# Patient Record
Sex: MALE | Race: ASIAN | Employment: UNEMPLOYED | URBAN - METROPOLITAN AREA
[De-identification: names, ages, dates, MRNs, and addresses within clinical notes are randomized per-mention and may not be internally consistent; named-entity substitution may affect disease eponyms.]

---

## 2023-07-03 ENCOUNTER — APPOINTMENT (OUTPATIENT)
Dept: GENERAL RADIOLOGY | Facility: CLINIC | Age: 38
End: 2023-07-03
Attending: STUDENT IN AN ORGANIZED HEALTH CARE EDUCATION/TRAINING PROGRAM
Payer: COMMERCIAL

## 2023-07-03 ENCOUNTER — HOSPITAL ENCOUNTER (INPATIENT)
Facility: CLINIC | Age: 38
LOS: 17 days | Discharge: HOME | End: 2023-07-20
Attending: STUDENT IN AN ORGANIZED HEALTH CARE EDUCATION/TRAINING PROGRAM | Admitting: STUDENT IN AN ORGANIZED HEALTH CARE EDUCATION/TRAINING PROGRAM
Payer: COMMERCIAL

## 2023-07-03 DIAGNOSIS — Z79.899 RECEIVING INOTROPIC MEDICATION: ICD-10-CM

## 2023-07-03 DIAGNOSIS — R07.82 INTERCOSTAL PAIN: ICD-10-CM

## 2023-07-03 DIAGNOSIS — F41.9 ANXIETY: ICD-10-CM

## 2023-07-03 DIAGNOSIS — E61.1 IRON DEFICIENCY: ICD-10-CM

## 2023-07-03 DIAGNOSIS — I42.7: ICD-10-CM

## 2023-07-03 DIAGNOSIS — R57.0 CARDIOGENIC SHOCK (H): Primary | ICD-10-CM

## 2023-07-03 LAB
ABO/RH(D): NORMAL
ALBUMIN SERPL BCG-MCNC: 3.6 G/DL (ref 3.5–5.2)
ALP SERPL-CCNC: 141 U/L (ref 40–129)
ALT SERPL W P-5'-P-CCNC: 554 U/L (ref 0–70)
ANION GAP SERPL CALCULATED.3IONS-SCNC: 11 MMOL/L (ref 7–15)
ANTIBODY SCREEN: NEGATIVE
AST SERPL W P-5'-P-CCNC: 128 U/L (ref 0–45)
BASE EXCESS BLDV CALC-SCNC: 3.2 MMOL/L (ref -7.7–1.9)
BASE EXCESS BLDV CALC-SCNC: 3.6 MMOL/L (ref -7.7–1.9)
BASOPHILS # BLD AUTO: 0.1 10E3/UL (ref 0–0.2)
BASOPHILS NFR BLD AUTO: 1 %
BILIRUB SERPL-MCNC: 0.9 MG/DL
BUN SERPL-MCNC: 19 MG/DL (ref 6–20)
CALCIUM SERPL-MCNC: 8.9 MG/DL (ref 8.6–10)
CHLORIDE SERPL-SCNC: 99 MMOL/L (ref 98–107)
CREAT SERPL-MCNC: 1.01 MG/DL (ref 0.67–1.17)
DEPRECATED HCO3 PLAS-SCNC: 24 MMOL/L (ref 22–29)
EOSINOPHIL # BLD AUTO: 0.2 10E3/UL (ref 0–0.7)
EOSINOPHIL NFR BLD AUTO: 3 %
ERYTHROCYTE [DISTWIDTH] IN BLOOD BY AUTOMATED COUNT: 22.2 % (ref 10–15)
GFR SERPL CREATININE-BSD FRML MDRD: >90 ML/MIN/1.73M2
GLUCOSE BLDC GLUCOMTR-MCNC: 100 MG/DL (ref 70–99)
GLUCOSE SERPL-MCNC: 98 MG/DL (ref 70–99)
HCO3 BLDV-SCNC: 27 MMOL/L (ref 21–28)
HCO3 BLDV-SCNC: 27 MMOL/L (ref 21–28)
HCT VFR BLD AUTO: 35.3 % (ref 40–53)
HGB BLD-MCNC: 10.3 G/DL (ref 13.3–17.7)
IMM GRANULOCYTES # BLD: 0 10E3/UL
IMM GRANULOCYTES NFR BLD: 0 %
INR PPP: 1.3 (ref 0.85–1.15)
LYMPHOCYTES # BLD AUTO: 2.2 10E3/UL (ref 0.8–5.3)
LYMPHOCYTES NFR BLD AUTO: 33 %
MAGNESIUM SERPL-MCNC: 1.9 MG/DL (ref 1.7–2.3)
MCH RBC QN AUTO: 20.6 PG (ref 26.5–33)
MCHC RBC AUTO-ENTMCNC: 29.2 G/DL (ref 31.5–36.5)
MCV RBC AUTO: 71 FL (ref 78–100)
MONOCYTES # BLD AUTO: 0.9 10E3/UL (ref 0–1.3)
MONOCYTES NFR BLD AUTO: 13 %
NEUTROPHILS # BLD AUTO: 3.3 10E3/UL (ref 1.6–8.3)
NEUTROPHILS NFR BLD AUTO: 50 %
NRBC # BLD AUTO: 0 10E3/UL
NRBC BLD AUTO-RTO: 0 /100
NT-PROBNP SERPL-MCNC: 1773 PG/ML (ref 0–450)
O2/TOTAL GAS SETTING VFR VENT: 21 %
O2/TOTAL GAS SETTING VFR VENT: 21 %
OXYHGB MFR BLDV: 43 % (ref 70–75)
OXYHGB MFR BLDV: 63 % (ref 70–75)
PCO2 BLDV: 37 MM HG (ref 40–50)
PCO2 BLDV: 38 MM HG (ref 40–50)
PH BLDV: 7.47 [PH] (ref 7.32–7.43)
PH BLDV: 7.48 [PH] (ref 7.32–7.43)
PLATELET # BLD AUTO: 379 10E3/UL (ref 150–450)
PO2 BLDV: 27 MM HG (ref 25–47)
PO2 BLDV: 36 MM HG (ref 25–47)
POTASSIUM SERPL-SCNC: 3.8 MMOL/L (ref 3.4–5.3)
PROT SERPL-MCNC: 7.6 G/DL (ref 6.4–8.3)
RBC # BLD AUTO: 5.01 10E6/UL (ref 4.4–5.9)
SODIUM SERPL-SCNC: 134 MMOL/L (ref 136–145)
SPECIMEN EXPIRATION DATE: NORMAL
TROPONIN T SERPL HS-MCNC: 63 NG/L
WBC # BLD AUTO: 6.7 10E3/UL (ref 4–11)

## 2023-07-03 PROCEDURE — 84484 ASSAY OF TROPONIN QUANT: CPT | Performed by: STUDENT IN AN ORGANIZED HEALTH CARE EDUCATION/TRAINING PROGRAM

## 2023-07-03 PROCEDURE — 83735 ASSAY OF MAGNESIUM: CPT | Performed by: STUDENT IN AN ORGANIZED HEALTH CARE EDUCATION/TRAINING PROGRAM

## 2023-07-03 PROCEDURE — 250N000009 HC RX 250: Performed by: STUDENT IN AN ORGANIZED HEALTH CARE EDUCATION/TRAINING PROGRAM

## 2023-07-03 PROCEDURE — 83880 ASSAY OF NATRIURETIC PEPTIDE: CPT | Performed by: STUDENT IN AN ORGANIZED HEALTH CARE EDUCATION/TRAINING PROGRAM

## 2023-07-03 PROCEDURE — 82728 ASSAY OF FERRITIN: CPT | Performed by: STUDENT IN AN ORGANIZED HEALTH CARE EDUCATION/TRAINING PROGRAM

## 2023-07-03 PROCEDURE — 85610 PROTHROMBIN TIME: CPT | Performed by: STUDENT IN AN ORGANIZED HEALTH CARE EDUCATION/TRAINING PROGRAM

## 2023-07-03 PROCEDURE — 250N000011 HC RX IP 250 OP 636: Performed by: STUDENT IN AN ORGANIZED HEALTH CARE EDUCATION/TRAINING PROGRAM

## 2023-07-03 PROCEDURE — 250N000011 HC RX IP 250 OP 636: Mod: JZ | Performed by: STUDENT IN AN ORGANIZED HEALTH CARE EDUCATION/TRAINING PROGRAM

## 2023-07-03 PROCEDURE — 999N000065 XR CHEST PORT 1 VIEW

## 2023-07-03 PROCEDURE — 250N000013 HC RX MED GY IP 250 OP 250 PS 637: Performed by: STUDENT IN AN ORGANIZED HEALTH CARE EDUCATION/TRAINING PROGRAM

## 2023-07-03 PROCEDURE — 86850 RBC ANTIBODY SCREEN: CPT | Performed by: STUDENT IN AN ORGANIZED HEALTH CARE EDUCATION/TRAINING PROGRAM

## 2023-07-03 PROCEDURE — 85025 COMPLETE CBC W/AUTO DIFF WBC: CPT | Performed by: STUDENT IN AN ORGANIZED HEALTH CARE EDUCATION/TRAINING PROGRAM

## 2023-07-03 PROCEDURE — 200N000002 HC R&B ICU UMMC

## 2023-07-03 PROCEDURE — 80053 COMPREHEN METABOLIC PANEL: CPT | Performed by: STUDENT IN AN ORGANIZED HEALTH CARE EDUCATION/TRAINING PROGRAM

## 2023-07-03 PROCEDURE — 83550 IRON BINDING TEST: CPT | Performed by: STUDENT IN AN ORGANIZED HEALTH CARE EDUCATION/TRAINING PROGRAM

## 2023-07-03 PROCEDURE — 71045 X-RAY EXAM CHEST 1 VIEW: CPT | Mod: 26 | Performed by: RADIOLOGY

## 2023-07-03 PROCEDURE — 82805 BLOOD GASES W/O2 SATURATION: CPT | Performed by: STUDENT IN AN ORGANIZED HEALTH CARE EDUCATION/TRAINING PROGRAM

## 2023-07-03 PROCEDURE — 99207 PR SERVICE NOT STAFFED W/SUPERV PROV: CPT | Performed by: STUDENT IN AN ORGANIZED HEALTH CARE EDUCATION/TRAINING PROGRAM

## 2023-07-03 PROCEDURE — 93005 ELECTROCARDIOGRAM TRACING: CPT

## 2023-07-03 RX ORDER — POTASSIUM CHLORIDE 750 MG/1
20 TABLET, EXTENDED RELEASE ORAL ONCE
Status: COMPLETED | OUTPATIENT
Start: 2023-07-04 | End: 2023-07-03

## 2023-07-03 RX ORDER — BUMETANIDE 0.25 MG/ML
0.5 INJECTION INTRAMUSCULAR; INTRAVENOUS ONCE
Status: DISCONTINUED | OUTPATIENT
Start: 2023-07-03 | End: 2023-07-04

## 2023-07-03 RX ORDER — MAGNESIUM SULFATE HEPTAHYDRATE 40 MG/ML
2 INJECTION, SOLUTION INTRAVENOUS ONCE
Status: COMPLETED | OUTPATIENT
Start: 2023-07-04 | End: 2023-07-04

## 2023-07-03 RX ORDER — DOBUTAMINE HYDROCHLORIDE 200 MG/100ML
7.5 INJECTION INTRAVENOUS CONTINUOUS
Status: DISPENSED | OUTPATIENT
Start: 2023-07-03 | End: 2023-07-04

## 2023-07-03 RX ADMIN — DOBUTAMINE HYDROCHLORIDE 7.5 MCG/KG/MIN: 200 INJECTION INTRAVENOUS at 23:32

## 2023-07-03 RX ADMIN — Medication 0.5 MG/HR: at 23:46

## 2023-07-03 RX ADMIN — POTASSIUM CHLORIDE 20 MEQ: 750 TABLET, EXTENDED RELEASE ORAL at 23:58

## 2023-07-03 RX ADMIN — MAGNESIUM SULFATE IN WATER 2 G: 40 INJECTION, SOLUTION INTRAVENOUS at 23:51

## 2023-07-03 ASSESSMENT — ACTIVITIES OF DAILY LIVING (ADL): ADLS_ACUITY_SCORE: 35

## 2023-07-03 NOTE — LETTER
Transition Communication Hand-off for Care Transitions to Next Level of Care Provider    Name: Jose Enrique Engel  : 1985  MRN #: [id number]  Primary Care Provider: KUSH NO     Primary Clinic: [address] [facility] [address]     Reason for Hospitalization:  Cardiogenic shock (H) [R57.0]  Admit Date/Time: 7/3/2023  9:30 PM  Discharge Date:23  Payor Source: Payor: [employer] / Plan: [employer] PMAP / Product Type: HMO /     Reason for Communication Hand-off Referral: Fragility  Multiple providers/specialties    Discharge Plan: Home with Dobutamine infusion, labs and line care in clinic and outpatient cardiac rehab.        Concern for non-adherence with plan of care:   No  Discharge Needs Assessment:  Needs      Flowsheet Row Most Recent Value   Anticipated Changes Related to Illness none   Equipment Currently Used at Home none   # of Referrals Placed by CM External Care Coordination, Home Infusion            Already enrolled in Tele-monitoring program and name of program:    Follow-up specialty is recommended: Yes    Follow-up plan:    Future Appointments   Date Time Provider Department Center   2023  9:30 AM  CV DEVICE 1 UCCVCV [facility]   2023 12:45 PM UC LAB UCLABR [facility]   2023  1:15 PM Heather Dove MD [facility]       Any outstanding tests or procedures:        Referrals       Future Labs/Procedures    Home Infusion Referral     Comments:    [facility]  Phone # [phone]  Fax # [phone]              Goyal Recommendations:      Ghada Treadwell RN    AVS/Discharge Summary is the source of truth; this is a helpful guide for improved communication of patient story

## 2023-07-04 ENCOUNTER — APPOINTMENT (OUTPATIENT)
Dept: CARDIOLOGY | Facility: CLINIC | Age: 38
End: 2023-07-04
Attending: STUDENT IN AN ORGANIZED HEALTH CARE EDUCATION/TRAINING PROGRAM
Payer: COMMERCIAL

## 2023-07-04 LAB
ALBUMIN SERPL BCG-MCNC: 3.5 G/DL (ref 3.5–5.2)
ALBUMIN SERPL BCG-MCNC: 3.7 G/DL (ref 3.5–5.2)
ALP SERPL-CCNC: 139 U/L (ref 40–129)
ALP SERPL-CCNC: 147 U/L (ref 40–129)
ALT SERPL W P-5'-P-CCNC: 452 U/L (ref 0–70)
ALT SERPL W P-5'-P-CCNC: 540 U/L (ref 0–70)
ANION GAP SERPL CALCULATED.3IONS-SCNC: 11 MMOL/L (ref 7–15)
ANION GAP SERPL CALCULATED.3IONS-SCNC: 13 MMOL/L (ref 7–15)
APTT PPP: 44 SECONDS (ref 22–38)
APTT PPP: 92 SECONDS (ref 22–38)
AST SERPL W P-5'-P-CCNC: 122 U/L (ref 0–45)
AST SERPL W P-5'-P-CCNC: 97 U/L (ref 0–45)
BASE EXCESS BLDV CALC-SCNC: 6.8 MMOL/L (ref -7.7–1.9)
BASE EXCESS BLDV CALC-SCNC: 7 MMOL/L (ref -7.7–1.9)
BASE EXCESS BLDV CALC-SCNC: 7.2 MMOL/L (ref -7.7–1.9)
BASE EXCESS BLDV CALC-SCNC: 7.2 MMOL/L (ref -7.7–1.9)
BASE EXCESS BLDV CALC-SCNC: 8 MMOL/L (ref -7.7–1.9)
BI-PLANE LVEF ECHO: NORMAL
BILIRUB SERPL-MCNC: 1 MG/DL
BILIRUB SERPL-MCNC: 1.1 MG/DL
BUN SERPL-MCNC: 18 MG/DL (ref 6–20)
BUN SERPL-MCNC: 20.9 MG/DL (ref 6–20)
CALCIUM SERPL-MCNC: 8.7 MG/DL (ref 8.6–10)
CALCIUM SERPL-MCNC: 9 MG/DL (ref 8.6–10)
CHLORIDE SERPL-SCNC: 95 MMOL/L (ref 98–107)
CHLORIDE SERPL-SCNC: 96 MMOL/L (ref 98–107)
CHOLEST SERPL-MCNC: 117 MG/DL
CREAT SERPL-MCNC: 1 MG/DL (ref 0.67–1.17)
CREAT SERPL-MCNC: 1.06 MG/DL (ref 0.67–1.17)
DEPRECATED HCO3 PLAS-SCNC: 26 MMOL/L (ref 22–29)
DEPRECATED HCO3 PLAS-SCNC: 27 MMOL/L (ref 22–29)
ERYTHROCYTE [DISTWIDTH] IN BLOOD BY AUTOMATED COUNT: 22.2 % (ref 10–15)
FERRITIN SERPL-MCNC: 49 NG/ML (ref 31–409)
GFR SERPL CREATININE-BSD FRML MDRD: >90 ML/MIN/1.73M2
GFR SERPL CREATININE-BSD FRML MDRD: >90 ML/MIN/1.73M2
GLUCOSE BLDC GLUCOMTR-MCNC: 108 MG/DL (ref 70–99)
GLUCOSE BLDC GLUCOMTR-MCNC: 96 MG/DL (ref 70–99)
GLUCOSE SERPL-MCNC: 101 MG/DL (ref 70–99)
GLUCOSE SERPL-MCNC: 93 MG/DL (ref 70–99)
HCO3 BLDV-SCNC: 31 MMOL/L (ref 21–28)
HCO3 BLDV-SCNC: 32 MMOL/L (ref 21–28)
HCO3 BLDV-SCNC: 33 MMOL/L (ref 21–28)
HCT VFR BLD AUTO: 36.3 % (ref 40–53)
HDLC SERPL-MCNC: 43 MG/DL
HGB BLD-MCNC: 10.5 G/DL (ref 13.3–17.7)
IRON BINDING CAPACITY (ROCHE): 449 UG/DL (ref 240–430)
IRON SATN MFR SERPL: 3 % (ref 15–46)
IRON SERPL-MCNC: 15 UG/DL (ref 61–157)
LDLC SERPL CALC-MCNC: 65 MG/DL
LVEF ECHO: NORMAL
MAGNESIUM SERPL-MCNC: 2.1 MG/DL (ref 1.7–2.3)
MAGNESIUM SERPL-MCNC: 2.4 MG/DL (ref 1.7–2.3)
MCH RBC QN AUTO: 20.3 PG (ref 26.5–33)
MCHC RBC AUTO-ENTMCNC: 28.9 G/DL (ref 31.5–36.5)
MCV RBC AUTO: 70 FL (ref 78–100)
NONHDLC SERPL-MCNC: 74 MG/DL
O2/TOTAL GAS SETTING VFR VENT: 21 %
OXYHGB MFR BLDV: 45 % (ref 70–75)
OXYHGB MFR BLDV: 53 % (ref 70–75)
OXYHGB MFR BLDV: 58 % (ref 70–75)
OXYHGB MFR BLDV: 58 % (ref 70–75)
OXYHGB MFR BLDV: 63 % (ref 70–75)
PCO2 BLDV: 42 MM HG (ref 40–50)
PCO2 BLDV: 43 MM HG (ref 40–50)
PCO2 BLDV: 44 MM HG (ref 40–50)
PH BLDV: 7.47 [PH] (ref 7.32–7.43)
PH BLDV: 7.47 [PH] (ref 7.32–7.43)
PH BLDV: 7.48 [PH] (ref 7.32–7.43)
PH BLDV: 7.48 [PH] (ref 7.32–7.43)
PH BLDV: 7.49 [PH] (ref 7.32–7.43)
PHOSPHATE SERPL-MCNC: 4.5 MG/DL (ref 2.5–4.5)
PLATELET # BLD AUTO: 397 10E3/UL (ref 150–450)
PO2 BLDV: 28 MM HG (ref 25–47)
PO2 BLDV: 31 MM HG (ref 25–47)
PO2 BLDV: 34 MM HG (ref 25–47)
PO2 BLDV: 34 MM HG (ref 25–47)
PO2 BLDV: 37 MM HG (ref 25–47)
POTASSIUM SERPL-SCNC: 3.3 MMOL/L (ref 3.4–5.3)
POTASSIUM SERPL-SCNC: 3.5 MMOL/L (ref 3.4–5.3)
POTASSIUM SERPL-SCNC: 3.8 MMOL/L (ref 3.4–5.3)
POTASSIUM SERPL-SCNC: 3.8 MMOL/L (ref 3.4–5.3)
POTASSIUM SERPL-SCNC: 4 MMOL/L (ref 3.4–5.3)
PROT SERPL-MCNC: 7.5 G/DL (ref 6.4–8.3)
PROT SERPL-MCNC: 7.9 G/DL (ref 6.4–8.3)
RBC # BLD AUTO: 5.17 10E6/UL (ref 4.4–5.9)
SODIUM SERPL-SCNC: 132 MMOL/L (ref 136–145)
SODIUM SERPL-SCNC: 136 MMOL/L (ref 136–145)
TRIGL SERPL-MCNC: 47 MG/DL
UFH PPP CHRO-ACNC: 0.89 IU/ML
WBC # BLD AUTO: 8.2 10E3/UL (ref 4–11)

## 2023-07-04 PROCEDURE — 999N000208 ECHOCARDIOGRAM COMPLETE

## 2023-07-04 PROCEDURE — 250N000011 HC RX IP 250 OP 636: Mod: JZ | Performed by: STUDENT IN AN ORGANIZED HEALTH CARE EDUCATION/TRAINING PROGRAM

## 2023-07-04 PROCEDURE — 85027 COMPLETE CBC AUTOMATED: CPT | Performed by: STUDENT IN AN ORGANIZED HEALTH CARE EDUCATION/TRAINING PROGRAM

## 2023-07-04 PROCEDURE — 85520 HEPARIN ASSAY: CPT | Performed by: STUDENT IN AN ORGANIZED HEALTH CARE EDUCATION/TRAINING PROGRAM

## 2023-07-04 PROCEDURE — 85730 THROMBOPLASTIN TIME PARTIAL: CPT | Performed by: STUDENT IN AN ORGANIZED HEALTH CARE EDUCATION/TRAINING PROGRAM

## 2023-07-04 PROCEDURE — 250N000009 HC RX 250: Performed by: STUDENT IN AN ORGANIZED HEALTH CARE EDUCATION/TRAINING PROGRAM

## 2023-07-04 PROCEDURE — 258N000003 HC RX IP 258 OP 636: Performed by: STUDENT IN AN ORGANIZED HEALTH CARE EDUCATION/TRAINING PROGRAM

## 2023-07-04 PROCEDURE — 84100 ASSAY OF PHOSPHORUS: CPT | Performed by: STUDENT IN AN ORGANIZED HEALTH CARE EDUCATION/TRAINING PROGRAM

## 2023-07-04 PROCEDURE — 80061 LIPID PANEL: CPT | Performed by: STUDENT IN AN ORGANIZED HEALTH CARE EDUCATION/TRAINING PROGRAM

## 2023-07-04 PROCEDURE — 84450 TRANSFERASE (AST) (SGOT): CPT | Performed by: INTERNAL MEDICINE

## 2023-07-04 PROCEDURE — 84132 ASSAY OF SERUM POTASSIUM: CPT | Performed by: STUDENT IN AN ORGANIZED HEALTH CARE EDUCATION/TRAINING PROGRAM

## 2023-07-04 PROCEDURE — 99291 CRITICAL CARE FIRST HOUR: CPT | Mod: GC | Performed by: STUDENT IN AN ORGANIZED HEALTH CARE EDUCATION/TRAINING PROGRAM

## 2023-07-04 PROCEDURE — 255N000002 HC RX 255 OP 636: Performed by: INTERNAL MEDICINE

## 2023-07-04 PROCEDURE — 200N000002 HC R&B ICU UMMC

## 2023-07-04 PROCEDURE — 250N000013 HC RX MED GY IP 250 OP 250 PS 637

## 2023-07-04 PROCEDURE — 93306 TTE W/DOPPLER COMPLETE: CPT | Mod: 26 | Performed by: INTERNAL MEDICINE

## 2023-07-04 PROCEDURE — 80053 COMPREHEN METABOLIC PANEL: CPT | Performed by: STUDENT IN AN ORGANIZED HEALTH CARE EDUCATION/TRAINING PROGRAM

## 2023-07-04 PROCEDURE — 250N000013 HC RX MED GY IP 250 OP 250 PS 637: Performed by: STUDENT IN AN ORGANIZED HEALTH CARE EDUCATION/TRAINING PROGRAM

## 2023-07-04 PROCEDURE — 258N000003 HC RX IP 258 OP 636

## 2023-07-04 PROCEDURE — 250N000011 HC RX IP 250 OP 636: Mod: JZ

## 2023-07-04 PROCEDURE — 83735 ASSAY OF MAGNESIUM: CPT | Performed by: STUDENT IN AN ORGANIZED HEALTH CARE EDUCATION/TRAINING PROGRAM

## 2023-07-04 PROCEDURE — 999N000045 HC STATISTIC DAILY SWAN MONITORING

## 2023-07-04 PROCEDURE — 82805 BLOOD GASES W/O2 SATURATION: CPT | Performed by: STUDENT IN AN ORGANIZED HEALTH CARE EDUCATION/TRAINING PROGRAM

## 2023-07-04 RX ORDER — ISOSORBIDE DINITRATE 10 MG/1
20 TABLET ORAL
Status: DISCONTINUED | OUTPATIENT
Start: 2023-07-04 | End: 2023-07-06

## 2023-07-04 RX ORDER — GABAPENTIN 300 MG/1
600 CAPSULE ORAL EVERY 8 HOURS PRN
Status: DISCONTINUED | OUTPATIENT
Start: 2023-07-04 | End: 2023-07-04

## 2023-07-04 RX ORDER — GABAPENTIN 300 MG/1
900 CAPSULE ORAL EVERY 8 HOURS
Status: DISCONTINUED | OUTPATIENT
Start: 2023-07-04 | End: 2023-07-04

## 2023-07-04 RX ORDER — ONDANSETRON 4 MG/1
4 TABLET, ORALLY DISINTEGRATING ORAL EVERY 4 HOURS PRN
Status: ON HOLD | COMMUNITY
End: 2023-07-17

## 2023-07-04 RX ORDER — GABAPENTIN 600 MG/1
1200 TABLET ORAL ONCE
Status: DISCONTINUED | OUTPATIENT
Start: 2023-07-04 | End: 2023-07-04

## 2023-07-04 RX ORDER — GABAPENTIN 300 MG/1
900 CAPSULE ORAL EVERY 8 HOURS PRN
Status: DISCONTINUED | OUTPATIENT
Start: 2023-07-04 | End: 2023-07-04

## 2023-07-04 RX ORDER — ACETAMINOPHEN 325 MG/1
325 TABLET ORAL EVERY 4 HOURS PRN
Status: DISCONTINUED | OUTPATIENT
Start: 2023-07-04 | End: 2023-07-13

## 2023-07-04 RX ORDER — GABAPENTIN 300 MG/1
300 CAPSULE ORAL EVERY 8 HOURS PRN
Status: DISCONTINUED | OUTPATIENT
Start: 2023-07-04 | End: 2023-07-04

## 2023-07-04 RX ORDER — GABAPENTIN 300 MG/1
300 CAPSULE ORAL EVERY 8 HOURS
Status: DISCONTINUED | OUTPATIENT
Start: 2023-07-09 | End: 2023-07-04

## 2023-07-04 RX ORDER — SPIRONOLACTONE 25 MG/1
12.5 TABLET ORAL DAILY
Status: ON HOLD | COMMUNITY
End: 2023-07-19

## 2023-07-04 RX ORDER — GABAPENTIN 100 MG/1
100 CAPSULE ORAL EVERY 8 HOURS
Status: DISCONTINUED | OUTPATIENT
Start: 2023-07-11 | End: 2023-07-04

## 2023-07-04 RX ORDER — SILDENAFIL 100 MG/1
100 TABLET, FILM COATED ORAL DAILY PRN
Status: ON HOLD | COMMUNITY
End: 2023-07-19

## 2023-07-04 RX ORDER — HYDRALAZINE HYDROCHLORIDE 25 MG/1
25 TABLET, FILM COATED ORAL 4 TIMES DAILY
Status: DISCONTINUED | OUTPATIENT
Start: 2023-07-04 | End: 2023-07-06

## 2023-07-04 RX ORDER — GABAPENTIN 600 MG/1
1200 TABLET ORAL
Status: DISCONTINUED | OUTPATIENT
Start: 2023-07-04 | End: 2023-07-04

## 2023-07-04 RX ORDER — LISINOPRIL 2.5 MG/1
2.5 TABLET ORAL DAILY
Status: ON HOLD | COMMUNITY
End: 2023-07-19

## 2023-07-04 RX ORDER — RAMELTEON 8 MG/1
8 TABLET ORAL AT BEDTIME
Status: DISCONTINUED | OUTPATIENT
Start: 2023-07-04 | End: 2023-07-20 | Stop reason: HOSPADM

## 2023-07-04 RX ORDER — HEPARIN SODIUM 10000 [USP'U]/100ML
0-5000 INJECTION, SOLUTION INTRAVENOUS CONTINUOUS
Status: DISCONTINUED | OUTPATIENT
Start: 2023-07-04 | End: 2023-07-04

## 2023-07-04 RX ORDER — GABAPENTIN 300 MG/1
600 CAPSULE ORAL EVERY 8 HOURS
Status: DISCONTINUED | OUTPATIENT
Start: 2023-07-07 | End: 2023-07-04

## 2023-07-04 RX ORDER — GABAPENTIN 100 MG/1
100 CAPSULE ORAL EVERY 8 HOURS PRN
Status: DISCONTINUED | OUTPATIENT
Start: 2023-07-04 | End: 2023-07-04

## 2023-07-04 RX ORDER — MULTIPLE VITAMINS W/ MINERALS TAB 9MG-400MCG
1 TAB ORAL DAILY
Status: DISCONTINUED | OUTPATIENT
Start: 2023-07-04 | End: 2023-07-20 | Stop reason: HOSPADM

## 2023-07-04 RX ORDER — POTASSIUM CHLORIDE 29.8 MG/ML
20 INJECTION INTRAVENOUS
Status: COMPLETED | OUTPATIENT
Start: 2023-07-04 | End: 2023-07-04

## 2023-07-04 RX ORDER — FUROSEMIDE 20 MG
20 TABLET ORAL DAILY PRN
Status: ON HOLD | COMMUNITY
End: 2023-07-19

## 2023-07-04 RX ORDER — POLYETHYLENE GLYCOL 3350 17 G/17G
17 POWDER, FOR SOLUTION ORAL DAILY
Status: DISCONTINUED | OUTPATIENT
Start: 2023-07-04 | End: 2023-07-20 | Stop reason: HOSPADM

## 2023-07-04 RX ORDER — HEPARIN SODIUM 10000 [USP'U]/100ML
0-5000 INJECTION, SOLUTION INTRAVENOUS CONTINUOUS
Status: DISCONTINUED | OUTPATIENT
Start: 2023-07-04 | End: 2023-07-07

## 2023-07-04 RX ORDER — METHYLPREDNISOLONE SODIUM SUCCINATE 125 MG/2ML
125 INJECTION, POWDER, LYOPHILIZED, FOR SOLUTION INTRAMUSCULAR; INTRAVENOUS
Status: DISCONTINUED | OUTPATIENT
Start: 2023-07-04 | End: 2023-07-09

## 2023-07-04 RX ORDER — DOBUTAMINE HCL IN DEXTROSE 5 % 500MG/250
5 INTRAVENOUS SOLUTION INTRAVENOUS CONTINUOUS
Status: DISCONTINUED | OUTPATIENT
Start: 2023-07-04 | End: 2023-07-20

## 2023-07-04 RX ORDER — POTASSIUM CHLORIDE 29.8 MG/ML
20 INJECTION INTRAVENOUS ONCE
Status: COMPLETED | OUTPATIENT
Start: 2023-07-04 | End: 2023-07-04

## 2023-07-04 RX ORDER — DIPHENHYDRAMINE HYDROCHLORIDE 50 MG/ML
50 INJECTION INTRAMUSCULAR; INTRAVENOUS
Status: DISCONTINUED | OUTPATIENT
Start: 2023-07-04 | End: 2023-07-09

## 2023-07-04 RX ORDER — FOLIC ACID 1 MG/1
1 TABLET ORAL DAILY
Status: DISCONTINUED | OUTPATIENT
Start: 2023-07-04 | End: 2023-07-20 | Stop reason: HOSPADM

## 2023-07-04 RX ORDER — METOPROLOL SUCCINATE 25 MG/1
12.5 TABLET, EXTENDED RELEASE ORAL DAILY
Status: ON HOLD | COMMUNITY
End: 2023-07-19

## 2023-07-04 RX ORDER — FERROUS SULFATE 325(65) MG
325 TABLET, DELAYED RELEASE (ENTERIC COATED) ORAL 2 TIMES DAILY
Status: ON HOLD | COMMUNITY
End: 2023-07-19

## 2023-07-04 RX ORDER — POTASSIUM CHLORIDE 750 MG/1
20 TABLET, EXTENDED RELEASE ORAL ONCE
Status: COMPLETED | OUTPATIENT
Start: 2023-07-04 | End: 2023-07-04

## 2023-07-04 RX ADMIN — POTASSIUM CHLORIDE 20 MEQ: 750 TABLET, EXTENDED RELEASE ORAL at 05:45

## 2023-07-04 RX ADMIN — DOBUTAMINE 7.5 MCG/KG/MIN: 12.5 INJECTION, SOLUTION, CONCENTRATE INTRAVENOUS at 17:29

## 2023-07-04 RX ADMIN — ISOSORBIDE DINITRATE 20 MG: 10 TABLET ORAL at 17:28

## 2023-07-04 RX ADMIN — POTASSIUM CHLORIDE 20 MEQ: 29.8 INJECTION, SOLUTION INTRAVENOUS at 09:45

## 2023-07-04 RX ADMIN — HEPARIN SODIUM 1600 UNITS/HR: 10000 INJECTION, SOLUTION INTRAVENOUS at 01:23

## 2023-07-04 RX ADMIN — HYDRALAZINE HYDROCHLORIDE 25 MG: 25 TABLET, FILM COATED ORAL at 17:28

## 2023-07-04 RX ADMIN — ISOSORBIDE DINITRATE 20 MG: 10 TABLET ORAL at 09:47

## 2023-07-04 RX ADMIN — IRON SUCROSE 200 MG: 20 INJECTION, SOLUTION INTRAVENOUS at 11:10

## 2023-07-04 RX ADMIN — SODIUM NITROPRUSSIDE 0.25 MCG/KG/MIN: 25 INJECTION, SOLUTION, CONCENTRATE INTRAVENOUS at 00:45

## 2023-07-04 RX ADMIN — HYDRALAZINE HYDROCHLORIDE 25 MG: 25 TABLET, FILM COATED ORAL at 13:30

## 2023-07-04 RX ADMIN — POTASSIUM CHLORIDE 20 MEQ: 29.8 INJECTION, SOLUTION INTRAVENOUS at 10:48

## 2023-07-04 RX ADMIN — DOBUTAMINE HYDROCHLORIDE 7.5 MCG/KG/MIN: 200 INJECTION INTRAVENOUS at 11:09

## 2023-07-04 RX ADMIN — HYDRALAZINE HYDROCHLORIDE 25 MG: 25 TABLET, FILM COATED ORAL at 09:46

## 2023-07-04 RX ADMIN — THIAMINE HCL TAB 100 MG 100 MG: 100 TAB at 09:46

## 2023-07-04 RX ADMIN — ISOSORBIDE DINITRATE 20 MG: 10 TABLET ORAL at 13:29

## 2023-07-04 RX ADMIN — HUMAN ALBUMIN MICROSPHERES AND PERFLUTREN 5 ML: 10; .22 INJECTION, SOLUTION INTRAVENOUS at 08:19

## 2023-07-04 RX ADMIN — Medication 1 TABLET: at 09:47

## 2023-07-04 RX ADMIN — POTASSIUM CHLORIDE 20 MEQ: 29.8 INJECTION, SOLUTION INTRAVENOUS at 18:51

## 2023-07-04 RX ADMIN — FOLIC ACID 1 MG: 1 TABLET ORAL at 09:46

## 2023-07-04 RX ADMIN — RAMELTEON 8 MG: 8 TABLET, FILM COATED ORAL at 00:52

## 2023-07-04 RX ADMIN — HEPARIN SODIUM 1900 UNITS/HR: 10000 INJECTION, SOLUTION INTRAVENOUS at 16:21

## 2023-07-04 RX ADMIN — HYDRALAZINE HYDROCHLORIDE 25 MG: 25 TABLET, FILM COATED ORAL at 20:02

## 2023-07-04 ASSESSMENT — ACTIVITIES OF DAILY LIVING (ADL)
DRESSING/BATHING_DIFFICULTY: NO
ADLS_ACUITY_SCORE: 25
ADLS_ACUITY_SCORE: 25
CONCENTRATING,_REMEMBERING_OR_MAKING_DECISIONS_DIFFICULTY: YES
ADLS_ACUITY_SCORE: 35
ADLS_ACUITY_SCORE: 25
WEAR_GLASSES_OR_BLIND: NO
ADLS_ACUITY_SCORE: 25
WALKING_OR_CLIMBING_STAIRS_DIFFICULTY: NO
ADLS_ACUITY_SCORE: 25
CHANGE_IN_FUNCTIONAL_STATUS_SINCE_ONSET_OF_CURRENT_ILLNESS/INJURY: NO
ADLS_ACUITY_SCORE: 25
TOILETING_ISSUES: NO
ADLS_ACUITY_SCORE: 25
ADLS_ACUITY_SCORE: 25
FALL_HISTORY_WITHIN_LAST_SIX_MONTHS: NO
DOING_ERRANDS_INDEPENDENTLY_DIFFICULTY: NO
DIFFICULTY_EATING/SWALLOWING: NO
ADLS_ACUITY_SCORE: 25
ADLS_ACUITY_SCORE: 35
ADLS_ACUITY_SCORE: 25

## 2023-07-04 NOTE — PROGRESS NOTES
Cardiology ICU Progress Note    Brief HPI:  Jose Enrique is a 38-year-old gentleman with past medical history of ADHD who was transferred from [facility] on 7/3/23 due to cardiogenic shock.      Subjective and Interval:  -admitted last night, switched NTG to nipride  -diuresing well   -discussed substance use at length this am    Assessment and plan by system:  Today's changes:  -add hydralazine 25 mg QID   -IV iron  -continue bumex 0.5 mg/hr   -continue dobutamine 7.5 mcg/kg/min  -continue heparin for LV thrombus   -wean nipride after starting hydralazine   -q6h hemodynamics        Neurology  #ADHD  Monitor for now    #Alcohol use disorder  #Methamphetamine use  -addiction med consult once he is more stable and getting close to leaving ICU   -CIWA, thiamine, folate, MVI, no benzos for now since his last drink he says was ~1 month ago      Cardiovascular  #Cardiogenic Shock  #HFrEF (EF 10%) secondary to NICM possibly from drug use/alcohol use disorders   #LV mural thrombus  Pt with recently diagnosed heart failure presenting with cardiogenic shock (CO/CI 3.0/1.43 on 7/3) to [facility], transferred to [facility] for further care on 7/3. Coronary angiogram on 6/14 showed no obstructive CAD. Hemodynamics have improved while on Dobutamine, NTG and bumex drips. 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 Luckily minimal end organ damage with only shock liver on presentation which is improving.    -Hemodynamics q6h   -Continue Dobutamine at 7.5 mcg/kg/min for now   -start hydralazine 25 mg QID   -Continue bumex drip at 0.5mg/hour. Having good response  -Heparin drip for LV thrombus, can transition to DOAC when appropriate  -Hold off on GDMT given cardiogenic shock picture, pharmacy liaison consult for ARNI and SGLT2i coverage   -Strict I/O  -Daily standing weight  -Low sodium diet and 2L daily fluid  "restriction     Pulmonary  No acute issues     Gastrointestinal, Nutrition  #Shock Liver  Improving, continue to monitor     Renal, Electrolytes  #Hypervolemic Hyponatremia  Diuresis as above     Infectious Disease  No acute issues     Hematology  #Iron deficiency anemia  Monitor Hb  Transfuse for Hb <7  Iron panel  7/3 with evidence of iron deficiency  -venofer 200 mg IV x5 doses      Endocrinology  Started on synthroid at OSH due to TSH of 9. Technically should treat subclinical hypothyroidism if TSH >10. Unsure if heart failure plays a role in this decision-making. Although subclinical hypothyroidism is associated with cardiovascular disease, this is not the cause of his heart failure.  -Can restart synthroid 25mcg daily in AM       ICU Checklist:  #Feeding: low na   #Analgesia: tylenol   #Sedation: n/a  #Thromboembolism ppx: heparin   #HOB: 30 degrees   #Ulcer ppx: not indicated   #Glucose: acceptable   #SBT/SAT: n/a  #Bowel reg: miralax   #Lines/tubes/drains:   R internal jugular swan   PIV x3  #Code status: full   #Family: father Neal is primary contact   #Dispo: ICU     Patient seen and discussed with staff physician.    León Beaulieu MD  Cardiology Fellow  Pager: [phone]    Objective:  Most recent vital signs:  BP 99/52   Pulse 113   Temp 98.3  F (36.8  C) (Oral)   Resp 18   Ht 1.803 m (5' 11\")   Wt 89.2 kg (196 lb 10.4 oz)   SpO2 93%   BMI 27.43 kg/m    Temp:  [98.3  F (36.8  C)-98.4  F (36.9  C)] 98.3  F (36.8  C)  Pulse:  [100-117] 113  Resp:  [13-25] 18  BP: ()/(52-94) 99/52  SpO2:  [90 %-100 %] 93 %  Wt Readings from Last 2 Encounters:   07/03/23 89.2 kg (196 lb 10.4 oz)       Intake/Output Summary (Last 24 hours) at 7/4/2023 1224  Last data filed at 7/4/2023 1100  Gross per 24 hour   Intake 1601.55 ml   Output 52866 ml   Net -8993.45 ml     Physical exam:  GEN: pleasant, no acute distress  HEENT: No discharge  EYES: no icterus  CV: RRR, normal s1/s2, no murmurs/rubs/s3/s4, no heave. " Unable to assess JVP due to presence of [place].  CHEST: clear to ausculation bilaterally, no rales or wheezing  ABD: soft, non-tender, normal active bowel sounds  : no flank/suprapubic tenderness  EXTR: No clubbing, cyanosis or edema.   NEURO: alert oriented, speech fluent/appropriate, motor grossly nonfocal  PSYCH: cooperative, affect appropriate, pleasant    Labs (Past three days):  CBC  Recent Labs   Lab 23  0157 23   WBC 8.2 6.7   RBC 5.17 5.01   HGB 10.5* 10.3*   HCT 36.3* 35.3*   MCV 70* 71*   MCH 20.3* 20.6*   MCHC 28.9* 29.2*   RDW 22.2* 22.2*    379     BMP  Recent Labs   Lab 23  0846 23  0400 23  0359 23   NA  --  136  --   --  134*   POTASSIUM 3.3* 3.5  --   --  3.8   CHLORIDE  --  96*  --   --  99   CO2  --  27  --   --  24   ANIONGAP  --  13  --   --  11   GLC  --  93 96 100* 98   BUN  --  18.0  --   --  19.0   CR  --  1.06  --   --  1.01   GFRESTIMATED  --  >90  --   --  >90   LENARD  --  9.0  --   --  8.9   MAG  --  2.4*  --   --  1.9   PHOS  --  4.5  --   --   --      Troponins:     INR  Recent Labs   Lab 23   INR 1.30*     Liver panel  Recent Labs   Lab 23  04023   PROTTOTAL 7.9 7.6   ALBUMIN 3.7 3.6   BILITOTAL 1.1 0.9   ALKPHOS 147* 141*   * 128*   * 554*       Imaging/procedure results:  Echocardiogram Complete  [id number]  [id number]  [id number]  [id number]^ALDEN^NIELS     [facility],[place]  Echocardiography Laboratory  [address] [place] [id number]     Name: WILLIAM JIMÉNEZ  MRN: [id number]  : 1985  Study Date: 2023 08:08 AM  Age: 38 yrs  Gender: Male  Patient Location: [facility]  Reason For Study: CHF  Ordering Physician: NIELS RUANO  Referring Physician: SAMIR TRACY  Performed By: Dolly Manriquez RDCS     BSA: 2.1 m2  Height: 71 in  Weight: 196 lb  BP: 108/76  mmHg  ______________________________________________________________________________  Procedure  Echocardiogram with two-dimensional, color and spectral Doppler performed.  Contrast Optison. Optison (NDC #2953-8575-89) given intravenously. Patient was  given 5 ml mixture of 3 ml Optison and 6 ml saline. 4 ml wasted. The final  echo results were communicated to Dr. León Beaulieu. The final echo results were  communicated to the ordering physician by phone .  ______________________________________________________________________________  Interpretation Summary  Severely dilated (LVIDd 7.2 cm) LV with severely reduced LV function,  LVEF=14%.  Mildly dilated RV with moderately to severely reduced RV function, RVFAC 19%.  LV apical thrombus, large (~1.8 cm x 1.8 cm x 1.1 cm), protruding, and  predominantly sessile. Some views are suspicious for additional more laminary  mural thrombi. If clinically relevant, overall burden of thrombus could be  better determined by cardiac MRI.  Small circumferential pericardial effusion is present without any hemodynamic  significance.  Previous study not available for comparison.  ______________________________________________________________________________  Left Ventricle  Left ventricular size is normal. Biplane LVEF is 14%. Severe left ventricular  dilation is present. LVIDd 7.2 cm. Left ventricular function is decreased. The  ejection fraction is 10-15% (severely reduced). Grade III or advanced  diastolic dysfunction. Diastolic Doppler findings (E/E' ratio and/or other  parameters) suggest left ventricular filling pressures are indeterminate. LV  apical thrombus, large (~1.8 cm x 1.8 cm x 1.1 cm) , protruding, and  predominantly sessile. Some views are suspicious for additional more laminary  mural thrombi. If clinically relevant, overall burden of thrombus could be  better determined by cardiac MRI.     Right Ventricle  Mild right ventricular dilation is present. Global right  ventricular function  is moderately to severely reduced. RVFAC 19%.     Atria  The right atria appears normal. Moderate left atrial enlargement is present.     Mitral Valve  Mild mitral insufficiency is present. The cause of the mitral insufficiency  appears to be altered left ventricular size and geometry.     Aortic Valve  The aortic valve is tricuspid. Trace aortic insufficiency is present.     Tricuspid Valve  The tricuspid valve is normal. Mild tricuspid insufficiency is present. The  right ventricular systolic pressure is approximated at 22.1 mmHg plus the  right atrial pressure.     Pulmonic Valve  The pulmonic valve is normal. Trace pulmonic insufficiency is present.     Vessels  The aorta root is normal. The thoracic aorta is normal. Dilation of the  inferior vena cava is present with abnormal respiratory variation in diameter.  IVC diameter >2.1 cm collapsing <50% with sniff suggests a high RA pressure  estimated at 15 mmHg or greater.     Pericardium  Small circumferential pericardial effusion is present without any hemodynamic  significance.     Compared to Previous Study  Previous study not available for comparison.  ______________________________________________________________________________  MMode/2D Measurements & Calculations     IVSd: 0.89 cm  LVIDd: 7.2 cm  LVIDs: 6.8 cm  LVPWd: 0.99 cm  FS: 5.4 %  LV mass(C)d: 314.2 grams  LV mass(C)dI: 150.3 grams/m2  Ao root diam: 3.5 cm  asc Aorta Diam: 3.2 cm  LVOT diam: 2.3 cm  LVOT area: 4.2 cm2     EF(MOD-bp): 13.6 %  LA Volume (BP): 100.0 ml  LA Volume Index (BP): 47.8 ml/m2     RV Base: 4.2 cm  RWT: 0.27  TAPSE: 1.5 cm     Time Measurements  Aortic HR: 100.2 BPM     Doppler Measurements & Calculations  MV E max zayda: 85.9 cm/sec  MV A max zayda: 26.2 cm/sec  MV E/A: 3.3  MV dec slope: 529.0 cm/sec2  MV dec time: 0.16 sec  LV V1 max PG: 3.1 mmHg  LV V1 max: 87.8 cm/sec  LV V1 VTI: 11.1 cm  CO(LVOT): 4.6 l/min  CI(LVOT): 2.2 l/min/m2  SV(LVOT): 46.0  ml  SI(LVOT): 22.0 ml/m2  TR max christoph: 235.0 cm/sec  TR max P.1 mmHg  E/E' av.8  Lateral E/e': 10.4  Medial E/e': 15.2     RV S Christoph: 9.1 cm/sec     ______________________________________________________________________________  Report approved by: Seth Redd 2023 10:11 AM        XR Chest Port 1 View  Narrative: EXAM: XR CHEST PORT 1 VIEW 7/3/2023 11:28 PM      HISTORY: swan reposition.    COMPARISON: Same-day chest radiograph at 11:15 PM.     TECHNIQUE: Frontal view of the chest.    FINDINGS:   Interval retraction of right internal jugular [place]-Sam catheter with  tip now projecting over the right pulmonary artery.  Trachea is midline. Cardiac silhouette is stably enlarged. Pulmonary  vasculature is mildly indistinct with mild bilateral interstitial  prominence. No focal airspace opacity. Costophrenic angles are  collimated out of the field-of-view. No appreciable pneumothorax.    No acute osseous abnormality. Visualized soft tissues and upper  abdomen are unremarkable.  Impression: IMPRESSION: Interval retraction of the [place]-Sam catheter with tip now  projecting over the right pulmonary artery.    I have personally reviewed the examination and initial interpretation  and I agree with the findings.    REILLY FRANKEL DO         SYSTEM ID:  [id number]  XR Chest Port 1 View  Narrative: EXAM: XR CHEST PORT 1 VIEW 7/3/2023 11:26 PM      HISTORY: [place] reposition.    COMPARISON: Chest radiograph same day at 10:07 PM.     TECHNIQUE: Frontal view of the chest.    FINDINGS:   Right internal jugular [place]-Sam catheter with tip in unchanged  position projecting over the right lower lobar pulmonary artery.    Trachea is midline. Cardiac silhouette is stably enlarged. Pulmonary  vasculature is distinct. No focal airspace opacity. Costophrenic  angles are collimated out of the field-of-view. No appreciable  pneumothorax.    No acute osseous abnormality. Visualized soft tissues and upper  abdomen are  unremarkable.  Impression: IMPRESSION: Unchanged position of [place]-Sam catheter with tip  projecting over the right lower lobar pulmonary artery.     I have personally reviewed the examination and initial interpretation  and I agree with the findings.    REILLY FRANKEL DO         SYSTEM ID:  [id number]  XR Chest Port 1 View  Narrative: Exam: XR CHEST PORT 1 VIEW, 7/3/2023 10:10 PM    Indication: [place] placement    Comparison: None    Findings:   Semiupright AP chest. [place]-Sam catheter tip projects over the right  lower lobar pulmonary artery. Cardiomegaly. Central streaky opacities.  No pneumothorax or pleural effusion.  Impression: Impression: [place]-Sam catheter tip projects over the right lower lobar  pulmonary artery. Recommend slight retraction.    I have personally reviewed the examination and initial interpretation  and I agree with the findings.    REILLY FRANKEL DO         SYSTEM ID:  [id number]

## 2023-07-04 NOTE — PLAN OF CARE
Major Shift Events:   Neuro: A/Ox4, MORGAN's. PERRLA  Cardiac: Nipride and dobutamine to keep MAP 65-85, [place] in place, CARA #'s every 4hrs (CVP 11, CI 2, CO  4.3, PAP 50/37, SVR 1229, SV 14. Cards 2 fellow notified of Cara #'s, no changes at this time. MAPS and SBP remained stable . Potasium replaced, repeat in PM per cards 2 fellow and at 0400 per protocol  Pulmonary: LS clear, room air. Strong non-productive cough  GI: Good appetite, ate two lunch boxes in the night. 2 gm Na diet with 2L fluid restriction   : Adequate UOP in urinal ( >500cc/hr)  Skin: Intact     Plan: Cara #'s every 4 hrs, trending labs, accurate I&O, educate patient     For vital signs and complete assessments, please see documentation flowsheets.        Goal Outcome Evaluation:      Plan of Care Reviewed With: patient    Overall Patient Progress: no changeOverall Patient Progress: no change    Outcome Evaluation: On pressors for BP/MAP management

## 2023-07-04 NOTE — PLAN OF CARE
ICU End of Shift Summary. See flowsheets for vital signs and detailed assessment.    Changes this shift: A&O x4. Intermittently anxious and restless. CIWA protocl done, no benzos administered. Having muscle cramping, decreased since bumex gtt stopped, CVP:6-9, PA:40s/20s, SVO2: 48-58%, -1500. Up in room x1, SBA. K replaced through the day. Nipride, Heparin  and dobutamine gtt. Voiding adequately via urinal.     Plan: Will continue with POC and update team with changes.

## 2023-07-04 NOTE — PROGRESS NOTES
Admitted/transferred from: [facility]  Reason for admission/transfer: Heart failure management  Patient status upon admission/transfer: stable on dobutamine, nitroglycerin, and bumex  Interventions: hemodynamics obtained. Oriented to unit.  Plan: optimize heart function with drips and diuresis.   2 RN skin assessment: completed by Bakari Wilhelm   Result of skin assessment and interventions/actions: WDL  Height, weight, drug calc weight: done  Patient belongings: all belongings in room with patient. Including cellphone, tablet computer and two chargers. Various clothing items. Declined sending anything to security for safe keeping. Patient label placed on bag with belongings
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
## 2023-07-04 NOTE — PHARMACY-ADMISSION MEDICATION HISTORY
Pharmacist Admission Medication History    Admission medication history is complete. The information provided in this note is only as accurate as the sources available at the time of the update.    Medication reconciliation/reorder completed by provider prior to medication history? No    Information Source(s): Clinic records, Hospital records and med rec completed by PharmD @ OSH on 6/27/2023 (pharmacist spoke in person with patient) via chart review    Pertinent Information: levothyroxine 25 mcg po daily was started at the OSH                                      apixaban 5 mg po bid was started @ OSH- last dose was on 7/3/2023 @ 1920                              Per OSH med rec: patient reports that he gets nauseous and vomits when ever he takes his medications and has to use Zofran multiple times a day.     Changes made to PTA medication list:    Added: all listed below    Deleted: None    Changed: None      Allergies reviewed with patient and updates made in EHR: yes via chart review    Medications have been reviewed by me and are current to the best of my knowledge and ability.    Medication History Completed By: Danya Kemp PharmD 7/4/2023 1:57 PM    Prior to Admission medications    Medication Sig Last Dose Taking? Auth Provider Long Term End Date   ferrous sulfate (FE TABS) 325 (65 Fe) MG EC tablet Take 325 mg by mouth 2 times daily 7/3/2023 Yes Unknown, Entered By History     furosemide (LASIX) 20 MG tablet Take 20 mg by mouth daily as needed (for weight gain of 3 lbs in 1 day OR 5 lbs in 1 week) Unknown Yes Unknown, Entered By History Yes    lisinopril (ZESTRIL) 2.5 MG tablet Take 2.5 mg by mouth daily 7/3/2023 Yes Unknown, Entered By History     metoprolol succinate ER (TOPROL XL) 25 MG 24 hr tablet Take 12.5 mg by mouth daily Unknown Yes Unknown, Entered By History Yes    ondansetron (ZOFRAN ODT) 4 MG ODT tab Take 4 mg by mouth every 4 hours as needed for nausea or vomiting Unknown Yes  Unknown, Entered By History     sildenafil (VIAGRA) 100 MG tablet Take 100 mg by mouth daily as needed (one hour prior to anticipated sexual activity) Unknown Yes Unknown, Entered By History Yes    spironolactone (ALDACTONE) 25 MG tablet Take 12.5 mg by mouth daily 6/27/2023 Yes Unknown, Entered By History Yes

## 2023-07-05 ENCOUNTER — APPOINTMENT (OUTPATIENT)
Dept: GENERAL RADIOLOGY | Facility: CLINIC | Age: 38
End: 2023-07-05
Attending: STUDENT IN AN ORGANIZED HEALTH CARE EDUCATION/TRAINING PROGRAM
Payer: COMMERCIAL

## 2023-07-05 ENCOUNTER — APPOINTMENT (OUTPATIENT)
Dept: OCCUPATIONAL THERAPY | Facility: CLINIC | Age: 38
End: 2023-07-05
Attending: STUDENT IN AN ORGANIZED HEALTH CARE EDUCATION/TRAINING PROGRAM
Payer: COMMERCIAL

## 2023-07-05 LAB
ANION GAP SERPL CALCULATED.3IONS-SCNC: 10 MMOL/L (ref 7–15)
APTT PPP: 84 SECONDS (ref 22–38)
BASE EXCESS BLDV CALC-SCNC: 3.9 MMOL/L (ref -7.7–1.9)
BASE EXCESS BLDV CALC-SCNC: 4.1 MMOL/L (ref -7.7–1.9)
BASE EXCESS BLDV CALC-SCNC: 5 MMOL/L (ref -7.7–1.9)
BASE EXCESS BLDV CALC-SCNC: 5.8 MMOL/L (ref -7.7–1.9)
BASE EXCESS BLDV CALC-SCNC: 6.8 MMOL/L (ref -7.7–1.9)
BUN SERPL-MCNC: 21.4 MG/DL (ref 6–20)
CALCIUM SERPL-MCNC: 9.2 MG/DL (ref 8.6–10)
CHLORIDE SERPL-SCNC: 97 MMOL/L (ref 98–107)
CREAT SERPL-MCNC: 0.99 MG/DL (ref 0.67–1.17)
DEPRECATED HCO3 PLAS-SCNC: 27 MMOL/L (ref 22–29)
ERYTHROCYTE [DISTWIDTH] IN BLOOD BY AUTOMATED COUNT: 22 % (ref 10–15)
GFR SERPL CREATININE-BSD FRML MDRD: >90 ML/MIN/1.73M2
GLUCOSE BLDC GLUCOMTR-MCNC: 124 MG/DL (ref 70–99)
GLUCOSE BLDC GLUCOMTR-MCNC: 130 MG/DL (ref 70–99)
GLUCOSE BLDC GLUCOMTR-MCNC: 93 MG/DL (ref 70–99)
GLUCOSE SERPL-MCNC: 93 MG/DL (ref 70–99)
HCO3 BLDV-SCNC: 28 MMOL/L (ref 21–28)
HCO3 BLDV-SCNC: 29 MMOL/L (ref 21–28)
HCO3 BLDV-SCNC: 30 MMOL/L (ref 21–28)
HCO3 BLDV-SCNC: 30 MMOL/L (ref 21–28)
HCO3 BLDV-SCNC: 32 MMOL/L (ref 21–28)
HCT VFR BLD AUTO: 35.8 % (ref 40–53)
HGB BLD-MCNC: 10.7 G/DL (ref 13.3–17.7)
MAGNESIUM SERPL-MCNC: 2.1 MG/DL (ref 1.7–2.3)
MCH RBC QN AUTO: 20.7 PG (ref 26.5–33)
MCHC RBC AUTO-ENTMCNC: 29.9 G/DL (ref 31.5–36.5)
MCV RBC AUTO: 69 FL (ref 78–100)
O2/TOTAL GAS SETTING VFR VENT: 21 %
OXYHGB MFR BLDV: 50 % (ref 70–75)
OXYHGB MFR BLDV: 51 % (ref 70–75)
OXYHGB MFR BLDV: 56 % (ref 70–75)
OXYHGB MFR BLDV: 58 % (ref 70–75)
OXYHGB MFR BLDV: 60 % (ref 70–75)
PCO2 BLDV: 40 MM HG (ref 40–50)
PCO2 BLDV: 42 MM HG (ref 40–50)
PCO2 BLDV: 44 MM HG (ref 40–50)
PCO2 BLDV: 45 MM HG (ref 40–50)
PCO2 BLDV: 45 MM HG (ref 40–50)
PH BLDV: 7.42 [PH] (ref 7.32–7.43)
PH BLDV: 7.45 [PH] (ref 7.32–7.43)
PH BLDV: 7.46 [PH] (ref 7.32–7.43)
PHOSPHATE SERPL-MCNC: 4.7 MG/DL (ref 2.5–4.5)
PLATELET # BLD AUTO: 356 10E3/UL (ref 150–450)
PO2 BLDV: 32 MM HG (ref 25–47)
PO2 BLDV: 32 MM HG (ref 25–47)
PO2 BLDV: 34 MM HG (ref 25–47)
PO2 BLDV: 35 MM HG (ref 25–47)
PO2 BLDV: 36 MM HG (ref 25–47)
POTASSIUM SERPL-SCNC: 3.9 MMOL/L (ref 3.4–5.3)
POTASSIUM SERPL-SCNC: 4 MMOL/L (ref 3.4–5.3)
RBC # BLD AUTO: 5.17 10E6/UL (ref 4.4–5.9)
SODIUM SERPL-SCNC: 134 MMOL/L (ref 136–145)
WBC # BLD AUTO: 8 10E3/UL (ref 4–11)

## 2023-07-05 PROCEDURE — 84100 ASSAY OF PHOSPHORUS: CPT | Performed by: STUDENT IN AN ORGANIZED HEALTH CARE EDUCATION/TRAINING PROGRAM

## 2023-07-05 PROCEDURE — 200N000002 HC R&B ICU UMMC

## 2023-07-05 PROCEDURE — 97165 OT EVAL LOW COMPLEX 30 MIN: CPT | Mod: GO

## 2023-07-05 PROCEDURE — 250N000013 HC RX MED GY IP 250 OP 250 PS 637: Performed by: STUDENT IN AN ORGANIZED HEALTH CARE EDUCATION/TRAINING PROGRAM

## 2023-07-05 PROCEDURE — 71045 X-RAY EXAM CHEST 1 VIEW: CPT

## 2023-07-05 PROCEDURE — 82805 BLOOD GASES W/O2 SATURATION: CPT | Performed by: STUDENT IN AN ORGANIZED HEALTH CARE EDUCATION/TRAINING PROGRAM

## 2023-07-05 PROCEDURE — 250N000011 HC RX IP 250 OP 636: Mod: JZ | Performed by: INTERNAL MEDICINE

## 2023-07-05 PROCEDURE — 85730 THROMBOPLASTIN TIME PARTIAL: CPT | Performed by: STUDENT IN AN ORGANIZED HEALTH CARE EDUCATION/TRAINING PROGRAM

## 2023-07-05 PROCEDURE — 250N000011 HC RX IP 250 OP 636: Mod: JZ | Performed by: STUDENT IN AN ORGANIZED HEALTH CARE EDUCATION/TRAINING PROGRAM

## 2023-07-05 PROCEDURE — 250N000009 HC RX 250: Performed by: STUDENT IN AN ORGANIZED HEALTH CARE EDUCATION/TRAINING PROGRAM

## 2023-07-05 PROCEDURE — 250N000011 HC RX IP 250 OP 636: Mod: JZ

## 2023-07-05 PROCEDURE — 258N000003 HC RX IP 258 OP 636: Performed by: STUDENT IN AN ORGANIZED HEALTH CARE EDUCATION/TRAINING PROGRAM

## 2023-07-05 PROCEDURE — 71045 X-RAY EXAM CHEST 1 VIEW: CPT | Mod: 26 | Performed by: RADIOLOGY

## 2023-07-05 PROCEDURE — 258N000003 HC RX IP 258 OP 636

## 2023-07-05 PROCEDURE — 99291 CRITICAL CARE FIRST HOUR: CPT | Mod: GC | Performed by: STUDENT IN AN ORGANIZED HEALTH CARE EDUCATION/TRAINING PROGRAM

## 2023-07-05 PROCEDURE — 84132 ASSAY OF SERUM POTASSIUM: CPT | Performed by: STUDENT IN AN ORGANIZED HEALTH CARE EDUCATION/TRAINING PROGRAM

## 2023-07-05 PROCEDURE — 83735 ASSAY OF MAGNESIUM: CPT | Performed by: STUDENT IN AN ORGANIZED HEALTH CARE EDUCATION/TRAINING PROGRAM

## 2023-07-05 PROCEDURE — 250N000013 HC RX MED GY IP 250 OP 250 PS 637

## 2023-07-05 PROCEDURE — 97535 SELF CARE MNGMENT TRAINING: CPT | Mod: GO

## 2023-07-05 PROCEDURE — 250N000013 HC RX MED GY IP 250 OP 250 PS 637: Performed by: INTERNAL MEDICINE

## 2023-07-05 PROCEDURE — 85027 COMPLETE CBC AUTOMATED: CPT | Performed by: INTERNAL MEDICINE

## 2023-07-05 RX ORDER — SENNOSIDES 8.6 MG
8.6 TABLET ORAL 2 TIMES DAILY PRN
Status: DISCONTINUED | OUTPATIENT
Start: 2023-07-05 | End: 2023-07-20 | Stop reason: HOSPADM

## 2023-07-05 RX ORDER — FUROSEMIDE 10 MG/ML
60 INJECTION INTRAMUSCULAR; INTRAVENOUS ONCE
Status: COMPLETED | OUTPATIENT
Start: 2023-07-05 | End: 2023-07-05

## 2023-07-05 RX ADMIN — DOBUTAMINE 7.5 MCG/KG/MIN: 12.5 INJECTION, SOLUTION, CONCENTRATE INTRAVENOUS at 17:52

## 2023-07-05 RX ADMIN — Medication 1 HALF-TAB: at 19:45

## 2023-07-05 RX ADMIN — IRON SUCROSE 200 MG: 20 INJECTION, SOLUTION INTRAVENOUS at 11:09

## 2023-07-05 RX ADMIN — ISOSORBIDE DINITRATE 20 MG: 10 TABLET ORAL at 07:42

## 2023-07-05 RX ADMIN — Medication 1 HALF-TAB: at 11:09

## 2023-07-05 RX ADMIN — RAMELTEON 8 MG: 8 TABLET, FILM COATED ORAL at 00:10

## 2023-07-05 RX ADMIN — RAMELTEON 8 MG: 8 TABLET, FILM COATED ORAL at 22:05

## 2023-07-05 RX ADMIN — FUROSEMIDE 60 MG: 10 INJECTION, SOLUTION INTRAMUSCULAR; INTRAVENOUS at 09:28

## 2023-07-05 RX ADMIN — FOLIC ACID 1 MG: 1 TABLET ORAL at 07:42

## 2023-07-05 RX ADMIN — Medication 1 TABLET: at 07:42

## 2023-07-05 RX ADMIN — THIAMINE HCL TAB 100 MG 100 MG: 100 TAB at 07:42

## 2023-07-05 RX ADMIN — SODIUM NITROPRUSSIDE 0.25 MCG/KG/MIN: 25 INJECTION, SOLUTION, CONCENTRATE INTRAVENOUS at 01:08

## 2023-07-05 RX ADMIN — HEPARIN SODIUM 1900 UNITS/HR: 10000 INJECTION, SOLUTION INTRAVENOUS at 17:52

## 2023-07-05 RX ADMIN — HYDRALAZINE HYDROCHLORIDE 25 MG: 25 TABLET, FILM COATED ORAL at 07:42

## 2023-07-05 RX ADMIN — HEPARIN SODIUM 1900 UNITS/HR: 10000 INJECTION, SOLUTION INTRAVENOUS at 06:22

## 2023-07-05 ASSESSMENT — ACTIVITIES OF DAILY LIVING (ADL)
ADLS_ACUITY_SCORE: 25

## 2023-07-05 NOTE — PROGRESS NOTES
Cardiology ICU Progress Note    Brief HPI:  Jose Enrique is a 38-year-old gentleman with past medical history of ADHD who was transferred from [facility] on 7/3/23 due to cardiogenic shock.      Subjective and Interval:  -continued low CO/CI on dobutamine 7.5   -had cramps with bumex infusion yesterday, much better this am after stopping bumex     Assessment and plan by system:  Today's changes:  -start entresto 12/13 mg BID, hold hydralazine/isordil, wean nipride   -continue dobutamine 7.5 mcg/kg/min  -spot dose lasix today based on CVP, goal 8-10   -continue heparin for LV thrombus      Neurology  #ADHD  Monitor for now    #Alcohol use disorder  #Methamphetamine use  -addiction med consult once he is more stable and getting close to leaving ICU   -CIWA, thiamine, folate, MVI, no benzos for now since his last drink he says was ~1 month ago      Cardiovascular  #Cardiogenic Shock  #HFrEF (EF 10%) secondary to NICM possibly from drug use/alcohol use disorders   #LV mural thrombus  Pt with recently diagnosed heart failure presenting with cardiogenic shock (CO/CI 3.0/1.43 on 7/3) to [facility], transferred to [facility] for further care on 7/3. Coronary angiogram on 6/14 showed no obstructive CAD. Hemodynamics have improved while on Dobutamine, NTG and bumex drips. 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 Luckily minimal end organ damage with only shock liver on presentation which is improving.    -Hemodynamics q6h   -Continue Dobutamine at 7.5 mcg/kg/min for now   -start entresto 12/13 mg BID today 7/5   -hold hydralazine/isordil today since starting entresto   -lasix 60 mg IV x1 this am   -Heparin drip for LV thrombus, can transition to DOAC when appropriate  -Strict I/O  -Daily standing weight  -Low sodium diet and 2L daily fluid restriction     Pulmonary  No acute issues     Gastrointestinal, Nutrition  #Shock  "Liver  Improving, continue to monitor     Renal, Electrolytes  #Hypervolemic Hyponatremia  Diuresis as above     Infectious Disease  No acute issues     Hematology  #Iron deficiency anemia  Monitor Hb  Transfuse for Hb <7  Iron panel  7/3 with evidence of iron deficiency  -venofer 200 mg IV x5 doses      Endocrinology  Started on synthroid at OSH due to TSH of 9. Technically should treat subclinical hypothyroidism if TSH >10. Unsure if heart failure plays a role in this decision-making. Although subclinical hypothyroidism is associated with cardiovascular disease, this is not the cause of his heart failure.  -Can restart synthroid 25mcg daily in AM       ICU Checklist:  #Feeding: low na   #Analgesia: tylenol   #Sedation: n/a  #Thromboembolism ppx: heparin   #HOB: 30 degrees   #Ulcer ppx: not indicated   #Glucose: acceptable   #SBT/SAT: n/a  #Bowel reg: miralax   #Lines/tubes/drains:   R internal jugular swan   PIV x3  #Code status: full   #Family: father Neal is primary contact, will update today   #Dispo: ICU     Patient seen and discussed with staff physician.    León Beaulieu MD  Cardiology Fellow  Pager: [phone]    Objective:  Most recent vital signs:  /76   Pulse 119   Temp 97.8  F (36.6  C) (Axillary)   Resp 20   Ht 1.803 m (5' 11\")   Wt 89.2 kg (196 lb 10.4 oz)   SpO2 97%   BMI 27.43 kg/m    Temp:  [97.8  F (36.6  C)-98.3  F (36.8  C)] 97.8  F (36.6  C)  Pulse:  [101-119] 119  Resp:  [16-24] 20  BP: ()/(51-79) 106/76  SpO2:  [89 %-99 %] 97 %  Wt Readings from Last 2 Encounters:   07/03/23 89.2 kg (196 lb 10.4 oz)       Intake/Output Summary (Last 24 hours) at 7/4/2023 1224  Last data filed at 7/4/2023 1100  Gross per 24 hour   Intake 1601.55 ml   Output 23886 ml   Net -8993.45 ml     Physical exam:  GEN: pleasant, no acute distress  HEENT: No discharge  EYES: no icterus  CV: RRR, normal s1/s2, no murmurs/rubs/s3/s4, no heave. Unable to assess JVP due to presence of [place].  CHEST: clear " to ausculation bilaterally, no rales or wheezing  ABD: soft, non-tender, normal active bowel sounds  : no flank/suprapubic tenderness  EXTR: No clubbing, cyanosis or edema.   NEURO: alert oriented, speech fluent/appropriate, motor grossly nonfocal  PSYCH: cooperative, affect appropriate, pleasant    Labs (Past three days):  CBC  Recent Labs   Lab 07/05/23  0410 07/04/23  0157 07/03/23  2204   WBC 8.0 8.2 6.7   RBC 5.17 5.17 5.01   HGB 10.7* 10.5* 10.3*   HCT 35.8* 36.3* 35.3*   MCV 69* 70* 71*   MCH 20.7* 20.3* 20.6*   MCHC 29.9* 28.9* 29.2*   RDW 22.0* 22.2* 22.2*    397 379     BMP  Recent Labs   Lab 07/05/23  0411 07/05/23  0410 07/05/23  0007 07/05/23  0006 07/04/23 2017 07/04/23  1606 07/04/23  1219 07/04/23  0846 07/04/23  0400 07/03/23  2209 07/03/23  2204   NA  --  134*  --   --   --  132*  --   --  136  --  134*   POTASSIUM  --  4.0 3.9  --   --  3.8  3.8 4.0   < > 3.5  --  3.8   CHLORIDE  --  97*  --   --   --  95*  --   --  96*  --  99   CO2  --  27  --   --   --  26  --   --  27  --  24   ANIONGAP  --  10  --   --   --  11  --   --  13  --  11   GLC 93 93  --  124* 108* 101*  --   --  93   < > 98   BUN  --  21.4*  --   --   --  20.9*  --   --  18.0  --  19.0   CR  --  0.99  --   --   --  1.00  --   --  1.06  --  1.01   GFRESTIMATED  --  >90  --   --   --  >90  --   --  >90  --  >90   LENARD  --  9.2  --   --   --  8.7  --   --  9.0  --  8.9   MAG  --  2.1  --   --   --  2.1  --   --  2.4*  --  1.9   PHOS  --  4.7*  --   --   --   --   --   --  4.5  --   --     < > = values in this interval not displayed.     Troponins:     INR  Recent Labs   Lab 07/03/23  2204   INR 1.30*     Liver panel  Recent Labs   Lab 07/04/23  1606 07/04/23  0400 07/03/23  2204   PROTTOTAL 7.5 7.9 7.6   ALBUMIN 3.5 3.7 3.6   BILITOTAL 1.0 1.1 0.9   ALKPHOS 139* 147* 141*   AST 97* 122* 128*   * 540* 554*       Imaging/procedure results:  XR Chest Port 1 View  Narrative: EXAM: XR CHEST PORT 1 VIEW 7/5/2023 12:44 AM       HISTORY: daily swan check.    COMPARISON: Chest radiograph 7/3/2023.     TECHNIQUE: Frontal view of the chest.    FINDINGS: Right internal jugular [place]-Sam catheter with tip  projecting over the main pulmonary artery, retracted compared to prior  exam. Stable cardiomegaly. Trachea is midline. No definite pleural  effusion. No appreciable pneumothorax. No focal consolidative opacity.  No acute osseous abnormality. Upper abdomen is unremarkable.  Impression: IMPRESSION: Interval retraction of Columbia-Sam catheter with tip now  projecting over the main pulmonary artery.    I have personally reviewed the examination and initial interpretation  and I agree with the findings.    EBONIE MULLIGAN MD         SYSTEM ID:  [id number]

## 2023-07-05 NOTE — PROGRESS NOTES
CLINICAL NUTRITION SERVICES    Reason for Assessment:  MD consult for low-sodium (2 g/day) nutrition education    Interventions:  -Attempted to discuss nutrition education with pt, however he asked RD to come back at another time  -Left pt with the following handouts: Low Sodium Nutrition Therapy    Follow-up:   RD will provide pt with low sodium education prior to discharge     Tamika Romero MS, RD, LD  4E (CVICU) RD pager: [phone]  Ascom: [phone]  Weekend/Holiday RD pager: [phone]

## 2023-07-05 NOTE — PLAN OF CARE
Major Shift Events:   Neuro: A/Ox4, MORGAN's. PERRLA  Cardiac: Nipride and dobutamine to keep MAP 65-85, [place] in place, trending CARA #'s (CVP 8-11 , CI 2 , CO 4 , PAP 42/22 , SVR 1331 , SV 36. MAPS and SBP remain stable  Pulmonary: LS clear, room air. Strong non-productive cough  GI: Good appetite. 2 gm Na diet with 2L fluid restriction   : Adequate UOP in urinal   Skin: Intact      Plan: Cara #'s every 6 hrs, trending labs, accurate I&O, educate patient      For vital signs and complete assessments, please see documentation flowsheets.          Goal Outcome Evaluation:       Plan of Care Reviewed With: patient     Overall Patient Progress: no changeOverall Patient Progress: no change     Outcome Evaluation: On pressors for BP/MAP management    Goal Outcome Evaluation:      Plan of Care Reviewed With: patient    Overall Patient Progress: no changeOverall Patient Progress: no change    Outcome Evaluation: Pressors still needed.

## 2023-07-05 NOTE — CONSULTS
Discharge Pharmacy Test Claim    Entresto, farxiga, and jardiance are covered with copays of $3 per month through patient's [employer] prepaid medical assistance plan.    Test Claim Copay   entresto 3.00   farxiga 3.00   jardiance 3.00     Thais Parson  [facility] Liaison  Ph: [phone] Pager: [phone]   Securely message with the Vocera Web Console (learn more here)

## 2023-07-05 NOTE — PLAN OF CARE
Major Shift Events: Hemodynamic numbers improving. Nipride shut off. Entresto started. Up to the chair x1 - moving well. Good appetite.     Plan: Continue to monitor.     For vital signs and complete assessments, please see documentation flowsheets.

## 2023-07-05 NOTE — PROGRESS NOTES
07/05/23 1600   Appointment Info   Signing Clinician's Name / Credentials (OT) Shalini Keith OTRL   Living Environment   People in Home child(rian), dependent   Current Living Arrangements house   Transportation Anticipated family or friend will provide   Living Environment Comments Pt lives in a house with his 6 y.o. son. Pt reports his SO can A at discharge. 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   Self-Care   Usual Activity Tolerance good   Current Activity Tolerance fair   Regular Exercise No   Equipment Currently Used at Home none   Fall history within last six months no   Activity/Exercise/Self-Care Comment Pt reports IND at baseline with ADLs/IADLs. Pt is primary caregiver for 6 y.o. son.   General Information   Onset of Illness/Injury or Date of Surgery 07/03/23   Referring Physician Pawel Stevens MD   Patient/Family Therapy Goal Statement (OT) To go home   Additional Occupational Profile Info/Pertinent History of Current Problem Jose Enrique is a 38-year-old gentleman with past medical history of ADHD who was transferred from [facility] on 7/3/23 due to cardiogenic shock.   Existing Precautions/Restrictions cardiac   Cognitive Status Examination   Orientation Status orientation to person, place and time   Affect/Mental Status (Cognitive) WFL   Follows Commands WFL   Safety Deficit minimal deficit;safety precautions awareness;awareness of need for assistance   Attention Deficit moderate deficit;distractible in noisy environment;distractible in quiet environment   Cognitive Status Comments Pt pleasant, following commands appropriately. Distractible throughout session, though able to be redirected   Posture   Posture not impaired   Range of Motion Comprehensive   General Range of Motion no range of motion deficits identified   Strength Comprehensive (MMT)   General Manual Muscle Testing (MMT) Assessment no strength deficits identified   Coordination   Upper Extremity Coordination No deficits were identified   Bed  Mobility   Bed Mobility supine-sit   Supine-Sit [place] (Bed Mobility) supervision;set up   Comment (Bed Mobility) SBA for line management   Transfers   Transfers sit-stand transfer;toilet transfer;shower transfer   Sit-Stand Transfer   Sit-Stand [place] (Transfers) supervision;set up   Shower Transfer   Type (Shower Transfer) lateral   [place] Level (Shower Transfer) independent   Shower Transfer Comments per clinical judgement   Toilet Transfer   Type (Toilet Transfer) sit-stand   [place] Level (Toilet Transfer) independent   Toilet Transfer Comments per clinical judgement   Balance   Balance Comments No balance deficits noted   Clinical Impression   Criteria for Skilled Therapeutic Interventions Met (OT) Yes, treatment indicated   OT Diagnosis Pt is below baseline for ADLs/IADLs, at increased risk for deconditioning due to hospital stay   OT Problem List-Impairments impacting ADL problems related to;activity tolerance impaired   Assessment of Occupational Performance 1-3 Performance Deficits   Identified Performance Deficits home management, work, childcare   Planned Therapy Interventions (OT) IADL retraining;home program guidelines;progressive activity/exercise   Clinical Decision Making Complexity (OT) low complexity   Risk & Benefits of therapy have been explained evaluation/treatment results reviewed;care plan/treatment goals reviewed;risks/benefits reviewed;current/potential barriers reviewed;participants voiced agreement with care plan;participants included;patient   Clinical Impression Comments Pt presents at increased risk of deconditioning due to hospital stay, pt will benefit from skilled OT to progress toward PLOF   OT Total Evaluation Time   OT Eval, Low Complexity Minutes (66227) 7   OT Goals   Therapy Frequency (OT) 3 times/wk   OT Predicted Duration/Target Date for Goal Attainment 07/19/23   OT Goals Cardiac Phase 1   OT: Understanding of cardiac education to maximize quality of  life, condition management, and health outcomes Patient;Verbalize;Demonstrate   OT: Perform aerobic activity with stable cardiovascular response intermittent;15 minutes;ambulation;NuStep   OT: Functional/aerobic ambulation tolerance with stable cardiovascular response in order to return to home and community environment Independent;Greater than 300 feet   OT: Navigation of stairs simulating home set up with stable cardiovascular response in order to return to home and community environment Modified independent;5 stairs   OT Discharge Planning   OT Plan update stair goal as needed, progress ambulation   OT Discharge Recommendation (DC Rec) home with assist;home with outpatient cardiac rehab   OT Rationale for DC Rec Anticipate pt will be safe to d/c home with PRN assist from SO/family. Pending progress and eligibility for OP CR pt may also benefit from CR referral to progress activity tolerance   OT Brief overview of current status SBA for lines   Total Session Time   Total Session Time (sum of timed and untimed services) 7

## 2023-07-06 ENCOUNTER — APPOINTMENT (OUTPATIENT)
Dept: GENERAL RADIOLOGY | Facility: CLINIC | Age: 38
End: 2023-07-06
Attending: STUDENT IN AN ORGANIZED HEALTH CARE EDUCATION/TRAINING PROGRAM
Payer: COMMERCIAL

## 2023-07-06 LAB
ANION GAP SERPL CALCULATED.3IONS-SCNC: 10 MMOL/L (ref 7–15)
APTT PPP: 103 SECONDS (ref 22–38)
APTT PPP: 143 SECONDS (ref 22–38)
APTT PPP: 91 SECONDS (ref 22–38)
APTT PPP: 99 SECONDS (ref 22–38)
BASE EXCESS BLDV CALC-SCNC: 1.4 MMOL/L (ref -7.7–1.9)
BASE EXCESS BLDV CALC-SCNC: 2.3 MMOL/L (ref -7.7–1.9)
BASE EXCESS BLDV CALC-SCNC: 4.4 MMOL/L (ref -7.7–1.9)
BASE EXCESS BLDV CALC-SCNC: 5.7 MMOL/L (ref -7.7–1.9)
BUN SERPL-MCNC: 31.6 MG/DL (ref 6–20)
CALCIUM SERPL-MCNC: 9.1 MG/DL (ref 8.6–10)
CHLORIDE SERPL-SCNC: 98 MMOL/L (ref 98–107)
CREAT SERPL-MCNC: 0.96 MG/DL (ref 0.67–1.17)
DEPRECATED HCO3 PLAS-SCNC: 25 MMOL/L (ref 22–29)
ERYTHROCYTE [DISTWIDTH] IN BLOOD BY AUTOMATED COUNT: 22.7 % (ref 10–15)
GFR SERPL CREATININE-BSD FRML MDRD: >90 ML/MIN/1.73M2
GLUCOSE BLDC GLUCOMTR-MCNC: 145 MG/DL (ref 70–99)
GLUCOSE BLDC GLUCOMTR-MCNC: 89 MG/DL (ref 70–99)
GLUCOSE SERPL-MCNC: 97 MG/DL (ref 70–99)
HCO3 BLDV-SCNC: 27 MMOL/L (ref 21–28)
HCO3 BLDV-SCNC: 28 MMOL/L (ref 21–28)
HCO3 BLDV-SCNC: 29 MMOL/L (ref 21–28)
HCO3 BLDV-SCNC: 31 MMOL/L (ref 21–28)
HCT VFR BLD AUTO: 41.2 % (ref 40–53)
HGB BLD-MCNC: 12.2 G/DL (ref 13.3–17.7)
MAGNESIUM SERPL-MCNC: 2.1 MG/DL (ref 1.7–2.3)
MCH RBC QN AUTO: 20.9 PG (ref 26.5–33)
MCHC RBC AUTO-ENTMCNC: 29.6 G/DL (ref 31.5–36.5)
MCV RBC AUTO: 71 FL (ref 78–100)
O2/TOTAL GAS SETTING VFR VENT: 21 %
OXYHGB MFR BLDV: 41 % (ref 70–75)
OXYHGB MFR BLDV: 51 % (ref 70–75)
OXYHGB MFR BLDV: 58 % (ref 70–75)
OXYHGB MFR BLDV: 59 % (ref 70–75)
PCO2 BLDV: 44 MM HG (ref 40–50)
PCO2 BLDV: 45 MM HG (ref 40–50)
PCO2 BLDV: 45 MM HG (ref 40–50)
PCO2 BLDV: 46 MM HG (ref 40–50)
PH BLDV: 7.38 [PH] (ref 7.32–7.43)
PH BLDV: 7.4 [PH] (ref 7.32–7.43)
PH BLDV: 7.43 [PH] (ref 7.32–7.43)
PH BLDV: 7.44 [PH] (ref 7.32–7.43)
PHOSPHATE SERPL-MCNC: 4.4 MG/DL (ref 2.5–4.5)
PLATELET # BLD AUTO: 394 10E3/UL (ref 150–450)
PO2 BLDV: 27 MM HG (ref 25–47)
PO2 BLDV: 32 MM HG (ref 25–47)
PO2 BLDV: 35 MM HG (ref 25–47)
PO2 BLDV: 37 MM HG (ref 25–47)
POTASSIUM SERPL-SCNC: 3.8 MMOL/L (ref 3.4–5.3)
RBC # BLD AUTO: 5.84 10E6/UL (ref 4.4–5.9)
SODIUM SERPL-SCNC: 133 MMOL/L (ref 136–145)
WBC # BLD AUTO: 8.9 10E3/UL (ref 4–11)

## 2023-07-06 PROCEDURE — 250N000011 HC RX IP 250 OP 636: Mod: JZ | Performed by: STUDENT IN AN ORGANIZED HEALTH CARE EDUCATION/TRAINING PROGRAM

## 2023-07-06 PROCEDURE — 258N000003 HC RX IP 258 OP 636

## 2023-07-06 PROCEDURE — 85730 THROMBOPLASTIN TIME PARTIAL: CPT | Performed by: STUDENT IN AN ORGANIZED HEALTH CARE EDUCATION/TRAINING PROGRAM

## 2023-07-06 PROCEDURE — 83735 ASSAY OF MAGNESIUM: CPT | Performed by: STUDENT IN AN ORGANIZED HEALTH CARE EDUCATION/TRAINING PROGRAM

## 2023-07-06 PROCEDURE — 250N000013 HC RX MED GY IP 250 OP 250 PS 637

## 2023-07-06 PROCEDURE — 250N000013 HC RX MED GY IP 250 OP 250 PS 637: Performed by: INTERNAL MEDICINE

## 2023-07-06 PROCEDURE — 80048 BASIC METABOLIC PNL TOTAL CA: CPT | Performed by: STUDENT IN AN ORGANIZED HEALTH CARE EDUCATION/TRAINING PROGRAM

## 2023-07-06 PROCEDURE — 250N000011 HC RX IP 250 OP 636: Mod: JZ

## 2023-07-06 PROCEDURE — 82805 BLOOD GASES W/O2 SATURATION: CPT | Performed by: STUDENT IN AN ORGANIZED HEALTH CARE EDUCATION/TRAINING PROGRAM

## 2023-07-06 PROCEDURE — 71045 X-RAY EXAM CHEST 1 VIEW: CPT | Mod: 26 | Performed by: RADIOLOGY

## 2023-07-06 PROCEDURE — 85027 COMPLETE CBC AUTOMATED: CPT | Performed by: INTERNAL MEDICINE

## 2023-07-06 PROCEDURE — 200N000002 HC R&B ICU UMMC

## 2023-07-06 PROCEDURE — 250N000013 HC RX MED GY IP 250 OP 250 PS 637: Performed by: STUDENT IN AN ORGANIZED HEALTH CARE EDUCATION/TRAINING PROGRAM

## 2023-07-06 PROCEDURE — 250N000011 HC RX IP 250 OP 636: Mod: JZ | Performed by: INTERNAL MEDICINE

## 2023-07-06 PROCEDURE — 99291 CRITICAL CARE FIRST HOUR: CPT | Mod: GC | Performed by: STUDENT IN AN ORGANIZED HEALTH CARE EDUCATION/TRAINING PROGRAM

## 2023-07-06 PROCEDURE — 71045 X-RAY EXAM CHEST 1 VIEW: CPT

## 2023-07-06 PROCEDURE — 84100 ASSAY OF PHOSPHORUS: CPT | Performed by: STUDENT IN AN ORGANIZED HEALTH CARE EDUCATION/TRAINING PROGRAM

## 2023-07-06 PROCEDURE — 258N000003 HC RX IP 258 OP 636: Performed by: INTERNAL MEDICINE

## 2023-07-06 RX ORDER — POTASSIUM CHLORIDE 750 MG/1
20 TABLET, EXTENDED RELEASE ORAL ONCE
Status: COMPLETED | OUTPATIENT
Start: 2023-07-06 | End: 2023-07-06

## 2023-07-06 RX ADMIN — HEPARIN SODIUM 1600 UNITS/HR: 10000 INJECTION, SOLUTION INTRAVENOUS at 21:18

## 2023-07-06 RX ADMIN — POLYETHYLENE GLYCOL 3350 17 G: 17 POWDER, FOR SOLUTION ORAL at 08:28

## 2023-07-06 RX ADMIN — Medication 1 TABLET: at 08:29

## 2023-07-06 RX ADMIN — Medication 1 HALF-TAB: at 12:32

## 2023-07-06 RX ADMIN — FOLIC ACID 1 MG: 1 TABLET ORAL at 08:29

## 2023-07-06 RX ADMIN — HEPARIN SODIUM 1600 UNITS/HR: 10000 INJECTION, SOLUTION INTRAVENOUS at 06:48

## 2023-07-06 RX ADMIN — DOBUTAMINE 5 MCG/KG/MIN: 12.5 INJECTION, SOLUTION, CONCENTRATE INTRAVENOUS at 21:13

## 2023-07-06 RX ADMIN — RAMELTEON 8 MG: 8 TABLET, FILM COATED ORAL at 22:06

## 2023-07-06 RX ADMIN — Medication 1 HALF-TAB: at 08:29

## 2023-07-06 RX ADMIN — POTASSIUM CHLORIDE 20 MEQ: 750 TABLET, EXTENDED RELEASE ORAL at 05:04

## 2023-07-06 RX ADMIN — IRON SUCROSE 200 MG: 20 INJECTION, SOLUTION INTRAVENOUS at 12:38

## 2023-07-06 RX ADMIN — THIAMINE HCL TAB 100 MG 100 MG: 100 TAB at 08:29

## 2023-07-06 RX ADMIN — SACUBITRIL AND VALSARTAN 1 TABLET: 24; 26 TABLET, FILM COATED ORAL at 20:00

## 2023-07-06 ASSESSMENT — ACTIVITIES OF DAILY LIVING (ADL)
ADLS_ACUITY_SCORE: 25
ADLS_ACUITY_SCORE: 21
ADLS_ACUITY_SCORE: 25
ADLS_ACUITY_SCORE: 21

## 2023-07-06 NOTE — PLAN OF CARE
ICU End of Shift Summary. See flowsheets for vital signs and detailed assessment.    Major Changes: 2000 CARA calculations 1.9 CI 1500 SVR SvO2 50%, team notified, no interventions prescribed, reassess at 0200, pt order chicken devin-hayes online, team notified because of salt restriction, pt refused education on sodium restriction and ate food anyways, team told RN to let day team know, team alerted of pt's 0200 numbers, no intervnetion prescribed, will reassess at 0800    Neuro: WDL, denies pain, distractable  Cardiac: -110, BP WDL, afebrile  CARA:   2000: CI 1.9, Svo2: 50% SVR: 1500 Pa: 42/22 CVP:6   0200: CI: 2.1 SvO2: 58% SVR: 1147 Pa: 34/15 CVP: 4  Respiratory: RA, no SOB reported, lungs clear  GI/: voiding spontaneously without difficulty, large, formed BM, fluid restriction followed  Skin: WDL, no changes  Drips: dobut 7.5 heparin 1600  Labs: aptt 143, heparin paused and decreased by 300, redraw to verify results, K 3.8 20 meq replaced    Plan: continue monitoring CARA, encourage compliance of Na restriction, and incorporate discharge teaching of lifestyle management    EFREN Lockwood  July 5, 2023        Goal Outcome Evaluation:      Plan of Care Reviewed With: patient    Overall Patient Progress: no changeOverall Patient Progress: no change    Outcome Evaluation: On dobut and heprin

## 2023-07-06 NOTE — PLAN OF CARE
Major Shift Events:  Aox4, neuros intact. CIWAs are 0. Afebrile. Sinus tach 100-110s. MAP 60s-70s. Dobutamine lowered to 5. See flowsheets for katina numbers. RA, , lungs clear. LBM: 7-4-23.     Plan: Continues to monitor. Notify MD with concerns.    For vital signs and complete assessments, please see documentation flowsheets.

## 2023-07-06 NOTE — PROGRESS NOTES
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     Subjective and Interval:  -ordered chicken devin hayes last night  -started entresto 12/13 yesterday, tolerated well  -continues on dobutamine 7.5 mcg/kg/min this am  -father coming today to discuss his care    Assessment and plan by system:  Today's changes:  -hold diuresis given single digit CVP today   -decrease dobutamine from 7.5 to 5 mcg/kg/min  -spot dose lasix today based on CVP, goal 8-10   -continue heparin for LV thrombus      Neurology  #ADHD  Monitor for now    #Alcohol use disorder  #Methamphetamine use  -addiction med consult once he is more stable and getting close to leaving ICU   -CIWA, thiamine, folate, MVI, no benzos for now since his last drink he says was ~1 month ago      Cardiovascular  #Cardiogenic Shock  #HFrEF (EF 10%) secondary to NICM possibly from drug use/alcohol use disorders   #LV mural thrombus  Pt with recently diagnosed heart failure presenting with cardiogenic shock (CO/CI 3.0/1.43 on 7/3) to [facility], transferred to [facility] for further care on 7/3. Coronary angiogram on 6/14 showed no obstructive CAD. Hemodynamics have improved while on Dobutamine, NTG and bumex drips. 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 Luckily minimal end organ damage with only shock liver on presentation which is improving.    -Hemodynamics q6h   -decrease Dobutamine to 5 mcg/kg/min   -continue entresto 12/13 mg BID   -Heparin drip for LV thrombus, can transition to DOAC when appropriate  -Strict I/O  -Daily standing weight  -Low sodium diet and 2L daily fluid restriction     Pulmonary  No acute issues     Gastrointestinal, Nutrition  #Shock  "Liver  Improving, continue to monitor     Renal, Electrolytes  #Hypervolemic Hyponatremia  Diuresis as above     Infectious Disease  No acute issues     Hematology  #Iron deficiency anemia  Monitor Hb  Transfuse for Hb <7  Iron panel  7/3 with evidence of iron deficiency  -venofer 200 mg IV x5 doses      Endocrinology  Started on synthroid at OSH due to TSH of 9. Technically should treat subclinical hypothyroidism if TSH >10. Unsure if heart failure plays a role in this decision-making. Although subclinical hypothyroidism is associated with cardiovascular disease, this is not the cause of his heart failure.  -will hold synthroid and recheck once more stable out of ICU, may be sick euthyroid syndrome        ICU Checklist:  #Feeding: low na   #Analgesia: tylenol   #Sedation: n/a  #Thromboembolism ppx: heparin   #HOB: 30 degrees   #Ulcer ppx: not indicated   #Glucose: acceptable   #SBT/SAT: n/a  #Bowel reg: miralax   #Lines/tubes/drains:   R internal jugular swan   PIV x3  #Code status: full   #Family: father Neal is primary contact, will update today   #Dispo: ICU     Patient seen and discussed with staff physician.    León Beaulieu MD  Cardiology Fellow  Pager: [phone]    Objective:  Most recent vital signs:  BP 98/66   Pulse 110   Temp 97.6  F (36.4  C) (Axillary)   Resp 23   Ht 1.803 m (5' 11\")   Wt 74.8 kg (164 lb 14.5 oz)   SpO2 98%   BMI 23.00 kg/m    Temp:  [96.9  F (36.1  C)-97.9  F (36.6  C)] 97.6  F (36.4  C)  Pulse:  [] 110  Resp:  [11-29] 23  BP: ()/(51-80) 98/66  SpO2:  [92 %-100 %] 98 %  Wt Readings from Last 2 Encounters:   07/06/23 74.8 kg (164 lb 14.5 oz)       Intake/Output Summary (Last 24 hours) at 7/4/2023 1224  Last data filed at 7/4/2023 1100  Gross per 24 hour   Intake 1601.55 ml   Output 26696 ml   Net -8993.45 ml     Physical exam:  GEN: pleasant, no acute distress  HEENT: No discharge  EYES: no icterus  CV: RRR, normal s1/s2, no murmurs/rubs/s3/s4, no heave. Unable to " assess JVP due to presence of [place].  CHEST: clear to ausculation bilaterally, no rales or wheezing  ABD: soft, non-tender, normal active bowel sounds  : no flank/suprapubic tenderness  EXTR: No clubbing, cyanosis or edema.   NEURO: alert oriented, speech fluent/appropriate, motor grossly nonfocal  PSYCH: cooperative, affect appropriate, pleasant    Labs (Past three days):  CBC  Recent Labs   Lab 07/06/23  0344 07/05/23  0410 07/04/23  0157 07/03/23  2204   WBC 8.9 8.0 8.2 6.7   RBC 5.84 5.17 5.17 5.01   HGB 12.2* 10.7* 10.5* 10.3*   HCT 41.2 35.8* 36.3* 35.3*   MCV 71* 69* 70* 71*   MCH 20.9* 20.7* 20.3* 20.6*   MCHC 29.6* 29.9* 28.9* 29.2*   RDW 22.7* 22.0* 22.2* 22.2*    356 397 379     BMP  Recent Labs   Lab 07/06/23  0344 07/05/23  2347 07/05/23  0411 07/05/23  0410 07/05/23  0007 07/04/23  2017 07/04/23  1606 07/04/23  0846 07/04/23  0400   *  --   --  134*  --   --  132*  --  136   POTASSIUM 3.8  --   --  4.0 3.9  --  3.8  3.8   < > 3.5   CHLORIDE 98  --   --  97*  --   --  95*  --  96*   CO2 25  --   --  27  --   --  26  --  27   ANIONGAP 10  --   --  10  --   --  11  --  13   GLC 97 130* 93 93  --    < > 101*  --  93   BUN 31.6*  --   --  21.4*  --   --  20.9*  --  18.0   CR 0.96  --   --  0.99  --   --  1.00  --  1.06   GFRESTIMATED >90  --   --  >90  --   --  >90  --  >90   LENARD 9.1  --   --  9.2  --   --  8.7  --  9.0   MAG 2.1  --   --  2.1  --   --  2.1  --  2.4*   PHOS 4.4  --   --  4.7*  --   --   --   --  4.5    < > = values in this interval not displayed.     Troponins:     INR  Recent Labs   Lab 07/03/23  2204   INR 1.30*     Liver panel  Recent Labs   Lab 07/04/23  1606 07/04/23  0400 07/03/23  2204   PROTTOTAL 7.5 7.9 7.6   ALBUMIN 3.5 3.7 3.6   BILITOTAL 1.0 1.1 0.9   ALKPHOS 139* 147* 141*   AST 97* 122* 128*   * 540* 554*       Imaging/procedure results:  XR Chest Port 1 View  Narrative: EXAM: XR CHEST PORT 1 VIEW 7/5/2023 12:44 AM      HISTORY: daily swan  check.    COMPARISON: Chest radiograph 7/3/2023.     TECHNIQUE: Frontal view of the chest.    FINDINGS: Right internal jugular [place]-Sam catheter with tip  projecting over the main pulmonary artery, retracted compared to prior  exam. Stable cardiomegaly. Trachea is midline. No definite pleural  effusion. No appreciable pneumothorax. No focal consolidative opacity.  No acute osseous abnormality. Upper abdomen is unremarkable.  Impression: IMPRESSION: Interval retraction of [place]-Sam catheter with tip now  projecting over the main pulmonary artery.    I have personally reviewed the examination and initial interpretation  and I agree with the findings.    EBONIE MULLIGAN MD         SYSTEM ID:  [id number]

## 2023-07-07 ENCOUNTER — APPOINTMENT (OUTPATIENT)
Dept: GENERAL RADIOLOGY | Facility: CLINIC | Age: 38
End: 2023-07-07
Attending: STUDENT IN AN ORGANIZED HEALTH CARE EDUCATION/TRAINING PROGRAM
Payer: COMMERCIAL

## 2023-07-07 LAB
ALBUMIN SERPL BCG-MCNC: 3.3 G/DL (ref 3.5–5.2)
ALP SERPL-CCNC: 119 U/L (ref 40–129)
ALT SERPL W P-5'-P-CCNC: 238 U/L (ref 0–70)
APTT PPP: 84 SECONDS (ref 22–38)
AST SERPL W P-5'-P-CCNC: 45 U/L (ref 0–45)
BASE EXCESS BLDV CALC-SCNC: -0.2 MMOL/L (ref -7.7–1.9)
BASE EXCESS BLDV CALC-SCNC: 0 MMOL/L (ref -7.7–1.9)
BASE EXCESS BLDV CALC-SCNC: 1.3 MMOL/L (ref -7.7–1.9)
BASE EXCESS BLDV CALC-SCNC: 1.4 MMOL/L (ref -7.7–1.9)
BASE EXCESS BLDV CALC-SCNC: 1.6 MMOL/L (ref -7.7–1.9)
BILIRUB DIRECT SERPL-MCNC: 0.24 MG/DL (ref 0–0.3)
BILIRUB SERPL-MCNC: 0.5 MG/DL
ERYTHROCYTE [DISTWIDTH] IN BLOOD BY AUTOMATED COUNT: 23 % (ref 10–15)
HCO3 BLDV-SCNC: 25 MMOL/L (ref 21–28)
HCO3 BLDV-SCNC: 26 MMOL/L (ref 21–28)
HCT VFR BLD AUTO: 40.5 % (ref 40–53)
HGB BLD-MCNC: 12 G/DL (ref 13.3–17.7)
LACTATE SERPL-SCNC: 1.4 MMOL/L (ref 0.7–2)
MAGNESIUM SERPL-MCNC: 2.4 MG/DL (ref 1.7–2.3)
MCH RBC QN AUTO: 21.2 PG (ref 26.5–33)
MCHC RBC AUTO-ENTMCNC: 29.6 G/DL (ref 31.5–36.5)
MCV RBC AUTO: 71 FL (ref 78–100)
O2/TOTAL GAS SETTING VFR VENT: 21 %
OXYHGB MFR BLDV: 41 % (ref 70–75)
OXYHGB MFR BLDV: 44 % (ref 70–75)
OXYHGB MFR BLDV: 47 % (ref 70–75)
OXYHGB MFR BLDV: 49 % (ref 70–75)
OXYHGB MFR BLDV: 52 % (ref 70–75)
PCO2 BLDV: 41 MM HG (ref 40–50)
PCO2 BLDV: 44 MM HG (ref 40–50)
PCO2 BLDV: 45 MM HG (ref 40–50)
PH BLDV: 7.36 [PH] (ref 7.32–7.43)
PH BLDV: 7.37 [PH] (ref 7.32–7.43)
PH BLDV: 7.41 [PH] (ref 7.32–7.43)
PH BLDV: 7.41 [PH] (ref 7.32–7.43)
PH BLDV: 7.42 [PH] (ref 7.32–7.43)
PHOSPHATE SERPL-MCNC: 3.4 MG/DL (ref 2.5–4.5)
PLATELET # BLD AUTO: 379 10E3/UL (ref 150–450)
PO2 BLDV: 26 MM HG (ref 25–47)
PO2 BLDV: 29 MM HG (ref 25–47)
PO2 BLDV: 30 MM HG (ref 25–47)
PO2 BLDV: 30 MM HG (ref 25–47)
PO2 BLDV: 31 MM HG (ref 25–47)
POTASSIUM SERPL-SCNC: 4.4 MMOL/L (ref 3.4–5.3)
PROT SERPL-MCNC: 7.1 G/DL (ref 6.4–8.3)
RBC # BLD AUTO: 5.67 10E6/UL (ref 4.4–5.9)
UFH PPP CHRO-ACNC: 0.38 IU/ML
UFH PPP CHRO-ACNC: 0.38 IU/ML
WBC # BLD AUTO: 8 10E3/UL (ref 4–11)

## 2023-07-07 PROCEDURE — 85520 HEPARIN ASSAY: CPT | Performed by: INTERNAL MEDICINE

## 2023-07-07 PROCEDURE — 99204 OFFICE O/P NEW MOD 45 MIN: CPT | Performed by: PSYCHIATRY & NEUROLOGY

## 2023-07-07 PROCEDURE — 250N000011 HC RX IP 250 OP 636: Mod: JZ | Performed by: STUDENT IN AN ORGANIZED HEALTH CARE EDUCATION/TRAINING PROGRAM

## 2023-07-07 PROCEDURE — 999N000155 HC STATISTIC RAPCV CVP MONITORING

## 2023-07-07 PROCEDURE — 250N000013 HC RX MED GY IP 250 OP 250 PS 637: Performed by: STUDENT IN AN ORGANIZED HEALTH CARE EDUCATION/TRAINING PROGRAM

## 2023-07-07 PROCEDURE — 250N000013 HC RX MED GY IP 250 OP 250 PS 637: Performed by: INTERNAL MEDICINE

## 2023-07-07 PROCEDURE — 36569 INSJ PICC 5 YR+ W/O IMAGING: CPT

## 2023-07-07 PROCEDURE — 99291 CRITICAL CARE FIRST HOUR: CPT | Mod: GC | Performed by: INTERNAL MEDICINE

## 2023-07-07 PROCEDURE — 71045 X-RAY EXAM CHEST 1 VIEW: CPT

## 2023-07-07 PROCEDURE — 258N000003 HC RX IP 258 OP 636

## 2023-07-07 PROCEDURE — 84132 ASSAY OF SERUM POTASSIUM: CPT | Performed by: STUDENT IN AN ORGANIZED HEALTH CARE EDUCATION/TRAINING PROGRAM

## 2023-07-07 PROCEDURE — 250N000013 HC RX MED GY IP 250 OP 250 PS 637

## 2023-07-07 PROCEDURE — 200N000002 HC R&B ICU UMMC

## 2023-07-07 PROCEDURE — 82805 BLOOD GASES W/O2 SATURATION: CPT | Performed by: STUDENT IN AN ORGANIZED HEALTH CARE EDUCATION/TRAINING PROGRAM

## 2023-07-07 PROCEDURE — 85520 HEPARIN ASSAY: CPT | Performed by: STUDENT IN AN ORGANIZED HEALTH CARE EDUCATION/TRAINING PROGRAM

## 2023-07-07 PROCEDURE — 80076 HEPATIC FUNCTION PANEL: CPT

## 2023-07-07 PROCEDURE — 83735 ASSAY OF MAGNESIUM: CPT | Performed by: STUDENT IN AN ORGANIZED HEALTH CARE EDUCATION/TRAINING PROGRAM

## 2023-07-07 PROCEDURE — 85730 THROMBOPLASTIN TIME PARTIAL: CPT | Performed by: STUDENT IN AN ORGANIZED HEALTH CARE EDUCATION/TRAINING PROGRAM

## 2023-07-07 PROCEDURE — 84100 ASSAY OF PHOSPHORUS: CPT | Performed by: STUDENT IN AN ORGANIZED HEALTH CARE EDUCATION/TRAINING PROGRAM

## 2023-07-07 PROCEDURE — 71045 X-RAY EXAM CHEST 1 VIEW: CPT | Mod: 26 | Performed by: RADIOLOGY

## 2023-07-07 PROCEDURE — 272N000473 HC KIT, VPS RHYTHM STYLET

## 2023-07-07 PROCEDURE — 85014 HEMATOCRIT: CPT | Performed by: INTERNAL MEDICINE

## 2023-07-07 PROCEDURE — 250N000011 HC RX IP 250 OP 636: Mod: JZ

## 2023-07-07 PROCEDURE — 272N000451 HC KIT SHRLOCK 5FR POWER PICC DOUBLE LUMEN

## 2023-07-07 PROCEDURE — 83605 ASSAY OF LACTIC ACID: CPT | Performed by: STUDENT IN AN ORGANIZED HEALTH CARE EDUCATION/TRAINING PROGRAM

## 2023-07-07 RX ORDER — LIDOCAINE 40 MG/G
CREAM TOPICAL
Status: ACTIVE | OUTPATIENT
Start: 2023-07-07 | End: 2023-07-10

## 2023-07-07 RX ORDER — DAPAGLIFLOZIN 5 MG/1
5 TABLET, FILM COATED ORAL DAILY
Status: DISCONTINUED | OUTPATIENT
Start: 2023-07-07 | End: 2023-07-07

## 2023-07-07 RX ORDER — HEPARIN SODIUM,PORCINE 10 UNIT/ML
5-20 VIAL (ML) INTRAVENOUS EVERY 24 HOURS
Status: DISCONTINUED | OUTPATIENT
Start: 2023-07-07 | End: 2023-07-20 | Stop reason: HOSPADM

## 2023-07-07 RX ORDER — HEPARIN SODIUM 10000 [USP'U]/100ML
0-5000 INJECTION, SOLUTION INTRAVENOUS CONTINUOUS
Status: DISCONTINUED | OUTPATIENT
Start: 2023-07-07 | End: 2023-07-17

## 2023-07-07 RX ORDER — HEPARIN SODIUM,PORCINE 10 UNIT/ML
5-20 VIAL (ML) INTRAVENOUS
Status: DISCONTINUED | OUTPATIENT
Start: 2023-07-07 | End: 2023-07-20 | Stop reason: HOSPADM

## 2023-07-07 RX ORDER — DAPAGLIFLOZIN 10 MG/1
10 TABLET, FILM COATED ORAL DAILY
Status: DISCONTINUED | OUTPATIENT
Start: 2023-07-07 | End: 2023-07-07

## 2023-07-07 RX ADMIN — IRON SUCROSE 200 MG: 20 INJECTION, SOLUTION INTRAVENOUS at 11:19

## 2023-07-07 RX ADMIN — FOLIC ACID 1 MG: 1 TABLET ORAL at 08:58

## 2023-07-07 RX ADMIN — EMPAGLIFLOZIN 10 MG: 10 TABLET, FILM COATED ORAL at 11:12

## 2023-07-07 RX ADMIN — RAMELTEON 8 MG: 8 TABLET, FILM COATED ORAL at 22:48

## 2023-07-07 RX ADMIN — Medication 1 TABLET: at 08:58

## 2023-07-07 RX ADMIN — HEPARIN SODIUM 1600 UNITS/HR: 10000 INJECTION, SOLUTION INTRAVENOUS at 11:14

## 2023-07-07 RX ADMIN — THIAMINE HCL TAB 100 MG 100 MG: 100 TAB at 08:58

## 2023-07-07 RX ADMIN — SACUBITRIL AND VALSARTAN 1 TABLET: 24; 26 TABLET, FILM COATED ORAL at 08:59

## 2023-07-07 RX ADMIN — SACUBITRIL AND VALSARTAN 1 TABLET: 24; 26 TABLET, FILM COATED ORAL at 20:31

## 2023-07-07 ASSESSMENT — ACTIVITIES OF DAILY LIVING (ADL)
ADLS_ACUITY_SCORE: 21
ADLS_ACUITY_SCORE: 25
ADLS_ACUITY_SCORE: 21
ADLS_ACUITY_SCORE: 25
ADLS_ACUITY_SCORE: 21

## 2023-07-07 NOTE — PLAN OF CARE
ICU End of Shift Summary. See flowsheets for vital signs and detailed assessment.    Major Changes: 2230 CARA calculation completed while pt was anxious, pt asked to try and relax but pt felt he was unable to relax. SvO2 was 41. Charge consulted, redrawn at 0000. SvO2 was 52. Ci-1.6, team notified, lactate sent. Lactate normal. CARA redone at 0530 when pt was asleep, CI-1.6, SvO2 49, Svr 1500, team notified, recheck in 4 hrs    Neuro: Anxious, on phone with significant other and getting upset, hard for pt to relax and sleep, AOx4, moves all extremities, PERRLA, denies pain  Cardiac: -120, BP WDL, afebrile  Respiratory: RA, clear lungs  GI/: voids spontaneously, no BM, eating snacks throughout night, Na restriction education re-enforced,   Skin: WDL    Drips: dobut 5, heparin 1600  Labs: aptt WDL on 2nd check, recheck at 4am, electrolytes WDL    Plan: Continue to monitor       EFREN Lockwood  July 7, 2023        Goal Outcome Evaluation:      Plan of Care Reviewed With: patient    Overall Patient Progress: no changeOverall Patient Progress: no change    Outcome Evaluation: 5 dobut, CARA numbers trending

## 2023-07-07 NOTE — PROCEDURES
[facility]    Double Lumen PICC Placement    Date/Time: 7/7/2023 2:46 PM    Performed by: Mark Khan RN  Authorized by: Aileen Dimas MD  Indications: vascular access      UNIVERSAL PROTOCOL   Site Marked: Yes  Prior Images Obtained and Reviewed:  Yes  Required items: Required blood products, implants, devices and special equipment available    Patient identity confirmed:  Verbally with patient, arm band, provided demographic data, hospital-assigned identification number and anonymous protocol, patient vented/unresponsive  NA - No sedation, light sedation, or local anesthesia  Confirmation Checklist:  Patient's identity using two indicators, relevant allergies, procedure was appropriate and matched the consent or emergent situation and correct equipment/implants were available  Time out: Immediately prior to the procedure a time out was called (Mark)    Universal Protocol: the Joint Commission Universal Protocol was followed    Preparation: Patient was prepped and draped in usual sterile fashion       ANESTHESIA    Anesthesia: Local infiltration  Local Anesthetic:  Lidocaine 1% without epinephrine  Anesthetic Total (mL):  5      SEDATION    Patient Sedated: No        Preparation: skin prepped with ChloraPrep  Skin prep agent: skin prep agent completely dried prior to procedure  Sterile barriers: maximum sterile barriers were used: cap, mask, sterile gown, sterile gloves, and large sterile sheet  Hand hygiene: hand hygiene performed prior to central venous catheter insertion  Type of line used: PICC  Catheter type: double lumen  Lumen type: non-valved and power PICC  Lumen Identification: Purple and Red  Catheter size: 5 Fr  Brand: Bard  Lot number: [id number]  Placement method: venipuncture, MST, ultrasound and tip navigation system  Number of attempts: 1  Difficulty threading catheter: no  Successful placement: yes  Orientation: left  Catheter to Vein (%):  24  Location: basilic vein (0.74cm)  Tip Location: SVC  Arm circumference: adults 10 cm  Extremity circumference: 28  Visible catheter length: 2  Total catheter length: 48  Dressing and securement: adhesive securement device, alcohol impregnated caps, blood cleaned with CHG, blood removed, fixation device, chlorhexidine patch applied, securement device, site cleansed, statlock and transparent securement dressing  Post procedure assessment: blood return through all ports, free fluid flow and placement verified by 3CG technology  PROCEDURE Describe Procedure: Left basilic vein 0.74cm dm.2cm external.Placement verified by Richar 3CG.PICC okay to use.  Disposal: sharps and needle count correct at the end of procedure, needles and guidewire disposed in sharps container

## 2023-07-07 NOTE — PLAN OF CARE
Goal Outcome Evaluation:      Plan of Care Reviewed With: patient    Overall Patient Progress: no changeOverall Patient Progress: no change    Outcome Evaluation: CI 1.5 on 5 of dobutamine.    Major Shift Events:  Alert and oriented, sleeping most of shift. -120s. Dobutamine infusing at 5. Cardiac index 1.3-1.5, team aware, Jardiance started. PICC placed. Sating >90% on RA. 2G Na 2L fluid restriction diet. Voiding well. Did not get out of bed today. Left double lumen PICC placed.    Plan: Remove internal jugular CVC and swan. Transfer to 6th floor pending bed availability.    For vital signs and complete assessments, please see documentation flowsheets.

## 2023-07-07 NOTE — PROGRESS NOTES
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     Subjective and Interval:  -met with family yesterday  -index is still poor on 5 of dobutamine   -increased entresto yesterday    Assessment and plan by system:  Today's changes:  -start dapagliflozin  -continue entresto 24/26  -continue dobutamine at 5 mcg/kg/min for now  -remove swan, place PICC, transfer to floor afterwards   -psych consult for ADHD - trying to avoid stimulant medications with severe cardiomyopathy     Neurology  #ADHD  Monitor for now    #Alcohol use disorder  #Methamphetamine use  -addiction med consult once he is more stable and getting close to leaving ICU   -CIWA, thiamine, folate, MVI, no benzos for now since his last drink he says was ~1 month ago      Cardiovascular  #Cardiogenic Shock  #HFrEF (EF 10%) secondary to NICM possibly from drug use/alcohol use disorders   #LV mural thrombus  Pt with recently diagnosed heart failure presenting with cardiogenic shock (CO/CI 3.0/1.43 on 7/3) to [facility], transferred to [facility] for further care on 7/3. Coronary angiogram on 6/14 showed no obstructive CAD. Hemodynamics have improved while on Dobutamine, NTG and bumex drips. 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 Luckily minimal end organ damage with only shock liver on presentation which is improving.    -continue Dobutamine at 5 mcg/kg/min   -continue entresto 24/26 mg BID   -start farxiga, likely add spironolactone tomorrow   -Heparin drip for LV thrombus, can transition to DOAC when appropriate in next couple of days  -remove swan  -place PICC line   -Strict I/O  -Daily standing weight  -Low sodium diet and 2L daily fluid  "restriction     Pulmonary  No acute issues     Gastrointestinal, Nutrition  #Shock Liver  Improving, continue to monitor     Renal, Electrolytes  #Hypervolemic Hyponatremia  Diuresis as above     Infectious Disease  No acute issues     Hematology  #Iron deficiency anemia  Monitor Hb  Transfuse for Hb <7  Iron panel  7/3 with evidence of iron deficiency  -venofer 200 mg IV x5 doses      Endocrinology  Started on synthroid at OSH due to TSH of 9. Technically should treat subclinical hypothyroidism if TSH >10. Unsure if heart failure plays a role in this decision-making. Although subclinical hypothyroidism is associated with cardiovascular disease, this is not the cause of his heart failure.  -will hold synthroid and recheck once more stable out of ICU, may be sick euthyroid syndrome        ICU Checklist:  #Feeding: low na   #Analgesia: tylenol   #Sedation: n/a  #Thromboembolism ppx: heparin   #HOB: 30 degrees   #Ulcer ppx: not indicated   #Glucose: acceptable   #SBT/SAT: n/a  #Bowel reg: miralax   #Lines/tubes/drains:   R internal jugular swan   PIV x3  #Code status: full   #Family: father Neal is primary contact, will update today   #Dispo: ICU     Patient seen and discussed with staff physician.    León Beaulieu MD  Cardiology Fellow  Pager: [phone]    Objective:  Most recent vital signs:  BP 99/65   Pulse 104   Temp 98.2  F (36.8  C) (Oral)   Resp 13   Ht 1.803 m (5' 11\")   Wt 77.7 kg (171 lb 4.8 oz)   SpO2 96%   BMI 23.89 kg/m    Temp:  [97.6  F (36.4  C)-98.6  F (37  C)] 98.2  F (36.8  C)  Pulse:  [] 104  Resp:  [] 13  BP: ()/(53-87) 99/65  SpO2:  [91 %-100 %] 96 %  Wt Readings from Last 2 Encounters:   07/07/23 77.7 kg (171 lb 4.8 oz)       Intake/Output Summary (Last 24 hours) at 7/4/2023 1224  Last data filed at 7/4/2023 1100  Gross per 24 hour   Intake 1601.55 ml   Output 97073 ml   Net -8993.45 ml     Physical exam:  GEN: pleasant, no acute distress  HEENT: No discharge  EYES: " no icterus  CV: tachycardic, normal s1/s2, no murmurs/rubs/s3/s4, no heave, JVD ~8 cm H2O  CHEST: clear to ausculation bilaterally, no rales or wheezing  ABD: soft, non-tender, normal active bowel sounds  : no flank/suprapubic tenderness  EXTR: No clubbing, cyanosis or edema.   NEURO: alert oriented, speech fluent/appropriate, motor grossly nonfocal  PSYCH: cooperative, affect appropriate, pleasant    Labs (Past three days):  CBC  Recent Labs   Lab 07/07/23  0409 07/06/23  0344 07/05/23  0410 07/04/23  0157   WBC 8.0 8.9 8.0 8.2   RBC 5.67 5.84 5.17 5.17   HGB 12.0* 12.2* 10.7* 10.5*   HCT 40.5 41.2 35.8* 36.3*   MCV 71* 71* 69* 70*   MCH 21.2* 20.9* 20.7* 20.3*   MCHC 29.6* 29.6* 29.9* 28.9*   RDW 23.0* 22.7* 22.0* 22.2*    394 356 397     BMP  Recent Labs   Lab 07/07/23  0409 07/06/23  1623 07/06/23  0929 07/06/23  0344 07/05/23  2347 07/05/23  0411 07/05/23  0410 07/05/23  0007 07/04/23 2017 07/04/23  1606 07/04/23  0846 07/04/23  0400   NA  --   --   --  133*  --   --  134*  --   --  132*  --  136   POTASSIUM 4.4  --   --  3.8  --   --  4.0 3.9  --  3.8  3.8   < > 3.5   CHLORIDE  --   --   --  98  --   --  97*  --   --  95*  --  96*   CO2  --   --   --  25  --   --  27  --   --  26  --  27   ANIONGAP  --   --   --  10  --   --  10  --   --  11  --  13   GLC  --  145* 89 97 130*   < > 93  --    < > 101*  --  93   BUN  --   --   --  31.6*  --   --  21.4*  --   --  20.9*  --  18.0   CR  --   --   --  0.96  --   --  0.99  --   --  1.00  --  1.06   GFRESTIMATED  --   --   --  >90  --   --  >90  --   --  >90  --  >90   LENARD  --   --   --  9.1  --   --  9.2  --   --  8.7  --  9.0   MAG 2.4*  --   --  2.1  --   --  2.1  --   --  2.1  --  2.4*   PHOS 3.4  --   --  4.4  --   --  4.7*  --   --   --   --  4.5    < > = values in this interval not displayed.     Troponins:     INR  Recent Labs   Lab 07/03/23 2204   INR 1.30*     Liver panel  Recent Labs   Lab 07/04/23  1606 07/04/23  0400 07/03/23  2204   PROTTOTAL  7.5 7.9 7.6   ALBUMIN 3.5 3.7 3.6   BILITOTAL 1.0 1.1 0.9   ALKPHOS 139* 147* 141*   AST 97* 122* 128*   * 540* 554*       Imaging/procedure results:  XR Chest Port 1 View  Narrative: EXAM: XR CHEST PORT 1 VIEW 7/6/2023 12:39 AM      HISTORY: daily swan check.    COMPARISON: Previous day.     TECHNIQUE: Frontal view of the chest.    FINDINGS: Right internal jugular [place]-Sam catheter tip projects over  the main pulmonary artery. Stable enlarged cardiac silhouette. Trachea  is midline. No significant pleural effusion. Lung apices are  collimated out of the field-of-view. No visualized pneumothorax. No  focal consolidative opacity. No acute osseous abnormality. Upper  abdomen is unremarkable.  Impression: IMPRESSION: [place] catheter tip projects over the main pulmonary  artery.    I have personally reviewed the examination and initial interpretation  and I agree with the findings.    REILLY FRANKEL DO         SYSTEM ID:  [id number]
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
## 2023-07-08 ENCOUNTER — APPOINTMENT (OUTPATIENT)
Dept: GENERAL RADIOLOGY | Facility: CLINIC | Age: 38
End: 2023-07-08
Attending: STUDENT IN AN ORGANIZED HEALTH CARE EDUCATION/TRAINING PROGRAM
Payer: COMMERCIAL

## 2023-07-08 LAB
ALBUMIN SERPL BCG-MCNC: 3.5 G/DL (ref 3.5–5.2)
ALP SERPL-CCNC: 119 U/L (ref 40–129)
ALT SERPL W P-5'-P-CCNC: 226 U/L (ref 0–70)
ANION GAP SERPL CALCULATED.3IONS-SCNC: 9 MMOL/L (ref 7–15)
APTT PPP: 85 SECONDS (ref 22–38)
AST SERPL W P-5'-P-CCNC: 66 U/L (ref 0–45)
ATRIAL RATE - MUSE: 108 BPM
BASE EXCESS BLDV CALC-SCNC: -1 MMOL/L (ref -7.7–1.9)
BILIRUB SERPL-MCNC: 0.5 MG/DL
BUN SERPL-MCNC: 29.2 MG/DL (ref 6–20)
CALCIUM SERPL-MCNC: 8.9 MG/DL (ref 8.6–10)
CHLORIDE SERPL-SCNC: 105 MMOL/L (ref 98–107)
CREAT SERPL-MCNC: 0.98 MG/DL (ref 0.67–1.17)
DEPRECATED HCO3 PLAS-SCNC: 20 MMOL/L (ref 22–29)
DIASTOLIC BLOOD PRESSURE - MUSE: NORMAL MMHG
ERYTHROCYTE [DISTWIDTH] IN BLOOD BY AUTOMATED COUNT: 23.5 % (ref 10–15)
GFR SERPL CREATININE-BSD FRML MDRD: >90 ML/MIN/1.73M2
GLUCOSE SERPL-MCNC: 97 MG/DL (ref 70–99)
HCO3 BLDV-SCNC: 24 MMOL/L (ref 21–28)
HCT VFR BLD AUTO: 41.6 % (ref 40–53)
HGB BLD-MCNC: 11.8 G/DL (ref 13.3–17.7)
INTERPRETATION ECG - MUSE: NORMAL
LACTATE SERPL-SCNC: 1 MMOL/L (ref 0.7–2)
MAGNESIUM SERPL-MCNC: 2.1 MG/DL (ref 1.7–2.3)
MCH RBC QN AUTO: 20.6 PG (ref 26.5–33)
MCHC RBC AUTO-ENTMCNC: 28.4 G/DL (ref 31.5–36.5)
MCV RBC AUTO: 73 FL (ref 78–100)
O2/TOTAL GAS SETTING VFR VENT: 21 %
OXYHGB MFR BLDV: 54 % (ref 70–75)
P AXIS - MUSE: 47 DEGREES
PCO2 BLDV: 40 MM HG (ref 40–50)
PH BLDV: 7.38 [PH] (ref 7.32–7.43)
PHOSPHATE SERPL-MCNC: 3.6 MG/DL (ref 2.5–4.5)
PLATELET # BLD AUTO: 364 10E3/UL (ref 150–450)
PO2 BLDV: 33 MM HG (ref 25–47)
POTASSIUM SERPL-SCNC: 4.7 MMOL/L (ref 3.4–5.3)
PR INTERVAL - MUSE: 174 MS
PROT SERPL-MCNC: 7.4 G/DL (ref 6.4–8.3)
QRS DURATION - MUSE: 90 MS
QT - MUSE: 362 MS
QTC - MUSE: 485 MS
R AXIS - MUSE: 70 DEGREES
RBC # BLD AUTO: 5.72 10E6/UL (ref 4.4–5.9)
SODIUM SERPL-SCNC: 134 MMOL/L (ref 136–145)
SYSTOLIC BLOOD PRESSURE - MUSE: NORMAL MMHG
T AXIS - MUSE: 17 DEGREES
UFH PPP CHRO-ACNC: 0.36 IU/ML
VENTRICULAR RATE- MUSE: 108 BPM
WBC # BLD AUTO: 7.8 10E3/UL (ref 4–11)

## 2023-07-08 PROCEDURE — 99233 SBSQ HOSP IP/OBS HIGH 50: CPT | Mod: GC | Performed by: INTERNAL MEDICINE

## 2023-07-08 PROCEDURE — 73610 X-RAY EXAM OF ANKLE: CPT | Mod: RT

## 2023-07-08 PROCEDURE — 999N000045 HC STATISTIC DAILY SWAN MONITORING

## 2023-07-08 PROCEDURE — 80053 COMPREHEN METABOLIC PANEL: CPT | Performed by: INTERNAL MEDICINE

## 2023-07-08 PROCEDURE — 85730 THROMBOPLASTIN TIME PARTIAL: CPT | Performed by: STUDENT IN AN ORGANIZED HEALTH CARE EDUCATION/TRAINING PROGRAM

## 2023-07-08 PROCEDURE — 84100 ASSAY OF PHOSPHORUS: CPT | Performed by: STUDENT IN AN ORGANIZED HEALTH CARE EDUCATION/TRAINING PROGRAM

## 2023-07-08 PROCEDURE — 85520 HEPARIN ASSAY: CPT | Performed by: INTERNAL MEDICINE

## 2023-07-08 PROCEDURE — 250N000011 HC RX IP 250 OP 636: Mod: JZ | Performed by: INTERNAL MEDICINE

## 2023-07-08 PROCEDURE — 250N000013 HC RX MED GY IP 250 OP 250 PS 637

## 2023-07-08 PROCEDURE — 250N000013 HC RX MED GY IP 250 OP 250 PS 637: Performed by: INTERNAL MEDICINE

## 2023-07-08 PROCEDURE — 999N000155 HC STATISTIC RAPCV CVP MONITORING

## 2023-07-08 PROCEDURE — 85027 COMPLETE CBC AUTOMATED: CPT | Performed by: INTERNAL MEDICINE

## 2023-07-08 PROCEDURE — 214N000001 HC R&B CCU UMMC

## 2023-07-08 PROCEDURE — 83735 ASSAY OF MAGNESIUM: CPT | Performed by: STUDENT IN AN ORGANIZED HEALTH CARE EDUCATION/TRAINING PROGRAM

## 2023-07-08 PROCEDURE — 258N000003 HC RX IP 258 OP 636

## 2023-07-08 PROCEDURE — 82805 BLOOD GASES W/O2 SATURATION: CPT | Performed by: STUDENT IN AN ORGANIZED HEALTH CARE EDUCATION/TRAINING PROGRAM

## 2023-07-08 PROCEDURE — 258N000003 HC RX IP 258 OP 636: Performed by: INTERNAL MEDICINE

## 2023-07-08 PROCEDURE — 83605 ASSAY OF LACTIC ACID: CPT | Performed by: INTERNAL MEDICINE

## 2023-07-08 PROCEDURE — 73610 X-RAY EXAM OF ANKLE: CPT | Mod: 26 | Performed by: RADIOLOGY

## 2023-07-08 PROCEDURE — 250N000013 HC RX MED GY IP 250 OP 250 PS 637: Performed by: STUDENT IN AN ORGANIZED HEALTH CARE EDUCATION/TRAINING PROGRAM

## 2023-07-08 PROCEDURE — 250N000011 HC RX IP 250 OP 636: Mod: JZ

## 2023-07-08 RX ORDER — SPIRONOLACTONE 25 MG/1
25 TABLET ORAL DAILY
Status: DISCONTINUED | OUTPATIENT
Start: 2023-07-08 | End: 2023-07-20 | Stop reason: HOSPADM

## 2023-07-08 RX ADMIN — THIAMINE HCL TAB 100 MG 100 MG: 100 TAB at 07:55

## 2023-07-08 RX ADMIN — DOBUTAMINE 5 MCG/KG/MIN: 12.5 INJECTION, SOLUTION, CONCENTRATE INTRAVENOUS at 07:46

## 2023-07-08 RX ADMIN — FOLIC ACID 1 MG: 1 TABLET ORAL at 07:55

## 2023-07-08 RX ADMIN — SPIRONOLACTONE 25 MG: 25 TABLET, FILM COATED ORAL at 07:55

## 2023-07-08 RX ADMIN — HEPARIN SODIUM 1600 UNITS/HR: 10000 INJECTION, SOLUTION INTRAVENOUS at 01:52

## 2023-07-08 RX ADMIN — EMPAGLIFLOZIN 10 MG: 10 TABLET, FILM COATED ORAL at 07:55

## 2023-07-08 RX ADMIN — SACUBITRIL AND VALSARTAN 1 TABLET: 24; 26 TABLET, FILM COATED ORAL at 21:50

## 2023-07-08 RX ADMIN — Medication 1 TABLET: at 07:58

## 2023-07-08 RX ADMIN — IRON SUCROSE 200 MG: 20 INJECTION, SOLUTION INTRAVENOUS at 15:20

## 2023-07-08 RX ADMIN — HEPARIN SODIUM 1600 UNITS/HR: 10000 INJECTION, SOLUTION INTRAVENOUS at 18:13

## 2023-07-08 RX ADMIN — SACUBITRIL AND VALSARTAN 1 TABLET: 24; 26 TABLET, FILM COATED ORAL at 07:55

## 2023-07-08 ASSESSMENT — ACTIVITIES OF DAILY LIVING (ADL)
ADLS_ACUITY_SCORE: 21

## 2023-07-08 NOTE — PROGRESS NOTES
Transferred to: Unit 6C at 1240, report called to Rubia PIPER RN  Belongings: With Pt including phone, ipad, , clothing, money, and wallet  Valle removed? Yes  Central line removed? Yes, PICC remains  Chart and medications sent with patient Yes  Family notified: No: Pt will inform

## 2023-07-08 NOTE — PROGRESS NOTES
Shift Summary    CV: ST rates from 109-125.  ScvO2 was up to 54 from 47 yesterday.  8 beats of symptomatic (palpitations) VTach - team aware.      Pulmonary: RA.    Resp: 17    GI/: Voiding via urinal.    Intake/Output Summary (Last 24 hours) at 7/8/2023 0401  Last data filed at 7/8/2023 0400  Gross per 24 hour   Intake 2691.8 ml   Output 2100 ml   Net 591.8 ml     Neuro: WDL.    Skin: WDL.    Major Shift Events:  SWAN removed at 2145, no complications.    Plan: Continue to optimize PO and IV medication therapy.    Documenting RN assumed care of this patient from 7/7/23  [id number].  For vital signs, drip titrations, and complete assessments, please see documentation flowsheets; assessments charted by exception.  All ICU standards of care were followed.    Haroldo Whitt, RN, BSN, CCRN on 7/8/2023 at 4:01 AM

## 2023-07-08 NOTE — PROGRESS NOTES
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     Subjective and Interval:  -removed swan yesterday, placed PICC, floor orders  -started dapagliflozin     Assessment and plan by system:  Today's changes:  -start spironolactone 25 mg daily   -continue dobutamine 5 mcg/kg/min   -ambulate  -floor orders     Neurology  #ADHD  -psych consulted 7/7, managing without medications     #Alcohol use disorder  #Methamphetamine use  -addiction med consult once he is more stable and getting close to leaving ICU   -CIWA, thiamine, folate, MVI, no benzos for now since his last drink he says was ~1 month ago      Cardiovascular  #Cardiogenic Shock  #HFrEF (EF 10%) secondary to NICM possibly from drug use/alcohol use disorders   #LV mural thrombus  Pt with recently diagnosed heart failure presenting with cardiogenic shock (CO/CI 3.0/1.43 on 7/3) to [facility], transferred to [facility] for further care on 7/3. Coronary angiogram on 6/14 showed no obstructive CAD. Hemodynamics have improved while on Dobutamine, NTG and bumex drips. 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 Luckily minimal end organ damage with only shock liver on presentation which is improving.    -continue Dobutamine at 5 mcg/kg/min   -continue entresto 24/26 mg BID   -continue farxiga   -start spironolactone 25 mg daily   -Heparin drip for LV thrombus, can transition to DOAC when appropriate in next couple of days - pharmacy liason consult for coverage   -Strict I/O  -Daily standing weight  -Low sodium diet and 2L daily fluid restriction     Pulmonary  No acute issues     Gastrointestinal, Nutrition  #Shock Liver  Improving, continue to monitor     Renal,  "Electrolytes  #Hypervolemic Hyponatremia  Diuresis as above     Infectious Disease  No acute issues     Hematology  #Iron deficiency anemia  Monitor Hb  Transfuse for Hb <7  Iron panel  7/3 with evidence of iron deficiency  -venofer 200 mg IV x5 doses      Endocrinology  Started on synthroid at OSH due to TSH of 9. Technically should treat subclinical hypothyroidism if TSH >10. Unsure if heart failure plays a role in this decision-making. Although subclinical hypothyroidism is associated with cardiovascular disease, this is not the cause of his heart failure.  -will hold synthroid and recheck once more stable out of ICU, may be sick euthyroid syndrome        ICU Checklist:  #Feeding: low na   #Analgesia: tylenol   #Sedation: n/a  #Thromboembolism ppx: heparin   #HOB: n/a   #Ulcer ppx: not indicated   #Glucose: acceptable   #SBT/SAT: n/a  #Bowel reg: miralax   #Lines/tubes/drains:   R internal jugular swan   PIV x3  #Code status: full   #Family: father Neal is primary contact   #Dispo: ICU     Patient seen and discussed with staff physician.    León Beaulieu MD  Cardiology Fellow  Pager: [phone]    Objective:  Most recent vital signs:  /62   Pulse 112   Temp 98.6  F (37  C) (Oral)   Resp 25   Ht 1.803 m (5' 11\")   Wt 79 kg (174 lb 2.6 oz)   SpO2 96%   BMI 24.29 kg/m    Temp:  [98  F (36.7  C)-98.8  F (37.1  C)] 98.6  F (37  C)  Pulse:  [105-126] 112  Resp:  [14-28] 25  BP: ()/() 104/62  SpO2:  [94 %-100 %] 96 %  Wt Readings from Last 2 Encounters:   07/08/23 79 kg (174 lb 2.6 oz)       Intake/Output Summary (Last 24 hours) at 7/4/2023 1224  Last data filed at 7/4/2023 1100  Gross per 24 hour   Intake 1601.55 ml   Output 63879 ml   Net -8993.45 ml     Physical exam:  GEN: pleasant, no acute distress  HEENT: No discharge  EYES: no icterus  CV: tachycardic, normal s1/s2, no murmurs/rubs/s3/s4, no heave, JVD ~8 cm H2O  CHEST: clear to ausculation bilaterally, no rales or wheezing  ABD: soft, " non-tender, normal active bowel sounds  : no flank/suprapubic tenderness  EXTR: No clubbing, cyanosis or edema.   NEURO: alert oriented, speech fluent/appropriate, motor grossly nonfocal  PSYCH: cooperative, affect appropriate, pleasant    Labs (Past three days):  CBC  Recent Labs   Lab 07/08/23  0331 07/07/23  0409 07/06/23 0344 07/05/23  0410   WBC 7.8 8.0 8.9 8.0   RBC 5.72 5.67 5.84 5.17   HGB 11.8* 12.0* 12.2* 10.7*   HCT 41.6 40.5 41.2 35.8*   MCV 73* 71* 71* 69*   MCH 20.6* 21.2* 20.9* 20.7*   MCHC 28.4* 29.6* 29.6* 29.9*   RDW 23.5* 23.0* 22.7* 22.0*    379 394 356     BMP  Recent Labs   Lab 07/08/23  0331 07/07/23 0409 07/06/23  1623 07/06/23  0929 07/06/23 0344 07/05/23 0411 07/05/23  0410 07/04/23 2017 07/04/23  1606   *  --   --   --  133*  --  134*  --  132*   POTASSIUM 4.7 4.4  --   --  3.8  --  4.0   < > 3.8  3.8   CHLORIDE 105  --   --   --  98  --  97*  --  95*   CO2 20*  --   --   --  25  --  27  --  26   ANIONGAP 9  --   --   --  10  --  10  --  11   GLC 97  --  145* 89 97   < > 93   < > 101*   BUN 29.2*  --   --   --  31.6*  --  21.4*  --  20.9*   CR 0.98  --   --   --  0.96  --  0.99  --  1.00   GFRESTIMATED >90  --   --   --  >90  --  >90  --  >90   LENARD 8.9  --   --   --  9.1  --  9.2  --  8.7   MAG 2.1 2.4*  --   --  2.1  --  2.1  --  2.1   PHOS 3.6 3.4  --   --  4.4  --  4.7*  --   --     < > = values in this interval not displayed.     Troponins:     INR  Recent Labs   Lab 07/03/23  2204   INR 1.30*     Liver panel  Recent Labs   Lab 07/08/23  0331 07/07/23  0409 07/04/23  1606 07/04/23  0400   PROTTOTAL 7.4 7.1 7.5 7.9   ALBUMIN 3.5 3.3* 3.5 3.7   BILITOTAL 0.5 0.5 1.0 1.1   ALKPHOS 119 119 139* 147*   AST 66* 45 97* 122*   * 238* 452* 540*       Imaging/procedure results:  XR Chest Port 1 View  Narrative: EXAM: XR CHEST PORT 1 VIEW 7/7/2023 12:44 AM      HISTORY: daily swan check.    COMPARISON: Previous day.     TECHNIQUE: Frontal view of the  chest.    FINDINGS: Right internal jugular Hartwick-Sam catheter with tip  projecting over the main pulmonary artery. Stable cardiomegaly.  Trachea is midline. No significant pleural effusion. Lung apices are  collimated off the field of view, there is no visualized pneumothorax.  No focal consolidative opacity. Upper abdomen is unremarkable.  Impression: IMPRESSION: Hartwick-Sam catheter tip projects over the main pulmonary  artery.    I have personally reviewed the examination and initial interpretation  and I agree with the findings.    EBONIE MULLIGAN MD         SYSTEM ID:  [id number]

## 2023-07-09 LAB
ALBUMIN SERPL BCG-MCNC: 3.7 G/DL (ref 3.5–5.2)
ALP SERPL-CCNC: 116 U/L (ref 40–129)
ALT SERPL W P-5'-P-CCNC: 218 U/L (ref 0–70)
ANION GAP SERPL CALCULATED.3IONS-SCNC: 11 MMOL/L (ref 7–15)
AST SERPL W P-5'-P-CCNC: 68 U/L (ref 0–45)
BASE EXCESS BLDV CALC-SCNC: 0.1 MMOL/L (ref -7.7–1.9)
BASE EXCESS BLDV CALC-SCNC: 0.7 MMOL/L (ref -7.7–1.9)
BILIRUB SERPL-MCNC: 0.6 MG/DL
BUN SERPL-MCNC: 20 MG/DL (ref 6–20)
CALCIUM SERPL-MCNC: 9.4 MG/DL (ref 8.6–10)
CHLORIDE SERPL-SCNC: 103 MMOL/L (ref 98–107)
CREAT SERPL-MCNC: 1.04 MG/DL (ref 0.67–1.17)
DEPRECATED HCO3 PLAS-SCNC: 22 MMOL/L (ref 22–29)
ERYTHROCYTE [DISTWIDTH] IN BLOOD BY AUTOMATED COUNT: 24.1 % (ref 10–15)
GFR SERPL CREATININE-BSD FRML MDRD: >90 ML/MIN/1.73M2
GLUCOSE SERPL-MCNC: 88 MG/DL (ref 70–99)
HCO3 BLDV-SCNC: 25 MMOL/L (ref 21–28)
HCO3 BLDV-SCNC: 26 MMOL/L (ref 21–28)
HCT VFR BLD AUTO: 41.4 % (ref 40–53)
HGB BLD-MCNC: 12 G/DL (ref 13.3–17.7)
LACTATE SERPL-SCNC: 1 MMOL/L (ref 0.7–2)
MAGNESIUM SERPL-MCNC: 2 MG/DL (ref 1.7–2.3)
MCH RBC QN AUTO: 21.1 PG (ref 26.5–33)
MCHC RBC AUTO-ENTMCNC: 29 G/DL (ref 31.5–36.5)
MCV RBC AUTO: 73 FL (ref 78–100)
O2/TOTAL GAS SETTING VFR VENT: 21 %
O2/TOTAL GAS SETTING VFR VENT: 21 %
OXYHGB MFR BLDV: 36 % (ref 70–75)
OXYHGB MFR BLDV: 81 % (ref 70–75)
PCO2 BLDV: 39 MM HG (ref 40–50)
PCO2 BLDV: 43 MM HG (ref 40–50)
PH BLDV: 7.39 [PH] (ref 7.32–7.43)
PH BLDV: 7.41 [PH] (ref 7.32–7.43)
PHOSPHATE SERPL-MCNC: 4.3 MG/DL (ref 2.5–4.5)
PLATELET # BLD AUTO: 339 10E3/UL (ref 150–450)
PO2 BLDV: 26 MM HG (ref 25–47)
PO2 BLDV: 49 MM HG (ref 25–47)
POTASSIUM SERPL-SCNC: 4.4 MMOL/L (ref 3.4–5.3)
PROT SERPL-MCNC: 7.5 G/DL (ref 6.4–8.3)
RBC # BLD AUTO: 5.69 10E6/UL (ref 4.4–5.9)
SODIUM SERPL-SCNC: 136 MMOL/L (ref 136–145)
UFH PPP CHRO-ACNC: 0.45 IU/ML
WBC # BLD AUTO: 8.2 10E3/UL (ref 4–11)

## 2023-07-09 PROCEDURE — 999N000007 HC SITE CHECK

## 2023-07-09 PROCEDURE — 250N000013 HC RX MED GY IP 250 OP 250 PS 637

## 2023-07-09 PROCEDURE — 258N000003 HC RX IP 258 OP 636: Performed by: INTERNAL MEDICINE

## 2023-07-09 PROCEDURE — 85027 COMPLETE CBC AUTOMATED: CPT | Performed by: INTERNAL MEDICINE

## 2023-07-09 PROCEDURE — 250N000013 HC RX MED GY IP 250 OP 250 PS 637: Performed by: INTERNAL MEDICINE

## 2023-07-09 PROCEDURE — 250N000011 HC RX IP 250 OP 636: Mod: JZ | Performed by: INTERNAL MEDICINE

## 2023-07-09 PROCEDURE — 99233 SBSQ HOSP IP/OBS HIGH 50: CPT | Performed by: PHYSICIAN ASSISTANT

## 2023-07-09 PROCEDURE — 83605 ASSAY OF LACTIC ACID: CPT | Performed by: INTERNAL MEDICINE

## 2023-07-09 PROCEDURE — 84100 ASSAY OF PHOSPHORUS: CPT | Performed by: INTERNAL MEDICINE

## 2023-07-09 PROCEDURE — 85520 HEPARIN ASSAY: CPT | Performed by: INTERNAL MEDICINE

## 2023-07-09 PROCEDURE — 83735 ASSAY OF MAGNESIUM: CPT | Performed by: STUDENT IN AN ORGANIZED HEALTH CARE EDUCATION/TRAINING PROGRAM

## 2023-07-09 PROCEDURE — 82805 BLOOD GASES W/O2 SATURATION: CPT | Performed by: INTERNAL MEDICINE

## 2023-07-09 PROCEDURE — 214N000001 HC R&B CCU UMMC

## 2023-07-09 PROCEDURE — 82805 BLOOD GASES W/O2 SATURATION: CPT | Performed by: PHYSICIAN ASSISTANT

## 2023-07-09 PROCEDURE — 250N000013 HC RX MED GY IP 250 OP 250 PS 637: Performed by: STUDENT IN AN ORGANIZED HEALTH CARE EDUCATION/TRAINING PROGRAM

## 2023-07-09 PROCEDURE — 80053 COMPREHEN METABOLIC PANEL: CPT | Performed by: INTERNAL MEDICINE

## 2023-07-09 RX ORDER — MAGNESIUM OXIDE 400 MG/1
400 TABLET ORAL EVERY 4 HOURS
Status: COMPLETED | OUTPATIENT
Start: 2023-07-09 | End: 2023-07-09

## 2023-07-09 RX ADMIN — DOBUTAMINE 5 MCG/KG/MIN: 12.5 INJECTION, SOLUTION, CONCENTRATE INTRAVENOUS at 19:26

## 2023-07-09 RX ADMIN — RAMELTEON 8 MG: 8 TABLET, FILM COATED ORAL at 22:48

## 2023-07-09 RX ADMIN — SPIRONOLACTONE 25 MG: 25 TABLET, FILM COATED ORAL at 09:33

## 2023-07-09 RX ADMIN — SACUBITRIL AND VALSARTAN 1 TABLET: 24; 26 TABLET, FILM COATED ORAL at 09:38

## 2023-07-09 RX ADMIN — MAGNESIUM OXIDE TAB 400 MG (241.3 MG ELEMENTAL MG) 400 MG: 400 (241.3 MG) TAB at 13:42

## 2023-07-09 RX ADMIN — HEPARIN SODIUM 1600 UNITS/HR: 10000 INJECTION, SOLUTION INTRAVENOUS at 09:29

## 2023-07-09 RX ADMIN — MAGNESIUM OXIDE TAB 400 MG (241.3 MG ELEMENTAL MG) 400 MG: 400 (241.3 MG) TAB at 06:26

## 2023-07-09 RX ADMIN — FOLIC ACID 1 MG: 1 TABLET ORAL at 09:33

## 2023-07-09 RX ADMIN — SACUBITRIL AND VALSARTAN 1 TABLET: 24; 26 TABLET, FILM COATED ORAL at 20:08

## 2023-07-09 RX ADMIN — EMPAGLIFLOZIN 10 MG: 10 TABLET, FILM COATED ORAL at 09:33

## 2023-07-09 RX ADMIN — Medication 1 TABLET: at 09:33

## 2023-07-09 ASSESSMENT — ACTIVITIES OF DAILY LIVING (ADL)
ADLS_ACUITY_SCORE: 23
ADLS_ACUITY_SCORE: 21
DEPENDENT_IADLS:: INDEPENDENT
ADLS_ACUITY_SCORE: 21
ADLS_ACUITY_SCORE: 23
ADLS_ACUITY_SCORE: 21

## 2023-07-09 NOTE — PLAN OF CARE
[id number]    Neuro: A&Ox4. Intermittently forgetful and hyperverbal. Poor sleep throughout shift. Utilizes call light appropriately and is able to make needs known.   Cardiac:  Sinus tach w HR ~110s. Continues on hep gtt for LV thrombus. Denies CP or SOB.   Respiratory: RR even & nonlabored. Sats >90 on RA. Denies CP or SOB.  GI/: Voiding, adequate urine output. LBM 7/8. Bowel sounds active throughout all quadrants. Endorses presence of flatus.   Diet/appetite: Good appetite. Continues on 2g sodium restriction and 2 L FR.  Activity: Independent & ambulatory  Pain: Denied pain throughout shift  Skin:  No skin impairments or integrity concerns noted this shift.   LDA's: R double lumen PICC infusing dobutamine @ 5 mcg/kg/min. R midline infusing heparin @ 1400 unit(s)/hr.     Plan: Plan to discharge on dobutamine gtt. Patient learning center appt possibly Monday. No changes to current plan of care. Monitor for changes or complications.        Temp: 98.6  F (37  C) Temp src: Oral BP: 114/79 Pulse: 115   Resp: 18 SpO2: 97 % O2 Device: None (Room air)  Weight: 76.4 kg (168 lb 8 oz)

## 2023-07-09 NOTE — PROGRESS NOTES
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     Today's Plan:  - Recheck Venous blood gas off PICC  - Addiction medicine consult  - Pharmacy consult for DOAC coverage    # Acute on chronic systolic heart failure/HFrEF secondary to NICM 2/2 methamphetamines and etoh     Stage D. NYHA Class IIIB.   TTE 7/3/23: LVIDd 7.2 cm, EF 14%, RV mildly dilated with moderate to severe RV dysfunction, large apical LV thrombus. Last Hemodynamics 7/7/23: CVP 9 PA 44/25, no PCW, Olinda CI 1.5 PVR    Fluid status: euvolemic off loop diuretics  Inotrop: Dobutamine at 5 mcg/kg/min   ACEi/ARB/ARNI/afterload reduction: Entresto 24-26 mg BID  BB: contraindicated due to low cardiac output  Aldosterone antagonist: aldactone 25 mg daily  SGLT2i: jardiance 10 mg daily  SCD prophylaxis: N/A, need to consider lifevest vs ICD prior to discharge  - 2g sodium diet  - 2L fluid restriction    # LV mural thrombus  - Continue heparin gtt  - Planning to transition to DOAC in coming days, pharmacy consult placed today     #Alcohol use disorder  #Methamphetamine use  -addiction med consult now that he is out of the ICU  -CIWA, thiamine, folate, MVI, no benzos for now since his last drink he says was ~1 month ago     # Shock Liver  Improving, continue to monitor with daily LFTs    # Hypervolemic Hyponatremia, resolved  - Volume management as above    #Iron deficiency anemia Iron panel  7/3 with evidence of iron deficiency  -venofer 200 mg IV x5 doses     #ADHD  -psych consulted 7/7, managing without medications     # Subclinical Hypothyroidism  Started on synthroid at OSH due to TSH  "of 9. Technically should treat subclinical hypothyroidism if TSH >10. Unsure if heart failure plays a role in this decision-making. Although subclinical hypothyroidism is associated with cardiovascular disease, this is not the cause of his heart failure.  -will hold synthroid and recheck once more stable out of ICU, may be sick euthyroid syndrome      FEN: 2g Na, 2L Fluid restriction  PROPHY:  Heparin gtt  LINES:  PICC  DISPO:  Pending clinical course  CODE STATUS:  FULL CODE    Rosy Mcdermott PA-C  Advanced Heart Failure/Cardiology II Service  Pager [phone]      Patient discussed with Dr. Johnson.        65 minutes spent on the date of the encounter doing chart review, history and exam, documentation and further activities per the note    ================================================================    Subjective/24-Hr Events:   Last 24 hr care team notes reviewed. Feeling okay toda. Maybe a bit of lightheadedness with going to the bathroom, but otherwise has been okay. Breathing is feeling better, but still not baseline. His right ankle still hurts. No chest pain. No nausea. Appetite is good. He is watching his fluids and salt intake. No chest pain. No other concerns.    ROS:  4 point ROS including respiratory, CV, GI and  (other than that noted in the HPI) is negative.     Medications: Reviewed in EPIC.     Physical Exam:   /72 (BP Location: Left arm)   Pulse 110   Temp 97.8  F (36.6  C) (Oral)   Resp 18   Ht 1.803 m (5' 11\")   Wt 76.4 kg (168 lb 8 oz)   SpO2 98%   BMI 23.50 kg/m      GENERAL: Appears comfortable, in no distress.  HEENT: Eye symmetrical, no discharge or icterus bilaterally. Mucous membranes moist and without lesions.  NECK: Supple, JVD ~8.   CV: Tachycardic, but regular, +S1S2, no murmur, rub, or gallop.   RESPIRATORY: Respirations regular, even, and unlabored. Lungs CTA throughout.    GI: Soft and non distended with normoactive bowel sounds present in all " quadrants. No tenderness, rebound, guarding.   EXTREMITIES: No peripheral edema. All extremities are warm and well perfused  NEUROLOGIC: Alert and interacting appropriatly. No focal deficits.   MUSCULOSKELETAL: No joint swelling or tenderness.   SKIN: No jaundice. No rashes or lesions.     Labs:  [facility]  Recent Labs   Lab 07/09/23  0414 07/08/23  0331 07/07/23  0409 07/06/23  1623 07/06/23  0929 07/06/23  0344 07/05/23  0411 07/05/23  0410 07/04/23 2017 07/04/23  1606    134*  --   --   --  133*  --  134*  --  132*   POTASSIUM 4.4 4.7 4.4  --   --  3.8  --  4.0   < > 3.8  3.8   CHLORIDE 103 105  --   --   --  98  --  97*  --  95*   CO2 22 20*  --   --   --  25  --  27  --  26   ANIONGAP 11 9  --   --   --  10  --  10  --  11   GLC 88 97  --  145* 89 97   < > 93   < > 101*   BUN 20.0 29.2*  --   --   --  31.6*  --  21.4*  --  20.9*   CR 1.04 0.98  --   --   --  0.96  --  0.99  --  1.00   GFRESTIMATED >90 >90  --   --   --  >90  --  >90  --  >90   LENARD 9.4 8.9  --   --   --  9.1  --  9.2  --  8.7   MAG 2.0 2.1 2.4*  --   --  2.1  --  2.1  --  2.1   PHOS 4.3 3.6 3.4  --   --  4.4  --  4.7*  --   --    PROTTOTAL 7.5 7.4 7.1  --   --   --   --   --   --  7.5   ALBUMIN 3.7 3.5 3.3*  --   --   --   --   --   --  3.5   BILITOTAL 0.6 0.5 0.5  --   --   --   --   --   --  1.0   ALKPHOS 116 119 119  --   --   --   --   --   --  139*   AST 68* 66* 45  --   --   --   --   --   --  97*   * 226* 238*  --   --   --   --   --   --  452*    < > = values in this interval not displayed.       CBC  Recent Labs   Lab 07/09/23  0414 07/08/23  0331 07/07/23  0409 07/06/23  0344   WBC 8.2 7.8 8.0 8.9   RBC 5.69 5.72 5.67 5.84   HGB 12.0* 11.8* 12.0* 12.2*   HCT 41.4 41.6 40.5 41.2   MCV 73* 73* 71* 71*   MCH 21.1* 20.6* 21.2* 20.9*   MCHC 29.0* 28.4* 29.6* 29.6*   RDW 24.1* 23.5* 23.0* 22.7*    [phone]       INR  Recent Labs   Lab 07/03/23  2204   INR 1.30*

## 2023-07-09 NOTE — PLAN OF CARE
Shift Events: Pt arrived on 6C, Room 16-02. Received report from Kaity ELLINGTON RN. Pt stable on arrival. Vital signs stable.   Pt belongings: Guitar, backpack, small cosmetic bag and sunglasses.   Gtt: Dobutamine & Heparin.  Plan: Pt learning center coming on Monday for PICC line and Dobutamine teaching.  For vital signs and complete assessments, please see documentation flowsheets.     Goal Outcome Evaluation:  Plan of Care Reviewed With: patient  Overall Patient Progress: improvingOverall Patient Progress: improving

## 2023-07-09 NOTE — CONSULTS
Care Management Initial Consult    General Information  Assessment completed with: Patient,    Type of CM/SW Visit: Initial Assessment    Primary Care Provider verified and updated as needed: Yes   Readmission within the last 30 days: previous discharge plan unsuccessful      Reason for Consult: other (see comments) (home infusion)  Advance Care Planning: Advance Care Planning Reviewed: no concerns identified          Communication Assessment  Patient's communication style: spoken language (English or Bilingual)    Hearing Difficulty or Deaf: no   Wear Glasses or Blind: no    Cognitive  Cognitive/Neuro/Behavioral: WDL                      Living Environment:   People in home: significant other, child(rian), dependent  Bela (fiance)  Current living Arrangements: house      Able to return to prior arrangements: yes       Family/Social Support:  Care provided by: self, spouse/significant other  Provides care for: child(rian)  Marital Status: Lives with Significant Other             Description of Support System:           Current Resources:   Patient receiving home care services: No     Community Resources: None  Equipment currently used at home: none  Supplies currently used at home: None    Employment/Financial:  Employment Status: unemployed        Financial Concerns:             Does the patient's insurance plan have a 3 day qualifying hospital stay waiver?  No    Lifestyle & Psychosocial Needs:  Social Determinants of Health     Tobacco Use: Not on file   Alcohol Use: Not on file   Financial Resource Strain: Not on file   Food Insecurity: Not on file   Transportation Needs: Not on file   Physical Activity: Not on file   Stress: Not on file   Social Connections: Not on file   Intimate Partner Violence: Not on file   Depression: Not on file   Housing Stability: Not on file       Functional Status:  Prior to admission patient needed assistance:   Dependent ADLs:: Independent  Dependent IADLs:: Independent       Mental  Health Status:  Mental Health Status: No Current Concerns       Chemical Dependency Status:  Chemical Dependency Status: Current Concern (addictions med cons for EtOH, meth)             Values/Beliefs:  Spiritual, Cultural Beliefs, Cheondoism Practices, Values that affect care: no               Additional Information:  RNCC attempted bedside case management assessment however patient not in room though patient then answered their listed primary phone number so RNCC completed assessment with patient. Patient comes from home, a house in [place], [place] with his fiance Bela, his 6 y.o. son and 15 y.o. son. Patient is not employed. Patient lives across the street from his parents and grand parents. Patient is independent at baseline. RNCC aware, spoke to CARDS 2 team, re: need for home IV dobutamine. Patient is aware of this need, open to [facility], [place], [facility], others. RNCC placed initial referral/benefits check to [facility], awaits follow up. PLC ordered. Patient has ride home from parents at discharge in next day(s). RNCC available as needed.      [facility]  Phone # [phone]  Fax # [phone]     Patient Learning Center  Ph: [phone]    Kojo Romero BSN, BA, RN, CMSRN, RNCC  [facility]  Covering Units 6D/OBS  Pager: [phone]  Phone: [phone]    6D  Ph: [phone]  6D  pager: [phone]    6C/D Weekend/holiday pager: [phone]    6A/B Weekend/holiday pager: [phone]    7A/7B/7C/7D/5C Weekend/holiday pager: [phone]    4A/4C/4E/5A/5B Weekend/holiday pager: [phone]    [facility] ED/5 Ortho/5 Med/Surg 6 Med/Surg 8A 10 ICU and [facility] Weekend/holiday pager: [phone]    Observation weekend/after hours phone: [phone]      ]

## 2023-07-09 NOTE — PLAN OF CARE
"Time of care 1900 - 2330    Neuro: AOx4  Cardiac: Sinus tach. Taking blood pressures on left lower leg.  Respiratory: Breathing comfortably on room air.  GI/: 2g Na diet. 2L fluid restriction. Good appetite.  Activity: SBA.  Pain: Denies this shift.  Skin: No new deficits noted this shift.   Lines: Left double lumen PICC. Right double lumen midline.   Drips: Dobutamine at 5 mcg/kg/min (6.7 mL/hr). Heparin at 1600 units/hr.    Refused bedtime ramelteon dose.    Goal Outcome Evaluation: progressing    /86 (BP Location: Left leg, Patient Position: Supine, Cuff Size: Adult Regular)   Pulse 114   Temp 98.2  F (36.8  C) (Oral)   Resp 18   Ht 1.803 m (5' 11\")   Wt 79 kg (174 lb 2.6 oz)   SpO2 97%   BMI 24.29 kg/m      "

## 2023-07-10 ENCOUNTER — HOME INFUSION (PRE-WILLOW HOME INFUSION) (OUTPATIENT)
Dept: PHARMACY | Facility: CLINIC | Age: 38
End: 2023-07-10
Payer: COMMERCIAL

## 2023-07-10 LAB
ALBUMIN SERPL BCG-MCNC: 3.7 G/DL (ref 3.5–5.2)
ALP SERPL-CCNC: 108 U/L (ref 40–129)
ALT SERPL W P-5'-P-CCNC: 189 U/L (ref 0–70)
ANION GAP SERPL CALCULATED.3IONS-SCNC: 11 MMOL/L (ref 7–15)
AST SERPL W P-5'-P-CCNC: 60 U/L (ref 0–45)
BASE EXCESS BLDV CALC-SCNC: -0.1 MMOL/L (ref -7.7–1.9)
BASOPHILS # BLD AUTO: 0.1 10E3/UL (ref 0–0.2)
BASOPHILS NFR BLD AUTO: 1 %
BILIRUB SERPL-MCNC: 0.6 MG/DL
BUN SERPL-MCNC: 23 MG/DL (ref 6–20)
CALCIUM SERPL-MCNC: 8.9 MG/DL (ref 8.6–10)
CHLORIDE SERPL-SCNC: 106 MMOL/L (ref 98–107)
CREAT SERPL-MCNC: 0.98 MG/DL (ref 0.67–1.17)
DEPRECATED HCO3 PLAS-SCNC: 21 MMOL/L (ref 22–29)
EOSINOPHIL # BLD AUTO: 0.4 10E3/UL (ref 0–0.7)
EOSINOPHIL NFR BLD AUTO: 6 %
ERYTHROCYTE [DISTWIDTH] IN BLOOD BY AUTOMATED COUNT: 24.6 % (ref 10–15)
GFR SERPL CREATININE-BSD FRML MDRD: >90 ML/MIN/1.73M2
GLUCOSE SERPL-MCNC: 93 MG/DL (ref 70–99)
HCO3 BLDV-SCNC: 25 MMOL/L (ref 21–28)
HCT VFR BLD AUTO: 39.5 % (ref 40–53)
HGB BLD-MCNC: 11.5 G/DL (ref 13.3–17.7)
IMM GRANULOCYTES # BLD: 0 10E3/UL
IMM GRANULOCYTES NFR BLD: 0 %
LACTATE SERPL-SCNC: 1 MMOL/L (ref 0.7–2)
LYMPHOCYTES # BLD AUTO: 3 10E3/UL (ref 0.8–5.3)
LYMPHOCYTES NFR BLD AUTO: 41 %
MAGNESIUM SERPL-MCNC: 2.1 MG/DL (ref 1.7–2.3)
MCH RBC QN AUTO: 21.5 PG (ref 26.5–33)
MCHC RBC AUTO-ENTMCNC: 29.1 G/DL (ref 31.5–36.5)
MCV RBC AUTO: 74 FL (ref 78–100)
MONOCYTES # BLD AUTO: 0.8 10E3/UL (ref 0–1.3)
MONOCYTES NFR BLD AUTO: 12 %
NEUTROPHILS # BLD AUTO: 2.9 10E3/UL (ref 1.6–8.3)
NEUTROPHILS NFR BLD AUTO: 40 %
NRBC # BLD AUTO: 0 10E3/UL
NRBC BLD AUTO-RTO: 0 /100
O2/TOTAL GAS SETTING VFR VENT: 21 %
OXYHGB MFR BLDV: 41 % (ref 70–75)
PCO2 BLDV: 44 MM HG (ref 40–50)
PH BLDV: 7.37 [PH] (ref 7.32–7.43)
PHOSPHATE SERPL-MCNC: 3.8 MG/DL (ref 2.5–4.5)
PLATELET # BLD AUTO: 307 10E3/UL (ref 150–450)
PO2 BLDV: 28 MM HG (ref 25–47)
POTASSIUM SERPL-SCNC: 4.3 MMOL/L (ref 3.4–5.3)
PROT SERPL-MCNC: 7.3 G/DL (ref 6.4–8.3)
RBC # BLD AUTO: 5.35 10E6/UL (ref 4.4–5.9)
SODIUM SERPL-SCNC: 138 MMOL/L (ref 136–145)
UFH PPP CHRO-ACNC: 0.3 IU/ML
WBC # BLD AUTO: 7.3 10E3/UL (ref 4–11)

## 2023-07-10 PROCEDURE — 99223 1ST HOSP IP/OBS HIGH 75: CPT | Performed by: INTERNAL MEDICINE

## 2023-07-10 PROCEDURE — 250N000013 HC RX MED GY IP 250 OP 250 PS 637: Performed by: PHYSICIAN ASSISTANT

## 2023-07-10 PROCEDURE — 999N000007 HC SITE CHECK

## 2023-07-10 PROCEDURE — 250N000013 HC RX MED GY IP 250 OP 250 PS 637: Performed by: STUDENT IN AN ORGANIZED HEALTH CARE EDUCATION/TRAINING PROGRAM

## 2023-07-10 PROCEDURE — 99418 PROLNG IP/OBS E/M EA 15 MIN: CPT | Performed by: INTERNAL MEDICINE

## 2023-07-10 PROCEDURE — 250N000013 HC RX MED GY IP 250 OP 250 PS 637

## 2023-07-10 PROCEDURE — 82805 BLOOD GASES W/O2 SATURATION: CPT | Performed by: INTERNAL MEDICINE

## 2023-07-10 PROCEDURE — 85025 COMPLETE CBC W/AUTO DIFF WBC: CPT

## 2023-07-10 PROCEDURE — 84100 ASSAY OF PHOSPHORUS: CPT | Performed by: INTERNAL MEDICINE

## 2023-07-10 PROCEDURE — 250N000011 HC RX IP 250 OP 636: Mod: JZ | Performed by: INTERNAL MEDICINE

## 2023-07-10 PROCEDURE — 80053 COMPREHEN METABOLIC PANEL: CPT | Performed by: INTERNAL MEDICINE

## 2023-07-10 PROCEDURE — 83605 ASSAY OF LACTIC ACID: CPT | Performed by: INTERNAL MEDICINE

## 2023-07-10 PROCEDURE — 83735 ASSAY OF MAGNESIUM: CPT | Performed by: STUDENT IN AN ORGANIZED HEALTH CARE EDUCATION/TRAINING PROGRAM

## 2023-07-10 PROCEDURE — 250N000013 HC RX MED GY IP 250 OP 250 PS 637: Performed by: INTERNAL MEDICINE

## 2023-07-10 PROCEDURE — 85520 HEPARIN ASSAY: CPT | Performed by: INTERNAL MEDICINE

## 2023-07-10 PROCEDURE — 214N000001 HC R&B CCU UMMC

## 2023-07-10 PROCEDURE — 99232 SBSQ HOSP IP/OBS MODERATE 35: CPT | Performed by: PHYSICIAN ASSISTANT

## 2023-07-10 RX ORDER — CEFAZOLIN SODIUM 2 G/100ML
2 INJECTION, SOLUTION INTRAVENOUS
Status: CANCELLED | OUTPATIENT
Start: 2023-07-10

## 2023-07-10 RX ORDER — SODIUM CHLORIDE 9 MG/ML
INJECTION, SOLUTION INTRAVENOUS CONTINUOUS
Status: CANCELLED | OUTPATIENT
Start: 2023-07-10

## 2023-07-10 RX ORDER — FUROSEMIDE 20 MG
20 TABLET ORAL
Status: DISCONTINUED | OUTPATIENT
Start: 2023-07-10 | End: 2023-07-19

## 2023-07-10 RX ORDER — LIDOCAINE 40 MG/G
CREAM TOPICAL
Status: CANCELLED | OUTPATIENT
Start: 2023-07-10

## 2023-07-10 RX ORDER — POTASSIUM CHLORIDE 750 MG/1
20 TABLET, EXTENDED RELEASE ORAL DAILY
Status: DISCONTINUED | OUTPATIENT
Start: 2023-07-10 | End: 2023-07-10

## 2023-07-10 RX ADMIN — Medication 1 TABLET: at 09:05

## 2023-07-10 RX ADMIN — ACETAMINOPHEN 325 MG: 325 TABLET, FILM COATED ORAL at 17:04

## 2023-07-10 RX ADMIN — HEPARIN SODIUM 1600 UNITS/HR: 10000 INJECTION, SOLUTION INTRAVENOUS at 03:01

## 2023-07-10 RX ADMIN — EMPAGLIFLOZIN 10 MG: 10 TABLET, FILM COATED ORAL at 09:05

## 2023-07-10 RX ADMIN — FUROSEMIDE 20 MG: 20 TABLET ORAL at 17:04

## 2023-07-10 RX ADMIN — FOLIC ACID 1 MG: 1 TABLET ORAL at 09:05

## 2023-07-10 RX ADMIN — RAMELTEON 8 MG: 8 TABLET, FILM COATED ORAL at 22:03

## 2023-07-10 RX ADMIN — SACUBITRIL AND VALSARTAN 1 TABLET: 24; 26 TABLET, FILM COATED ORAL at 09:05

## 2023-07-10 RX ADMIN — SACUBITRIL AND VALSARTAN 1 TABLET: 24; 26 TABLET, FILM COATED ORAL at 19:40

## 2023-07-10 RX ADMIN — HEPARIN SODIUM 1600 UNITS/HR: 10000 INJECTION, SOLUTION INTRAVENOUS at 17:05

## 2023-07-10 RX ADMIN — SPIRONOLACTONE 25 MG: 25 TABLET, FILM COATED ORAL at 09:05

## 2023-07-10 ASSESSMENT — ACTIVITIES OF DAILY LIVING (ADL)
ADLS_ACUITY_SCORE: 23
ADLS_ACUITY_SCORE: 23
ADLS_ACUITY_SCORE: 19
ADLS_ACUITY_SCORE: 23
ADLS_ACUITY_SCORE: 19
ADLS_ACUITY_SCORE: 23
ADLS_ACUITY_SCORE: 19
ADLS_ACUITY_SCORE: 23
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
## 2023-07-10 NOTE — PROGRESS NOTES
Care Management Follow Up    Length of Stay (days): 7    Expected Discharge Date: 07/12/2023     Concerns to be Addressed: discharge planning     Patient plan of care discussed at interdisciplinary rounds: Yes    Anticipated Discharge Disposition: Home     Anticipated Discharge Services: Home Infusion  Anticipated Discharge DME: None    Patient/family educated on Medicare website which has current facility and service quality ratings: no  Education Provided on the Discharge Plan: yes  Patient/Family in Agreement with the Plan: yes    Referrals Placed by CM/SW: External Care Coordination, Home Infusion    Additional Information:  This writer met with pt and SO, Bela to discuss discharge planning. This writer called and spoke with PLC, scheduled pt for tomorrow 7/11 at noon. Bela confirmed she can attend.    Bela is currently staying at a local hotel. She came down to [place] in a medi-van with another family member who is here for cardiology workup this week. She stated pt could stay with her at the hotel if needed since pt's dad said he his unable to transport pt home until Friday.     Awaiting benefit check for home IV dobutamine. Per NP, still deciding on ICD vs lifevest.     CC will continue to monitor patient's medical condition and progress towards discharge.  Krystal Bonds RN BSN  6C Unit Care Coordinator  Phone number: [phone]  Pager: [phone]

## 2023-07-10 NOTE — PROGRESS NOTES
Therapy: IV Dobutamine   Insurance: [employer]       100% coverage    In reference to admission on 7/3/23 to check IV Dobutamine coverage      Please contact Intake with any questions, [phone] or In Basket pool, [facility] ([phone]).

## 2023-07-10 NOTE — PLAN OF CARE
[id number]     Neuro: A&Ox4. Utilizes call light appropriately and is able to make needs known. Rested well between cares- sleep improved from last night.   Cardiac:  Sinus tach w HR ~110s. Continues on hep gtt for LV thrombus. Denies CP or SOB.   Respiratory: RR even & nonlabored. Sats >90 on RA. Denies CP or SOB.  GI/: Voiding, adequate urine output. LBM 7/8. Bowel sounds active throughout all quadrants.  Diet/appetite: Good appetite. Continues on 2g sodium restriction and 2 L FR.  Activity: Independent & ambulatory  Pain: Denied pain throughout shift  Skin:  No skin impairments or integrity concerns noted this shift.   LDA's: R double lumen PICC infusing dobutamine @ 5 mcg/kg/min. R midline infusing heparin @ 1600 unit(s)/hr.  Plan: Plan to discharge on dobutamine gtt. Pt partner coming in AM and hoping to receive teaching r/t PICC line & dobutamine at home infusion. No changes to current plan of care. Monitor for changes or complications.       Temp: 97.8  F (36.6  C) Temp src: Oral BP: 129/81 Pulse: 108   Resp: 18 SpO2: 97 % O2 Device: None (Room air)       Intake/Output Summary (Last 24 hours) at 7/10/2023 0600  Last data filed at 7/10/2023 0529  Gross per 24 hour   Intake 2365.6 ml   Output 725 ml   Net 1640.6 ml

## 2023-07-10 NOTE — CONSULTS
[facility]   Consult Note - Addiction Service     Date of Admission:  7/3/2023    Consult Requested by: Rosy Mcdermott PA-C  Reason for Consult: Alcohol and Methamphetamine abuse , with end-stage HF    Assessment & Plan   Jose Enrique is a 38-year-old gentleman with past medical history of ADHD who was transferred from [facility] on 7/3/23 due to cardiogenic shock from non-ischemic cardiomyopathy complicated and possibly secondary to methamphetamine and alcohol use.     # Alcohol Use Disorder  #Acute on chronic systolic heart failure/HFrEF with EF = 14% 2ndary due to alcohol and amphetamine use  Denies cravings, but does report often drinking while playing the guitar.  Goal is abstinence from alcohol, and reports meeting this goal prior to hospitalization.  We discussed different options to maintain goal of sobriety, including medications to manage cravings if they develop in the future, including acamprosate and naltrexone.   At this time, he thinks he can meet his goal without meds, but is interested in linkage to addiction medicine.   - will discuss local connection ([facility] in [address] [address]   Phone: [phone]. Fax: [phone]), or virtual, through [place], tomorrow.    # ADHD  # Methamphetamine Use Disorder  Amanda has ADHD. Has been on ritalin and aderral in the past for it.   He mentioned that he has no cravings for meth, and goal is to avoid illicit stimulants.  He does worry about ability to focus without ADHD management, and thinks having options would be helpful; he is aware that stimulant based medications like adderal are not possible due to interactions with Dobutamine, plus the risk of stressing his heart.  Of note, he recently established care with Agatha Romero, Psych [facility] at [facility], and would be interested in following with her.      # Peer Support:   -Our peer  will meet the patient if  agreeable and still hospitalized on Thursday, to provide additional outpatient resources  -To contact Tiara, Peer  from [facility]): call or text: [phone]    # Addiction Social Worker:   Our addiction social worker Zenon Romero can be contacted if needed, on her pager [phone] or texted/called at [phone]  --Patient is interested in ONLY outpatient addiction treatment after discharge.  Our addiction social worker will give the patient appropriate resources for outpatient evaluation.    The patient's care was discussed with the Attending Physician, Dr. Waterman.    I spent 120 minutes on the unit/floor managing the care of Jose Enrique Engel. Over 50% of my time was spent on the following:   Significant education and counseling spent on: how substance use disorders and dependence occur, and how it can become a chronic relapsing and remitting medical condition.  In addition, the pharmacology of medical treatments including Methamphetamine, aderral, alcohol, the importance of follow up, and Harm Reduction advice on how to use substances in a less harmful way why trying to cut down were discussed today.      Patti Ellington MD  [facility]   Contact information available via [facility] Paging/Directory  Please see sign in/sign out for up to date coverage information    ChAT team (Addiction Consult Team): Coverage Monday-Friday 8-4pm     ______________________________________________________________________    Chief Complaint   Alcohol and Methamphetamine abuse , with end-stage HF      History is obtained from the patient    History of Present Illness   Jose Enrique is a 38-year-old gentleman with past medical history of ADHD who was transferred from [facility] on 7/3/23 due to cardiogenic shock from non-ischemic cardiomyopathy due to methamphetamine and alcohol use.     Drug/Substance of Choice: methamphetamine and alcohol    Opioid use history:  none    Alcohol use history: mentioned that he started drinking late. aobut 2 years ago. 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 He used to drink half a pint every day during weekdays and lighter during weekends. He would be hungover but was able to carry out all the work. No severe withdrawal or halllucinations or seizures. Last drink was may 19    Withdrawal history: no severe withdrawal history    Methamphetamine use :  He mentioned that he has ADHD and was on ritalin since 6 years upto school. It helped him focus and helped with school work. After finishing school, he felt that he needed to be focus so stopped his medicaitons. He said that around 31 years of age, he wanted to be more focus with work and started aderral. It was prescribed by his PCP, then his PCP retired and the new PCP did not recommend him Aderrall, so he started researching other ways to get it. He found that methamphetamine and aderral was similar so he started taking it from the street, which helped him keep focus. He states that he needed higher dosage than normal to function as he was ritalin since young. He used it via smoking. He used to take a puff and it would be enough for the day , but developed tolerence and needed to puff 2-3 times during the day. Did not inject. He said that his surrounding at work enabled his behaviour. Work mate all used meth and alcohol.     Prior MAT history:  none    Other substances used: marijuana: doesnot like it cause it slows his brain.     Employed: unemplouyed on disability  Housing status/insecurity: good/ safe  Where patient lives/[facility] town:[facility] [place] [facility]  Transportation status/insecurity: none  Telephone status/insecurity: none  Additional family member or friend who can support health goals: has supportive fiance, family members who lives near him    Review of Systems     No past medical history on file.    Past Surgical History:    Procedure Laterality Date     PICC DOUBLE LUMEN PLACEMENT Left 07/07/2023    Left basilic vein 48cm total 2cm external.       Social History   Social History     Socioeconomic History     Marital status: Single     Spouse name: Not on file     Number of children: Not on file     Years of education: Not on file     Highest education level: Not on file   Occupational History     Not on file   Tobacco Use     Smoking status: Not on file     Smokeless tobacco: Not on file   Substance and Sexual Activity     Alcohol use: Not on file     Drug use: Not on file     Sexual activity: Not on file   Other Topics Concern     Not on file   Social History Narrative     Not on file     Social Determinants of Health     Financial Resource Strain: Not on file   Food Insecurity: Not on file   Transportation Needs: Not on file   Physical Activity: Not on file   Stress: Not on file   Social Connections: Not on file   Intimate Partner Violence: Not on file   Housing Stability: Not on file       Medications   I have reviewed this patient's current medications    Allergies   No Known Allergies    Physical Exam   Temp: 98.1  F (36.7  C) Temp src: Oral BP: 117/49 Pulse: 112   Resp: 18 SpO2: 99 % O2 Device: None (Room air)      GENERAL: Appears comfortable, in no distress.  HEENT: Eye symmetrical, no discharge or icterus bilaterally. Mucous membranes moist and without lesions.  NECK: Supple, JVD ~8.   CV: Tachycardic, but regular, +S1S2, no murmur, rub, or gallop.   RESPIRATORY: Respirations regular, even, and unlabored. Lungs CTA throughout.    GI: Soft and non distended with normoactive bowel sounds present in all quadrants. No tenderness, rebound, guarding.   EXTREMITIES: No peripheral edema. All extremities are warm and well perfused  NEUROLOGIC: Alert and interacting appropriatly. No focal deficits.   MUSCULOSKELETAL: No joint swelling or tenderness.   SKIN: No jaundice. No rashes or lesions.     Due to regulation of Title 42 of the  Code of Federal Regulations (CFR) Part 2: Confidentiality laws apply to this note and the information wherein.  Thus, this note cannot be copy and pasted into any other health care staff's note nor can it be included in general medical records sent to ANY outside agency without the patient's written consent.    Addiction Medicine Staff Addendum  Date of Service: 7/10/2023  I have seen and examined Jose Enrique Engel with the resident team, reviewed the data and discussed the plan of care with the patient and the care team on P&FC Rounds.  I agree with the above documentation     I discussed pt's care with bedside RN, case management/social work today.  I personally reviewed labs, medications and past 24 hr notes.  Assessment/Plan/Diagnoses: plan/dx as above, which contains my edits and reflects our joint medical decision-making.     Geo Waterman MD  Addiction Medicine   of Internal Medicine and Pediatrics  [facility]  Pager: [phone]

## 2023-07-10 NOTE — PROGRESS NOTES
Care Management Follow Up    Length of Stay (days): 7    Expected Discharge Date: 07/12/2023     Concerns to be Addressed:  PLC communication   Patient plan of care discussed at interdisciplinary rounds: NA    Anticipated Discharge Disposition:  Home, Home Infusion     Anticipated Discharge Services:    Anticipated Discharge DME:      Patient/family educated on Medicare website which has current facility and service quality ratings: no  Education Provided on the Discharge Plan:    Patient/Family in Agreement with the Plan: yes    Referrals Placed by CM/SW: External Care Coordination, Home Infusion  Private pay costs discussed: Not applicable    Additional Information:  9:28am:  Received voice message from Ella in [facility] [employer] [facility]. She had called pt's Dad to set up teaching appt. Pt's dad inquired about the discharge date. He is not available to take pt home until Friday, 7-14.  Updated Nanda Bonds, RNCC, Unit 6C.         Adeline Camp, RN Care Coordinator  Float RNCC  [facility]  On 7- RNCC coverage for Unit 6D & Observation. Call [phone].

## 2023-07-10 NOTE — PLAN OF CARE
"Time of care 1900 - 2330     Neuro: AOx4  Cardiac: Sinus tach. Taking blood pressures on left lower leg.  Respiratory: Breathing comfortably on room air.  GI/: 2g Na diet. 2L fluid restriction. Good appetite.  Activity: SBA.  Pain: Denies this shift.  Skin: No new deficits noted this shift.   Lines: Left double lumen PICC. Right double lumen midline.   Drips: Dobutamine at 5 mcg/kg/min (6.7 mL/hr). Heparin at 1600 units/hr.     Provided education regarding heart failure, low sodium diet and fluid restriction. .     Goal Outcome Evaluation: progressing     /65   Pulse 116   Temp 98.1  F (36.7  C) (Oral)   Resp 20   Ht 1.803 m (5' 11\")   Wt 76.4 kg (168 lb 8 oz)   SpO2 99%   BMI 23.50 kg/m       "

## 2023-07-10 NOTE — CONSULTS
Discharge Pharmacy Test Claim    Eliquis and xarelto are covered with copays of $3 per month through patient's [employer] prepaid medical assistance plan.    Test Claim Copay   eliquis 3.00   xarelto 3.00     Thais Parson  [facility] Pharmacy Liaison  Ph: [phone] Pager: [phone]   Securely message with the Vocera Web Console (learn more here)

## 2023-07-10 NOTE — PLAN OF CARE
"Care from: 0700 - 1530    Neuro: A&Ox4. Q4 CIWA  Cardiac: ST. VSS.   Respiratory: Sating 99% on RA.  GI/: Adequate urine output. LBM 7/8  Diet/appetite: Tolerating 2g Na diet with 2L FR - does not adhere to diet/fluid restriction . Eating well.  Activity:  Independent  Pain: At acceptable level on current regimen. Denies pain.  Skin: No new deficits noted.  LDA's: R DL midline - red infusing heparin @1600U/hr, purple SL; L DL PICC - purple infusing Dobutamine 5 mcg/kg/min, red - SL    /82 (BP Location: Left arm, Cuff Size: Adult Regular)   Pulse 115   Temp 97.8  F (36.6  C) (Oral)   Resp 18   Ht 1.803 m (5' 11\")   Wt 77.3 kg (170 lb 8 oz)   SpO2 99%   BMI 23.78 kg/m      Plan: Continue to monitor Pt status and report changes to Cards 2 treatment team. Family at bedside. At-home dobutamine education for discharge in the near future.    Problem: Risk for Delirium  Goal: Improved Behavioral Control  Outcome: Progressing  Intervention: Minimize Safety Risk  Recent Flowsheet Documentation  Taken 7/10/2023 0900 by Ann Marie Borges RN  Trust Relationship/Rapport:    care explained    questions answered    questions encouraged    thoughts/feelings acknowledged    reassurance provided     Problem: Plan of Care - These are the overarching goals to be used throughout the patient stay.    Goal: Optimal Comfort and Wellbeing  Intervention: Provide Person-Centered Care  Recent Flowsheet Documentation  Taken 7/10/2023 0900 by Ann Marie Borges, RN  Trust Relationship/Rapport:    care explained    questions answered    questions encouraged    thoughts/feelings acknowledged    reassurance provided     Problem: Plan of Care - These are the overarching goals to be used throughout the patient stay.    Goal: Optimal Comfort and Wellbeing  Intervention: Provide Person-Centered Care  Recent Flowsheet Documentation  Taken 7/10/2023 0900 by Ann Marie Borges, RN  Trust Relationship/Rapport:    care explained    questions answered    " questions encouraged    thoughts/feelings acknowledged    reassurance provided   Goal Outcome Evaluation:      Plan of Care Reviewed With: patient    Overall Patient Progress: improvingOverall Patient Progress: improving
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
## 2023-07-10 NOTE — PLAN OF CARE
Shift Events:   Pt vital signs stable. Continues to be restless throughout the day. Addressed questions and concerns regarding discharge home. Educational material reviewed on Dobutamine, central line and heart failure. Pt seemed interested and interactive during teaching.   Gtt: Dobutamine & Heparin.  Plan: Pt learning center tomorrow. Pt wants girl friend to be present during teaching. Pt will be living with girl friend. Pt's father is a nurse and lives down the street from them per pt report.   For vital signs and complete assessments, please see documentation flowsheets.     Goal Outcome Evaluation:  Plan of Care Reviewed With: patient  Overall Patient Progress: improvingOverall Patient Progress: improving

## 2023-07-11 ENCOUNTER — APPOINTMENT (OUTPATIENT)
Dept: EDUCATION SERVICES | Facility: CLINIC | Age: 38
End: 2023-07-11
Attending: INTERNAL MEDICINE
Payer: COMMERCIAL

## 2023-07-11 LAB
ALBUMIN SERPL BCG-MCNC: 3.8 G/DL (ref 3.5–5.2)
ALP SERPL-CCNC: 104 U/L (ref 40–129)
ALT SERPL W P-5'-P-CCNC: 175 U/L (ref 0–70)
ANION GAP SERPL CALCULATED.3IONS-SCNC: 11 MMOL/L (ref 7–15)
AST SERPL W P-5'-P-CCNC: 53 U/L (ref 0–45)
BASE EXCESS BLDV CALC-SCNC: 0.6 MMOL/L (ref -7.7–1.9)
BASOPHILS # BLD AUTO: 0.1 10E3/UL (ref 0–0.2)
BASOPHILS NFR BLD AUTO: 1 %
BILIRUB SERPL-MCNC: 0.6 MG/DL
BUN SERPL-MCNC: 20.2 MG/DL (ref 6–20)
CALCIUM SERPL-MCNC: 9.4 MG/DL (ref 8.6–10)
CHLORIDE SERPL-SCNC: 102 MMOL/L (ref 98–107)
CREAT SERPL-MCNC: 1.09 MG/DL (ref 0.67–1.17)
DEPRECATED HCO3 PLAS-SCNC: 22 MMOL/L (ref 22–29)
EOSINOPHIL # BLD AUTO: 0.4 10E3/UL (ref 0–0.7)
EOSINOPHIL NFR BLD AUTO: 7 %
ERYTHROCYTE [DISTWIDTH] IN BLOOD BY AUTOMATED COUNT: 24.9 % (ref 10–15)
GFR SERPL CREATININE-BSD FRML MDRD: 89 ML/MIN/1.73M2
GLUCOSE SERPL-MCNC: 88 MG/DL (ref 70–99)
HCO3 BLDV-SCNC: 26 MMOL/L (ref 21–28)
HCT VFR BLD AUTO: 38.8 % (ref 40–53)
HGB BLD-MCNC: 11.4 G/DL (ref 13.3–17.7)
IMM GRANULOCYTES # BLD: 0 10E3/UL
IMM GRANULOCYTES NFR BLD: 0 %
LACTATE SERPL-SCNC: 0.8 MMOL/L (ref 0.7–2)
LYMPHOCYTES # BLD AUTO: 2.8 10E3/UL (ref 0.8–5.3)
LYMPHOCYTES NFR BLD AUTO: 42 %
MAGNESIUM SERPL-MCNC: 2 MG/DL (ref 1.7–2.3)
MCH RBC QN AUTO: 21.5 PG (ref 26.5–33)
MCHC RBC AUTO-ENTMCNC: 29.4 G/DL (ref 31.5–36.5)
MCV RBC AUTO: 73 FL (ref 78–100)
MONOCYTES # BLD AUTO: 0.8 10E3/UL (ref 0–1.3)
MONOCYTES NFR BLD AUTO: 11 %
NEUTROPHILS # BLD AUTO: 2.7 10E3/UL (ref 1.6–8.3)
NEUTROPHILS NFR BLD AUTO: 39 %
NRBC # BLD AUTO: 0 10E3/UL
NRBC BLD AUTO-RTO: 0 /100
O2/TOTAL GAS SETTING VFR VENT: 21 %
OXYHGB MFR BLDV: 62 % (ref 70–75)
PCO2 BLDV: 44 MM HG (ref 40–50)
PH BLDV: 7.38 [PH] (ref 7.32–7.43)
PHOSPHATE SERPL-MCNC: 4.2 MG/DL (ref 2.5–4.5)
PLATELET # BLD AUTO: 299 10E3/UL (ref 150–450)
PO2 BLDV: 37 MM HG (ref 25–47)
POTASSIUM SERPL-SCNC: 4.3 MMOL/L (ref 3.4–5.3)
PROT SERPL-MCNC: 7.5 G/DL (ref 6.4–8.3)
RBC # BLD AUTO: 5.3 10E6/UL (ref 4.4–5.9)
SODIUM SERPL-SCNC: 135 MMOL/L (ref 136–145)
TSH SERPL DL<=0.005 MIU/L-ACNC: 4.01 UIU/ML (ref 0.3–4.2)
UFH PPP CHRO-ACNC: 0.44 IU/ML
WBC # BLD AUTO: 6.8 10E3/UL (ref 4–11)

## 2023-07-11 PROCEDURE — 85025 COMPLETE CBC W/AUTO DIFF WBC: CPT

## 2023-07-11 PROCEDURE — 99232 SBSQ HOSP IP/OBS MODERATE 35: CPT | Performed by: NURSE PRACTITIONER

## 2023-07-11 PROCEDURE — 80053 COMPREHEN METABOLIC PANEL: CPT | Performed by: INTERNAL MEDICINE

## 2023-07-11 PROCEDURE — 99232 SBSQ HOSP IP/OBS MODERATE 35: CPT | Performed by: INTERNAL MEDICINE

## 2023-07-11 PROCEDURE — 84443 ASSAY THYROID STIM HORMONE: CPT | Performed by: PHYSICIAN ASSISTANT

## 2023-07-11 PROCEDURE — 250N000013 HC RX MED GY IP 250 OP 250 PS 637: Performed by: PHYSICIAN ASSISTANT

## 2023-07-11 PROCEDURE — 250N000013 HC RX MED GY IP 250 OP 250 PS 637

## 2023-07-11 PROCEDURE — 83735 ASSAY OF MAGNESIUM: CPT | Performed by: STUDENT IN AN ORGANIZED HEALTH CARE EDUCATION/TRAINING PROGRAM

## 2023-07-11 PROCEDURE — 250N000011 HC RX IP 250 OP 636: Mod: JZ | Performed by: INTERNAL MEDICINE

## 2023-07-11 PROCEDURE — 258N000003 HC RX IP 258 OP 636: Performed by: INTERNAL MEDICINE

## 2023-07-11 PROCEDURE — 83605 ASSAY OF LACTIC ACID: CPT | Performed by: INTERNAL MEDICINE

## 2023-07-11 PROCEDURE — 85520 HEPARIN ASSAY: CPT | Performed by: INTERNAL MEDICINE

## 2023-07-11 PROCEDURE — 84100 ASSAY OF PHOSPHORUS: CPT | Performed by: INTERNAL MEDICINE

## 2023-07-11 PROCEDURE — 999N000007 HC SITE CHECK

## 2023-07-11 PROCEDURE — 250N000013 HC RX MED GY IP 250 OP 250 PS 637: Performed by: INTERNAL MEDICINE

## 2023-07-11 PROCEDURE — 82805 BLOOD GASES W/O2 SATURATION: CPT | Performed by: INTERNAL MEDICINE

## 2023-07-11 PROCEDURE — 250N000013 HC RX MED GY IP 250 OP 250 PS 637: Performed by: NURSE PRACTITIONER

## 2023-07-11 PROCEDURE — 250N000013 HC RX MED GY IP 250 OP 250 PS 637: Performed by: STUDENT IN AN ORGANIZED HEALTH CARE EDUCATION/TRAINING PROGRAM

## 2023-07-11 PROCEDURE — 214N000001 HC R&B CCU UMMC

## 2023-07-11 RX ORDER — MAGNESIUM OXIDE 400 MG/1
400 TABLET ORAL EVERY 4 HOURS
Status: COMPLETED | OUTPATIENT
Start: 2023-07-11 | End: 2023-07-11

## 2023-07-11 RX ORDER — MAGNESIUM OXIDE 400 MG/1
400 TABLET ORAL EVERY 4 HOURS
Status: DISCONTINUED | OUTPATIENT
Start: 2023-07-11 | End: 2023-07-11

## 2023-07-11 RX ADMIN — EMPAGLIFLOZIN 10 MG: 10 TABLET, FILM COATED ORAL at 08:09

## 2023-07-11 RX ADMIN — FOLIC ACID 1 MG: 1 TABLET ORAL at 08:09

## 2023-07-11 RX ADMIN — MAGNESIUM OXIDE TAB 400 MG (241.3 MG ELEMENTAL MG) 400 MG: 400 (241.3 MG) TAB at 08:09

## 2023-07-11 RX ADMIN — DOBUTAMINE 5 MCG/KG/MIN: 12.5 INJECTION, SOLUTION, CONCENTRATE INTRAVENOUS at 07:48

## 2023-07-11 RX ADMIN — ACETAMINOPHEN 325 MG: 325 TABLET, FILM COATED ORAL at 09:48

## 2023-07-11 RX ADMIN — SACUBITRIL AND VALSARTAN 1 TABLET: 24; 26 TABLET, FILM COATED ORAL at 08:09

## 2023-07-11 RX ADMIN — HEPARIN SODIUM 1600 UNITS/HR: 10000 INJECTION, SOLUTION INTRAVENOUS at 22:51

## 2023-07-11 RX ADMIN — MAGNESIUM OXIDE TAB 400 MG (241.3 MG ELEMENTAL MG) 400 MG: 400 (241.3 MG) TAB at 11:18

## 2023-07-11 RX ADMIN — SACUBITRIL AND VALSARTAN 1 TABLET: 49; 51 TABLET, FILM COATED ORAL at 20:04

## 2023-07-11 RX ADMIN — HEPARIN SODIUM 1600 UNITS/HR: 10000 INJECTION, SOLUTION INTRAVENOUS at 07:59

## 2023-07-11 RX ADMIN — FUROSEMIDE 20 MG: 20 TABLET ORAL at 08:09

## 2023-07-11 RX ADMIN — RAMELTEON 8 MG: 8 TABLET, FILM COATED ORAL at 21:55

## 2023-07-11 RX ADMIN — Medication 1 TABLET: at 08:09

## 2023-07-11 RX ADMIN — FUROSEMIDE 20 MG: 20 TABLET ORAL at 16:11

## 2023-07-11 RX ADMIN — SPIRONOLACTONE 25 MG: 25 TABLET, FILM COATED ORAL at 08:09

## 2023-07-11 ASSESSMENT — ACTIVITIES OF DAILY LIVING (ADL)
ADLS_ACUITY_SCORE: 19

## 2023-07-11 NOTE — PROGRESS NOTES
Addiction Team Social Work Note    Date of  Intervention: 07/11/23  Collaborated with:  Dr. Waterman, Addiction Attending; patient; pt's SO Bela    Patient information: Addiction Medicine team following for support, resources and linkage to care.      Intervention:  Chart reviewed.  Discussed pt's care and plan in Team rounds.    Writer met with pt with Dr. Waterman for an initial introduction for writer, and to discuss plan for support and resources moving forward to help meet pt's goal of complete avoidance.    Pt openly shared his substance use, potential hardships and areas of support.  He plans on following up with Psychiatry who he had previously seen recently.  Community Health Worker assisting with making this follow up appointment.  Discussed OP virtual Addiction Medicine clinic appointments which could be with [facility] here in [place].  He states he prefers in-person and there is [facility] in [place].  This would include care for his ADHD.    He shared his support system that includes his SO, Bela, and his father. Bela joined today's visit.     He states it is important to him to get things done around the home and be productive.  He hopes to continue to be able to do these things despite having heart failure.      Assessment:  Supportive visit focused on establishing connections for both immediate support but also to support him meeting his long-term goal of avoidance.    Plan:    Anticipated Disposition:  Home.    Follow Up:  Writer will continue to follow.    Due to regulation of Title 42 of the Code of Federal Regulations (CFR) Part 2: Confidentiality laws apply to this note and the information wherein.  Thus, this note cannot be copy and pasted into any other health care staff's note nor can it be included in general medical records sent to ANY outside agency without the patient's written consent.    Zenon Romero,  DULCE  She/her/hers  Social Work Services  Inpatient Addiction Medicine Team  [phone] work cell phone  [phone] pager  8:00am-4:30pm M/T/Th/F; Off Wednesday's

## 2023-07-11 NOTE — PLAN OF CARE
Goal Outcome Evaluation:    Major Shift Events:  Denies pain. VSS, RA, tachycardic. Continues on heparin and dobutamine drips. Slept fair overnight. NPO after midnight.     Plan: ICD vs Life Vest today.     For vital signs and complete assessments, please see documentation flowsheets.

## 2023-07-11 NOTE — CONSULTS
Jose Enrique and Bela were seen bedside for PICC and IV dobutamine infusion. There was some tension between them so class was delayed. They were taught heparin flush, use of the CADD villalobos pump for continuous infusion and safety and troubleshooting.. Bela did practice a heparin flush on model but needed reminders during flush to follow directions. They watched a setup and takedown of the cCADD pump but neither of them practiced. They will have  A second class when Jose Enrique's dad comes on Friday.    Literature given: Handwashing and Skin Care, Getting Ready for Your PICC, Caring for Your PICC at Home, Changing the End Cap, Flushing the Line with Heparin, Saline or Citrate,   Literature given: How to Change Your Medicine Bag, Using the CADD Villalobos Pump for a Single Dose of Medicine.

## 2023-07-11 NOTE — PROGRESS NOTES
Care Management Follow Up    Length of Stay (days): 8    Expected Discharge Date: 7/14/2023      Concerns to be Addressed: discharge planning     Patient plan of care discussed at interdisciplinary rounds: Yes    Anticipated Discharge Disposition: Home     Anticipated Discharge Services: Home infusion  Anticipated Discharge DME: None    Patient/family educated on Medicare website which has current facility and service quality ratings: no  Education Provided on the Discharge Plan:    Patient/Family in Agreement with the Plan: yes    Referrals Placed by CM/SW: External Care Coordination, Home Infusion (See 7/10 RNCC note)    Additional Information:    1. CHW was tasked by 6C RNCC to schedule a follow-up appointment with pt's previous psychiatry provider Agatha LYNNE at [facility] a few weeks after hospitalization discharge. CHW met w/pt at bedside to inquire about preferred appointment dates and times. Pt informed CHW that afternoons were better and Wednesdays or Thursdays. CHW thanked pt left the room.     Appointment    [facility] [address], [place] [address]  Provider: Agatha Romero  Date: 7/20/23   Time: 1:30 pm  Phone: [phone]    2. CHW was tasked by 6C RNCC to schedule weekly OP PICC line dressing changes at [facility] in [place] starting 7/20 or 7/21 as there are no home care options available to pt in home area. CHW spoke with representative at [facility] and they said to fax orders to [phone] to schedule the appointment to start weekly PICC dressing changes.       Lamar Coyne

## 2023-07-11 NOTE — PROGRESS NOTES
Care Management Follow Up    Length of Stay (days): 8    Expected Discharge Date: 07/12/2023     Concerns to be Addressed: discharge planning     Patient plan of care discussed at interdisciplinary rounds: Yes    Anticipated Discharge Disposition: Home     Anticipated Discharge Services: Home Infusion, outpatient PICC line cares, outpatient psych follow up  Anticipated Discharge DME: None anticipated    Patient/family educated on Medicare website which has current facility and service quality ratings: no  Education Provided on the Discharge Plan: yes  Patient/Family in Agreement with the Plan: yes    Referrals Placed by CM/SW: Home Infusion    Additional Information:  Received update from [facility] that pt has 100% coverage for home IV dobutamine. Per [facility] gray, unable to find home care agency in pt's area, he will need outpatient line cares.     Per PLC RN, pt and SO will need additional education. Since pt will not have home care will need additional teaching prior to discharge. Additional PLC scheduled for Friday 7/14 at 1pm as pt's father likely coming that day to transport him home. Called pt's father, Neal who confirmed he can attend PLC Friday. Neal is an ER RN a their local hospital and feels comfortable learning and assisting pt with his home dobutamine infusion.     CHW calling to schedule outpatient pysch follow up. CHW also calling to assist in arranging outpatient PICC line dressing changes.     [facility]   Phone: [phone]    CC will continue to monitor patient's medical condition and progress towards discharge.  Krystal Bonds RN BSN  6C Unit Care Coordinator  Phone number: [phone]  Pager: [phone]

## 2023-07-11 NOTE — PROGRESS NOTES
Home Infusion    Received referral from Krystal Bonds RNCC for IV inotropes.  Benefits verified.  Patient has [facility] PMAP plan and is covered 100%.  Met with patient at bedside to review home infusion services, review benefits and offer choice of providers.  Patient would like to use hospitals for home infusion.  Confirmed discharge address, phone, and emergency contact information.  Jose Enrique is expected to dc soon and will be going home on IV Dobutamine.  Patient has not done home IV therapy before however he and his s.o. Bela met with the [facility] for initial teaching today.  Provided patient with information about hospitals services.  Explained about administration method, the pump, medication bag and keri pack used.   I informed patient that I will assist with hook up of his home medication here at the hospital on day of discharge.  Unfortunately there are no home care agencies with availability to provide home RN visits for patient in his area.  Patient will need to be independent with dobutamine bag changes prior to discharge and he will need to go to a local clinic for weekly PICC line dressing changes. RNCC aware.  Informed patient about supplies and delivery of supplies, storage of medication, continuous infusion requirements, backup pump and medication bag, and 24/7 availability of hospitals staff while on IV therapy.   Patient verbalized understanding of all information given.   He is willing to learn and manage home IV therapy and states his father is an RN and will be able to help.  Questions answered.  Plan for patient and father to have [facility] training Friday.  Pending final discharge orders and IV training, hospitals will plan to deliver medication and supplies to hospital on day of discharge.  Will continue to follow until discharge and update pt once final orders are determined.    Thank you for the referral      EFREN Muniz  hospitals Nurse Liaison   [email or web address]  Cell: [phone]  M-F  [facility] Main: [phone]

## 2023-07-11 NOTE — PROGRESS NOTES
[facility]     Addiction Progress Note - Addiction Service        Date of Admission:  7/3/2023  Assessment & Plan       Jose Enrique is a 38-year-old gentleman with past medical history of ADHD who was transferred from [facility] on 7/3/23 due to cardiogenic shock from non-ischemic cardiomyopathy complicated and possibly secondary to methamphetamine and alcohol use.      # Alcohol Use Disorder  #Acute on chronic systolic heart failure/HFrEF with EF = 14% 2ndary due to alcohol and amphetamine use  Denies cravings, but does report often drinking while playing the guitar.  Goal is abstinence from alcohol, and reports meeting this goal prior to hospitalization.  We discussed different options to maintain goal of sobriety, including medications to manage cravings if they develop in the future, including acamprosate and naltrexone.   At this time, he thinks he can meet his goal without meds, but is interested in linkage to addiction medicine.   - will discuss local connection ([facility] in [place], [address] [place] [address]   Phone: [phone]. Fax: [phone]), or virtual, through [place], tomorrow.    - Will provide contact info to community health care worker to link to follow up. ([address] [place] [address], [phone])  - will reach out and discuss guanfacine or other medications that could potentially be started prior to discharge.      # ADHD  # Methamphetamine Use Disorder  Amanda has ADHD. Has been on ritalin and aderral in the past for it.   He mentioned that he has no cravings for meth, and goal is to avoid illicit stimulants.  He does worry about ability to focus without ADHD management, and thinks having options would be helpful; he is aware that stimulant based medications like adderal are not possible due to interactions with Dobutamine, plus the risk of stressing his heart.  Of note, he recently established care with Agatha  "Nick, Psych FERNANDO at [place], and would be interested in following with her.       # Peer Support:   -Our peer  will meet the patient if agreeable and still hospitalized on Thursday, to provide additional outpatient resources  -To contact Cielo Menjivar  from [facility] ([facility]): call or text: [phone]     # Addiction Social Worker:   Our addiction social worker Zenon Nick can be contacted if needed, on her pager [phone] or texted/called at [phone]  --Patient is interested in ONLY outpatient addiction treatment after discharge.  Our addiction social worker will give the patient appropriate resources for outpatient evaluation.       Linkage to care:  As above, prior to discharge, will link to addiction medicine as well as mental health for ADHD management      The patient's care was discussed with the Bedside Nurse, Care Coordinator/, Patient and Primary team.    Time Spent on this Encounter   I spent 40 minutes on the unit/floor managing the care of Jose Enrique Engel. Over 50% of my time was spent on the following:   - Counseling the patient and/or family regarding: risks and benefits of treatment options, recommended follow-up and medical compliance  - Coordination of care with the: care coordinator/, as well as with outpatient psychiatry         Geo Waterman MD on 7/11/2023 at 1:52 PM   Addiction Service   [facility]     Contact information available via [facility] Paging/Directory under \"addiction medicine\"        ______________________________________________________________________    Interval History   No acute events.  Planning for ICD prior to discharge    ROS:  CV, Pulm, GI and  assessed. Pertinent positives as above, otherwise negative.     Data reviewed today: I reviewed all medications, new labs and imaging results over the last 24 hours.     Physical Exam   Vital Signs: Temp: 99.5  F (37.5  C) " Temp src: Oral BP: 94/63 Pulse: 108   Resp: 16 SpO2: 96 % O2 Device: None (Room air)    Weight: 185 lbs 6.51 oz  Gen: NAD  HEENT: EOMI, PERRL, MMM  CV: extremities warm and well perfused  Resp: breathing comfortably on RA  : deferred  Msk: no LE edema  Skin: no rashes  Neuro: nonfocal exam        Due to regulation of Title 42 of the Code of Federal Regulations (CFR) Part 2: Confidentiality laws apply to this note and the information wherein.  Thus, this note cannot be copy and pasted into any other health care staff's note nor can it be included in general medical records sent to ANY outside agency without the patient's written consent.

## 2023-07-11 NOTE — PLAN OF CARE
"D: 38-year-old gentleman with past medical history of ADHD who was transferred from [facility] on 7/3/23 due to cardiogenic shock from non-ischemic cardiomyopathy due to methamphetamine and alcohol use.     I: Monitored and assessed patient status; nursing cares provided.    A:   Today's Highlights/Changes:    Pt states okay for visitor Bela black to visit.     New bag heparin and dobutamine this am.     Walked hallways in am, went downstairs for donut in cafe with NST.     PICC line care education in am,     /82 (BP Location: Right leg)   Pulse 98   Temp 98.4  F (36.9  C) (Oral)   Resp 18   Ht 1.803 m (5' 11\")   Wt 75.1 kg (165 lb 9.1 oz)   SpO2 98%   BMI 23.09 kg/m      Neuro: A&Ox4. Calm, cooperative, and pleasant.   Cardiac: SR with HR 's. Afebrile and VSS.   Respiratory: Sating 98 on RA.  GI/: Adequate urine output and no BM during shift.   Diet/appetite: Tolerating 2 g NA diet/2 L restriction. Good appetite and eating well.  Activity:  Up ad aron, independent, walked hallways in am.  Pain: Denies, pt does have scheduled tylenol given x 1.    Labs/Lytes: Magnesium 2.0, replaced.   Skin: No new deficits noted.  LDA's: L double lumen PICC, both infusing.     Plan: Possible ICD placement tomorrow. Continue with POC. Notify primary team with changes.    Dianna Stovall RN    Cardiology     "
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
## 2023-07-11 NOTE — PLAN OF CARE
I/A: A/Ox4. VSS on RA. ST 100s-110s. PRN tylenol given before midline removal. Good appetite. 2L FR. Adequate UOP. LBM 7/8. Up ad aron.     Drips: Dobutamine gtt at 5 mcg/kg/min via L DL PICC  Heparin gtt at 1600 units/hr (PCU collect)    P: NPO for possible ICD placement tomorrow. PLC IV med/ PICC line teaching at noon w/ SO Priyank

## 2023-07-11 NOTE — PROGRESS NOTES
SPIRITUAL HEALTH SERVICES  SPIRITUAL ASSESSMENT Progress Note  [facility] 6C     REFERRAL SOURCE: LOS    Brief visit with pt Jose Enrique Engel.  Jose Enrique said that he was doing fine and did not have any needs at this time but is aware of how to reach out to [facility] should he want another visit.     PLAN: [facility] will remain available     Comfort Mckeon  Pager: [phone]

## 2023-07-12 ENCOUNTER — APPOINTMENT (OUTPATIENT)
Dept: OCCUPATIONAL THERAPY | Facility: CLINIC | Age: 38
End: 2023-07-12
Attending: STUDENT IN AN ORGANIZED HEALTH CARE EDUCATION/TRAINING PROGRAM
Payer: COMMERCIAL

## 2023-07-12 ENCOUNTER — ANESTHESIA EVENT (OUTPATIENT)
Dept: CARDIOLOGY | Facility: CLINIC | Age: 38
End: 2023-07-12
Payer: COMMERCIAL

## 2023-07-12 LAB
ALBUMIN SERPL BCG-MCNC: 3.9 G/DL (ref 3.5–5.2)
ALP SERPL-CCNC: 104 U/L (ref 40–129)
ALT SERPL W P-5'-P-CCNC: 156 U/L (ref 0–70)
ANION GAP SERPL CALCULATED.3IONS-SCNC: 16 MMOL/L (ref 7–15)
AST SERPL W P-5'-P-CCNC: 44 U/L (ref 0–45)
BASE EXCESS BLDV CALC-SCNC: -0.3 MMOL/L (ref -7.7–1.9)
BASOPHILS # BLD AUTO: 0.1 10E3/UL (ref 0–0.2)
BASOPHILS NFR BLD AUTO: 1 %
BILIRUB SERPL-MCNC: 0.5 MG/DL
BUN SERPL-MCNC: 26 MG/DL (ref 6–20)
CALCIUM SERPL-MCNC: 9.3 MG/DL (ref 8.6–10)
CHLORIDE SERPL-SCNC: 101 MMOL/L (ref 98–107)
CREAT SERPL-MCNC: 1.14 MG/DL (ref 0.67–1.17)
DEPRECATED HCO3 PLAS-SCNC: 22 MMOL/L (ref 22–29)
EOSINOPHIL # BLD AUTO: 0.5 10E3/UL (ref 0–0.7)
EOSINOPHIL NFR BLD AUTO: 6 %
ERYTHROCYTE [DISTWIDTH] IN BLOOD BY AUTOMATED COUNT: 25.5 % (ref 10–15)
GFR SERPL CREATININE-BSD FRML MDRD: 84 ML/MIN/1.73M2
GLUCOSE SERPL-MCNC: 113 MG/DL (ref 70–99)
HCO3 BLDV-SCNC: 26 MMOL/L (ref 21–28)
HCT VFR BLD AUTO: 41.1 % (ref 40–53)
HGB BLD-MCNC: 12.2 G/DL (ref 13.3–17.7)
IMM GRANULOCYTES # BLD: 0 10E3/UL
IMM GRANULOCYTES NFR BLD: 0 %
LACTATE SERPL-SCNC: 1.3 MMOL/L (ref 0.7–2)
LYMPHOCYTES # BLD AUTO: 2.8 10E3/UL (ref 0.8–5.3)
LYMPHOCYTES NFR BLD AUTO: 34 %
MAGNESIUM SERPL-MCNC: 2.1 MG/DL (ref 1.7–2.3)
MCH RBC QN AUTO: 21.9 PG (ref 26.5–33)
MCHC RBC AUTO-ENTMCNC: 29.7 G/DL (ref 31.5–36.5)
MCV RBC AUTO: 74 FL (ref 78–100)
MONOCYTES # BLD AUTO: 0.9 10E3/UL (ref 0–1.3)
MONOCYTES NFR BLD AUTO: 11 %
NEUTROPHILS # BLD AUTO: 4 10E3/UL (ref 1.6–8.3)
NEUTROPHILS NFR BLD AUTO: 48 %
NRBC # BLD AUTO: 0 10E3/UL
NRBC BLD AUTO-RTO: 0 /100
O2/TOTAL GAS SETTING VFR VENT: 21 %
OXYHGB MFR BLDV: 46 % (ref 70–75)
PCO2 BLDV: 45 MM HG (ref 40–50)
PH BLDV: 7.36 [PH] (ref 7.32–7.43)
PHOSPHATE SERPL-MCNC: 4.5 MG/DL (ref 2.5–4.5)
PLATELET # BLD AUTO: 307 10E3/UL (ref 150–450)
PO2 BLDV: 30 MM HG (ref 25–47)
POTASSIUM SERPL-SCNC: 4.4 MMOL/L (ref 3.4–5.3)
PROT SERPL-MCNC: 7.8 G/DL (ref 6.4–8.3)
RBC # BLD AUTO: 5.58 10E6/UL (ref 4.4–5.9)
SODIUM SERPL-SCNC: 139 MMOL/L (ref 136–145)
UFH PPP CHRO-ACNC: 0.68 IU/ML
WBC # BLD AUTO: 8.3 10E3/UL (ref 4–11)

## 2023-07-12 PROCEDURE — 84100 ASSAY OF PHOSPHORUS: CPT | Performed by: INTERNAL MEDICINE

## 2023-07-12 PROCEDURE — 99232 SBSQ HOSP IP/OBS MODERATE 35: CPT | Performed by: INTERNAL MEDICINE

## 2023-07-12 PROCEDURE — 250N000013 HC RX MED GY IP 250 OP 250 PS 637: Performed by: INTERNAL MEDICINE

## 2023-07-12 PROCEDURE — 82805 BLOOD GASES W/O2 SATURATION: CPT | Performed by: INTERNAL MEDICINE

## 2023-07-12 PROCEDURE — 99231 SBSQ HOSP IP/OBS SF/LOW 25: CPT | Performed by: NURSE PRACTITIONER

## 2023-07-12 PROCEDURE — 250N000013 HC RX MED GY IP 250 OP 250 PS 637: Performed by: NURSE PRACTITIONER

## 2023-07-12 PROCEDURE — 85520 HEPARIN ASSAY: CPT | Performed by: INTERNAL MEDICINE

## 2023-07-12 PROCEDURE — 258N000003 HC RX IP 258 OP 636: Performed by: INTERNAL MEDICINE

## 2023-07-12 PROCEDURE — 80053 COMPREHEN METABOLIC PANEL: CPT | Performed by: INTERNAL MEDICINE

## 2023-07-12 PROCEDURE — 250N000013 HC RX MED GY IP 250 OP 250 PS 637: Performed by: PHYSICIAN ASSISTANT

## 2023-07-12 PROCEDURE — 85025 COMPLETE CBC W/AUTO DIFF WBC: CPT

## 2023-07-12 PROCEDURE — 250N000013 HC RX MED GY IP 250 OP 250 PS 637: Performed by: STUDENT IN AN ORGANIZED HEALTH CARE EDUCATION/TRAINING PROGRAM

## 2023-07-12 PROCEDURE — 97530 THERAPEUTIC ACTIVITIES: CPT | Mod: GO | Performed by: OCCUPATIONAL THERAPIST

## 2023-07-12 PROCEDURE — 250N000013 HC RX MED GY IP 250 OP 250 PS 637

## 2023-07-12 PROCEDURE — 83735 ASSAY OF MAGNESIUM: CPT | Performed by: STUDENT IN AN ORGANIZED HEALTH CARE EDUCATION/TRAINING PROGRAM

## 2023-07-12 PROCEDURE — 214N000001 HC R&B CCU UMMC

## 2023-07-12 PROCEDURE — 83605 ASSAY OF LACTIC ACID: CPT | Performed by: INTERNAL MEDICINE

## 2023-07-12 PROCEDURE — 97535 SELF CARE MNGMENT TRAINING: CPT | Mod: GO | Performed by: OCCUPATIONAL THERAPIST

## 2023-07-12 PROCEDURE — 250N000011 HC RX IP 250 OP 636: Mod: JZ | Performed by: INTERNAL MEDICINE

## 2023-07-12 RX ADMIN — SACUBITRIL AND VALSARTAN 1 TABLET: 49; 51 TABLET, FILM COATED ORAL at 08:51

## 2023-07-12 RX ADMIN — FUROSEMIDE 20 MG: 20 TABLET ORAL at 08:51

## 2023-07-12 RX ADMIN — DOBUTAMINE 5 MCG/KG/MIN: 12.5 INJECTION, SOLUTION, CONCENTRATE INTRAVENOUS at 17:55

## 2023-07-12 RX ADMIN — FOLIC ACID 1 MG: 1 TABLET ORAL at 08:51

## 2023-07-12 RX ADMIN — FUROSEMIDE 20 MG: 20 TABLET ORAL at 16:19

## 2023-07-12 RX ADMIN — SACUBITRIL AND VALSARTAN 1 TABLET: 49; 51 TABLET, FILM COATED ORAL at 19:47

## 2023-07-12 RX ADMIN — SPIRONOLACTONE 25 MG: 25 TABLET, FILM COATED ORAL at 08:51

## 2023-07-12 RX ADMIN — Medication 1 TABLET: at 08:51

## 2023-07-12 RX ADMIN — EMPAGLIFLOZIN 10 MG: 10 TABLET, FILM COATED ORAL at 08:51

## 2023-07-12 RX ADMIN — HEPARIN SODIUM 1600 UNITS/HR: 10000 INJECTION, SOLUTION INTRAVENOUS at 15:08

## 2023-07-12 RX ADMIN — RAMELTEON 8 MG: 8 TABLET, FILM COATED ORAL at 21:42

## 2023-07-12 ASSESSMENT — ACTIVITIES OF DAILY LIVING (ADL)
ADLS_ACUITY_SCORE: 19

## 2023-07-12 NOTE — PLAN OF CARE
I/A: A/Ox4. VSS on RA. ST 100s-110s. Denies pain. Good appetite. 2L FR. Voiding, needs reminders to save UOP. LBM 7/11. Up ad aron. Pre-procedure shower completed with CHG soap.      Drips: Dobutamine gtt at 5 mcg/kg/min via L DL PICC  Heparin gtt at 1600 units/hr (PCU collect)    P: NPO at [place] for possible S-ICD placement tomorrow.

## 2023-07-12 NOTE — PROGRESS NOTES
CLINICAL NUTRITION SERVICES    Reviewed nutrition risk factors due to LOS. Pt is tolerating diet, eating mostly 100% of adequate sized meals TID per nursing documentation and [facility] records. Attempted to discuss low sodium diet education with patient, however pt declined education at this time. Has handouts in room. May also follow up with an outpatient dietitian in the future as needed for nutrition education. RD will follow via rounds at this time, unless consulted.    CARLITO Mendoza, RD  6C/5A(beds 5201 - 0915) RD pgr: [phone]  Weekend/Holiday RD pager: [phone]    I have read and agree with the above nutrition note.   Sofya Clement, MS, RD, LD, CNSC

## 2023-07-12 NOTE — PLAN OF CARE
D: Admitted on 7/3/23 due to cardiogenic shock from non-ischemic cardiomyopathy due to methamphetamine and alcohol use.      I: Monitored vitals and assessed patient status.   Changed: pt supposed to be NPO since 0000 for possible procedure today, however patient declining to have procedure today due to lack of rest and not adhering to NPO orders, team updated on ~0230, NPO order dced this AM  Running:  dobutamine at 5 mcg/kg/min  heparin at 1600 units/hr, next 10a due tomorrow     A: A&Ox4. On room air. ST, intermittently refusing tele, VSS, afebrile. Urinating adequately. LBM 7/11. Up ad aron.     P: Continue to monitor. ICD placement TBD.

## 2023-07-12 NOTE — PROGRESS NOTES
CROSS COVER TEAM    Paged by RN at 5:45am    Patient ate around ~2am and he is refusing to have ICD placement today 7/12 due to no getting adequate rest. It seems that NPO [place] orders were not released until 6:15am.     Lacie Garrett  Internal Medicine PGY-3  [facility]

## 2023-07-12 NOTE — DISCHARGE INSTRUCTIONS
[facility] Behavioral Health Care Coordinator   Phone: [phone]  Address: [address] [place] [address]  *If you need help with arranging appointments with psychiatry or any of their other chemical health or mental health programs.    Peer  from [facility] ([facility]):   Phone (call or txt): Tiara [phone]    Addiction Medicine Clinic  [facility]   Phone: [phone]. Fax: [phone]  Address: [address] [place] [address] [place] [address]  *No appointment made at [facility]; this info is for your reference if you want an appointment with a specialized substance use doctor.    You have a follow-up appointment scheduled at:  [facility]  [address] [place] [address]., [address], [place] [address]  Provider: Kandice Miranda Nurse Practitioner  Date: August 3rd  Time: 4 pm  Phone: [phone]    You have a follow-up appointment scheduled at:  [facility]   [address] [place] [address]  Provider: Nasra Adams NP  Date: 7/26/23  Time : 2:45 pm, please come 30 minute early for labs  Phone: [phone]    You have an appointment for talk therapy at:  [facility]  Tuesday, August 1st at 12:30pm  With Angie Lee  [address] [place] [id number]  [phone]  *New Intake paperwork sent to your email ([email or web address]) to complete on your own; OR you can come to the appointment 30 minutes earlier to do the Intake paperwork in the clinic.          Home Care after an ICD implant    Wound care:  Keep your incision (surgery wound) dry for 3 days.  After 3 days, you may remove the outer bandage.  Keep the strips of tape on.  They will be removed at your clinic visit.  Check for signs of infection each day.  These include increased redness, swelling, drainage or a fever over 101 F (38.3 C).  Call us immediately if you see any of these signs.  If there are no signs of infection, you may shower in 3  days.  Do not submerge the incision (in a bath tub, hot tub, or swimming pool) until fully healed.  Pain:   You may have mild to moderate pain for 3 to 5 days.  Take acetaminophen (Tylenol) or ibuprofen (Advil) for the pain.  Call us if the pain is severe or lasts more than 5 days.    Activity:  You should slowly go back to your normal activities after 24 hours.  Healing will take 4 to 6 weeks.  No driving for 3 days  Avoid climbing a ladder alone.  It is best to stay within 4 feet of the ground.  Avoid anything that may cause rough contact or a hard hit to your chest.  This includes football, hockey, and other contact sports.  Do not go swimming or boating alone.    For at least 2 weeks:  Do not raise your affected arm above your shoulder.  Do not use your affected arm to push, pull, or lift anything over 10 pounds.  For the next 6 weeks:  Avoid repetitive upper body activities (ie: golf, swimming, and weight lifting)    Home monitor:     Upon arriving home, plug your [employer] Home Monitor into the wall.   Your weekly home monitor check will happen every Tuesday. The heart button on the monitor will flash indicating your check is due. Please press the button when flashing.    Follow Up Visits:  Return to the clinic in 7 to 10 days to have your device and wound checked.    Telling others about your device:  Before you have any medical tests or treatments, tell the doctors, dentists, and other care providers about your device.  There are a few tests and treatments that may interfere with your device.  (These include MRI, radiation therapy, electrocautery, and others.)  Your care team may need to take special steps to keep you safe.  Before you leave the hospital, you will receive a temporary ID card.  A permanent card will be mailed to you about 6 to 8 weeks later.  Always carry the ID card with you.  It has important details about your device.  You should also get a MedicAlert ID.  Please ask us  for a MedicAlert brochure, or go to [email or web address].    Safety near electrical equipment:  All of these are safe to use when in good repair:  Microwaves  Radios  Cordless phone  Remote controls  Small electrical tools    Security haines: It is okay to walk through security haines at the airports and department stores.  Tell airport security that you have a defibrillator.  They should keep the screening wand at least 6 inches from your device.  Full-body scanners are safe.    Avoid the following:   MRI tests in the hospital unless you have a MRI safe defibrillator.   Arc welding, chain saws and high-powered industrial or commercial tools.   Power lines, power plants and large power generators.   Electric body fat scales.   Magnetic mattress pads or pillow.    What to do after a shock from your ICD:  Stop what you re doing and rest.  If you feel fine before and after the shock, call the device clinic.  If you feel unwell or receive more than one shock, call 911 or go to the emergency room.  A shock could mean that your condition has changed and you may need to see a doctor.    Questions?  Please call [facility]   Device Nurse:          Business Hours:  [phone]    After Hours:  [phone]   Choose option 4, then ask for the on-call device nurse at job code [id number].    Your next device clinic appointment is scheduled on:     ___________________________ at _____________.                                                 [facility] Heart Care  [facility] - Clinic [address] [phone]

## 2023-07-12 NOTE — PROGRESS NOTES
[facility]     Addiction Progress Note - Addiction Service        Date of Admission:  7/3/2023  Assessment & Plan       Jose Enrique is a 38-year-old gentleman with past medical history of ADHD who was transferred from [facility] on 7/3/23 due to cardiogenic shock from non-ischemic cardiomyopathy complicated and possibly secondary to methamphetamine and alcohol use.      # Alcohol Use Disorder  #Acute on chronic systolic heart failure/HFrEF with EF = 14% 2ndary due to alcohol and amphetamine use  Denies cravings.  Goal is abstinence from alcohol, and reports meeting this goal prior to hospitalization.  We discussed different options to maintain goal of sobriety, including medications to manage cravings if they develop in the future, including acamprosate and naltrexone.   At this time, he thinks he can meet his goal without meds, but is interested in linkage to addiction medicine.   - discussed local connection ([facility] in [place], [address] [facility] [place] [address] [place] [address]   Phone: [phone]. Fax: [phone]), or virtual, through [facility].  He prefers to wait and see how things are going, so will provide resources in discharge AVS    - Will provide contact info to community health care worker to link to follow up. ([address] [place] [address], [phone])       # ADHD  # Methamphetamine Use Disorder  # anxiety  Amanda has ADHD. Has been on ritalin and aderral in the past for it.   He mentioned that he has no cravings for meth, and goal is to avoid illicit stimulants.  He does worry about ability to focus without ADHD management, and thinks having options would be helpful; he is aware that stimulant based medications like adderal are not possible due to interactions with Dobutamine, plus the risk of stressing his heart.  Of note, he recently established care with Agatha Romero, Psych FERNANDO at [place], and would be interested in following with her.     "Was able to discuss options with her, and she would recommend initiation of wellbutrin, 150 mg, titrating up to 300 mg.  He is agreeable  - wellbutrin, 150 mg XR, Q am     # Peer Support:   -Our peer  will meet the patient if agreeable and still hospitalized on Thursday, to provide additional outpatient resources  -To contact Tiara Peer  from [facility] ([facility]): call or text: [phone]     # Addiction Social Worker:   Our addiction social worker Zenon Nick can be contacted if needed, on her pager [phone] or texted/called at [phone]  --Patient is interested in ONLY outpatient addiction treatment after discharge.  Our addiction social worker will give the patient appropriate resources for outpatient evaluation.       Linkage to care:  As above, prior to discharge, will link to addiction medicine as well as mental health for ADHD management      The patient's care was discussed with the Bedside Nurse, Care Coordinator/, Patient and Primary team.    Time Spent on this Encounter   I spent 40 minutes on the unit/floor managing the care of Jose Enrique BENITO Engel. Over 50% of my time was spent on the following:   - Counseling the patient and/or family regarding: risks and benefits of treatment options, recommended follow-up and medical compliance  - Coordination of care with the: care coordinator/, as well as with outpatient psychiatry         Geo Waterman MD on 7/12/2023 at 1:52 PM   Addiction Service   [facility]     Contact information available via [facility] Paging/Directory under \"addiction medicine\"        ______________________________________________________________________    Interval History   No acute events.      ROS:  CV, Pulm, GI and  assessed. Pertinent positives as above, otherwise negative.     Data reviewed today: I reviewed all medications, new labs and imaging results over the last 24 hours. "     Physical Exam   Vital Signs: Temp: 99.5  F (37.5  C) Temp src: Oral BP: 94/63 Pulse: 108   Resp: 16 SpO2: 96 % O2 Device: None (Room air)    Weight: 185 lbs 6.51 oz  Gen: NAD  HEENT: EOMI, PERRL, MMM  CV: extremities warm and well perfused  Resp: breathing comfortably on RA  : deferred  Msk: no LE edema  Skin: no rashes  Neuro: nonfocal exam        Due to regulation of Title 42 of the Code of Federal Regulations (CFR) Part 2: Confidentiality laws apply to this note and the information wherein.  Thus, this note cannot be copy and pasted into any other health care staff's note nor can it be included in general medical records sent to ANY outside agency without the patient's written consent.

## 2023-07-12 NOTE — PLAN OF CARE
"D: 38-year-old gentleman with past medical history of ADHD who was transferred from [facility] on 7/3/23 due to cardiogenic shock from non-ischemic cardiomyopathy due to methamphetamine and alcohol use.      I: Monitored and assessed patient status; nursing cares provided.     A:   Today's Highlights/Changes:    Pt requesting new room or new roommate, informed charge nurse, waiting for a room to open on unit.     Possible ICD placement tomorrow 7/13, NPO at midnight.     Pt refused monitor on overnight and this am, provider spoke to pt to reinforce importance of cardiac monitor staying on, monitor back on currently for cardiac monitoring.      PICC/dobutamine teaching with father Friday 7/14.      BP 98/78 (BP Location: Right arm)   Pulse 94   Temp 97.9  F (36.6  C) (Oral)   Resp 16   Ht 1.803 m (5' 11\")   Wt 75.1 kg (165 lb 9.1 oz)   SpO2 95%   BMI 23.09 kg/m      Neuro: A&Ox4. Calm, cooperative, and pleasant. Pt states did not get much sleep last night from roommates alarms going off, irritable and refusing monitor on. Trying to sleep during the day.   Cardiac: SR with HR 's. Afebrile and VSS.       Respiratory: Sating 95 on RA.  GI/: Adequate urine output and no BM during shift.   Diet/appetite: Tolerating regular diet, good appetite and eating well.  Activity:  Up ad aron, independent.   Pain: Denies  Labs/Lytes: No replacements needed.    Skin: No new deficits noted.  LDA's: L double lumen PICC, both infusing.      Plan: Continue with POC. Notify primary team with changes.     Dianna Stovall, RN     Cardiology     "

## 2023-07-12 NOTE — PLAN OF CARE
Occupational Therapy Discharge Summary    Reason for therapy discharge:    No further expectations of functional progress.    Progress towards therapy goal(s). See goals on Care Plan in [facility] electronic health record for goal details.  Goals met    Therapy recommendation(s):    No further therapy is recommended.    Pt ambulating in hallway I with no difficulty.

## 2023-07-13 ENCOUNTER — APPOINTMENT (OUTPATIENT)
Dept: GENERAL RADIOLOGY | Facility: CLINIC | Age: 38
End: 2023-07-13
Attending: NURSE PRACTITIONER
Payer: COMMERCIAL

## 2023-07-13 ENCOUNTER — ANESTHESIA (OUTPATIENT)
Dept: CARDIOLOGY | Facility: CLINIC | Age: 38
End: 2023-07-13
Payer: COMMERCIAL

## 2023-07-13 LAB
ALBUMIN SERPL BCG-MCNC: 3.9 G/DL (ref 3.5–5.2)
ALP SERPL-CCNC: 102 U/L (ref 40–129)
ALT SERPL W P-5'-P-CCNC: 119 U/L (ref 0–70)
ANION GAP SERPL CALCULATED.3IONS-SCNC: 11 MMOL/L (ref 7–15)
AST SERPL W P-5'-P-CCNC: 35 U/L (ref 0–45)
BASE EXCESS BLDV CALC-SCNC: 0.2 MMOL/L (ref -7.7–1.9)
BASOPHILS # BLD AUTO: 0 10E3/UL (ref 0–0.2)
BASOPHILS NFR BLD AUTO: 1 %
BILIRUB SERPL-MCNC: 0.5 MG/DL
BUN SERPL-MCNC: 18.8 MG/DL (ref 6–20)
CALCIUM SERPL-MCNC: 9 MG/DL (ref 8.6–10)
CHLORIDE SERPL-SCNC: 103 MMOL/L (ref 98–107)
CREAT SERPL-MCNC: 0.96 MG/DL (ref 0.67–1.17)
DEPRECATED HCO3 PLAS-SCNC: 22 MMOL/L (ref 22–29)
EOSINOPHIL # BLD AUTO: 0.4 10E3/UL (ref 0–0.7)
EOSINOPHIL NFR BLD AUTO: 5 %
ERYTHROCYTE [DISTWIDTH] IN BLOOD BY AUTOMATED COUNT: 26 % (ref 10–15)
GFR SERPL CREATININE-BSD FRML MDRD: >90 ML/MIN/1.73M2
GLUCOSE SERPL-MCNC: 94 MG/DL (ref 70–99)
HCO3 BLDV-SCNC: 26 MMOL/L (ref 21–28)
HCT VFR BLD AUTO: 40.9 % (ref 40–53)
HGB BLD-MCNC: 12.3 G/DL (ref 13.3–17.7)
IMM GRANULOCYTES # BLD: 0 10E3/UL
IMM GRANULOCYTES NFR BLD: 0 %
LACTATE SERPL-SCNC: 1.3 MMOL/L (ref 0.7–2)
LYMPHOCYTES # BLD AUTO: 2.3 10E3/UL (ref 0.8–5.3)
LYMPHOCYTES NFR BLD AUTO: 32 %
MAGNESIUM SERPL-MCNC: 2 MG/DL (ref 1.7–2.3)
MCH RBC QN AUTO: 22.4 PG (ref 26.5–33)
MCHC RBC AUTO-ENTMCNC: 30.1 G/DL (ref 31.5–36.5)
MCV RBC AUTO: 74 FL (ref 78–100)
MONOCYTES # BLD AUTO: 0.9 10E3/UL (ref 0–1.3)
MONOCYTES NFR BLD AUTO: 12 %
NEUTROPHILS # BLD AUTO: 3.7 10E3/UL (ref 1.6–8.3)
NEUTROPHILS NFR BLD AUTO: 50 %
NRBC # BLD AUTO: 0 10E3/UL
NRBC BLD AUTO-RTO: 0 /100
O2/TOTAL GAS SETTING VFR VENT: 21 %
OXYHGB MFR BLDV: 52 % (ref 70–75)
PCO2 BLDV: 43 MM HG (ref 40–50)
PH BLDV: 7.38 [PH] (ref 7.32–7.43)
PHOSPHATE SERPL-MCNC: 4.2 MG/DL (ref 2.5–4.5)
PLATELET # BLD AUTO: 302 10E3/UL (ref 150–450)
PO2 BLDV: 33 MM HG (ref 25–47)
POTASSIUM SERPL-SCNC: 4.3 MMOL/L (ref 3.4–5.3)
PROT SERPL-MCNC: 7.6 G/DL (ref 6.4–8.3)
RBC # BLD AUTO: 5.5 10E6/UL (ref 4.4–5.9)
SODIUM SERPL-SCNC: 136 MMOL/L (ref 136–145)
UFH PPP CHRO-ACNC: 0.15 IU/ML
UFH PPP CHRO-ACNC: 0.43 IU/ML
UFH PPP CHRO-ACNC: 0.61 IU/ML
WBC # BLD AUTO: 7.4 10E3/UL (ref 4–11)

## 2023-07-13 PROCEDURE — 250N000013 HC RX MED GY IP 250 OP 250 PS 637: Performed by: PHYSICIAN ASSISTANT

## 2023-07-13 PROCEDURE — 99231 SBSQ HOSP IP/OBS SF/LOW 25: CPT | Performed by: NURSE PRACTITIONER

## 2023-07-13 PROCEDURE — 83605 ASSAY OF LACTIC ACID: CPT | Performed by: INTERNAL MEDICINE

## 2023-07-13 PROCEDURE — 258N000003 HC RX IP 258 OP 636: Performed by: INTERNAL MEDICINE

## 2023-07-13 PROCEDURE — 250N000013 HC RX MED GY IP 250 OP 250 PS 637: Performed by: NURSE PRACTITIONER

## 2023-07-13 PROCEDURE — 83735 ASSAY OF MAGNESIUM: CPT | Performed by: STUDENT IN AN ORGANIZED HEALTH CARE EDUCATION/TRAINING PROGRAM

## 2023-07-13 PROCEDURE — 250N000011 HC RX IP 250 OP 636: Mod: JZ

## 2023-07-13 PROCEDURE — 85520 HEPARIN ASSAY: CPT | Performed by: INTERNAL MEDICINE

## 2023-07-13 PROCEDURE — 71046 X-RAY EXAM CHEST 2 VIEWS: CPT | Mod: 26 | Performed by: RADIOLOGY

## 2023-07-13 PROCEDURE — 84100 ASSAY OF PHOSPHORUS: CPT | Performed by: INTERNAL MEDICINE

## 2023-07-13 PROCEDURE — 71046 X-RAY EXAM CHEST 2 VIEWS: CPT

## 2023-07-13 PROCEDURE — 250N000013 HC RX MED GY IP 250 OP 250 PS 637: Performed by: INTERNAL MEDICINE

## 2023-07-13 PROCEDURE — 250N000011 HC RX IP 250 OP 636: Mod: JZ | Performed by: INTERNAL MEDICINE

## 2023-07-13 PROCEDURE — 250N000013 HC RX MED GY IP 250 OP 250 PS 637

## 2023-07-13 PROCEDURE — 99232 SBSQ HOSP IP/OBS MODERATE 35: CPT | Performed by: INTERNAL MEDICINE

## 2023-07-13 PROCEDURE — 82805 BLOOD GASES W/O2 SATURATION: CPT | Performed by: INTERNAL MEDICINE

## 2023-07-13 PROCEDURE — 85025 COMPLETE CBC W/AUTO DIFF WBC: CPT

## 2023-07-13 PROCEDURE — 80053 COMPREHEN METABOLIC PANEL: CPT | Performed by: INTERNAL MEDICINE

## 2023-07-13 PROCEDURE — 250N000013 HC RX MED GY IP 250 OP 250 PS 637: Performed by: STUDENT IN AN ORGANIZED HEALTH CARE EDUCATION/TRAINING PROGRAM

## 2023-07-13 PROCEDURE — 214N000001 HC R&B CCU UMMC

## 2023-07-13 RX ORDER — HYDROXYZINE HYDROCHLORIDE 25 MG/1
50 TABLET, FILM COATED ORAL EVERY 6 HOURS PRN
Status: DISCONTINUED | OUTPATIENT
Start: 2023-07-13 | End: 2023-07-20 | Stop reason: HOSPADM

## 2023-07-13 RX ORDER — BUPROPION HYDROCHLORIDE 150 MG/1
150 TABLET ORAL DAILY
Status: DISCONTINUED | OUTPATIENT
Start: 2023-07-13 | End: 2023-07-15

## 2023-07-13 RX ORDER — MAGNESIUM OXIDE 400 MG/1
400 TABLET ORAL EVERY 4 HOURS
Status: COMPLETED | OUTPATIENT
Start: 2023-07-13 | End: 2023-07-13

## 2023-07-13 RX ORDER — HYDROXYZINE HYDROCHLORIDE 25 MG/1
25 TABLET, FILM COATED ORAL EVERY 6 HOURS PRN
Status: DISCONTINUED | OUTPATIENT
Start: 2023-07-13 | End: 2023-07-20 | Stop reason: HOSPADM

## 2023-07-13 RX ORDER — ACETAMINOPHEN 325 MG/1
650 TABLET ORAL EVERY 6 HOURS PRN
Status: DISCONTINUED | OUTPATIENT
Start: 2023-07-13 | End: 2023-07-18

## 2023-07-13 RX ADMIN — Medication 1 TABLET: at 09:21

## 2023-07-13 RX ADMIN — MAGNESIUM OXIDE TAB 400 MG (241.3 MG ELEMENTAL MG) 400 MG: 400 (241.3 MG) TAB at 13:08

## 2023-07-13 RX ADMIN — ACETAMINOPHEN 325 MG: 325 TABLET, FILM COATED ORAL at 09:21

## 2023-07-13 RX ADMIN — RAMELTEON 8 MG: 8 TABLET, FILM COATED ORAL at 21:00

## 2023-07-13 RX ADMIN — DOBUTAMINE 5 MCG/KG/MIN: 12.5 INJECTION, SOLUTION, CONCENTRATE INTRAVENOUS at 23:29

## 2023-07-13 RX ADMIN — EMPAGLIFLOZIN 10 MG: 10 TABLET, FILM COATED ORAL at 09:21

## 2023-07-13 RX ADMIN — FUROSEMIDE 20 MG: 20 TABLET ORAL at 09:21

## 2023-07-13 RX ADMIN — HYDROXYZINE HYDROCHLORIDE 25 MG: 25 TABLET, FILM COATED ORAL at 09:53

## 2023-07-13 RX ADMIN — HEPARIN SODIUM 1600 UNITS/HR: 10000 INJECTION, SOLUTION INTRAVENOUS at 11:05

## 2023-07-13 RX ADMIN — SPIRONOLACTONE 25 MG: 25 TABLET, FILM COATED ORAL at 09:21

## 2023-07-13 RX ADMIN — SACUBITRIL AND VALSARTAN 1 TABLET: 49; 51 TABLET, FILM COATED ORAL at 20:59

## 2023-07-13 RX ADMIN — FUROSEMIDE 20 MG: 20 TABLET ORAL at 16:18

## 2023-07-13 RX ADMIN — MAGNESIUM OXIDE TAB 400 MG (241.3 MG ELEMENTAL MG) 400 MG: 400 (241.3 MG) TAB at 09:39

## 2023-07-13 RX ADMIN — Medication 10 ML: at 16:17

## 2023-07-13 RX ADMIN — HYDROXYZINE HYDROCHLORIDE 50 MG: 25 TABLET, FILM COATED ORAL at 23:21

## 2023-07-13 RX ADMIN — BUPROPION HYDROCHLORIDE 150 MG: 150 TABLET, FILM COATED, EXTENDED RELEASE ORAL at 09:39

## 2023-07-13 RX ADMIN — FOLIC ACID 1 MG: 1 TABLET ORAL at 09:22

## 2023-07-13 RX ADMIN — HYDROXYZINE HYDROCHLORIDE 50 MG: 25 TABLET, FILM COATED ORAL at 17:58

## 2023-07-13 RX ADMIN — HEPARIN SODIUM 1600 UNITS/HR: 10000 INJECTION, SOLUTION INTRAVENOUS at 23:26

## 2023-07-13 RX ADMIN — SACUBITRIL AND VALSARTAN 1 TABLET: 49; 51 TABLET, FILM COATED ORAL at 09:39

## 2023-07-13 ASSESSMENT — ACTIVITIES OF DAILY LIVING (ADL)
ADLS_ACUITY_SCORE: 19

## 2023-07-13 NOTE — PLAN OF CARE
"D: 38-year-old gentleman with past medical history of ADHD who was transferred from [facility] on 7/3/23 due to cardiogenic shock from non-ischemic cardiomyopathy due to methamphetamine and alcohol use.      I: Monitored and assessed patient status; nursing cares provided.     A:   Today's Highlights/Changes:    ICD placement cancel today, pt back on regular diet. Heparin gtt restarted, recheck Rosario six hrs after restart scheduled for 1300.     New meds: Atarax and Wellbutrin, both given x 1 during shift.     Xray ordered, pt refused/he's tired and wants time to sleep, will try again at dinner time.     BP (!) 82/56 (BP Location: Right arm, Cuff Size: Adult Regular)   Pulse 99   Temp 97.8  F (36.6  C) (Oral)   Resp 20   Ht 1.803 m (5' 11\")   Wt 75.1 kg (165 lb 9.1 oz)   SpO2 97%   BMI 23.09 kg/m      Neuro: A&Ox4. Cooperative, and pleasant.   Cardiac: SR with HR 's. Afebrile and VSS.       Respiratory: Sating 95% on RA.  GI/: Adequate urine output and no BM during shift.   Diet/appetite: Tolerating regular diet, good appetite and eating well.  Activity:  Up ad aron, independent.   Pain: Rib/chest pain during inspiration, PRN tylenol given x 1.   Labs/Lytes: Mg 2.0, replaced.     Skin: No new deficits noted.  LDA's: L double lumen PICC, both infusing.      Plan: Possible ICD placement Monday 7/17. Continue with POC. Notify primary team with changes.     Dianna Stovall, RN     Cardiology        "

## 2023-07-13 NOTE — PROGRESS NOTES
[facility]     Addiction Progress Note - Addiction Service        Date of Admission:  7/3/2023  Assessment & Plan       Jose Enrique is a 38-year-old gentleman with past medical history of ADHD who was transferred from [facility] on 7/3/23 due to cardiogenic shock from non-ischemic cardiomyopathy complicated and possibly secondary to methamphetamine and alcohol use.      Changes today:  No new changes, visited for social support.  Received first dose of wellbutrin this am    # Alcohol Use Disorder  #Acute on chronic systolic heart failure/HFrEF with EF = 14% 2ndary due to alcohol and amphetamine use  Denies cravings.  Goal is abstinence from alcohol, and reports meeting this goal prior to hospitalization.  We discussed different options to maintain goal of sobriety, including medications to manage cravings if they develop in the future, including acamprosate and naltrexone.   At this time, he thinks he can meet his goal without meds, but is will consider linkage to addiction medicine.   - discussed local connection ([facility] in [place], [address] [facility] [place] [address]   Phone: [phone]. Fax: [phone]), or virtual, through [facility].  He prefers to wait and see how things are going, so will provide resources in discharge AVS    - Will provide contact info to community health care worker to link to follow up. ([address] [place] [address], [phone])       # ADHD  # Methamphetamine Use Disorder  # anxiety  F/u with Agatha Romero, Psych FERNANDO at [place]     - wellbutrin, 150 mg XR, Q am, will icnresae to 300 mg prior to discharge     # Peer Support:   -Our peer  will meet the patient if agreeable and still hospitalized on Thursday, to provide additional outpatient resources  -To contact Tiara Peer  from [facility] ([facility]): call or text: [phone]     # Addiction Social Worker:   Our addiction social worker Zenon  "Nick can be contacted if needed, on her pager [phone] or texted/called at [phone]  --Patient is interested in ONLY outpatient addiction treatment after discharge.  Our addiction social worker will give the patient appropriate resources for outpatient evaluation.       Linkage to care:  As above, prior to discharge, will link to addiction medicine as well as mental health for ADHD management      The patient's care was discussed with the Bedside Nurse, Care Coordinator/, Patient and Primary team.          Geo Waterman MD on 7/13/2023 at 1:20 PM   Addiction Service   [facility]     Contact information available via [facility] Paging/Directory under \"addiction medicine\"        ______________________________________________________________________    Interval History   No acute events.      ROS:  CV, Pulm, GI and  assessed. Pertinent positives as above, otherwise negative.     Data reviewed today: I reviewed all medications, new labs and imaging results over the last 24 hours.     Physical Exam   Vital Signs: Temp: 99.5  F (37.5  C) Temp src: Oral BP: 94/63 Pulse: 108   Resp: 16 SpO2: 96 % O2 Device: None (Room air)    Weight: 185 lbs 6.51 oz  Gen: NAD  HEENT: EOMI, PERRL, MMM  CV: extremities warm and well perfused  Resp: breathing comfortably on RA  : deferred  Msk: no LE edema  Skin: no rashes  Neuro: nonfocal exam        Due to regulation of Title 42 of the Code of Federal Regulations (CFR) Part 2: Confidentiality laws apply to this note and the information wherein.  Thus, this note cannot be copy and pasted into any other health care staff's note nor can it be included in general medical records sent to ANY outside agency without the patient's written consent.    "

## 2023-07-13 NOTE — PROGRESS NOTES
"/74 (BP Location: Left leg)   Pulse 97   Temp 98.2  F (36.8  C) (Oral)   Resp 16   Ht 1.803 m (5' 11\")   Wt 75.1 kg (165 lb 9.1 oz)   SpO2 98%   BMI 23.09 kg/m      Shift: 0850-8744  Reason for Admission: transferred from [facility] on 7/3/23 due to cardiogenic shock from non-ischemic cardiomyopathy complicated and possibly secondary to methamphetamine and alcohol use.   VS/Tele: VSS on RA. SR  Neuros: A/Ox4, able to make needs known. Restless.  Respiratory: Sats >95% on RA. No c/o of SOB  GI/: No BMs this shift. Voiding in urinal  Nutrition: Tolerating regular diet. 2L FR  Drains/Lines: L. PICC 2L- infusing heparin gtt @ 1600 units/hr, dobutamine @ 5mL/hr   Activity: Pt up ad aron  Pain/Nausea: C/o of slight pain in chest. Denies nausea  Skin: Intact  Labs: Hep10 recheck due at 2030.   New this shift: Gave 50 mg Atarax for anxiety. Pt states he just wants to fall alseep.   Plan of care: Plan for possible ICD placement on Monday. Will continue to monitor and follow POC.     "
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
## 2023-07-13 NOTE — PLAN OF CARE
Pt transferred from [place] on 7/3 due to cardiogenic shock from non-ischemic cardiomyopathy due to methamphetamine and alcohol use. PMHx of ADHD      Code status: Full Code   Team: Cards 2     Neuro: AOx4, calm and cooperative, no nuero deficits  Cardiac/Tele: SR/ST, HR 90's-100's, no chest pain or S/S of distress. Other VSS   Respiratory: Room air, no SOB at rest. O2 sats > 92%  GI/: Voids spontaneously. BS audible and normoactive. BM regular.   Diet/Appetite: NPO from midnight for procedure  Skin: No overt deficits  Endocrine/Electrolytes: No BG checks. No replacements done this shift  LDAs: Double lumen PICC. Infusing dobutamine at 5 mcg/kg/min  Activity: Up ad aron  Pain: Denies pain     Plan: ICD placement today. Pt to discharge with IV dobutamine. Continue to monitor and notify team of concerns

## 2023-07-14 ENCOUNTER — APPOINTMENT (OUTPATIENT)
Dept: EDUCATION SERVICES | Facility: CLINIC | Age: 38
End: 2023-07-14
Attending: STUDENT IN AN ORGANIZED HEALTH CARE EDUCATION/TRAINING PROGRAM
Payer: COMMERCIAL

## 2023-07-14 LAB
ALBUMIN SERPL BCG-MCNC: 3.9 G/DL (ref 3.5–5.2)
ALP SERPL-CCNC: 97 U/L (ref 40–129)
ALT SERPL W P-5'-P-CCNC: 103 U/L (ref 0–70)
ANION GAP SERPL CALCULATED.3IONS-SCNC: 12 MMOL/L (ref 7–15)
AST SERPL W P-5'-P-CCNC: 31 U/L (ref 0–45)
BASE EXCESS BLDV CALC-SCNC: 0.3 MMOL/L (ref -7.7–1.9)
BASOPHILS # BLD AUTO: 0.1 10E3/UL (ref 0–0.2)
BASOPHILS NFR BLD AUTO: 1 %
BILIRUB SERPL-MCNC: 0.4 MG/DL
BUN SERPL-MCNC: 23 MG/DL (ref 6–20)
CALCIUM SERPL-MCNC: 9.5 MG/DL (ref 8.6–10)
CHLORIDE SERPL-SCNC: 101 MMOL/L (ref 98–107)
CREAT SERPL-MCNC: 1.04 MG/DL (ref 0.67–1.17)
DEPRECATED HCO3 PLAS-SCNC: 22 MMOL/L (ref 22–29)
EOSINOPHIL # BLD AUTO: 0.4 10E3/UL (ref 0–0.7)
EOSINOPHIL NFR BLD AUTO: 5 %
ERYTHROCYTE [DISTWIDTH] IN BLOOD BY AUTOMATED COUNT: 26.8 % (ref 10–15)
GFR SERPL CREATININE-BSD FRML MDRD: >90 ML/MIN/1.73M2
GLUCOSE SERPL-MCNC: 93 MG/DL (ref 70–99)
HCO3 BLDV-SCNC: 26 MMOL/L (ref 21–28)
HCT VFR BLD AUTO: 41.7 % (ref 40–53)
HGB BLD-MCNC: 12.3 G/DL (ref 13.3–17.7)
IMM GRANULOCYTES # BLD: 0 10E3/UL
IMM GRANULOCYTES NFR BLD: 0 %
LACTATE SERPL-SCNC: 1 MMOL/L (ref 0.7–2)
LYMPHOCYTES # BLD AUTO: 3 10E3/UL (ref 0.8–5.3)
LYMPHOCYTES NFR BLD AUTO: 40 %
MAGNESIUM SERPL-MCNC: 2.2 MG/DL (ref 1.7–2.3)
MCH RBC QN AUTO: 22 PG (ref 26.5–33)
MCHC RBC AUTO-ENTMCNC: 29.5 G/DL (ref 31.5–36.5)
MCV RBC AUTO: 75 FL (ref 78–100)
MONOCYTES # BLD AUTO: 0.9 10E3/UL (ref 0–1.3)
MONOCYTES NFR BLD AUTO: 12 %
NEUTROPHILS # BLD AUTO: 3.2 10E3/UL (ref 1.6–8.3)
NEUTROPHILS NFR BLD AUTO: 42 %
NRBC # BLD AUTO: 0 10E3/UL
NRBC BLD AUTO-RTO: 0 /100
O2/TOTAL GAS SETTING VFR VENT: 21 %
OXYHGB MFR BLDV: 49 % (ref 70–75)
PCO2 BLDV: 47 MM HG (ref 40–50)
PH BLDV: 7.36 [PH] (ref 7.32–7.43)
PHOSPHATE SERPL-MCNC: 4.8 MG/DL (ref 2.5–4.5)
PLATELET # BLD AUTO: 362 10E3/UL (ref 150–450)
PO2 BLDV: 32 MM HG (ref 25–47)
POTASSIUM SERPL-SCNC: 4.2 MMOL/L (ref 3.4–5.3)
PROT SERPL-MCNC: 7.7 G/DL (ref 6.4–8.3)
RBC # BLD AUTO: 5.59 10E6/UL (ref 4.4–5.9)
SODIUM SERPL-SCNC: 135 MMOL/L (ref 136–145)
UFH PPP CHRO-ACNC: 0.56 IU/ML
WBC # BLD AUTO: 7.6 10E3/UL (ref 4–11)

## 2023-07-14 PROCEDURE — 250N000013 HC RX MED GY IP 250 OP 250 PS 637: Performed by: NURSE PRACTITIONER

## 2023-07-14 PROCEDURE — 85025 COMPLETE CBC W/AUTO DIFF WBC: CPT

## 2023-07-14 PROCEDURE — 214N000001 HC R&B CCU UMMC

## 2023-07-14 PROCEDURE — 83735 ASSAY OF MAGNESIUM: CPT | Performed by: STUDENT IN AN ORGANIZED HEALTH CARE EDUCATION/TRAINING PROGRAM

## 2023-07-14 PROCEDURE — 250N000013 HC RX MED GY IP 250 OP 250 PS 637: Performed by: INTERNAL MEDICINE

## 2023-07-14 PROCEDURE — 85520 HEPARIN ASSAY: CPT | Performed by: INTERNAL MEDICINE

## 2023-07-14 PROCEDURE — 80053 COMPREHEN METABOLIC PANEL: CPT | Performed by: INTERNAL MEDICINE

## 2023-07-14 PROCEDURE — 82805 BLOOD GASES W/O2 SATURATION: CPT | Performed by: INTERNAL MEDICINE

## 2023-07-14 PROCEDURE — 83605 ASSAY OF LACTIC ACID: CPT | Performed by: INTERNAL MEDICINE

## 2023-07-14 PROCEDURE — 250N000013 HC RX MED GY IP 250 OP 250 PS 637: Performed by: STUDENT IN AN ORGANIZED HEALTH CARE EDUCATION/TRAINING PROGRAM

## 2023-07-14 PROCEDURE — 250N000013 HC RX MED GY IP 250 OP 250 PS 637: Performed by: PHYSICIAN ASSISTANT

## 2023-07-14 PROCEDURE — 99232 SBSQ HOSP IP/OBS MODERATE 35: CPT | Performed by: INTERNAL MEDICINE

## 2023-07-14 PROCEDURE — 99231 SBSQ HOSP IP/OBS SF/LOW 25: CPT | Mod: FS | Performed by: INTERNAL MEDICINE

## 2023-07-14 PROCEDURE — 250N000013 HC RX MED GY IP 250 OP 250 PS 637

## 2023-07-14 PROCEDURE — 84100 ASSAY OF PHOSPHORUS: CPT | Performed by: INTERNAL MEDICINE

## 2023-07-14 RX ADMIN — HYDROXYZINE HYDROCHLORIDE 50 MG: 25 TABLET, FILM COATED ORAL at 15:11

## 2023-07-14 RX ADMIN — HYDROXYZINE HYDROCHLORIDE 50 MG: 25 TABLET, FILM COATED ORAL at 23:32

## 2023-07-14 RX ADMIN — FUROSEMIDE 20 MG: 20 TABLET ORAL at 08:00

## 2023-07-14 RX ADMIN — ACETAMINOPHEN 650 MG: 325 TABLET, FILM COATED ORAL at 08:00

## 2023-07-14 RX ADMIN — BUPROPION HYDROCHLORIDE 150 MG: 150 TABLET, FILM COATED, EXTENDED RELEASE ORAL at 08:00

## 2023-07-14 RX ADMIN — SACUBITRIL AND VALSARTAN 1 TABLET: 49; 51 TABLET, FILM COATED ORAL at 09:52

## 2023-07-14 RX ADMIN — SPIRONOLACTONE 25 MG: 25 TABLET, FILM COATED ORAL at 08:00

## 2023-07-14 RX ADMIN — HYDROXYZINE HYDROCHLORIDE 50 MG: 25 TABLET, FILM COATED ORAL at 08:00

## 2023-07-14 RX ADMIN — EMPAGLIFLOZIN 10 MG: 10 TABLET, FILM COATED ORAL at 08:00

## 2023-07-14 RX ADMIN — Medication 1 TABLET: at 08:00

## 2023-07-14 RX ADMIN — FUROSEMIDE 20 MG: 20 TABLET ORAL at 15:07

## 2023-07-14 RX ADMIN — RAMELTEON 8 MG: 8 TABLET, FILM COATED ORAL at 21:16

## 2023-07-14 RX ADMIN — Medication 10 MG: at 02:25

## 2023-07-14 RX ADMIN — FOLIC ACID 1 MG: 1 TABLET ORAL at 08:00

## 2023-07-14 RX ADMIN — SACUBITRIL AND VALSARTAN 1 TABLET: 49; 51 TABLET, FILM COATED ORAL at 21:22

## 2023-07-14 ASSESSMENT — ACTIVITIES OF DAILY LIVING (ADL)
ADLS_ACUITY_SCORE: 19

## 2023-07-14 NOTE — PROGRESS NOTES
[facility]     Addiction Progress Note - Addiction Service        Date of Admission:  7/3/2023  Assessment & Plan       Jose Enrique is a 38-year-old gentleman with past medical history of ADHD who was transferred from [facility] on 7/3/23 due to cardiogenic shock from non-ischemic cardiomyopathy complicated and possibly secondary to methamphetamine and alcohol use.      Changes today:  No new changes, visited for social support.  Will discuss increasing wellbutrin tomorrow    # Alcohol Use Disorder  #Acute on chronic systolic heart failure/HFrEF with EF = 14% 2ndary due to alcohol and amphetamine use  Denies cravings.  Goal is abstinence from alcohol, and reports meeting this goal prior to hospitalization.  We discussed different options to maintain goal of sobriety, including medications to manage cravings if they develop in the future, including acamprosate and naltrexone.   At this time, he thinks he can meet his goal without meds, but is will consider linkage to addiction medicine.   - discussed local connection ([facility] in [place], [address] [place] [address] [place] [address]   Phone: [phone]. Fax: [phone]), or virtual, through [place].  He prefers to wait and see how things are going, so will provide resources in discharge AVS    - Will provide contact info to community health care worker to link to follow up. ([address] [place] [address], [phone])       # ADHD  # Methamphetamine Use Disorder  # anxiety  F/u with Agatha Romero, Psych FERNAND[facility] at [facility]     - wellbutrin, 150 mg XR, Q am, will increase to 300 mg prior to discharge     # Peer Support:   -Our peer  will meet the patient if agreeable and still hospitalized on Thursday, to provide additional outpatient resources  -To contact Tiara Peer  from [facility] ([facility]): call or text: [phone]     # Addiction Social Worker:   Our addiction social worker  "Zenon Romero can be contacted if needed, on her pager [phone] or texted/called at [phone]  --Patient is interested in ONLY outpatient addiction treatment after discharge.  Our addiction social worker will give the patient appropriate resources for outpatient evaluation.       Linkage to care:  As above, prior to discharge, will link to addiction medicine as well as mental health for ADHD management      The patient's care was discussed with the Bedside Nurse, Care Coordinator/, Patient and Primary team.          Geo Waterman MD on 7/14/2023 at 1:20 PM   Addiction Service   [facility]     Contact information available via [facility] Paging/Directory under \"addiction medicine\"        ______________________________________________________________________    Interval History   No acute events.      ROS:  CV, Pulm, GI and  assessed. Pertinent positives as above, otherwise negative.     Data reviewed today: I reviewed all medications, new labs and imaging results over the last 24 hours.     Physical Exam   Vital Signs: Temp: 99.5  F (37.5  C) Temp src: Oral BP: 94/63 Pulse: 108   Resp: 16 SpO2: 96 % O2 Device: None (Room air)    Weight: 185 lbs 6.51 oz  Gen: NAD  HEENT: EOMI, PERRL, MMM  CV: extremities warm and well perfused  Resp: breathing comfortably on RA  : deferred  Msk: no LE edema  Skin: no rashes  Neuro: nonfocal exam        Due to regulation of Title 42 of the Code of Federal Regulations (CFR) Part 2: Confidentiality laws apply to this note and the information wherein.  Thus, this note cannot be copy and pasted into any other health care staff's note nor can it be included in general medical records sent to ANY outside agency without the patient's written consent.    "

## 2023-07-14 NOTE — CONSULTS
Met with patient's father Neal at the bedside for PICC and IV dobutamine education. Neal reports that he is an ER nurse at the [employer] near home and is familiar with line cares. Demonstrated on a model changing the medicine bag on the CADD pump, cap change, and use of the emergency clamp. We discussed line safety, importance of not stopping or flushing the medication line and only heparin flushing the dormant lumen, and when to seek medical care. Patient asked several times if he could stop his medication for short periods of time and this RN reiterated the importance of keeping the medication continuous at all times. Neal was able to return demonstrate changing the medicine bag on the model without difficulty. He verbalized understanding of the information given.     Literature given: Handwashing and Skin Care, Caring for Your PICC at Home, Changing the End Cap, Flushing the Line with Heparin, Saline or Citrate, How to Change Your Medicine Bag.

## 2023-07-14 NOTE — PLAN OF CARE
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      I: Monitored vitals and assessed pt status.   Changed: Pt frustrated with prior roommate and couldn't sleep so was going to sleep on couch in hallway but 6C had another bed open in another room so Pt able to switch rooms and was much happier with that and then able to sleep   LDA: L DL PICC  Running:   -Dobutamine gtt @ 5 mcg/kg/min  -Heparin gtt @ 1600 units/hr   PRN:   -prn atarax x1  -Melatonin x1 (now scheduled)  Tele: SR/ST (90's-100's)  -PAC's  O2: RA  Mobility: Ind     A: A0x4. VSS. BP's soft. Afebrile. Denied pain, OWEN/SOB, CP, dizziness or lightheadedness, or N/T. Regular diet, 2L FR. No nausea. Urinating adequately via urinal. No BM reported. No new skin deficits noted. Ambulating independently/up ad aron. Appeared to sleep well once in new room.      P: Continue to monitor Pt status and report changes to Cards 2. Plan for ICD placement on Monday 7/17 1PM. Will discharge with IV dobutamine - PICC/dobutamine teaching with father today 7/14.      Shift of care [id number]
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
## 2023-07-14 NOTE — PROGRESS NOTES
CHW spoke with pt at bedside regarding the below appointment to ask if pt wanted the CHW to reschedule the appointment to a later date. The pt was in agreement.     Appointment  [facility]  [address], [place] [address]  Provider: Agatha Romero  Date: 7/20/23   Time: 1:30 pm  Phone: [phone]    15:10 CHW spoke w/the  at [facility] to change appt as pt's expected discharge date is early next week.  said she will check with provider and call Monday to confirm a new appointment date.

## 2023-07-14 NOTE — PLAN OF CARE
"Care from: 0700 - 1300    Neuro: A&Ox4. Daily weight.  Cardiac: ST with occasional PACs. VSS   Respiratory: Sating >98% on RA.  GI/: Adequate urine output. LBM 7/13 per pt  Diet/appetite: Tolerating Reg diet. 2L FR. Eating well.  Activity:  Independent ad aron  Pain: At acceptable level on current regimen. PRN tylenol for 2/10 chest soreness but denies SOB/or other pain. Atarax x1 for anxiety  Skin: No new deficits noted.  LDA's: L DL PICC - dobutamine 5 mcg/kg/min, heparin - 1600 unit(s)/hr - caps changed - CDI    BP 97/60 (BP Location: Right arm, Cuff Size: Adult Regular)   Pulse 109   Temp 98.3  F (36.8  C) (Oral)   Resp 16   Ht 1.803 m (5' 11\")   Wt 76.9 kg (169 lb 8 oz)   SpO2 98%   BMI 23.64 kg/m      Plan: Continue to monitor Pt status and report changes to Cards 2 treatment team. Encourage activity and diet adherence. PICC and dobutamine education with dad today at 1400. ICD placement procedure scheduled for 7/17 @1300    Problem: Plan of Care - These are the overarching goals to be used throughout the patient stay.    Goal: Absence of Hospital-Acquired Illness or Injury  Intervention: Identify and Manage Fall Risk  Recent Flowsheet Documentation  Taken 7/14/2023 0800 by Ann Marie Borges RN  Safety Promotion/Fall Prevention:    assistive device/personal items within reach    nonskid shoes/slippers when out of bed    patient and family education    safety round/check completed    increased rounding and observation    room near nurse's station    room organization consistent     Problem: Heart Failure  Goal: Effective Oxygenation and Ventilation  Outcome: Progressing  Intervention: Promote Airway Secretion Clearance  Recent Flowsheet Documentation  Taken 7/14/2023 0800 by Ann Marie Borges RN  Cough And Deep Breathing: done independently per patient  Activity Management:    activity adjusted per tolerance    up ad aron  Intervention: Optimize Oxygenation and Ventilation  Recent Flowsheet " Documentation  Taken 7/14/2023 0800 by Ann Marie Borges, RN  Head of Bed (HOB) Positioning:    HOB at 20-30 degrees    HOB flat     Problem: Plan of Care - These are the overarching goals to be used throughout the patient stay.    Goal: Optimal Comfort and Wellbeing  Intervention: Monitor Pain and Promote Comfort  Recent Flowsheet Documentation  Taken 7/14/2023 0800 by Ann Marie Borges, RN  Pain Management Interventions:    medication (see MAR)    care clustered  Intervention: Provide Person-Centered Care  Recent Flowsheet Documentation  Taken 7/14/2023 0800 by Ann Marie Borges, RN  Trust Relationship/Rapport:    care explained    choices provided    reassurance provided    thoughts/feelings acknowledged   Goal Outcome Evaluation:      Plan of Care Reviewed With: patient    Overall Patient Progress: improvingOverall Patient Progress: improving

## 2023-07-14 NOTE — INTERIM SUMMARY
"Mannie Mora M.D.  Cardiovascular Medicine    I personally saw and examined this patient with fellow, discussed care with housestaff and other consultants, reviewed current laboratories and imaging studies, and conveyed impression and diagnostic/therapeutic plan to patient. I agree iw assessment and plan outlined below    Problem List  1 Biventricular congestive heart failure  2. History of methamphetamine and ETOH usage  3. Diabetes  4. Microcytosis  History    38 with acute HFrEF in cardiogenic shock, severely dilated biventricular cavities with thrombi, acute renal failure, shock liver, 2/2 NICM from most likely substance abuse with methamphetamine and ETOH abuse  Objective  /67 (BP Location: Left leg)   Pulse 105   Temp 98.2  F (36.8  C) (Oral)   Resp 16   Ht 1.803 m (5' 11\")   Wt 75.1 kg (165 lb 9.1 oz)   SpO2 98%   BMI 23.09 kg/m      Intake/Output Summary (Last 24 hours) at 7/13/2023 2116  Last data filed at 7/13/2023 1459  Gross per 24 hour   Intake 1332.93 ml   Output 1200 ml   Net 132.93 ml     Wt Readings from Last 24 Encounters:   07/11/23 75.1 kg (165 lb 9.1 oz)     Meds    buPROPion  150 mg Oral Daily     empagliflozin  10 mg Oral Daily     folic acid  1 mg Oral Daily     furosemide  20 mg Oral BID     heparin lock flush  5-20 mL Intracatheter Q24H     multivitamin w/minerals  1 tablet Oral Daily     polyethylene glycol  17 g Oral Daily     ramelteon  8 mg Oral At Bedtime     sacubitril-valsartan  1 tablet Oral BID     sodium chloride (PF)  10-40 mL Intracatheter Q8H     spironolactone  25 mg Oral Daily       Labs  CMP  Recent Labs   Lab 07/13/23  0532 07/12/23  0557 07/11/23  0510 07/10/23  0525    139 135* 138   POTASSIUM 4.3 4.4 4.3 4.3   CHLORIDE 103 101 102 106   CO2 22 22 22 21*   ANIONGAP 11 16* 11 11   GLC 94 113* 88 93   BUN 18.8 26.0* 20.2* 23.0*   CR 0.96 1.14 1.09 0.98   GFRESTIMATED >90 84 89 >90   LENARD 9.0 9.3 9.4 8.9   MAG 2.0 2.1 2.0 2.1   PHOS 4.2 4.5 4.2 3.8 "   PROTTOTAL 7.6 7.8 7.5 7.3   ALBUMIN 3.9 3.9 3.8 3.7   BILITOTAL 0.5 0.5 0.6 0.6   ALKPHOS 102 104 104 108   AST 35 44 53* 60*   * 156* 175* 189*     CBC  Recent Labs   Lab 23  0532 23  0557 23  0510 07/10/23  0525   WBC 7.4 8.3 6.8 7.3   RBC 5.50 5.58 5.30 5.35   HGB 12.3* 12.2* 11.4* 11.5*   HCT 40.9 41.1 38.8* 39.5*   MCV 74* 74* 73* 74*   MCH 22.4* 21.9* 21.5* 21.5*   MCHC 30.1* 29.7* 29.4* 29.1*   RDW 26.0* 25.5* 24.9* 24.6*    307 299 307     INRNo lab results found in last 7 days.  Arterial Blood Gas  Recent Labs   Lab 23  0532 23  0557 23  0510 07/10/23  0525   O2PER 21 21 21 21       Imaging     Name: WILLIAM JIMÉNEZ  MRN: [id number]  : 1985  Study Date: 2023 08:08 AM  Age: 38 yrs  Gender: Male  Patient Location: [facility]  Reason For Study: CHF  Ordering Physician: NIELS RUANO  Referring Physician: SAMIR TRACY  Performed By: Dolly Manriquez RDCS     BSA: 2.1 m2  Height: 71 in  Weight: 196 lb  BP: 108/76 mmHg  ______________________________________________________________________________  Procedure  Echocardiogram with two-dimensional, color and spectral Doppler performed.  Contrast Optison. Optison (NDC #7759-4442-66) given intravenously. Patient was  given 5 ml mixture of 3 ml Optison and 6 ml saline. 4 ml wasted. The final  echo results were communicated to Dr. León Beaulieu. The final echo results were  communicated to the ordering physician by phone .  ______________________________________________________________________________  Interpretation Summary  Severely dilated (LVIDd 7.2 cm) LV with severely reduced LV function,  LVEF=14%.  Mildly dilated RV with moderately to severely reduced RV function, RVFAC 19%.  LV apical thrombus, large (~1.8 cm x 1.8 cm x 1.1 cm), protruding, and  predominantly sessile. Some views are suspicious for additional more laminary  mural thrombi. If clinically relevant, overall burden of thrombus could  be  better determined by cardiac MRI.  Small circumferential pericardial effusion is present without any hemodynamic  significance.  Previous study not available for comparison.  ______________________________________________________________________________  Left Ventricle  Left ventricular size is normal. Biplane LVEF is 14%. Severe left ventricular  dilation is present. LVIDd 7.2 cm. Left ventricular function is decreased. The  ejection fraction is 10-15% (severely reduced). Grade III or advanced  diastolic dysfunction. Diastolic Doppler findings (E/E' ratio and/or other  parameters) suggest left ventricular filling pressures are indeterminate. LV  apical thrombus, large (~1.8 cm x 1.8 cm x 1.1 cm) , protruding, and  predominantly sessile. Some views are suspicious for additional more laminary  mural thrombi. If clinically relevant, overall burden of thrombus could be  better determined by cardiac MRI.     Right Ventricle  Mild right ventricular dilation is present. Global right ventricular function  is moderately to severely reduced. RVFAC 19%.     Atria  The right atria appears normal. Moderate left atrial enlargement is present.     Mitral Valve  Mild mitral insufficiency is present. The cause of the mitral insufficiency  appears to be altered left ventricular size and geometry.     Aortic Valve  The aortic valve is tricuspid. Trace aortic insufficiency is present.     Tricuspid Valve  The tricuspid valve is normal. Mild tricuspid insufficiency is present. The  right ventricular systolic pressure is approximated at 22.1 mmHg plus the  right atrial pressure.     Pulmonic Valve  The pulmonic valve is normal. Trace pulmonic insufficiency is present.     Vessels  The aorta root is normal. The thoracic aorta is normal. Dilation of the  inferior vena cava is present with abnormal respiratory variation in diameter.  IVC diameter >2.1 cm collapsing <50% with sniff suggests a high RA pressure  estimated at 15 mmHg or  greater.     Pericardium  Small circumferential pericardial effusion is present without any hemodynamic  significance.     Compared to Previous Study  Previous study not available for comparison.  ______________________________________________________________________________  MMode/2D Measurements & Calculations     IVSd: 0.89 cm  LVIDd: 7.2 cm  LVIDs: 6.8 cm  LVPWd: 0.99 cm  FS: 5.4 %  LV mass(C)d: 314.2 grams  LV mass(C)dI: 150.3 grams/m2  Ao root diam: 3.5 cm  asc Aorta Diam: 3.2 cm  LVOT diam: 2.3 cm  LVOT area: 4.2 cm2     EF(MOD-bp): 13.6 %  LA Volume (BP): 100.0 ml  LA Volume Index (BP): 47.8 ml/m2     RV Base: 4.2 cm  RWT: 0.27  TAPSE: 1.5 cm     Time Measurements  Aortic HR: 100.2 BPM     Doppler Measurements & Calculations  MV E max christoph: 85.9 cm/sec  MV A max christoph: 26.2 cm/sec  MV E/A: 3.3  MV dec slope: 529.0 cm/sec2  MV dec time: 0.16 sec  LV V1 max PG: 3.1 mmHg  LV V1 max: 87.8 cm/sec  LV V1 VTI: 11.1 cm  CO(LVOT): 4.6 l/min  CI(LVOT): 2.2 l/min/m2  SV(LVOT): 46.0 ml  SI(LVOT): 22.0 ml/m2  TR max christoph: 235.0 cm/sec  TR max P.1 mmHg  E/E' av.8  Lateral E/e': 10.4  Medial E/e': 15.2     RV S Christoph: 9.1 cm/sec

## 2023-07-14 NOTE — PLAN OF CARE
Shift [id number]    D: Admitted on 7/3/23 due to cardiogenic shock from non-ischemic cardiomyopathy due to methamphetamine and alcohol use.      I: Monitored vitals and assessed patient status.   Running:  dobutamine at 5 mcg/kg/min  heparin at 1600 units/hr, next 10a due tomorrow AM     A: A&Ox4. On room air, C/O rib pain with inspiration, CXR completed this shift. ST, VSS, afebrile. Urinating adequately. Up ad aron.     P: Continue to monitor. PICC education tomorrow. S-ICD placement Monday 7/17.

## 2023-07-15 LAB
ALBUMIN SERPL BCG-MCNC: 4 G/DL (ref 3.5–5.2)
ALP SERPL-CCNC: 97 U/L (ref 40–129)
ALT SERPL W P-5'-P-CCNC: 100 U/L (ref 0–70)
ANION GAP SERPL CALCULATED.3IONS-SCNC: 11 MMOL/L (ref 7–15)
AST SERPL W P-5'-P-CCNC: 38 U/L (ref 0–45)
BASE EXCESS BLDV CALC-SCNC: -0.9 MMOL/L (ref -7.7–1.9)
BASOPHILS # BLD AUTO: 0.1 10E3/UL (ref 0–0.2)
BASOPHILS NFR BLD AUTO: 1 %
BILIRUB SERPL-MCNC: 0.5 MG/DL
BUN SERPL-MCNC: 26.4 MG/DL (ref 6–20)
CALCIUM SERPL-MCNC: 9.4 MG/DL (ref 8.6–10)
CHLORIDE SERPL-SCNC: 102 MMOL/L (ref 98–107)
CREAT SERPL-MCNC: 1.09 MG/DL (ref 0.67–1.17)
DEPRECATED HCO3 PLAS-SCNC: 22 MMOL/L (ref 22–29)
EOSINOPHIL # BLD AUTO: 0.4 10E3/UL (ref 0–0.7)
EOSINOPHIL NFR BLD AUTO: 6 %
ERYTHROCYTE [DISTWIDTH] IN BLOOD BY AUTOMATED COUNT: 27.3 % (ref 10–15)
GFR SERPL CREATININE-BSD FRML MDRD: 89 ML/MIN/1.73M2
GLUCOSE SERPL-MCNC: 92 MG/DL (ref 70–99)
HCO3 BLDV-SCNC: 25 MMOL/L (ref 21–28)
HCT VFR BLD AUTO: 43.8 % (ref 40–53)
HGB BLD-MCNC: 12.9 G/DL (ref 13.3–17.7)
IMM GRANULOCYTES # BLD: 0 10E3/UL
IMM GRANULOCYTES NFR BLD: 0 %
LACTATE SERPL-SCNC: 0.9 MMOL/L (ref 0.7–2)
LYMPHOCYTES # BLD AUTO: 2.7 10E3/UL (ref 0.8–5.3)
LYMPHOCYTES NFR BLD AUTO: 39 %
MAGNESIUM SERPL-MCNC: 2.1 MG/DL (ref 1.7–2.3)
MCH RBC QN AUTO: 21.9 PG (ref 26.5–33)
MCHC RBC AUTO-ENTMCNC: 29.5 G/DL (ref 31.5–36.5)
MCV RBC AUTO: 74 FL (ref 78–100)
MONOCYTES # BLD AUTO: 0.8 10E3/UL (ref 0–1.3)
MONOCYTES NFR BLD AUTO: 11 %
NEUTROPHILS # BLD AUTO: 2.9 10E3/UL (ref 1.6–8.3)
NEUTROPHILS NFR BLD AUTO: 43 %
NRBC # BLD AUTO: 0 10E3/UL
NRBC BLD AUTO-RTO: 0 /100
O2/TOTAL GAS SETTING VFR VENT: 21 %
OXYHGB MFR BLDV: 58 % (ref 70–75)
PCO2 BLDV: 44 MM HG (ref 40–50)
PH BLDV: 7.36 [PH] (ref 7.32–7.43)
PHOSPHATE SERPL-MCNC: 4.5 MG/DL (ref 2.5–4.5)
PLATELET # BLD AUTO: 363 10E3/UL (ref 150–450)
PO2 BLDV: 37 MM HG (ref 25–47)
POTASSIUM SERPL-SCNC: 4.5 MMOL/L (ref 3.4–5.3)
PROT SERPL-MCNC: 7.9 G/DL (ref 6.4–8.3)
RBC # BLD AUTO: 5.9 10E6/UL (ref 4.4–5.9)
SODIUM SERPL-SCNC: 135 MMOL/L (ref 136–145)
UFH PPP CHRO-ACNC: 0.68 IU/ML
WBC # BLD AUTO: 6.9 10E3/UL (ref 4–11)

## 2023-07-15 PROCEDURE — 99232 SBSQ HOSP IP/OBS MODERATE 35: CPT | Performed by: INTERNAL MEDICINE

## 2023-07-15 PROCEDURE — 83605 ASSAY OF LACTIC ACID: CPT | Performed by: INTERNAL MEDICINE

## 2023-07-15 PROCEDURE — 82805 BLOOD GASES W/O2 SATURATION: CPT | Performed by: INTERNAL MEDICINE

## 2023-07-15 PROCEDURE — 214N000001 HC R&B CCU UMMC

## 2023-07-15 PROCEDURE — 250N000013 HC RX MED GY IP 250 OP 250 PS 637: Performed by: STUDENT IN AN ORGANIZED HEALTH CARE EDUCATION/TRAINING PROGRAM

## 2023-07-15 PROCEDURE — 250N000011 HC RX IP 250 OP 636: Mod: JZ | Performed by: INTERNAL MEDICINE

## 2023-07-15 PROCEDURE — 250N000013 HC RX MED GY IP 250 OP 250 PS 637: Performed by: INTERNAL MEDICINE

## 2023-07-15 PROCEDURE — 83735 ASSAY OF MAGNESIUM: CPT | Performed by: STUDENT IN AN ORGANIZED HEALTH CARE EDUCATION/TRAINING PROGRAM

## 2023-07-15 PROCEDURE — 258N000003 HC RX IP 258 OP 636: Performed by: INTERNAL MEDICINE

## 2023-07-15 PROCEDURE — 84100 ASSAY OF PHOSPHORUS: CPT | Performed by: INTERNAL MEDICINE

## 2023-07-15 PROCEDURE — 250N000013 HC RX MED GY IP 250 OP 250 PS 637

## 2023-07-15 PROCEDURE — 250N000013 HC RX MED GY IP 250 OP 250 PS 637: Performed by: PHYSICIAN ASSISTANT

## 2023-07-15 PROCEDURE — 85520 HEPARIN ASSAY: CPT | Performed by: INTERNAL MEDICINE

## 2023-07-15 PROCEDURE — 250N000013 HC RX MED GY IP 250 OP 250 PS 637: Performed by: NURSE PRACTITIONER

## 2023-07-15 PROCEDURE — 80053 COMPREHEN METABOLIC PANEL: CPT | Performed by: INTERNAL MEDICINE

## 2023-07-15 PROCEDURE — 85025 COMPLETE CBC W/AUTO DIFF WBC: CPT

## 2023-07-15 PROCEDURE — 99231 SBSQ HOSP IP/OBS SF/LOW 25: CPT | Performed by: NURSE PRACTITIONER

## 2023-07-15 RX ORDER — BUPROPION HYDROCHLORIDE 150 MG/1
150 TABLET ORAL ONCE
Status: COMPLETED | OUTPATIENT
Start: 2023-07-15 | End: 2023-07-15

## 2023-07-15 RX ORDER — BUPROPION HYDROCHLORIDE 300 MG/1
300 TABLET ORAL DAILY
Status: DISCONTINUED | OUTPATIENT
Start: 2023-07-16 | End: 2023-07-20 | Stop reason: HOSPADM

## 2023-07-15 RX ADMIN — DOBUTAMINE 5 MCG/KG/MIN: 12.5 INJECTION, SOLUTION, CONCENTRATE INTRAVENOUS at 05:46

## 2023-07-15 RX ADMIN — SACUBITRIL AND VALSARTAN 1 TABLET: 49; 51 TABLET, FILM COATED ORAL at 07:55

## 2023-07-15 RX ADMIN — FOLIC ACID 1 MG: 1 TABLET ORAL at 07:55

## 2023-07-15 RX ADMIN — BUPROPION HYDROCHLORIDE 150 MG: 150 TABLET, FILM COATED, EXTENDED RELEASE ORAL at 07:55

## 2023-07-15 RX ADMIN — HEPARIN SODIUM 1600 UNITS/HR: 10000 INJECTION, SOLUTION INTRAVENOUS at 05:04

## 2023-07-15 RX ADMIN — FUROSEMIDE 20 MG: 20 TABLET ORAL at 07:55

## 2023-07-15 RX ADMIN — RAMELTEON 8 MG: 8 TABLET, FILM COATED ORAL at 21:16

## 2023-07-15 RX ADMIN — HEPARIN SODIUM 1600 UNITS/HR: 10000 INJECTION, SOLUTION INTRAVENOUS at 22:13

## 2023-07-15 RX ADMIN — HYDROXYZINE HYDROCHLORIDE 50 MG: 25 TABLET, FILM COATED ORAL at 19:15

## 2023-07-15 RX ADMIN — SACUBITRIL AND VALSARTAN 1 TABLET: 49; 51 TABLET, FILM COATED ORAL at 21:16

## 2023-07-15 RX ADMIN — Medication 1 TABLET: at 07:55

## 2023-07-15 RX ADMIN — BUPROPION HYDROCHLORIDE 150 MG: 150 TABLET, FILM COATED, EXTENDED RELEASE ORAL at 10:45

## 2023-07-15 RX ADMIN — HYDROXYZINE HYDROCHLORIDE 50 MG: 25 TABLET, FILM COATED ORAL at 12:53

## 2023-07-15 RX ADMIN — FUROSEMIDE 20 MG: 20 TABLET ORAL at 16:52

## 2023-07-15 RX ADMIN — SPIRONOLACTONE 25 MG: 25 TABLET, FILM COATED ORAL at 08:03

## 2023-07-15 RX ADMIN — EMPAGLIFLOZIN 10 MG: 10 TABLET, FILM COATED ORAL at 07:55

## 2023-07-15 ASSESSMENT — ACTIVITIES OF DAILY LIVING (ADL)
ADLS_ACUITY_SCORE: 19

## 2023-07-15 NOTE — PLAN OF CARE
Temp: 98.2  F (36.8  C) Temp src: Oral BP: (!) 82/58 Pulse: 103   Resp: 16 SpO2: 97 % O2 Device: None (Room air)         D: Transferred from [facility] (7/3) for cardiogenic shock from non-ischemic cardiomyopathy d/t methamphetamine and etoh    PMHx: ADHD     I: Monitored vitals and assessed pt status. Encouraged activity.      Changed:   IV Access: L DL PICC  Running:    Dobutamine @5mcg/kg/min  Heparin @1,600 units/hr  Xa draw at 0400 therapeutic 0.68. Next draw 7/16@0500. Unit collect  PRN: Atarax 50 mg given x1  Cardiac: Sinus tach ('s). BP soft. High Mg, K+, and Phos protocol  O2: RA, sating >95%  Mobility: Up ad aron, independent  GI/: Voids using urinal. LBM 7/13  Diet: Regular diet, 2L FR.      A: A&Ox4. VSS. Afebrile. Denied pain, SOB, dizziness, lightheadedness, or numbness/tingling.      P: Continue to monitor pt status.Notify Cards 2 with changes. Plan for ICD placement on Monday 7/17 at 1300

## 2023-07-15 NOTE — PROVIDER NOTIFICATION
Provider notified regarding patients request to visit bottom floor with friend. Provider ok'd patient to go down to ground floor with friend: without nurse supervision. Patient instructed to come back to unit if IV needs assistance.
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
## 2023-07-15 NOTE — PLAN OF CARE
D: Transferred from [facility] (7/3) for cardiogenic shock from non-ischemic cardiomyopathy possibly related to methamphetamine and etoh     PMHx: ADHD     Neuro: A&Ox4  Cardiac: Sinus rhythm; Mg, K+, and Phos protocols; heparin drip (next 10a tomorrow morning) and dobutamine drip WNL  Respiratory: WDL; on room air; clear lungs; no cough  GI/: WDL; BM reported today; adequate urine output  Endocrine: WDL  Diet/Appetite: Regular diet; 2L fluid restriction. Eating 100% meals.  Skin: No skin issues noted  LDA: Left double lumen PICC WNL  Activity: Independent  Pain: No complaints of pain  Plan: update Cards 2 with changes; ICD placement Monday 17th    PRN Atarax given per request for anxiety.

## 2023-07-15 NOTE — PROGRESS NOTES
[facility]     Addiction Progress Note - Addiction Service        Date of Admission:  7/3/2023  Assessment & Plan       Jose Enrique is a 38-year-old gentleman with past medical history of ADHD who was transferred from [facility] on 7/3/23 due to cardiogenic shock from non-ischemic cardiomyopathy complicated and possibly secondary to methamphetamine and alcohol use.      Changes today:  visited for social support.    increasing wellbutrin to 300 mg    # Alcohol Use Disorder  #Acute on chronic systolic heart failure/HFrEF with EF = 14% 2ndary due to alcohol and amphetamine use  Denies cravings.  Goal is abstinence from alcohol, and reports meeting this goal prior to hospitalization.  We discussed different options to maintain goal of sobriety, including medications to manage cravings if they develop in the future, including acamprosate and naltrexone.   At this time, he thinks he can meet his goal without meds, but is will consider linkage to addiction medicine.   - For addiction medicine follow up, discussed local connection ([facility] in [place], [address] [facility] [address] [place] [address] [place] [address]   Phone: [phone]. Fax: [phone]), or virtual, through [place].  He prefers to wait and see how things are going, so will provide resources in discharge AVS    - For mental health follow up, see below    # ADHD  # Methamphetamine Use Disorder  # anxiety  F/u with Agatha Romero Psych FERNANDO at [place]   (Will provide contact info to community health care worker to link to follow up. ([address] [place] [address], [phone])  - wellbutrin, 300 mg XR, please prescribe at discharge     # Peer Support:   -Our peer  will meet the patient if agreeable and still hospitalized on Thursday, to provide additional outpatient resources  -To contact Tiara Peer  from [facility] ([facility]): call or text: [phone]     # Addiction Social Worker:   Our  "addiction social worker Zenon Romero can be contacted if needed, on her pager [phone] or texted/called at [phone]  --Patient is interested in ONLY outpatient addiction treatment after discharge.  Our addiction social worker will give the patient appropriate resources for outpatient evaluation.       Linkage to care:  As above, prior to discharge, will link to addiction medicine as well as mental health for ADHD management      The patient's care was discussed with the Bedside Nurse, Care Coordinator/, Patient and Primary team.          Geo Waterman MD on 7/15/2023 at 1:20 PM   Addiction Service   [facility]     Contact information available via [facility] Paging/Directory under \"addiction medicine\"        ______________________________________________________________________    Interval History   No acute events.      ROS:  CV, Pulm, GI and  assessed. Pertinent positives as above, otherwise negative.     Data reviewed today: I reviewed all medications, new labs and imaging results over the last 24 hours.     Physical Exam   Vital Signs: Temp: 99.5  F (37.5  C) Temp src: Oral BP: 94/63 Pulse: 108   Resp: 16 SpO2: 96 % O2 Device: None (Room air)    Weight: 185 lbs 6.51 oz  Gen: NAD  HEENT: EOMI, PERRL, MMM  CV: extremities warm and well perfused  Resp: breathing comfortably on RA  : deferred  Msk: no LE edema  Skin: no rashes  Neuro: nonfocal exam        Due to regulation of Title 42 of the Code of Federal Regulations (CFR) Part 2: Confidentiality laws apply to this note and the information wherein.  Thus, this note cannot be copy and pasted into any other health care staff's note nor can it be included in general medical records sent to ANY outside agency without the patient's written consent.    "

## 2023-07-16 LAB
ALBUMIN SERPL BCG-MCNC: 3.8 G/DL (ref 3.5–5.2)
ALP SERPL-CCNC: 88 U/L (ref 40–129)
ALT SERPL W P-5'-P-CCNC: 91 U/L (ref 0–70)
ANION GAP SERPL CALCULATED.3IONS-SCNC: 11 MMOL/L (ref 7–15)
AST SERPL W P-5'-P-CCNC: 35 U/L (ref 0–45)
BASE EXCESS BLDV CALC-SCNC: -1.4 MMOL/L (ref -7.7–1.9)
BASOPHILS # BLD AUTO: 0.1 10E3/UL (ref 0–0.2)
BASOPHILS NFR BLD AUTO: 1 %
BILIRUB SERPL-MCNC: 0.4 MG/DL
BUN SERPL-MCNC: 27.3 MG/DL (ref 6–20)
CALCIUM SERPL-MCNC: 9.1 MG/DL (ref 8.6–10)
CHLORIDE SERPL-SCNC: 103 MMOL/L (ref 98–107)
CREAT SERPL-MCNC: 1.21 MG/DL (ref 0.67–1.17)
DEPRECATED HCO3 PLAS-SCNC: 22 MMOL/L (ref 22–29)
EOSINOPHIL # BLD AUTO: 0.4 10E3/UL (ref 0–0.7)
EOSINOPHIL NFR BLD AUTO: 6 %
ERYTHROCYTE [DISTWIDTH] IN BLOOD BY AUTOMATED COUNT: 27.2 % (ref 10–15)
GFR SERPL CREATININE-BSD FRML MDRD: 79 ML/MIN/1.73M2
GLUCOSE SERPL-MCNC: 81 MG/DL (ref 70–99)
HCO3 BLDV-SCNC: 25 MMOL/L (ref 21–28)
HCT VFR BLD AUTO: 41.1 % (ref 40–53)
HGB BLD-MCNC: 12.3 G/DL (ref 13.3–17.7)
IMM GRANULOCYTES # BLD: 0 10E3/UL
IMM GRANULOCYTES NFR BLD: 0 %
LACTATE SERPL-SCNC: 1.1 MMOL/L (ref 0.7–2)
LYMPHOCYTES # BLD AUTO: 2.9 10E3/UL (ref 0.8–5.3)
LYMPHOCYTES NFR BLD AUTO: 41 %
MAGNESIUM SERPL-MCNC: 2.2 MG/DL (ref 1.7–2.3)
MCH RBC QN AUTO: 22.2 PG (ref 26.5–33)
MCHC RBC AUTO-ENTMCNC: 29.9 G/DL (ref 31.5–36.5)
MCV RBC AUTO: 74 FL (ref 78–100)
MONOCYTES # BLD AUTO: 1 10E3/UL (ref 0–1.3)
MONOCYTES NFR BLD AUTO: 13 %
NEUTROPHILS # BLD AUTO: 2.8 10E3/UL (ref 1.6–8.3)
NEUTROPHILS NFR BLD AUTO: 39 %
NRBC # BLD AUTO: 0 10E3/UL
NRBC BLD AUTO-RTO: 0 /100
O2/TOTAL GAS SETTING VFR VENT: 21 %
OXYHGB MFR BLDV: 49 % (ref 70–75)
PCO2 BLDV: 46 MM HG (ref 40–50)
PH BLDV: 7.34 [PH] (ref 7.32–7.43)
PHOSPHATE SERPL-MCNC: 4.5 MG/DL (ref 2.5–4.5)
PLATELET # BLD AUTO: 376 10E3/UL (ref 150–450)
PO2 BLDV: 33 MM HG (ref 25–47)
POTASSIUM SERPL-SCNC: 4.4 MMOL/L (ref 3.4–5.3)
PROT SERPL-MCNC: 7.5 G/DL (ref 6.4–8.3)
RBC # BLD AUTO: 5.53 10E6/UL (ref 4.4–5.9)
SODIUM SERPL-SCNC: 136 MMOL/L (ref 136–145)
UFH PPP CHRO-ACNC: 0.6 IU/ML
WBC # BLD AUTO: 7.2 10E3/UL (ref 4–11)

## 2023-07-16 PROCEDURE — 250N000011 HC RX IP 250 OP 636: Mod: JZ | Performed by: INTERNAL MEDICINE

## 2023-07-16 PROCEDURE — 85025 COMPLETE CBC W/AUTO DIFF WBC: CPT

## 2023-07-16 PROCEDURE — 99232 SBSQ HOSP IP/OBS MODERATE 35: CPT | Performed by: INTERNAL MEDICINE

## 2023-07-16 PROCEDURE — 258N000003 HC RX IP 258 OP 636: Performed by: INTERNAL MEDICINE

## 2023-07-16 PROCEDURE — 250N000013 HC RX MED GY IP 250 OP 250 PS 637: Performed by: NURSE PRACTITIONER

## 2023-07-16 PROCEDURE — 82805 BLOOD GASES W/O2 SATURATION: CPT | Performed by: INTERNAL MEDICINE

## 2023-07-16 PROCEDURE — 250N000013 HC RX MED GY IP 250 OP 250 PS 637

## 2023-07-16 PROCEDURE — 214N000001 HC R&B CCU UMMC

## 2023-07-16 PROCEDURE — 83735 ASSAY OF MAGNESIUM: CPT | Performed by: STUDENT IN AN ORGANIZED HEALTH CARE EDUCATION/TRAINING PROGRAM

## 2023-07-16 PROCEDURE — 83605 ASSAY OF LACTIC ACID: CPT | Performed by: INTERNAL MEDICINE

## 2023-07-16 PROCEDURE — 85520 HEPARIN ASSAY: CPT | Performed by: INTERNAL MEDICINE

## 2023-07-16 PROCEDURE — 250N000013 HC RX MED GY IP 250 OP 250 PS 637: Performed by: INTERNAL MEDICINE

## 2023-07-16 PROCEDURE — 84100 ASSAY OF PHOSPHORUS: CPT | Performed by: INTERNAL MEDICINE

## 2023-07-16 PROCEDURE — 99231 SBSQ HOSP IP/OBS SF/LOW 25: CPT | Performed by: NURSE PRACTITIONER

## 2023-07-16 PROCEDURE — 999N000044 HC STATISTIC CVC DRESSING CHANGE

## 2023-07-16 PROCEDURE — 250N000013 HC RX MED GY IP 250 OP 250 PS 637: Performed by: PHYSICIAN ASSISTANT

## 2023-07-16 PROCEDURE — 80053 COMPREHEN METABOLIC PANEL: CPT | Performed by: INTERNAL MEDICINE

## 2023-07-16 PROCEDURE — 250N000013 HC RX MED GY IP 250 OP 250 PS 637: Performed by: STUDENT IN AN ORGANIZED HEALTH CARE EDUCATION/TRAINING PROGRAM

## 2023-07-16 RX ADMIN — BUPROPION HYDROCHLORIDE 300 MG: 150 TABLET, FILM COATED, EXTENDED RELEASE ORAL at 07:59

## 2023-07-16 RX ADMIN — DOBUTAMINE 5 MCG/KG/MIN: 12.5 INJECTION, SOLUTION, CONCENTRATE INTRAVENOUS at 13:02

## 2023-07-16 RX ADMIN — RAMELTEON 8 MG: 8 TABLET, FILM COATED ORAL at 22:54

## 2023-07-16 RX ADMIN — FUROSEMIDE 20 MG: 20 TABLET ORAL at 07:59

## 2023-07-16 RX ADMIN — Medication 1 TABLET: at 07:59

## 2023-07-16 RX ADMIN — SACUBITRIL AND VALSARTAN 1 TABLET: 49; 51 TABLET, FILM COATED ORAL at 20:31

## 2023-07-16 RX ADMIN — HEPARIN SODIUM 1600 UNITS/HR: 10000 INJECTION, SOLUTION INTRAVENOUS at 14:28

## 2023-07-16 RX ADMIN — FUROSEMIDE 20 MG: 20 TABLET ORAL at 15:23

## 2023-07-16 RX ADMIN — EMPAGLIFLOZIN 10 MG: 10 TABLET, FILM COATED ORAL at 07:59

## 2023-07-16 RX ADMIN — HYDROXYZINE HYDROCHLORIDE 50 MG: 25 TABLET, FILM COATED ORAL at 15:23

## 2023-07-16 RX ADMIN — SPIRONOLACTONE 25 MG: 25 TABLET, FILM COATED ORAL at 07:58

## 2023-07-16 RX ADMIN — SACUBITRIL AND VALSARTAN 1 TABLET: 49; 51 TABLET, FILM COATED ORAL at 07:59

## 2023-07-16 RX ADMIN — HYDROXYZINE HYDROCHLORIDE 50 MG: 25 TABLET, FILM COATED ORAL at 21:25

## 2023-07-16 RX ADMIN — HYDROXYZINE HYDROCHLORIDE 50 MG: 25 TABLET, FILM COATED ORAL at 08:07

## 2023-07-16 RX ADMIN — HYDROXYZINE HYDROCHLORIDE 50 MG: 25 TABLET, FILM COATED ORAL at 00:57

## 2023-07-16 RX ADMIN — FOLIC ACID 1 MG: 1 TABLET ORAL at 07:59

## 2023-07-16 ASSESSMENT — ACTIVITIES OF DAILY LIVING (ADL)
ADLS_ACUITY_SCORE: 20
ADLS_ACUITY_SCORE: 19
ADLS_ACUITY_SCORE: 19
ADLS_ACUITY_SCORE: 20
ADLS_ACUITY_SCORE: 19
ADLS_ACUITY_SCORE: 20
ADLS_ACUITY_SCORE: 19
ADLS_ACUITY_SCORE: 20

## 2023-07-16 NOTE — PROGRESS NOTES
[facility]     Addiction Progress Note - Addiction Service        Date of Admission:  7/3/2023  Assessment & Plan       Jose Enrique is a 38-year-old gentleman with past medical history of ADHD who was transferred from [facility] on 7/3/23 due to cardiogenic shock from non-ischemic cardiomyopathy complicated and possibly secondary to methamphetamine and alcohol use.      Changes today:  visited for social support.    increasing wellbutrin to 300 mg    # Alcohol Use Disorder  #Acute on chronic systolic heart failure/HFrEF with EF = 14% 2ndary due to alcohol and amphetamine use  Denies cravings.  Goal is abstinence from alcohol, and reports meeting this goal prior to hospitalization.  We discussed different options to maintain goal of sobriety, including medications to manage cravings if they develop in the future, including acamprosate and naltrexone.   At this time, he thinks he can meet his goal without meds, but is will consider linkage to addiction medicine.   - For addiction medicine follow up, discussed local connection ([facility] in [place], [address] [facility] [place] [address] [place] [address]   Phone: [phone]. Fax: [phone]), or virtual, through [place].  He prefers to wait and see how things are going, so will provide resources in discharge AVS    - For mental health follow up, see below    # ADHD  # Methamphetamine Use Disorder  # anxiety  F/u with Agatha Romero Psych FERNANDO at [place]   (Will provide contact info to community health care worker to link to follow up. ([address] [place] [address], [phone])  - wellbutrin, 300 mg XR, please prescribe at discharge     # Peer Support:   -Our peer  will meet the patient if agreeable and still hospitalized on Thursday, to provide additional outpatient resources  -To contact Tiara Peer  from [facility] ([facility]): call or text: [phone]     # Addiction Social Worker:   Our  "addiction social worker Zenon Romero can be contacted if needed, on her pager [phone] or texted/called at [phone]  --Patient is interested in ONLY outpatient addiction treatment after discharge.  Our addiction social worker will give the patient appropriate resources for outpatient evaluation.       Linkage to care:  As above, prior to discharge, will link to addiction medicine as well as mental health for ADHD management      The patient's care was discussed with the Bedside Nurse, Care Coordinator/, Patient and Primary team.          Geo Waterman MD on 7/16/2023 at 1:20 PM   Addiction Service   [facility]     Contact information available via [facility] Paging/Directory under \"addiction medicine\"        ______________________________________________________________________    Interval History   No acute events.      ROS:  CV, Pulm, GI and  assessed. Pertinent positives as above, otherwise negative.     Data reviewed today: I reviewed all medications, new labs and imaging results over the last 24 hours.     Physical Exam   Vital Signs: Temp: 99.5  F (37.5  C) Temp src: Oral BP: 94/63 Pulse: 108   Resp: 16 SpO2: 96 % O2 Device: None (Room air)    Weight: 185 lbs 6.51 oz  Gen: NAD  HEENT: EOMI, PERRL, MMM  CV: extremities warm and well perfused  Resp: breathing comfortably on RA  : deferred  Msk: no LE edema  Skin: no rashes  Neuro: nonfocal exam        Due to regulation of Title 42 of the Code of Federal Regulations (CFR) Part 2: Confidentiality laws apply to this note and the information wherein.  Thus, this note cannot be copy and pasted into any other health care staff's note nor can it be included in general medical records sent to ANY outside agency without the patient's written consent.    "
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
## 2023-07-16 NOTE — PLAN OF CARE
Temp: 98  F (36.7  C) Temp src: Oral BP: (!) 84/59 Pulse: 102   Resp: 16 SpO2: 99 % O2 Device: None (Room air)         D: Transferred from [facility] (7/3) for cardiogenic shock from non-ischemic cardiomyopathy d/t methamphetamine and etoh    PMHx: ADHD      I: Monitored vitals and assessed pt status. Encouraged activity    Changed: Pt requested not to have vitals and blood draw scheduled at 0400 as it disrupts his sleep  IV Access: L DL PICC  Running:    Dobutamine @5mcg/kg/min  Heparin @1,600 units/hr  Xa draw at 0400 therapeutic 0.60. Next draw 7/17@0500. Unit collect. Draw from purple lumen.   PRN: Atarax 50 mg given x1  Cardiac: Sinus tach ('s). BP soft. High Mg, K+, and Phos protocol  O2: RA, sating >95%  Mobility: Up ad aron, independent  GI/: Voids using urinal. LBM 7/15  Diet: Regular diet, 2L FR.     A: A&Ox4. VSS. Afebrile. Denied pain, SOB, dizziness, lightheadedness, or numbness/tingling.      P: Continue to monitor pt status.Notify Cards 2 with changes. Plan for ICD placement on Monday 7/17 at 1300

## 2023-07-16 NOTE — PLAN OF CARE
D: Transferred from [facility] (7/3) for cardiogenic shock from non-ischemic cardiomyopathy possibly related to methamphetamine and etoh     PMHx: ADHD      Neuro: A&Ox4  Cardiac: Sinus rhythm; Mg, K+, and Phos protocols; heparin drip (next 10a tomorrow morning) and dobutamine drip WNL  Respiratory: WDL; on room air; clear lungs; no cough  GI/: WDL; BM reported yesterday; patient not saving urine, but reports frequent urination.  Endocrine: WDL  Diet/Appetite: Regular diet; 2L fluid restriction. Eating 100% meals.  Skin: No skin issues noted  LDA: Left double lumen PICC WNL. Dressing changed today.  Activity: Independent  Pain: No complaints of pain  Plan: update Cards 2 with changes; ICD placement Monday 17th  NPO at midnight. Heparin gtt to pause at 0600 (7/17)     PRN Atarax given per request for anxiety.

## 2023-07-17 ENCOUNTER — APPOINTMENT (OUTPATIENT)
Dept: GENERAL RADIOLOGY | Facility: CLINIC | Age: 38
End: 2023-07-17
Attending: NURSE PRACTITIONER
Payer: COMMERCIAL

## 2023-07-17 LAB
ALBUMIN SERPL BCG-MCNC: 3.9 G/DL (ref 3.5–5.2)
ALP SERPL-CCNC: 83 U/L (ref 40–129)
ALT SERPL W P-5'-P-CCNC: 86 U/L (ref 0–70)
ANION GAP SERPL CALCULATED.3IONS-SCNC: 10 MMOL/L (ref 7–15)
AST SERPL W P-5'-P-CCNC: 45 U/L (ref 0–45)
BASE EXCESS BLDV CALC-SCNC: -1.6 MMOL/L (ref -7.7–1.9)
BASOPHILS # BLD AUTO: 0 10E3/UL (ref 0–0.2)
BASOPHILS # BLD AUTO: 0.1 10E3/UL (ref 0–0.2)
BASOPHILS NFR BLD AUTO: 0 %
BASOPHILS NFR BLD AUTO: 1 %
BILIRUB SERPL-MCNC: 0.5 MG/DL
BUN SERPL-MCNC: 25.4 MG/DL (ref 6–20)
CALCIUM SERPL-MCNC: 9.3 MG/DL (ref 8.6–10)
CHLORIDE SERPL-SCNC: 103 MMOL/L (ref 98–107)
CREAT SERPL-MCNC: 1.11 MG/DL (ref 0.67–1.17)
DEPRECATED HCO3 PLAS-SCNC: 22 MMOL/L (ref 22–29)
EOSINOPHIL # BLD AUTO: 0.3 10E3/UL (ref 0–0.7)
EOSINOPHIL # BLD AUTO: 0.3 10E3/UL (ref 0–0.7)
EOSINOPHIL NFR BLD AUTO: 4 %
EOSINOPHIL NFR BLD AUTO: 5 %
ERYTHROCYTE [DISTWIDTH] IN BLOOD BY AUTOMATED COUNT: 27.6 % (ref 10–15)
ERYTHROCYTE [DISTWIDTH] IN BLOOD BY AUTOMATED COUNT: 27.8 % (ref 10–15)
GFR SERPL CREATININE-BSD FRML MDRD: 87 ML/MIN/1.73M2
GLUCOSE BLDC GLUCOMTR-MCNC: 109 MG/DL (ref 70–99)
GLUCOSE BLDC GLUCOMTR-MCNC: 132 MG/DL (ref 70–99)
GLUCOSE SERPL-MCNC: 94 MG/DL (ref 70–99)
HCO3 BLDV-SCNC: 25 MMOL/L (ref 21–28)
HCT VFR BLD AUTO: 42.8 % (ref 40–53)
HCT VFR BLD AUTO: 43 % (ref 40–53)
HGB BLD-MCNC: 12.5 G/DL (ref 13.3–17.7)
HGB BLD-MCNC: 13 G/DL (ref 13.3–17.7)
IMM GRANULOCYTES # BLD: 0 10E3/UL
IMM GRANULOCYTES # BLD: 0 10E3/UL
IMM GRANULOCYTES NFR BLD: 0 %
IMM GRANULOCYTES NFR BLD: 0 %
LACTATE SERPL-SCNC: 1.2 MMOL/L (ref 0.7–2)
LYMPHOCYTES # BLD AUTO: 1.7 10E3/UL (ref 0.8–5.3)
LYMPHOCYTES # BLD AUTO: 2.4 10E3/UL (ref 0.8–5.3)
LYMPHOCYTES NFR BLD AUTO: 25 %
LYMPHOCYTES NFR BLD AUTO: 38 %
MAGNESIUM SERPL-MCNC: 2.2 MG/DL (ref 1.7–2.3)
MCH RBC QN AUTO: 22 PG (ref 26.5–33)
MCH RBC QN AUTO: 23 PG (ref 26.5–33)
MCHC RBC AUTO-ENTMCNC: 29.1 G/DL (ref 31.5–36.5)
MCHC RBC AUTO-ENTMCNC: 30.4 G/DL (ref 31.5–36.5)
MCV RBC AUTO: 76 FL (ref 78–100)
MCV RBC AUTO: 76 FL (ref 78–100)
MONOCYTES # BLD AUTO: 0.8 10E3/UL (ref 0–1.3)
MONOCYTES # BLD AUTO: 0.8 10E3/UL (ref 0–1.3)
MONOCYTES NFR BLD AUTO: 11 %
MONOCYTES NFR BLD AUTO: 13 %
NEUTROPHILS # BLD AUTO: 2.7 10E3/UL (ref 1.6–8.3)
NEUTROPHILS # BLD AUTO: 4.2 10E3/UL (ref 1.6–8.3)
NEUTROPHILS NFR BLD AUTO: 43 %
NEUTROPHILS NFR BLD AUTO: 60 %
NRBC # BLD AUTO: 0 10E3/UL
NRBC # BLD AUTO: 0 10E3/UL
NRBC BLD AUTO-RTO: 0 /100
NRBC BLD AUTO-RTO: 0 /100
O2/TOTAL GAS SETTING VFR VENT: 21 %
OXYHGB MFR BLDV: 43 % (ref 70–75)
PCO2 BLDV: 46 MM HG (ref 40–50)
PH BLDV: 7.34 [PH] (ref 7.32–7.43)
PHOSPHATE SERPL-MCNC: 4.5 MG/DL (ref 2.5–4.5)
PLATELET # BLD AUTO: 363 10E3/UL (ref 150–450)
PLATELET # BLD AUTO: 386 10E3/UL (ref 150–450)
PO2 BLDV: 29 MM HG (ref 25–47)
POTASSIUM SERPL-SCNC: 5 MMOL/L (ref 3.4–5.3)
POTASSIUM SERPL-SCNC: 5.1 MMOL/L (ref 3.4–5.3)
PROT SERPL-MCNC: 7.7 G/DL (ref 6.4–8.3)
RBC # BLD AUTO: 5.66 10E6/UL (ref 4.4–5.9)
RBC # BLD AUTO: 5.67 10E6/UL (ref 4.4–5.9)
SODIUM SERPL-SCNC: 135 MMOL/L (ref 136–145)
UFH PPP CHRO-ACNC: 0.52 IU/ML
WBC # BLD AUTO: 6.4 10E3/UL (ref 4–11)
WBC # BLD AUTO: 7 10E3/UL (ref 4–11)

## 2023-07-17 PROCEDURE — 85520 HEPARIN ASSAY: CPT | Performed by: INTERNAL MEDICINE

## 2023-07-17 PROCEDURE — 250N000009 HC RX 250: Performed by: INTERNAL MEDICINE

## 2023-07-17 PROCEDURE — 250N000013 HC RX MED GY IP 250 OP 250 PS 637: Performed by: INTERNAL MEDICINE

## 2023-07-17 PROCEDURE — 250N000011 HC RX IP 250 OP 636: Performed by: NURSE PRACTITIONER

## 2023-07-17 PROCEDURE — 250N000011 HC RX IP 250 OP 636: Mod: JZ | Performed by: INTERNAL MEDICINE

## 2023-07-17 PROCEDURE — C1779 LEAD, PMKR, TRANSVENOUS VDD: HCPCS | Performed by: INTERNAL MEDICINE

## 2023-07-17 PROCEDURE — 93005 ELECTROCARDIOGRAM TRACING: CPT

## 2023-07-17 PROCEDURE — 83605 ASSAY OF LACTIC ACID: CPT | Performed by: INTERNAL MEDICINE

## 2023-07-17 PROCEDURE — C1722 AICD, SINGLE CHAMBER: HCPCS | Performed by: INTERNAL MEDICINE

## 2023-07-17 PROCEDURE — 250N000013 HC RX MED GY IP 250 OP 250 PS 637: Performed by: NURSE PRACTITIONER

## 2023-07-17 PROCEDURE — 200N000002 HC R&B ICU UMMC

## 2023-07-17 PROCEDURE — 0JH63FZ INSERTION OF SUBCUTANEOUS DEFIBRILLATOR LEAD INTO CHEST SUBCUTANEOUS TISSUE AND FASCIA, PERCUTANEOUS APPROACH: ICD-10-PCS | Performed by: INTERNAL MEDICINE

## 2023-07-17 PROCEDURE — 370N000017 HC ANESTHESIA TECHNICAL FEE, PER MIN: Performed by: INTERNAL MEDICINE

## 2023-07-17 PROCEDURE — 258N000003 HC RX IP 258 OP 636: Performed by: NURSE ANESTHETIST, CERTIFIED REGISTERED

## 2023-07-17 PROCEDURE — 250N000011 HC RX IP 250 OP 636: Mod: JZ | Performed by: NURSE ANESTHETIST, CERTIFIED REGISTERED

## 2023-07-17 PROCEDURE — 250N000013 HC RX MED GY IP 250 OP 250 PS 637: Performed by: PHYSICIAN ASSISTANT

## 2023-07-17 PROCEDURE — 0JH608Z INSERTION OF DEFIBRILLATOR GENERATOR INTO CHEST SUBCUTANEOUS TISSUE AND FASCIA, OPEN APPROACH: ICD-10-PCS | Performed by: INTERNAL MEDICINE

## 2023-07-17 PROCEDURE — 99231 SBSQ HOSP IP/OBS SF/LOW 25: CPT | Mod: 25 | Performed by: NURSE PRACTITIONER

## 2023-07-17 PROCEDURE — 272N000001 HC OR GENERAL SUPPLY STERILE: Performed by: INTERNAL MEDICINE

## 2023-07-17 PROCEDURE — 83735 ASSAY OF MAGNESIUM: CPT | Performed by: STUDENT IN AN ORGANIZED HEALTH CARE EDUCATION/TRAINING PROGRAM

## 2023-07-17 PROCEDURE — 250N000013 HC RX MED GY IP 250 OP 250 PS 637: Performed by: STUDENT IN AN ORGANIZED HEALTH CARE EDUCATION/TRAINING PROGRAM

## 2023-07-17 PROCEDURE — 71045 X-RAY EXAM CHEST 1 VIEW: CPT | Mod: 26 | Performed by: RADIOLOGY

## 2023-07-17 PROCEDURE — 258N000003 HC RX IP 258 OP 636: Performed by: INTERNAL MEDICINE

## 2023-07-17 PROCEDURE — 33270 INS/REP SUBQ DEFIBRILLATOR: CPT | Performed by: INTERNAL MEDICINE

## 2023-07-17 PROCEDURE — 250N000011 HC RX IP 250 OP 636: Performed by: NURSE ANESTHETIST, CERTIFIED REGISTERED

## 2023-07-17 PROCEDURE — 250N000011 HC RX IP 250 OP 636: Performed by: STUDENT IN AN ORGANIZED HEALTH CARE EDUCATION/TRAINING PROGRAM

## 2023-07-17 PROCEDURE — 84100 ASSAY OF PHOSPHORUS: CPT | Performed by: INTERNAL MEDICINE

## 2023-07-17 PROCEDURE — 250N000009 HC RX 250: Performed by: NURSE ANESTHETIST, CERTIFIED REGISTERED

## 2023-07-17 PROCEDURE — 272N000002 HC OR SUPPLY OTHER OPNP: Performed by: INTERNAL MEDICINE

## 2023-07-17 PROCEDURE — 250N000013 HC RX MED GY IP 250 OP 250 PS 637

## 2023-07-17 PROCEDURE — 250N000013 HC RX MED GY IP 250 OP 250 PS 637: Performed by: ANESTHESIOLOGY

## 2023-07-17 PROCEDURE — 84132 ASSAY OF SERUM POTASSIUM: CPT | Performed by: NURSE PRACTITIONER

## 2023-07-17 PROCEDURE — 80053 COMPREHEN METABOLIC PANEL: CPT | Performed by: INTERNAL MEDICINE

## 2023-07-17 PROCEDURE — 250N000011 HC RX IP 250 OP 636: Performed by: ANESTHESIOLOGY

## 2023-07-17 PROCEDURE — 999N000065 XR CHEST PORT 1 VIEW

## 2023-07-17 PROCEDURE — 82805 BLOOD GASES W/O2 SATURATION: CPT | Performed by: INTERNAL MEDICINE

## 2023-07-17 PROCEDURE — 85025 COMPLETE CBC W/AUTO DIFF WBC: CPT

## 2023-07-17 DEVICE — LEAD EMBLEM S-1CD 3501: Type: IMPLANTABLE DEVICE | Status: FUNCTIONAL

## 2023-07-17 DEVICE — GENERATOR EMBLEM S-ICD MRI: Type: IMPLANTABLE DEVICE | Status: FUNCTIONAL

## 2023-07-17 DEVICE — IMPLANTABLE DEVICE: Type: IMPLANTABLE DEVICE | Status: FUNCTIONAL

## 2023-07-17 RX ORDER — SPIRONOLACTONE 25 MG/1
25 TABLET ORAL DAILY
Qty: 30 TABLET | Refills: 3 | Status: CANCELLED | OUTPATIENT
Start: 2023-07-18

## 2023-07-17 RX ORDER — NALOXONE HYDROCHLORIDE 0.4 MG/ML
0.2 INJECTION, SOLUTION INTRAMUSCULAR; INTRAVENOUS; SUBCUTANEOUS
Status: DISCONTINUED | OUTPATIENT
Start: 2023-07-17 | End: 2023-07-20 | Stop reason: HOSPADM

## 2023-07-17 RX ORDER — BUPROPION HYDROCHLORIDE 300 MG/1
300 TABLET ORAL DAILY
Qty: 90 TABLET | Refills: 1 | Status: CANCELLED | OUTPATIENT
Start: 2023-07-18

## 2023-07-17 RX ORDER — FENTANYL CITRATE 50 UG/ML
INJECTION, SOLUTION INTRAMUSCULAR; INTRAVENOUS PRN
Status: DISCONTINUED | OUTPATIENT
Start: 2023-07-17 | End: 2023-07-17

## 2023-07-17 RX ORDER — PROPOFOL 10 MG/ML
INJECTION, EMULSION INTRAVENOUS PRN
Status: DISCONTINUED | OUTPATIENT
Start: 2023-07-17 | End: 2023-07-17

## 2023-07-17 RX ORDER — CEFAZOLIN SODIUM 2 G/100ML
2 INJECTION, SOLUTION INTRAVENOUS
Status: COMPLETED | OUTPATIENT
Start: 2023-07-17 | End: 2023-07-17

## 2023-07-17 RX ORDER — NALOXONE HYDROCHLORIDE 0.4 MG/ML
0.4 INJECTION, SOLUTION INTRAMUSCULAR; INTRAVENOUS; SUBCUTANEOUS
Status: DISCONTINUED | OUTPATIENT
Start: 2023-07-17 | End: 2023-07-20 | Stop reason: HOSPADM

## 2023-07-17 RX ORDER — GLYCOPYRROLATE 0.2 MG/ML
INJECTION, SOLUTION INTRAMUSCULAR; INTRAVENOUS PRN
Status: DISCONTINUED | OUTPATIENT
Start: 2023-07-17 | End: 2023-07-17

## 2023-07-17 RX ORDER — DOBUTAMINE HCL IN DEXTROSE 5 % 500MG/250
5 INTRAVENOUS SOLUTION INTRAVENOUS CONTINUOUS
COMMUNITY
Start: 2023-07-17 | End: 2023-07-19

## 2023-07-17 RX ORDER — VASOPRESSIN IN 0.9 % NACL 2 UNIT/2ML
SYRINGE (ML) INTRAVENOUS PRN
Status: DISCONTINUED | OUTPATIENT
Start: 2023-07-17 | End: 2023-07-17

## 2023-07-17 RX ORDER — HYDROMORPHONE HYDROCHLORIDE 1 MG/ML
0.5 INJECTION, SOLUTION INTRAMUSCULAR; INTRAVENOUS; SUBCUTANEOUS
Status: COMPLETED | OUTPATIENT
Start: 2023-07-17 | End: 2023-07-17

## 2023-07-17 RX ORDER — SODIUM CHLORIDE 9 MG/ML
INJECTION, SOLUTION INTRAVENOUS CONTINUOUS
Status: DISCONTINUED | OUTPATIENT
Start: 2023-07-17 | End: 2023-07-17

## 2023-07-17 RX ORDER — PHENYLEPHRINE HCL IN 0.9% NACL 50MG/250ML
.1-1 PLASTIC BAG, INJECTION (ML) INTRAVENOUS CONTINUOUS
Status: DISCONTINUED | OUTPATIENT
Start: 2023-07-17 | End: 2023-07-17 | Stop reason: HOSPADM

## 2023-07-17 RX ORDER — FOLIC ACID 1 MG/1
1 TABLET ORAL DAILY
Qty: 90 TABLET | Refills: 1 | Status: CANCELLED | OUTPATIENT
Start: 2023-07-18

## 2023-07-17 RX ORDER — FENTANYL CITRATE 50 UG/ML
25 INJECTION, SOLUTION INTRAMUSCULAR; INTRAVENOUS EVERY 5 MIN PRN
Status: DISCONTINUED | OUTPATIENT
Start: 2023-07-17 | End: 2023-07-17 | Stop reason: HOSPADM

## 2023-07-17 RX ORDER — FUROSEMIDE 20 MG
20 TABLET ORAL
Qty: 60 TABLET | Refills: 3 | Status: CANCELLED | OUTPATIENT
Start: 2023-07-17

## 2023-07-17 RX ORDER — CEPHALEXIN 500 MG/1
500 CAPSULE ORAL 3 TIMES DAILY
Status: DISCONTINUED | OUTPATIENT
Start: 2023-07-17 | End: 2023-07-20 | Stop reason: HOSPADM

## 2023-07-17 RX ORDER — ONDANSETRON 2 MG/ML
INJECTION INTRAMUSCULAR; INTRAVENOUS PRN
Status: DISCONTINUED | OUTPATIENT
Start: 2023-07-17 | End: 2023-07-17

## 2023-07-17 RX ORDER — LIDOCAINE 40 MG/G
CREAM TOPICAL
Status: DISCONTINUED | OUTPATIENT
Start: 2023-07-17 | End: 2023-07-17 | Stop reason: HOSPADM

## 2023-07-17 RX ORDER — PHENYLEPHRINE HCL IN 0.9% NACL 50MG/250ML
.1-6 PLASTIC BAG, INJECTION (ML) INTRAVENOUS CONTINUOUS
Status: DISCONTINUED | OUTPATIENT
Start: 2023-07-17 | End: 2023-07-19

## 2023-07-17 RX ORDER — HYDROXYZINE HYDROCHLORIDE 50 MG/1
50 TABLET, FILM COATED ORAL EVERY 8 HOURS PRN
Qty: 45 TABLET | Refills: 0 | Status: CANCELLED | OUTPATIENT
Start: 2023-07-17

## 2023-07-17 RX ORDER — FENTANYL CITRATE 50 UG/ML
50 INJECTION, SOLUTION INTRAMUSCULAR; INTRAVENOUS EVERY 5 MIN PRN
Status: DISCONTINUED | OUTPATIENT
Start: 2023-07-17 | End: 2023-07-17 | Stop reason: HOSPADM

## 2023-07-17 RX ORDER — LIDOCAINE HYDROCHLORIDE 20 MG/ML
INJECTION, SOLUTION INFILTRATION; PERINEURAL PRN
Status: DISCONTINUED | OUTPATIENT
Start: 2023-07-17 | End: 2023-07-17

## 2023-07-17 RX ORDER — MULTIPLE VITAMINS W/ MINERALS TAB 9MG-400MCG
1 TAB ORAL DAILY
Qty: 90 TABLET | Refills: 1 | Status: CANCELLED | OUTPATIENT
Start: 2023-07-18

## 2023-07-17 RX ORDER — CEFAZOLIN SODIUM 500 MG/2.2ML
INJECTION, POWDER, FOR SOLUTION INTRAMUSCULAR; INTRAVENOUS PRN
Status: DISCONTINUED | OUTPATIENT
Start: 2023-07-17 | End: 2023-07-17

## 2023-07-17 RX ORDER — HYDROMORPHONE HYDROCHLORIDE 1 MG/ML
0.4 INJECTION, SOLUTION INTRAMUSCULAR; INTRAVENOUS; SUBCUTANEOUS EVERY 5 MIN PRN
Status: DISCONTINUED | OUTPATIENT
Start: 2023-07-17 | End: 2023-07-17 | Stop reason: HOSPADM

## 2023-07-17 RX ORDER — OXYCODONE AND ACETAMINOPHEN 5; 325 MG/1; MG/1
1 TABLET ORAL EVERY 4 HOURS PRN
Status: DISCONTINUED | OUTPATIENT
Start: 2023-07-17 | End: 2023-07-18

## 2023-07-17 RX ORDER — SODIUM CHLORIDE, SODIUM LACTATE, POTASSIUM CHLORIDE, CALCIUM CHLORIDE 600; 310; 30; 20 MG/100ML; MG/100ML; MG/100ML; MG/100ML
INJECTION, SOLUTION INTRAVENOUS CONTINUOUS
Status: DISCONTINUED | OUTPATIENT
Start: 2023-07-17 | End: 2023-07-17

## 2023-07-17 RX ORDER — PROPOFOL 10 MG/ML
INJECTION, EMULSION INTRAVENOUS CONTINUOUS PRN
Status: DISCONTINUED | OUTPATIENT
Start: 2023-07-17 | End: 2023-07-17

## 2023-07-17 RX ORDER — SODIUM CHLORIDE, SODIUM LACTATE, POTASSIUM CHLORIDE, CALCIUM CHLORIDE 600; 310; 30; 20 MG/100ML; MG/100ML; MG/100ML; MG/100ML
INJECTION, SOLUTION INTRAVENOUS CONTINUOUS PRN
Status: DISCONTINUED | OUTPATIENT
Start: 2023-07-17 | End: 2023-07-17

## 2023-07-17 RX ORDER — ACETAMINOPHEN 325 MG/1
975 TABLET ORAL EVERY 4 HOURS PRN
Status: COMPLETED | OUTPATIENT
Start: 2023-07-17 | End: 2023-07-17

## 2023-07-17 RX ORDER — LIDOCAINE HYDROCHLORIDE 20 MG/ML
INJECTION, SOLUTION INFILTRATION; PERINEURAL
Status: DISCONTINUED | OUTPATIENT
Start: 2023-07-17 | End: 2023-07-17 | Stop reason: HOSPADM

## 2023-07-17 RX ORDER — ONDANSETRON 4 MG/1
4 TABLET, ORALLY DISINTEGRATING ORAL EVERY 30 MIN PRN
Status: DISCONTINUED | OUTPATIENT
Start: 2023-07-17 | End: 2023-07-17 | Stop reason: HOSPADM

## 2023-07-17 RX ORDER — ONDANSETRON 2 MG/ML
4 INJECTION INTRAMUSCULAR; INTRAVENOUS EVERY 30 MIN PRN
Status: DISCONTINUED | OUTPATIENT
Start: 2023-07-17 | End: 2023-07-17 | Stop reason: HOSPADM

## 2023-07-17 RX ORDER — HYDROMORPHONE HYDROCHLORIDE 1 MG/ML
0.2 INJECTION, SOLUTION INTRAMUSCULAR; INTRAVENOUS; SUBCUTANEOUS EVERY 5 MIN PRN
Status: DISCONTINUED | OUTPATIENT
Start: 2023-07-17 | End: 2023-07-17 | Stop reason: HOSPADM

## 2023-07-17 RX ADMIN — PROPOFOL 100 MCG/KG/MIN: 10 INJECTION, EMULSION INTRAVENOUS at 14:15

## 2023-07-17 RX ADMIN — ONDANSETRON 4 MG: 2 INJECTION INTRAMUSCULAR; INTRAVENOUS at 17:40

## 2023-07-17 RX ADMIN — GLYCOPYRROLATE 0.2 MG: 0.2 INJECTION, SOLUTION INTRAMUSCULAR; INTRAVENOUS at 16:31

## 2023-07-17 RX ADMIN — HYDROMORPHONE HYDROCHLORIDE 0.5 MG: 1 INJECTION, SOLUTION INTRAMUSCULAR; INTRAVENOUS; SUBCUTANEOUS at 22:21

## 2023-07-17 RX ADMIN — EPINEPHRINE 10 MCG: 1 INJECTION INTRAMUSCULAR; INTRAVENOUS; SUBCUTANEOUS at 15:23

## 2023-07-17 RX ADMIN — FENTANYL CITRATE 12.5 MCG: 50 INJECTION, SOLUTION INTRAMUSCULAR; INTRAVENOUS at 19:09

## 2023-07-17 RX ADMIN — OXYCODONE HYDROCHLORIDE AND ACETAMINOPHEN 1 TABLET: 5; 325 TABLET ORAL at 20:42

## 2023-07-17 RX ADMIN — FENTANYL CITRATE 12.5 MCG: 50 INJECTION, SOLUTION INTRAMUSCULAR; INTRAVENOUS at 19:50

## 2023-07-17 RX ADMIN — FENTANYL CITRATE 12.5 MCG: 50 INJECTION, SOLUTION INTRAMUSCULAR; INTRAVENOUS at 19:18

## 2023-07-17 RX ADMIN — FUROSEMIDE 20 MG: 20 TABLET ORAL at 20:42

## 2023-07-17 RX ADMIN — FENTANYL CITRATE 12.5 MCG: 50 INJECTION, SOLUTION INTRAMUSCULAR; INTRAVENOUS at 19:36

## 2023-07-17 RX ADMIN — EMPAGLIFLOZIN 10 MG: 10 TABLET, FILM COATED ORAL at 07:51

## 2023-07-17 RX ADMIN — ONDANSETRON 4 MG: 2 INJECTION INTRAMUSCULAR; INTRAVENOUS at 14:09

## 2023-07-17 RX ADMIN — EPINEPHRINE 20 MCG: 1 INJECTION INTRAMUSCULAR; INTRAVENOUS; SUBCUTANEOUS at 16:06

## 2023-07-17 RX ADMIN — EPINEPHRINE 10 MCG: 1 INJECTION INTRAMUSCULAR; INTRAVENOUS; SUBCUTANEOUS at 15:13

## 2023-07-17 RX ADMIN — RAMELTEON 8 MG: 8 TABLET, FILM COATED ORAL at 22:42

## 2023-07-17 RX ADMIN — EPINEPHRINE 10 MCG: 1 INJECTION INTRAMUSCULAR; INTRAVENOUS; SUBCUTANEOUS at 16:20

## 2023-07-17 RX ADMIN — PHENYLEPHRINE HYDROCHLORIDE 100 MCG: 10 INJECTION INTRAVENOUS at 14:45

## 2023-07-17 RX ADMIN — HYDROXYZINE HYDROCHLORIDE 50 MG: 25 TABLET, FILM COATED ORAL at 20:46

## 2023-07-17 RX ADMIN — Medication 1 TABLET: at 07:51

## 2023-07-17 RX ADMIN — LIDOCAINE HYDROCHLORIDE 100 MG: 20 INJECTION, SOLUTION INFILTRATION; PERINEURAL at 14:20

## 2023-07-17 RX ADMIN — Medication 2 G: at 14:24

## 2023-07-17 RX ADMIN — ACETAMINOPHEN 975 MG: 325 TABLET, FILM COATED ORAL at 18:19

## 2023-07-17 RX ADMIN — Medication 30 MG: at 16:01

## 2023-07-17 RX ADMIN — NOREPINEPHRINE BITARTRATE 12.8 MCG: 1 INJECTION, SOLUTION, CONCENTRATE INTRAVENOUS at 16:14

## 2023-07-17 RX ADMIN — FENTANYL CITRATE 25 MCG: 50 INJECTION, SOLUTION INTRAMUSCULAR; INTRAVENOUS at 15:43

## 2023-07-17 RX ADMIN — NOREPINEPHRINE BITARTRATE 12.8 MCG: 1 INJECTION, SOLUTION, CONCENTRATE INTRAVENOUS at 16:20

## 2023-07-17 RX ADMIN — EPINEPHRINE 20 MCG: 1 INJECTION INTRAMUSCULAR; INTRAVENOUS; SUBCUTANEOUS at 16:01

## 2023-07-17 RX ADMIN — FUROSEMIDE 20 MG: 20 TABLET ORAL at 07:52

## 2023-07-17 RX ADMIN — PROPOFOL 75 MCG/KG/MIN: 10 INJECTION, EMULSION INTRAVENOUS at 14:20

## 2023-07-17 RX ADMIN — NOREPINEPHRINE BITARTRATE 12.8 MCG: 1 INJECTION, SOLUTION, CONCENTRATE INTRAVENOUS at 16:30

## 2023-07-17 RX ADMIN — HYDROXYZINE HYDROCHLORIDE 50 MG: 25 TABLET, FILM COATED ORAL at 07:58

## 2023-07-17 RX ADMIN — Medication 0.5 MCG/KG/MIN: at 14:39

## 2023-07-17 RX ADMIN — SACUBITRIL AND VALSARTAN 1 TABLET: 49; 51 TABLET, FILM COATED ORAL at 07:51

## 2023-07-17 RX ADMIN — PROPOFOL 20 MG: 10 INJECTION, EMULSION INTRAVENOUS at 15:41

## 2023-07-17 RX ADMIN — EPINEPHRINE 10 MCG: 1 INJECTION INTRAMUSCULAR; INTRAVENOUS; SUBCUTANEOUS at 14:42

## 2023-07-17 RX ADMIN — FENTANYL CITRATE 25 MCG: 50 INJECTION, SOLUTION INTRAMUSCULAR; INTRAVENOUS at 15:45

## 2023-07-17 RX ADMIN — CEPHALEXIN 500 MG: 500 CAPSULE ORAL at 22:20

## 2023-07-17 RX ADMIN — SODIUM CHLORIDE, POTASSIUM CHLORIDE, SODIUM LACTATE AND CALCIUM CHLORIDE: 600; 310; 30; 20 INJECTION, SOLUTION INTRAVENOUS at 14:09

## 2023-07-17 RX ADMIN — BUPROPION HYDROCHLORIDE 300 MG: 150 TABLET, FILM COATED, EXTENDED RELEASE ORAL at 07:52

## 2023-07-17 RX ADMIN — MIDAZOLAM 2 MG: 1 INJECTION INTRAMUSCULAR; INTRAVENOUS at 14:09

## 2023-07-17 RX ADMIN — EPINEPHRINE 10 MCG: 1 INJECTION INTRAMUSCULAR; INTRAVENOUS; SUBCUTANEOUS at 15:54

## 2023-07-17 RX ADMIN — CEFAZOLIN 500 MG: 225 INJECTION, POWDER, FOR SOLUTION INTRAMUSCULAR; INTRAVENOUS at 15:29

## 2023-07-17 RX ADMIN — SPIRONOLACTONE 25 MG: 25 TABLET, FILM COATED ORAL at 07:51

## 2023-07-17 RX ADMIN — FOLIC ACID 1 MG: 1 TABLET ORAL at 07:51

## 2023-07-17 RX ADMIN — APIXABAN 5 MG: 5 TABLET, FILM COATED ORAL at 20:42

## 2023-07-17 RX ADMIN — Medication 1 UNITS: at 14:52

## 2023-07-17 RX ADMIN — Medication 1 UNITS: at 14:58

## 2023-07-17 RX ADMIN — PHENYLEPHRINE HYDROCHLORIDE 200 MCG: 10 INJECTION INTRAVENOUS at 14:48

## 2023-07-17 ASSESSMENT — ACTIVITIES OF DAILY LIVING (ADL)
ADLS_ACUITY_SCORE: 21
ADLS_ACUITY_SCORE: 19
ADLS_ACUITY_SCORE: 20
ADLS_ACUITY_SCORE: 19
ADLS_ACUITY_SCORE: 19
ADLS_ACUITY_SCORE: 21

## 2023-07-17 NOTE — PLAN OF CARE
Temp: 98.2  F (36.8  C) Temp src: Oral BP: 97/61 Pulse: 104   Resp: 17 SpO2: 98 % O2 Device: None (Room air)         D: Transferred from [facility] (7/3) for cardiogenic shock from non-ischemic cardiomyopathy d/t methamphetamine and etoh    PMHx: ADHD     I: Monitored vitals and assessed pt status. Encouraged activity.      Changed: Preoperative surgery prep. PO at midnight. CHG bath, linen and electrodes changed at 0630. Heparin stopped at 0600.  IV Access: L DL PICC  Running:    Dobutamine @5mcg/kg/min  Heparin stopped at 0600  Xa draw at 0630   PRN: Atarax 50 mg given x1  Cardiac: Sinus tach (low 100's). BP soft. MAP>70's. High Mg, K+, and Phos protocol  O2: RA, sating >95%  Mobility: Up ad aron, independent  GI/: Voids using urinal. LBM 7/15  Diet: NPO at midnight. Regular diet, 2L FR.      A: A&Ox4. VSS. Afebrile. Denied pain, SOB, dizziness, lightheadedness, or numbness/tingling. Appeared to have slept well overnight.      P: Continue to monitor pt status.Notify Cards 2 with changes. Plan for ICD placement on Monday 7/17 at 1300

## 2023-07-17 NOTE — ANESTHESIA POSTPROCEDURE EVALUATION
Patient: Jose Enrique Engel    Procedure: Procedure(s):  Subcutaneous Implantable Cardioverter Defibrillator Implant       Anesthesia Type:  MAC    Note:  Disposition: ICU            ICU Sign Out: Anesthesiologist/ICU physician sign out WAS performed   Postop Pain Control:    PONV:    Neuro/Psych:    Airway/Respiratory:    CV/Hemodynamics:             Sign Out: Detailed CV status               Blood Pressure: Pressors               Perfusion:  Inotropes   Other NRE:    DID A NON-ROUTINE EVENT OCCUR?            Last vitals:  Vitals Value Taken Time   BP 80/56 07/17/23 1755   Temp 36.3  C (97.4  F) 07/17/23 1725   Pulse 111 07/17/23 1756   Resp     SpO2 98 % 07/17/23 1756   Vitals shown include unvalidated device data.    Electronically Signed By: Angel Young MD  July 17, 2023  5:57 PM

## 2023-07-17 NOTE — OR NURSING
[facility] to bedside to assess pt. Per [facility], pt can receive 975mg Tylenol and 12.5mcg doses of Fentanyl for pain control.

## 2023-07-17 NOTE — PROVIDER NOTIFICATION
Cardiology NP notified pt. FREDRICK Engel Chest X-Ray for lead placement completed in PACU. Thanks. Jimmie *[phone] or [phone]

## 2023-07-17 NOTE — PROGRESS NOTES
Care Management Discharge Note    Discharge Date: 07/18/2023     Discharge Disposition: Home    Discharge Services: Home Infusion, Outpatient PICC cares    Discharge Transportation: family or friend will provide    Additional Information:  Per NP, anticipate pt will be able to discharge tomorrow. Pt having ICD placed today. Updated home infusion liason of likely discharge tomorrow. NP placed dobutamine script.    [facility] (IV dobutamine)  Phone: [phone]  Pt has completed PLC x2, his dad attend PLC last Friday and will assist pt at discharge.     [facility]  Fax: [phone]  Faxed orders for weekly PICC line dressing changes. Emely called back and scheduled pt for initial dressing change on Tuesday 7/25 at 2:30pm. Need to fax PICC report.     CC will continue to monitor patient's medical condition and progress towards discharge.  Krystal Bonds RN BSN  6C Unit Care Coordinator  Phone number: [phone]  Pager: [phone]

## 2023-07-17 NOTE — PLAN OF CARE
"BP 97/69 (BP Location: Right arm, Cuff Size: Adult Regular)   Pulse 112   Temp 98.2  F (36.8  C) (Oral)   Resp 18   Ht 1.803 m (5' 11\")   Wt 76 kg (167 lb 8 oz)   SpO2 98%   BMI 23.36 kg/m      Hours of Care: 5613-0018.    Neuro: Alert and oriented x 4. Gave atarax 50 mg for anxiety.  Vitals: VSS. Afebrile.  Respiratory: Room air. Breath sounds even and unlabored.  Cardiac: Tachycardic.  GI/: Continent of bowel and bladder. Bowel movement yesterday 7/16 per patient.  Skin/Wounds: WNL, no wounds or issues noted. 2nd CHG wipes done.  Lines: Left arm DL PICC. IV Dobutamine infusing in purple line at 5 mcg/kg/min. Red port saline locked.  Diet: NPO  since midnight for procedure.  Activity: Up ad aron.  Pain: No pain.      Plan: Having an ICD placed this morning, went down at 1100. Personal items including wallet and I pad put in patient bedside table drawer.      Continue to monitor and follow POC    Molly Cordero RN on 7/17/2023 at 10:29 AM      Problem: Risk for Delirium  Goal: Improved Behavioral Control  Intervention: Minimize Safety Risk  Recent Flowsheet Documentation  Taken 7/17/2023 0800 by Molly Cordero, RN  Trust Relationship/Rapport:    care explained    choices provided    emotional support provided    empathic listening provided    questions answered    questions encouraged    thoughts/feelings acknowledged     Problem: Plan of Care - These are the overarching goals to be used throughout the patient stay.    Goal: Plan of Care Review  Description: The Plan of Care Review/Shift note should be completed every shift.  The Outcome Evaluation is a brief statement about your assessment that the patient is improving, declining, or no change.  This information will be displayed automatically on your shift note.  Outcome: Progressing  Flowsheets (Taken 7/17/2023 1028)  Plan of Care Reviewed With: patient  Overall Patient Progress: improving  Goal: Absence of Hospital-Acquired Illness or " Injury  Intervention: Identify and Manage Fall Risk  Recent Flowsheet Documentation  Taken 7/17/2023 0800 by Molly Cordero RN  Safety Promotion/Fall Prevention:    clutter free environment maintained    increased rounding and observation    lighting adjusted    increase visualization of patient    nonskid shoes/slippers when out of bed    patient and family education    room door open    safety round/check completed  Intervention: Prevent Skin Injury  Recent Flowsheet Documentation  Taken 7/17/2023 0800 by Molly Cordero RN  Body Position: position changed independently  Intervention: Prevent and Manage VTE (Venous Thromboembolism) Risk  Recent Flowsheet Documentation  Taken 7/17/2023 0800 by Molly Cordero RN  VTE Prevention/Management: SCDs (sequential compression devices) off  Goal: Optimal Comfort and Wellbeing  Intervention: Provide Person-Centered Care  Recent Flowsheet Documentation  Taken 7/17/2023 0800 by Molly Cordero RN  Trust Relationship/Rapport:    care explained    choices provided    emotional support provided    empathic listening provided    questions answered    questions encouraged    thoughts/feelings acknowledged  Goal: Absence of Hospital-Acquired Illness or Injury  Intervention: Identify and Manage Fall Risk  Recent Flowsheet Documentation  Taken 7/17/2023 0800 by Molly Cordero RN  Safety Promotion/Fall Prevention:    clutter free environment maintained    increased rounding and observation    lighting adjusted    increase visualization of patient    nonskid shoes/slippers when out of bed    patient and family education    room door open    safety round/check completed  Intervention: Prevent Skin Injury  Recent Flowsheet Documentation  Taken 7/17/2023 0800 by Molly Cordero RN  Body Position: position changed independently  Intervention: Prevent and Manage VTE (Venous Thromboembolism) Risk  Recent Flowsheet Documentation  Taken 7/17/2023 0800 by Gil  Molly CARABALLO RN  VTE Prevention/Management: SCDs (sequential compression devices) off  Goal: Optimal Comfort and Wellbeing  Intervention: Provide Person-Centered Care  Recent Flowsheet Documentation  Taken 7/17/2023 0800 by Molly Cordero RN  Trust Relationship/Rapport:    care explained    choices provided    emotional support provided    empathic listening provided    questions answered    questions encouraged    thoughts/feelings acknowledged     Problem: Heart Failure  Goal: Optimal Functional Ability  Intervention: Optimize Functional Ability  Recent Flowsheet Documentation  Taken 7/17/2023 0800 by Molly Cordero RN  Activity Management:    activity adjusted per tolerance    activity encouraged    ambulated to bathroom    up ad aron  Goal: Effective Oxygenation and Ventilation  Intervention: Promote Airway Secretion Clearance  Recent Flowsheet Documentation  Taken 7/17/2023 0800 by Molly Cordero RN  Activity Management:    activity adjusted per tolerance    activity encouraged    ambulated to bathroom    up ad aron  Intervention: Optimize Oxygenation and Ventilation  Recent Flowsheet Documentation  Taken 7/17/2023 0800 by Molly Cordero, RN  Head of Bed (HOB) Positioning: HOB flat  Goal: Effective Breathing Pattern During Sleep  Intervention: Monitor and Manage Obstructive Sleep Apnea  Recent Flowsheet Documentation  Taken 7/17/2023 0800 by Molly Cordero RN  Medication Review/Management:    medications reviewed    high-risk medications identified

## 2023-07-17 NOTE — ANESTHESIA CARE TRANSFER NOTE
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    Anesthesia Type:   MAC     Note:    Oropharynx: spontaneously breathing  Level of Consciousness: awake  Oxygen Supplementation: nasal cannula  Level of Supplemental Oxygen (L/min / FiO2): 3  Independent Airway: airway patency satisfactory and stable  Dentition: dentition unchanged    Report to RN Given: handoff report given  Patient transferred to: PACU    Handoff Report: Identifed the Patient, Identified the Reponsible Provider, Reviewed the pertinent medical history, Discussed the surgical course, Reviewed Intra-OP anesthesia mangement and issues during anesthesia, Set expectations for post-procedure period and Allowed opportunity for questions and acknowledgement of understanding      Vitals:  Vitals Value Taken Time   BP 62/39 07/17/23 1715   Temp 97.4    Pulse 105 07/17/23 1719   Resp 16    SpO2 100 % 07/17/23 1719   Vitals shown include unvalidated device data.    Electronically Signed By: DARNELL Moore CRNA  July 17, 2023  5:20 PM

## 2023-07-17 NOTE — OR NURSING
[facility] states will do post-op sign out    Luis Alfredo Benítez RN on 7/17/2023 at 5:55 PM

## 2023-07-17 NOTE — PROGRESS NOTES
CHW received a call from the  at [facility] in [place] and the follow-up pyschiatry appointment has been rescheduled for:    Appointment  [facility]  [address], [address], [place] [address]  Provider: Agatha Romero  Date: 8/3/23   Time: 4 pm  Phone: [phone]

## 2023-07-17 NOTE — ANESTHESIA PREPROCEDURE EVALUATION
Anesthesia Pre-Procedure Evaluation    Patient: Jose Enrique Engel   MRN: [id number] : 1985        Procedure : Procedure(s):  Subcutaneous Implantable Cardioverter Defibrillator Implant          No past medical history on file.   Past Surgical History:   Procedure Laterality Date     PICC DOUBLE LUMEN PLACEMENT Left 2023    Left basilic vein 48cm total 2cm external.      No Known Allergies   Social History     Tobacco Use     Smoking status: Not on file     Smokeless tobacco: Not on file   Substance Use Topics     Alcohol use: Not on file      Wt Readings from Last 1 Encounters:   23 76 kg (167 lb 8 oz)        Anesthesia Evaluation   Pt has had prior anesthetic. Type: MAC.        ROS/MED HX  ENT/Pulmonary:       Neurologic:  - neg neurologic ROS     Cardiovascular:     (+) -----CHF Previous cardiac testing   Echo: Date: 23 Results:  Left Ventricle  Left ventricular size is normal. Biplane LVEF is 14%. Severe left ventricular  dilation is present. LVIDd 7.2 cm. Left ventricular function is decreased. The  ejection fraction is 10-15% (severely reduced). Grade III or advanced  diastolic dysfunction. Diastolic Doppler findings (E/E' ratio and/or other  parameters) suggest left ventricular filling pressures are indeterminate. LV  apical thrombus, large (~1.8 cm x 1.8 cm x 1.1 cm) , protruding, and  predominantly sessile. Some views are suspicious for additional more laminary  mural thrombi. If clinically relevant, overall burden of thrombus could be  better determined by cardiac MRI.     Right Ventricle  Mild right ventricular dilation is present. Global right ventricular function  is moderately to severely reduced. RVFAC 19%.     Atria  The right atria appears normal. Moderate left atrial enlargement is present.     Mitral Valve  Mild mitral insufficiency is present. The cause of the mitral insufficiency  appears to be altered left ventricular size and geometry.     Aortic Valve  The aortic valve is  tricuspid. Trace aortic insufficiency is present.     Tricuspid Valve  The tricuspid valve is normal. Mild tricuspid insufficiency is present. The  right ventricular systolic pressure is approximated at 22.1 mmHg plus the  right atrial pressure.     Pulmonic Valve  The pulmonic valve is normal. Trace pulmonic insufficiency is present.     Vessels  The aorta root is normal. The thoracic aorta is normal. Dilation of the  inferior vena cava is present with abnormal respiratory variation in diameter.  IVC diameter >2.1 cm collapsing <50% with sniff suggests a high RA pressure  estimated at 15 mmHg or greater.     Pericardium  Small circumferential pericardial effusion is present without any hemodynamic  significance.  Stress Test: Date: Results:    ECG Reviewed: Date: 7/3/23 Results:  Sinus tachycardia  Cath: Date: Results:      METS/Exercise Tolerance:     Hematologic:     (+) History of blood clots (LV mural thrombus), pt is anticoagulated,     Musculoskeletal:   (+) arthritis,     GI/Hepatic:     (+) liver disease (h/o shock liver),     Renal/Genitourinary:       Endo:       Psychiatric/Substance Use:     (+) psychiatric history anxiety (ADHD) alcohol abuse Recreational drug usage: Meth.    Infectious Disease:  - neg infectious disease ROS     Malignancy:  - neg malignancy ROS     Other:            Physical Exam    Airway        Mallampati: III   TM distance: > 3 FB   Neck ROM: full     Respiratory Devices and Support         Dental       (+) Minor Abnormalities - some fillings, tiny chips      Cardiovascular          Rhythm and rate: regular and normal     Pulmonary   pulmonary exam normal                OUTSIDE LABS:  CBC:   Lab Results   Component Value Date    WBC 7.2 07/16/2023    WBC 6.9 07/15/2023    HGB 12.3 (L) 07/16/2023    HGB 12.9 (L) 07/15/2023    HCT 41.1 07/16/2023    HCT 43.8 07/15/2023     07/16/2023     07/15/2023     BMP:   Lab Results   Component Value Date     07/16/2023    NA  135 (L) 07/15/2023    POTASSIUM 4.4 07/16/2023    POTASSIUM 4.5 07/15/2023    CHLORIDE 103 07/16/2023    CHLORIDE 102 07/15/2023    CO2 22 07/16/2023    CO2 22 07/15/2023    BUN 27.3 (H) 07/16/2023    BUN 26.4 (H) 07/15/2023    CR 1.21 (H) 07/16/2023    CR 1.09 07/15/2023    GLC 81 07/16/2023    GLC 92 07/15/2023     COAGS:   Lab Results   Component Value Date    PTT 85 (H) 07/08/2023    INR 1.30 (H) 07/03/2023     POC: No results found for: BGM, HCG, HCGS  HEPATIC:   Lab Results   Component Value Date    ALBUMIN 3.8 07/16/2023    PROTTOTAL 7.5 07/16/2023    ALT 91 (H) 07/16/2023    AST 35 07/16/2023    ALKPHOS 88 07/16/2023    BILITOTAL 0.4 07/16/2023     OTHER:   Lab Results   Component Value Date    LACT 1.1 07/16/2023    LENARD 9.1 07/16/2023    PHOS 4.5 07/16/2023    MAG 2.2 07/16/2023    TSH 4.01 07/11/2023       Anesthesia Plan    ASA Status:  4   NPO Status:  NPO Appropriate    Anesthesia Type: MAC.   Induction: Intravenous.           Consents    Anesthesia Plan(s) and associated risks, benefits, and realistic alternatives discussed. Questions answered and patient/representative(s) expressed understanding.     - Discussed: Risks, Benefits and Alternatives for BOTH SEDATION and the PROCEDURE were discussed     - Discussed with:  Patient    Use of blood products discussed: Yes.     Postoperative Care    Pain management: Multi-modal analgesia.   PONV prophylaxis: Ondansetron (or other 5HT-3)     Comments:                Suman Dougherty MD
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
## 2023-07-18 ENCOUNTER — APPOINTMENT (OUTPATIENT)
Dept: GENERAL RADIOLOGY | Facility: CLINIC | Age: 38
End: 2023-07-18
Attending: NURSE PRACTITIONER
Payer: COMMERCIAL

## 2023-07-18 ENCOUNTER — ANCILLARY PROCEDURE (OUTPATIENT)
Dept: CARDIOLOGY | Facility: CLINIC | Age: 38
End: 2023-07-18
Attending: NURSE PRACTITIONER
Payer: COMMERCIAL

## 2023-07-18 DIAGNOSIS — Z95.810 ICD (IMPLANTABLE CARDIOVERTER-DEFIBRILLATOR) IN PLACE: Primary | ICD-10-CM

## 2023-07-18 LAB
ALBUMIN SERPL BCG-MCNC: 3.9 G/DL (ref 3.5–5.2)
ALP SERPL-CCNC: 77 U/L (ref 40–129)
ALT SERPL W P-5'-P-CCNC: 57 U/L (ref 0–70)
ANION GAP SERPL CALCULATED.3IONS-SCNC: 11 MMOL/L (ref 7–15)
AST SERPL W P-5'-P-CCNC: 30 U/L (ref 0–45)
ATRIAL RATE - MUSE: 109 BPM
ATRIAL RATE - MUSE: 109 BPM
BASE EXCESS BLDV CALC-SCNC: -2.3 MMOL/L (ref -7.7–1.9)
BILIRUB SERPL-MCNC: 0.5 MG/DL
BUN SERPL-MCNC: 38.4 MG/DL (ref 6–20)
CALCIUM SERPL-MCNC: 9.1 MG/DL (ref 8.6–10)
CHLORIDE SERPL-SCNC: 97 MMOL/L (ref 98–107)
CREAT SERPL-MCNC: 1.44 MG/DL (ref 0.67–1.17)
DEPRECATED HCO3 PLAS-SCNC: 21 MMOL/L (ref 22–29)
DIASTOLIC BLOOD PRESSURE - MUSE: NORMAL MMHG
DIASTOLIC BLOOD PRESSURE - MUSE: NORMAL MMHG
GFR SERPL CREATININE-BSD FRML MDRD: 64 ML/MIN/1.73M2
GLUCOSE SERPL-MCNC: 137 MG/DL (ref 70–99)
HCO3 BLDV-SCNC: 24 MMOL/L (ref 21–28)
INTERPRETATION ECG - MUSE: NORMAL
INTERPRETATION ECG - MUSE: NORMAL
LACTATE SERPL-SCNC: 1.5 MMOL/L (ref 0.7–2)
MAGNESIUM SERPL-MCNC: 2.2 MG/DL (ref 1.7–2.3)
O2/TOTAL GAS SETTING VFR VENT: 21 %
OXYHGB MFR BLDV: 55 % (ref 70–75)
P AXIS - MUSE: 54 DEGREES
P AXIS - MUSE: 58 DEGREES
PCO2 BLDV: 45 MM HG (ref 40–50)
PH BLDV: 7.33 [PH] (ref 7.32–7.43)
PHOSPHATE SERPL-MCNC: 7.7 MG/DL (ref 2.5–4.5)
PO2 BLDV: 34 MM HG (ref 25–47)
POTASSIUM SERPL-SCNC: 5.3 MMOL/L (ref 3.4–5.3)
PR INTERVAL - MUSE: 170 MS
PR INTERVAL - MUSE: 172 MS
PROT SERPL-MCNC: 7.1 G/DL (ref 6.4–8.3)
QRS DURATION - MUSE: 102 MS
QRS DURATION - MUSE: 90 MS
QT - MUSE: 332 MS
QT - MUSE: 350 MS
QTC - MUSE: 447 MS
QTC - MUSE: 471 MS
R AXIS - MUSE: -58 DEGREES
R AXIS - MUSE: 68 DEGREES
SODIUM SERPL-SCNC: 129 MMOL/L (ref 136–145)
SYSTOLIC BLOOD PRESSURE - MUSE: NORMAL MMHG
SYSTOLIC BLOOD PRESSURE - MUSE: NORMAL MMHG
T AXIS - MUSE: 64 DEGREES
T AXIS - MUSE: 67 DEGREES
VENTRICULAR RATE- MUSE: 109 BPM
VENTRICULAR RATE- MUSE: 109 BPM

## 2023-07-18 PROCEDURE — 250N000013 HC RX MED GY IP 250 OP 250 PS 637

## 2023-07-18 PROCEDURE — 250N000013 HC RX MED GY IP 250 OP 250 PS 637: Performed by: INTERNAL MEDICINE

## 2023-07-18 PROCEDURE — 93260 PRGRMG DEV EVAL IMPLTBL SYS: CPT | Mod: 26 | Performed by: INTERNAL MEDICINE

## 2023-07-18 PROCEDURE — 250N000013 HC RX MED GY IP 250 OP 250 PS 637: Performed by: PHYSICIAN ASSISTANT

## 2023-07-18 PROCEDURE — 80053 COMPREHEN METABOLIC PANEL: CPT | Performed by: INTERNAL MEDICINE

## 2023-07-18 PROCEDURE — 93260 PRGRMG DEV EVAL IMPLTBL SYS: CPT

## 2023-07-18 PROCEDURE — 250N000011 HC RX IP 250 OP 636: Mod: JZ | Performed by: INTERNAL MEDICINE

## 2023-07-18 PROCEDURE — 250N000013 HC RX MED GY IP 250 OP 250 PS 637: Performed by: STUDENT IN AN ORGANIZED HEALTH CARE EDUCATION/TRAINING PROGRAM

## 2023-07-18 PROCEDURE — 84100 ASSAY OF PHOSPHORUS: CPT | Performed by: INTERNAL MEDICINE

## 2023-07-18 PROCEDURE — 71046 X-RAY EXAM CHEST 2 VIEWS: CPT | Mod: 26 | Performed by: RADIOLOGY

## 2023-07-18 PROCEDURE — 258N000003 HC RX IP 258 OP 636: Performed by: INTERNAL MEDICINE

## 2023-07-18 PROCEDURE — 82805 BLOOD GASES W/O2 SATURATION: CPT | Performed by: INTERNAL MEDICINE

## 2023-07-18 PROCEDURE — 250N000013 HC RX MED GY IP 250 OP 250 PS 637: Performed by: NURSE PRACTITIONER

## 2023-07-18 PROCEDURE — 99233 SBSQ HOSP IP/OBS HIGH 50: CPT | Mod: 25 | Performed by: INTERNAL MEDICINE

## 2023-07-18 PROCEDURE — 83605 ASSAY OF LACTIC ACID: CPT | Performed by: INTERNAL MEDICINE

## 2023-07-18 PROCEDURE — 250N000009 HC RX 250: Performed by: STUDENT IN AN ORGANIZED HEALTH CARE EDUCATION/TRAINING PROGRAM

## 2023-07-18 PROCEDURE — 999N000065 XR CHEST 2 VIEWS

## 2023-07-18 PROCEDURE — 250N000011 HC RX IP 250 OP 636: Performed by: STUDENT IN AN ORGANIZED HEALTH CARE EDUCATION/TRAINING PROGRAM

## 2023-07-18 PROCEDURE — 83735 ASSAY OF MAGNESIUM: CPT | Performed by: STUDENT IN AN ORGANIZED HEALTH CARE EDUCATION/TRAINING PROGRAM

## 2023-07-18 PROCEDURE — 200N000002 HC R&B ICU UMMC

## 2023-07-18 RX ORDER — NOREPINEPHRINE BITARTRATE 0.06 MG/ML
.01-.6 INJECTION, SOLUTION INTRAVENOUS CONTINUOUS
Status: DISCONTINUED | OUTPATIENT
Start: 2023-07-18 | End: 2023-07-19

## 2023-07-18 RX ORDER — ACETAMINOPHEN 325 MG/1
975 TABLET ORAL EVERY 8 HOURS
Status: DISCONTINUED | OUTPATIENT
Start: 2023-07-18 | End: 2023-07-18

## 2023-07-18 RX ORDER — HYDROMORPHONE HYDROCHLORIDE 1 MG/ML
0.5 INJECTION, SOLUTION INTRAMUSCULAR; INTRAVENOUS; SUBCUTANEOUS EVERY 4 HOURS PRN
Status: COMPLETED | OUTPATIENT
Start: 2023-07-18 | End: 2023-07-18

## 2023-07-18 RX ORDER — OXYCODONE AND ACETAMINOPHEN 10; 325 MG/1; MG/1
1 TABLET ORAL EVERY 4 HOURS PRN
Status: DISCONTINUED | OUTPATIENT
Start: 2023-07-18 | End: 2023-07-20 | Stop reason: HOSPADM

## 2023-07-18 RX ADMIN — FUROSEMIDE 20 MG: 20 TABLET ORAL at 07:57

## 2023-07-18 RX ADMIN — CEPHALEXIN 500 MG: 500 CAPSULE ORAL at 14:00

## 2023-07-18 RX ADMIN — CEPHALEXIN 500 MG: 500 CAPSULE ORAL at 20:20

## 2023-07-18 RX ADMIN — BUPROPION HYDROCHLORIDE 300 MG: 150 TABLET, FILM COATED, EXTENDED RELEASE ORAL at 07:57

## 2023-07-18 RX ADMIN — EMPAGLIFLOZIN 10 MG: 10 TABLET, FILM COATED ORAL at 07:56

## 2023-07-18 RX ADMIN — FUROSEMIDE 20 MG: 20 TABLET ORAL at 16:00

## 2023-07-18 RX ADMIN — APIXABAN 5 MG: 5 TABLET, FILM COATED ORAL at 20:20

## 2023-07-18 RX ADMIN — HYDROXYZINE HYDROCHLORIDE 50 MG: 25 TABLET, FILM COATED ORAL at 14:04

## 2023-07-18 RX ADMIN — APIXABAN 5 MG: 5 TABLET, FILM COATED ORAL at 07:57

## 2023-07-18 RX ADMIN — DOBUTAMINE 5 MCG/KG/MIN: 12.5 INJECTION, SOLUTION, CONCENTRATE INTRAVENOUS at 00:47

## 2023-07-18 RX ADMIN — Medication 1 TABLET: at 07:57

## 2023-07-18 RX ADMIN — HYDROMORPHONE HYDROCHLORIDE 0.5 MG: 1 INJECTION, SOLUTION INTRAMUSCULAR; INTRAVENOUS; SUBCUTANEOUS at 02:14

## 2023-07-18 RX ADMIN — OXYCODONE HYDROCHLORIDE AND ACETAMINOPHEN 1 TABLET: 10; 325 TABLET ORAL at 22:20

## 2023-07-18 RX ADMIN — ACETAMINOPHEN 650 MG: 325 TABLET, FILM COATED ORAL at 08:05

## 2023-07-18 RX ADMIN — OXYCODONE HYDROCHLORIDE AND ACETAMINOPHEN 1 TABLET: 5; 325 TABLET ORAL at 00:53

## 2023-07-18 RX ADMIN — CEPHALEXIN 500 MG: 500 CAPSULE ORAL at 07:58

## 2023-07-18 RX ADMIN — Medication 2.5 MCG/KG/MIN: at 01:01

## 2023-07-18 RX ADMIN — SPIRONOLACTONE 25 MG: 25 TABLET, FILM COATED ORAL at 07:57

## 2023-07-18 RX ADMIN — OXYCODONE HYDROCHLORIDE AND ACETAMINOPHEN 1 TABLET: 10; 325 TABLET ORAL at 18:08

## 2023-07-18 RX ADMIN — FOLIC ACID 1 MG: 1 TABLET ORAL at 07:57

## 2023-07-18 RX ADMIN — HYDROXYZINE HYDROCHLORIDE 50 MG: 25 TABLET, FILM COATED ORAL at 20:20

## 2023-07-18 RX ADMIN — HYDROXYZINE HYDROCHLORIDE 50 MG: 25 TABLET, FILM COATED ORAL at 07:56

## 2023-07-18 RX ADMIN — OXYCODONE HYDROCHLORIDE AND ACETAMINOPHEN 1 TABLET: 10; 325 TABLET ORAL at 14:00

## 2023-07-18 RX ADMIN — Medication 0.03 MCG/KG/MIN: at 02:11

## 2023-07-18 RX ADMIN — HYDROXYZINE HYDROCHLORIDE 50 MG: 25 TABLET, FILM COATED ORAL at 02:54

## 2023-07-18 RX ADMIN — RAMELTEON 8 MG: 8 TABLET, FILM COATED ORAL at 22:25

## 2023-07-18 ASSESSMENT — ACTIVITIES OF DAILY LIVING (ADL)
ADLS_ACUITY_SCORE: 21
ADLS_ACUITY_SCORE: 23
ADLS_ACUITY_SCORE: 23
ADLS_ACUITY_SCORE: 21
ADLS_ACUITY_SCORE: 23
ADLS_ACUITY_SCORE: 23
ADLS_ACUITY_SCORE: 21
ADLS_ACUITY_SCORE: 21

## 2023-07-18 NOTE — PROGRESS NOTES
[facility]     Addiction Progress Note - Addiction Service        Date of Admission:  7/3/2023  Assessment & Plan       Jose Enrique is a 38-year-old gentleman with past medical history of ADHD who was transferred from [facility] on 7/3/23 due to cardiogenic shock from non-ischemic cardiomyopathy complicated and possibly secondary to methamphetamine and alcohol use.     # Alcohol Use Disorder  # Acute on chronic systolic heart failure/HFrEF with EF = 14% secondary due to alcohol and amphetamine use  Goal is abstinence from alcohol and reports meeting this goal prior to hospitalization. Denies cravings.  We discussed different options to maintain goal of sobriety, including medications to manage cravings if they develop in the future, including acamprosate and naltrexone. He does not feel like he needs any medication to maintain sobriety and does not want another pill to take.   - For addiction medicine follow up, discussed local connection ([facility] in [place], [address] [place]  [address] [place] [address]   Phone: [phone]. Fax: [phone]) or virtual, through [place]. Will provide information in AVS at discharge.     # ADHD  # Methamphetamine Use Disorder  # anxiety  - F/u with Agatha Romero Psych [facility] at [place] (Will provide contact info to community health care worker to link to follow up. ([address] [place] [address], [phone])  - Continue Wellbutrin, 300 mg XR, tolerating well, please prescribe at discharge     # Peer Support:   -Our peer  will meet the patient if agreeable and still hospitalized on Thursday, to provide additional outpatient resources  -To contact Tiara Peer  from  [facility] ([facility]): call or text: [phone]     # Addiction Social Worker:   Our addiction social worker Zenon Romero can be contacted if needed, on her pager [phone] or texted/called at [phone]  --Patient is interested in ONLY  "outpatient addiction treatment after discharge.  Our addiction social worker will give the patient appropriate resources for outpatient evaluation.  -- Patient likely discharging tomorrow- will confirm with Zenon tomorrow that he has resources needed     Linkage to care:  As above, prior to discharge, will link to addiction medicine as well as mental health for ADHD management    The patient's care was discussed with the Patient.     Time Spent on this Encounter   I spent 50 minutes on the unit/floor managing the care of Jose Enrique Engel. Over 50% of my time was spent on the following:   - Counseling the patient and/or family regarding: risks and benefits of treatment options, recommended follow-up, medical compliance and prevention of disease  - Coordination of care with the: coordination of care not performed today    Heather Choudhary MD  Addiction Service   [facility]     Contact information available via [facility] Paging/Directory under \"addiction medicine\"         ______________________________________________________________________    Interval History   No acute events overnight. Jose Enrique is getting close to discharge. He is looking forward to seeing his son. He has a good support system to help him stay sober. He also will not be working which was a big trigger for him to drink. 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    ROS:  CV, Pulm, GI and  assessed. Pertinent positives as above, otherwise negative.     Data reviewed today: I reviewed all medications, new labs and imaging results over the last 24 hours.     Physical Exam   BP 93/69   Pulse 93   Temp 97.5  F (36.4  C) (Axillary)   Resp 10   Ht 1.803 m (5' 11\")   Wt 79.4 kg (175 lb)   SpO2 99%   BMI 24.41 kg/m      Gen: NAD  HEENT: EOMI, PERRL, MMM  CV: extremities warm and well perfused  Resp: breathing comfortably on RA  Msk: no LE edema  Skin: no rashes  Neuro: nonfocal exam, alert, oriented, " talkative     Due to regulation of Title 42 of the Code of Federal Regulations (CFR) Part 2: Confidentiality laws apply to this note and the information wherein.  Thus, this note cannot be copy and pasted into any other health care staff's note nor can it be included in general medical records sent to ANY outside agency without the patient's written consent.

## 2023-07-18 NOTE — PLAN OF CARE
Care Management Follow Up    Length of Stay (days): 15    Expected Discharge Date: 07/20/2023     Concerns to be Addressed: discharge planning     Patient plan of care discussed at interdisciplinary rounds: Yes    Anticipated Discharge Disposition: Home,    Anticipated Discharge Services:Home Infusion      Anticipated Discharge DME:  Home Infusion supplies       Patient/family educated on Medicare website which has current facility and service quality ratings: no  Education Provided on the Discharge Plan:  N/a   Patient/Family in Agreement with the Plan: n/a     Referrals Placed by CM/SW: External Care Coordination, Home Infusion  Private pay costs discussed: n/a   Additional Information:  RNCC was notified that Dobutamine was delivered to ICU from [facility] for the patient to start on Dobutamine for discharge home. Patient had clinical decline and was transferred to ICU in past 24 hours.   Confirmed with Madison Henriquez Fellow (pager#[phone]) that patient still on pressors. Patient will need to be clinically stable for the discharge home.   Per communication with [facility] pharmacist, ([phone]), hospital shipment of Dobutamine will be returned back to their pharmacy.     Updated primary RN.     Care Management team will continue to follow for discharge needs during this hospitalization.       Ghada Treadwell, MSN, MA, CCM, RN  4C/4A ICU Care Coordinator  Phone:[phone]  Pager: [phone]    For Weekend & Holiday on call RN Care Coordinator:  [facility] ([phone]) Saturday & Sunday; [phone]) [facility] Recognized Holidays   Pager: [phone] Units: 4A, 4C, 4E, 5A & 5B
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
## 2023-07-18 NOTE — PLAN OF CARE
ICU End of Shift Summary. See flowsheets for vital signs and detailed assessment.    Changes this shift: Alert and oriented x4, frustrated at time about pain management. Patient endorses incisional pain made on his chest area managed with dilaudid, atarax. Percocet discontinued. Titrating levophed to maintain MAP >65. Phenylphrine stopped. Tachycardic 's.  Incisions covered with dressing. Cards 2 notified about patient's lab this morning    Plan:  Continue with plan of care and notify provider with changes.

## 2023-07-18 NOTE — PLAN OF CARE
ICU End of Shift Summary. See flowsheets for vital signs and detailed assessment.    Changes this shift: Levo to maintain MAP over 65. Range of 0.02-0.08 today.  Repeated requests and inappropriate behavior related to seeking narcotics. Alert and oriented.  Standby assist.  Refused walks in gupta despite multiple attempts at encouraging patient to ambulate.  Patient also intermittently ripping of BP cuff despite RN education. Patient also turned on IV pump containing phenylephrine (it was not attached to the patient, it was capped).  RN educated patient that if he touches IV pump again he will require 1:1 sitter, also educated on the safety regarding only the RN managing the pump/medications.  Patient verbalized understanding.     Plan: continue to wean levo as tolerated. Continue plan of care.       Goal Outcome Evaluation: ongoing      Plan of Care Reviewed With: patient    Overall Patient Progress: no changeOverall Patient Progress: no change

## 2023-07-18 NOTE — PLAN OF CARE
"Admitted/transferred from: PACU  Reason for admission/transfer: Hypotension  Patient status upon admission/transfer: Phenyl 0.6 Dobutamine 5  Interventions: Monitored pt, notified MD that pt would like more for pain would like to be \"knocked out for the night\"  Plan: Titrate to keep MAP >65  2 RN skin assessment: completed by Dariusz  Result of skin assessment and interventions/actions: generalized bruising and scabs  Height, weight, drug calc weight: Done  Patient belongings (see Flowsheet - Adult Profile for details): lita jackson, 2 cell phones, ipad, headphones, wallet, shoes, clothes,  "

## 2023-07-19 LAB
ALBUMIN SERPL BCG-MCNC: 4 G/DL (ref 3.5–5.2)
ALP SERPL-CCNC: 81 U/L (ref 40–129)
ALT SERPL W P-5'-P-CCNC: 48 U/L (ref 0–70)
ANION GAP SERPL CALCULATED.3IONS-SCNC: 10 MMOL/L (ref 7–15)
AST SERPL W P-5'-P-CCNC: 32 U/L (ref 0–45)
BASE EXCESS BLDV CALC-SCNC: -0.5 MMOL/L (ref -7.7–1.9)
BASOPHILS # BLD AUTO: 0 10E3/UL (ref 0–0.2)
BASOPHILS NFR BLD AUTO: 1 %
BILIRUB SERPL-MCNC: 0.4 MG/DL
BUN SERPL-MCNC: 34.2 MG/DL (ref 6–20)
CALCIUM SERPL-MCNC: 9.3 MG/DL (ref 8.6–10)
CHLORIDE SERPL-SCNC: 96 MMOL/L (ref 98–107)
CREAT SERPL-MCNC: 0.95 MG/DL (ref 0.67–1.17)
DEPRECATED HCO3 PLAS-SCNC: 22 MMOL/L (ref 22–29)
EOSINOPHIL # BLD AUTO: 0.3 10E3/UL (ref 0–0.7)
EOSINOPHIL NFR BLD AUTO: 4 %
ERYTHROCYTE [DISTWIDTH] IN BLOOD BY AUTOMATED COUNT: 27.5 % (ref 10–15)
GFR SERPL CREATININE-BSD FRML MDRD: >90 ML/MIN/1.73M2
GLUCOSE SERPL-MCNC: 127 MG/DL (ref 70–99)
HCO3 BLDV-SCNC: 25 MMOL/L (ref 21–28)
HCT VFR BLD AUTO: 39.1 % (ref 40–53)
HGB BLD-MCNC: 11.8 G/DL (ref 13.3–17.7)
IMM GRANULOCYTES # BLD: 0 10E3/UL
IMM GRANULOCYTES NFR BLD: 0 %
LACTATE SERPL-SCNC: 1 MMOL/L (ref 0.7–2)
LYMPHOCYTES # BLD AUTO: 2.3 10E3/UL (ref 0.8–5.3)
LYMPHOCYTES NFR BLD AUTO: 31 %
MAGNESIUM SERPL-MCNC: 2.1 MG/DL (ref 1.7–2.3)
MCH RBC QN AUTO: 22.4 PG (ref 26.5–33)
MCHC RBC AUTO-ENTMCNC: 30.2 G/DL (ref 31.5–36.5)
MCV RBC AUTO: 74 FL (ref 78–100)
MONOCYTES # BLD AUTO: 0.9 10E3/UL (ref 0–1.3)
MONOCYTES NFR BLD AUTO: 12 %
NEUTROPHILS # BLD AUTO: 3.8 10E3/UL (ref 1.6–8.3)
NEUTROPHILS NFR BLD AUTO: 52 %
NRBC # BLD AUTO: 0 10E3/UL
NRBC BLD AUTO-RTO: 0 /100
O2/TOTAL GAS SETTING VFR VENT: 21 %
OXYHGB MFR BLDV: 52 % (ref 70–75)
PCO2 BLDV: 45 MM HG (ref 40–50)
PH BLDV: 7.36 [PH] (ref 7.32–7.43)
PLATELET # BLD AUTO: 310 10E3/UL (ref 150–450)
PO2 BLDV: 33 MM HG (ref 25–47)
POTASSIUM SERPL-SCNC: 4.8 MMOL/L (ref 3.4–5.3)
PROT SERPL-MCNC: 7.6 G/DL (ref 6.4–8.3)
RBC # BLD AUTO: 5.27 10E6/UL (ref 4.4–5.9)
SODIUM SERPL-SCNC: 128 MMOL/L (ref 136–145)
WBC # BLD AUTO: 7.3 10E3/UL (ref 4–11)

## 2023-07-19 PROCEDURE — 250N000013 HC RX MED GY IP 250 OP 250 PS 637: Performed by: INTERNAL MEDICINE

## 2023-07-19 PROCEDURE — 250N000013 HC RX MED GY IP 250 OP 250 PS 637: Performed by: NURSE PRACTITIONER

## 2023-07-19 PROCEDURE — 83605 ASSAY OF LACTIC ACID: CPT | Performed by: INTERNAL MEDICINE

## 2023-07-19 PROCEDURE — 250N000013 HC RX MED GY IP 250 OP 250 PS 637: Performed by: STUDENT IN AN ORGANIZED HEALTH CARE EDUCATION/TRAINING PROGRAM

## 2023-07-19 PROCEDURE — 80053 COMPREHEN METABOLIC PANEL: CPT | Performed by: INTERNAL MEDICINE

## 2023-07-19 PROCEDURE — 99233 SBSQ HOSP IP/OBS HIGH 50: CPT | Performed by: STUDENT IN AN ORGANIZED HEALTH CARE EDUCATION/TRAINING PROGRAM

## 2023-07-19 PROCEDURE — 99231 SBSQ HOSP IP/OBS SF/LOW 25: CPT | Mod: GC | Performed by: INTERNAL MEDICINE

## 2023-07-19 PROCEDURE — 200N000002 HC R&B ICU UMMC

## 2023-07-19 PROCEDURE — 83735 ASSAY OF MAGNESIUM: CPT | Performed by: STUDENT IN AN ORGANIZED HEALTH CARE EDUCATION/TRAINING PROGRAM

## 2023-07-19 PROCEDURE — 258N000003 HC RX IP 258 OP 636: Performed by: INTERNAL MEDICINE

## 2023-07-19 PROCEDURE — 250N000011 HC RX IP 250 OP 636: Mod: JZ | Performed by: INTERNAL MEDICINE

## 2023-07-19 PROCEDURE — 85025 COMPLETE CBC W/AUTO DIFF WBC: CPT

## 2023-07-19 PROCEDURE — 250N000013 HC RX MED GY IP 250 OP 250 PS 637

## 2023-07-19 PROCEDURE — 82805 BLOOD GASES W/O2 SATURATION: CPT | Performed by: INTERNAL MEDICINE

## 2023-07-19 RX ORDER — TORSEMIDE 20 MG/1
20 TABLET ORAL DAILY
Status: DISCONTINUED | OUTPATIENT
Start: 2023-07-19 | End: 2023-07-20 | Stop reason: HOSPADM

## 2023-07-19 RX ORDER — FERROUS SULFATE 325(65) MG
325 TABLET, DELAYED RELEASE (ENTERIC COATED) ORAL DAILY
Qty: 30 TABLET | Refills: 0 | Status: SHIPPED | OUTPATIENT
Start: 2023-07-19 | End: 2023-08-18

## 2023-07-19 RX ORDER — ONDANSETRON 4 MG/1
4 TABLET, ORALLY DISINTEGRATING ORAL EVERY 8 HOURS PRN
Qty: 30 TABLET | Refills: 0 | Status: SHIPPED | OUTPATIENT
Start: 2023-07-19

## 2023-07-19 RX ORDER — RAMELTEON 8 MG/1
8 TABLET ORAL AT BEDTIME
Qty: 90 TABLET | Refills: 1 | Status: SHIPPED | OUTPATIENT
Start: 2023-07-19

## 2023-07-19 RX ORDER — TORSEMIDE 20 MG/1
20 TABLET ORAL DAILY
Qty: 30 TABLET | Refills: 0 | Status: SHIPPED | OUTPATIENT
Start: 2023-07-20 | End: 2023-08-09

## 2023-07-19 RX ORDER — SPIRONOLACTONE 25 MG/1
25 TABLET ORAL DAILY
Qty: 30 TABLET | Refills: 1 | Status: SHIPPED | OUTPATIENT
Start: 2023-07-20

## 2023-07-19 RX ORDER — CEPHALEXIN 500 MG/1
500 CAPSULE ORAL 3 TIMES DAILY
Qty: 6 CAPSULE | Refills: 0 | Status: SHIPPED | OUTPATIENT
Start: 2023-07-19 | End: 2023-07-21

## 2023-07-19 RX ADMIN — CEPHALEXIN 500 MG: 500 CAPSULE ORAL at 13:12

## 2023-07-19 RX ADMIN — Medication 1 TABLET: at 08:46

## 2023-07-19 RX ADMIN — FOLIC ACID 1 MG: 1 TABLET ORAL at 08:46

## 2023-07-19 RX ADMIN — SPIRONOLACTONE 25 MG: 25 TABLET, FILM COATED ORAL at 08:46

## 2023-07-19 RX ADMIN — Medication 10 MG: at 23:55

## 2023-07-19 RX ADMIN — DOBUTAMINE 5 MCG/KG/MIN: 12.5 INJECTION, SOLUTION, CONCENTRATE INTRAVENOUS at 15:45

## 2023-07-19 RX ADMIN — EMPAGLIFLOZIN 10 MG: 10 TABLET, FILM COATED ORAL at 08:48

## 2023-07-19 RX ADMIN — APIXABAN 5 MG: 5 TABLET, FILM COATED ORAL at 19:32

## 2023-07-19 RX ADMIN — TORSEMIDE 20 MG: 20 TABLET ORAL at 08:59

## 2023-07-19 RX ADMIN — CEPHALEXIN 500 MG: 500 CAPSULE ORAL at 08:46

## 2023-07-19 RX ADMIN — CEPHALEXIN 500 MG: 500 CAPSULE ORAL at 19:32

## 2023-07-19 RX ADMIN — OXYCODONE HYDROCHLORIDE AND ACETAMINOPHEN 1 TABLET: 10; 325 TABLET ORAL at 13:12

## 2023-07-19 RX ADMIN — OXYCODONE HYDROCHLORIDE AND ACETAMINOPHEN 1 TABLET: 10; 325 TABLET ORAL at 08:47

## 2023-07-19 RX ADMIN — OXYCODONE HYDROCHLORIDE AND ACETAMINOPHEN 1 TABLET: 10; 325 TABLET ORAL at 02:20

## 2023-07-19 RX ADMIN — RAMELTEON 8 MG: 8 TABLET, FILM COATED ORAL at 21:08

## 2023-07-19 RX ADMIN — OXYCODONE HYDROCHLORIDE AND ACETAMINOPHEN 1 TABLET: 10; 325 TABLET ORAL at 22:12

## 2023-07-19 RX ADMIN — APIXABAN 5 MG: 5 TABLET, FILM COATED ORAL at 08:46

## 2023-07-19 RX ADMIN — OXYCODONE HYDROCHLORIDE AND ACETAMINOPHEN 1 TABLET: 10; 325 TABLET ORAL at 17:56

## 2023-07-19 RX ADMIN — HYDROXYZINE HYDROCHLORIDE 50 MG: 25 TABLET, FILM COATED ORAL at 02:21

## 2023-07-19 RX ADMIN — HYDROXYZINE HYDROCHLORIDE 50 MG: 25 TABLET, FILM COATED ORAL at 20:54

## 2023-07-19 RX ADMIN — BUPROPION HYDROCHLORIDE 300 MG: 150 TABLET, FILM COATED, EXTENDED RELEASE ORAL at 08:46

## 2023-07-19 RX ADMIN — HYDROXYZINE HYDROCHLORIDE 50 MG: 25 TABLET, FILM COATED ORAL at 08:47

## 2023-07-19 RX ADMIN — HYDROXYZINE HYDROCHLORIDE 50 MG: 25 TABLET, FILM COATED ORAL at 15:17

## 2023-07-19 ASSESSMENT — ACTIVITIES OF DAILY LIVING (ADL)
ADLS_ACUITY_SCORE: 21

## 2023-07-19 NOTE — PROGRESS NOTES
Clinical Nutrition Services- Brief Note    Reviewed nutrition risk factors due to LOS. Pt is tolerating a Regular diet, eating well per nursing documentation (mostly % of meals when not NPO). No nutrition issues identified at this time. RD will continue to follow per nutrition protocol.  Zaira Barron MS, RD, LD, [facility]  MICU pager: [phone]  ASCOM: [id number]
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
## 2023-07-19 NOTE — PLAN OF CARE
ICU End of Shift Summary. See flowsheets for vital signs and detailed assessment.    Changes this shift: VSS. -110's. Normotensive off Levo. Dobutamine gtt continues. RA. A&O x4. Cooperative and pleasant today. Continues to endorse moderate pain around ICD incisions. PRN's given. See MAR. Writer offered multiples times t/o the shift to bath and walk. Pt declined. Pt has not gotten out of bed this shift.     Plan: VS Q4. Continue dobutamine gtt. Discharge Thursday afternoon. Pt's father is planning to arrive @ 1300.           Goal Outcome Evaluation:      Plan of Care Reviewed With: patient    Overall Patient Progress: improvingOverall Patient Progress: improving    Outcome Evaluation: remains off levo, VSS, cooperative & calm, plan to d/c thursday afternoon

## 2023-07-19 NOTE — PLAN OF CARE
Care Management Follow Up    Length of Stay (days): 16    Expected Discharge Date: 07/20/2023     Concerns to be Addressed: discharge planning     Patient plan of care discussed at interdisciplinary rounds: Yes    Anticipated Discharge Disposition: Home,   Anticipated Discharge Services:   Home Infusion    Anticipated Discharge DME:   Home Infusion supplies       Referrals Placed by CM/SW: [facility]  Private pay costs discussed: insurance costs out of pocket expenses per insurance policy    Additional Information:  RNCC was notified that patient will likely discharge on 7/20 and will need home IV Dobutamine set up.     Spoke with [facility] liaison ([phone]) and requested to initiate Dobutamine delivery for tomorrow.   [facility] will need to be notified about the discharge transportation time.     Updated primary primary RN.      Ghada Treadwell, MSN, MA, CCM, RN  4C/4A ICU Care Coordinator  Phone:[phone]  Pager: [phone]    For Weekend & Holiday on call RN Care Coordinator:  [facility] ([phone]) Saturday & Sunday; ([phone]) [facility] Holidays   Pager: [phone] Units: 4A, 4C, 4E, 5A & 5B

## 2023-07-19 NOTE — PLAN OF CARE
ICU End of Shift Summary. See flowsheets for vital signs and detailed assessment.    Changes this shift: Alert and oriented x4, VSS on room, levo stopped around midnight Map>65. Frustrated at time overall pleasant.Pain managed with norco Q 4 hours. Dobutamine gtts still infusing.     Plan:  Encourage patient to ambulate, notify provider with changes.

## 2023-07-20 VITALS
WEIGHT: 175 LBS | SYSTOLIC BLOOD PRESSURE: 98 MMHG | HEIGHT: 71 IN | RESPIRATION RATE: 16 BRPM | TEMPERATURE: 98.1 F | HEART RATE: 106 BPM | OXYGEN SATURATION: 98 % | DIASTOLIC BLOOD PRESSURE: 73 MMHG | BODY MASS INDEX: 24.5 KG/M2

## 2023-07-20 LAB
ALBUMIN SERPL BCG-MCNC: 4 G/DL (ref 3.5–5.2)
ALP SERPL-CCNC: 80 U/L (ref 40–129)
ALT SERPL W P-5'-P-CCNC: 46 U/L (ref 0–70)
ANION GAP SERPL CALCULATED.3IONS-SCNC: 10 MMOL/L (ref 7–15)
AST SERPL W P-5'-P-CCNC: 32 U/L (ref 0–45)
BASE EXCESS BLDV CALC-SCNC: 2.2 MMOL/L (ref -7.7–1.9)
BASOPHILS # BLD AUTO: 0.1 10E3/UL (ref 0–0.2)
BASOPHILS NFR BLD AUTO: 1 %
BILIRUB SERPL-MCNC: 0.3 MG/DL
BUN SERPL-MCNC: 35.6 MG/DL (ref 6–20)
CALCIUM SERPL-MCNC: 9.3 MG/DL (ref 8.6–10)
CHLORIDE SERPL-SCNC: 96 MMOL/L (ref 98–107)
CREAT SERPL-MCNC: 0.69 MG/DL (ref 0.67–1.17)
DEPRECATED HCO3 PLAS-SCNC: 24 MMOL/L (ref 22–29)
EOSINOPHIL # BLD AUTO: 0.3 10E3/UL (ref 0–0.7)
EOSINOPHIL NFR BLD AUTO: 5 %
ERYTHROCYTE [DISTWIDTH] IN BLOOD BY AUTOMATED COUNT: 27.2 % (ref 10–15)
GFR SERPL CREATININE-BSD FRML MDRD: >90 ML/MIN/1.73M2
GLUCOSE BLDC GLUCOMTR-MCNC: 97 MG/DL (ref 70–99)
GLUCOSE SERPL-MCNC: 156 MG/DL (ref 70–99)
HCO3 BLDV-SCNC: 28 MMOL/L (ref 21–28)
HCT VFR BLD AUTO: 38.4 % (ref 40–53)
HGB BLD-MCNC: 11.7 G/DL (ref 13.3–17.7)
IMM GRANULOCYTES # BLD: 0 10E3/UL
IMM GRANULOCYTES NFR BLD: 0 %
LACTATE SERPL-SCNC: 1 MMOL/L (ref 0.7–2)
LYMPHOCYTES # BLD AUTO: 2.4 10E3/UL (ref 0.8–5.3)
LYMPHOCYTES NFR BLD AUTO: 36 %
MAGNESIUM SERPL-MCNC: 2 MG/DL (ref 1.7–2.3)
MCH RBC QN AUTO: 22.9 PG (ref 26.5–33)
MCHC RBC AUTO-ENTMCNC: 30.5 G/DL (ref 31.5–36.5)
MCV RBC AUTO: 75 FL (ref 78–100)
MONOCYTES # BLD AUTO: 0.9 10E3/UL (ref 0–1.3)
MONOCYTES NFR BLD AUTO: 14 %
NEUTROPHILS # BLD AUTO: 3 10E3/UL (ref 1.6–8.3)
NEUTROPHILS NFR BLD AUTO: 44 %
NRBC # BLD AUTO: 0 10E3/UL
NRBC BLD AUTO-RTO: 0 /100
O2/TOTAL GAS SETTING VFR VENT: 23 %
OXYHGB MFR BLDV: 44 % (ref 70–75)
PCO2 BLDV: 47 MM HG (ref 40–50)
PH BLDV: 7.38 [PH] (ref 7.32–7.43)
PHOSPHATE SERPL-MCNC: 4.8 MG/DL (ref 2.5–4.5)
PLATELET # BLD AUTO: 328 10E3/UL (ref 150–450)
PO2 BLDV: 29 MM HG (ref 25–47)
POTASSIUM SERPL-SCNC: 4.1 MMOL/L (ref 3.4–5.3)
PROT SERPL-MCNC: 7.5 G/DL (ref 6.4–8.3)
RBC # BLD AUTO: 5.11 10E6/UL (ref 4.4–5.9)
SODIUM SERPL-SCNC: 130 MMOL/L (ref 136–145)
WBC # BLD AUTO: 6.6 10E3/UL (ref 4–11)

## 2023-07-20 PROCEDURE — 84100 ASSAY OF PHOSPHORUS: CPT | Performed by: INTERNAL MEDICINE

## 2023-07-20 PROCEDURE — 83605 ASSAY OF LACTIC ACID: CPT | Performed by: INTERNAL MEDICINE

## 2023-07-20 PROCEDURE — 83735 ASSAY OF MAGNESIUM: CPT | Performed by: STUDENT IN AN ORGANIZED HEALTH CARE EDUCATION/TRAINING PROGRAM

## 2023-07-20 PROCEDURE — 80053 COMPREHEN METABOLIC PANEL: CPT | Performed by: INTERNAL MEDICINE

## 2023-07-20 PROCEDURE — 250N000013 HC RX MED GY IP 250 OP 250 PS 637

## 2023-07-20 PROCEDURE — 250N000013 HC RX MED GY IP 250 OP 250 PS 637: Performed by: INTERNAL MEDICINE

## 2023-07-20 PROCEDURE — 99239 HOSP IP/OBS DSCHRG MGMT >30: CPT | Mod: GC | Performed by: INTERNAL MEDICINE

## 2023-07-20 PROCEDURE — 250N000013 HC RX MED GY IP 250 OP 250 PS 637: Performed by: STUDENT IN AN ORGANIZED HEALTH CARE EDUCATION/TRAINING PROGRAM

## 2023-07-20 PROCEDURE — 250N000013 HC RX MED GY IP 250 OP 250 PS 637: Performed by: NURSE PRACTITIONER

## 2023-07-20 PROCEDURE — 82805 BLOOD GASES W/O2 SATURATION: CPT | Performed by: INTERNAL MEDICINE

## 2023-07-20 PROCEDURE — 85025 COMPLETE CBC W/AUTO DIFF WBC: CPT

## 2023-07-20 RX ORDER — BUPROPION HYDROCHLORIDE 300 MG/1
300 TABLET ORAL DAILY
Qty: 90 TABLET | Refills: 3 | Status: SHIPPED | OUTPATIENT
Start: 2023-07-20

## 2023-07-20 RX ORDER — MAGNESIUM OXIDE 400 MG/1
400 TABLET ORAL EVERY 4 HOURS
Status: COMPLETED | OUTPATIENT
Start: 2023-07-20 | End: 2023-07-20

## 2023-07-20 RX ORDER — DOBUTAMINE HCL IN DEXTROSE 5 % 500MG/250
5 INTRAVENOUS SOLUTION INTRAVENOUS CONTINUOUS
Status: DISCONTINUED | OUTPATIENT
Start: 2023-07-20 | End: 2023-07-20 | Stop reason: HOSPADM

## 2023-07-20 RX ORDER — DOBUTAMINE HCL IN DEXTROSE 5 % 500MG/250
5 INTRAVENOUS SOLUTION INTRAVENOUS CONTINUOUS
Start: 2023-07-20

## 2023-07-20 RX ORDER — SACUBITRIL AND VALSARTAN 24; 26 MG/1; MG/1
1 TABLET, FILM COATED ORAL 2 TIMES DAILY
Qty: 180 TABLET | Refills: 3 | Status: SHIPPED | OUTPATIENT
Start: 2023-07-20

## 2023-07-20 RX ORDER — OXYCODONE HYDROCHLORIDE 5 MG/1
5 TABLET ORAL EVERY 6 HOURS PRN
Qty: 10 TABLET | Refills: 0 | Status: SHIPPED | OUTPATIENT
Start: 2023-07-20 | End: 2023-07-23

## 2023-07-20 RX ADMIN — OXYCODONE HYDROCHLORIDE AND ACETAMINOPHEN 1 TABLET: 10; 325 TABLET ORAL at 03:04

## 2023-07-20 RX ADMIN — Medication 1 TABLET: at 07:42

## 2023-07-20 RX ADMIN — OXYCODONE HYDROCHLORIDE AND ACETAMINOPHEN 1 TABLET: 10; 325 TABLET ORAL at 12:03

## 2023-07-20 RX ADMIN — FOLIC ACID 1 MG: 1 TABLET ORAL at 07:41

## 2023-07-20 RX ADMIN — CEPHALEXIN 500 MG: 500 CAPSULE ORAL at 07:41

## 2023-07-20 RX ADMIN — EMPAGLIFLOZIN 10 MG: 10 TABLET, FILM COATED ORAL at 07:42

## 2023-07-20 RX ADMIN — MAGNESIUM OXIDE TAB 400 MG (241.3 MG ELEMENTAL MG) 400 MG: 400 (241.3 MG) TAB at 11:22

## 2023-07-20 RX ADMIN — TORSEMIDE 20 MG: 20 TABLET ORAL at 07:42

## 2023-07-20 RX ADMIN — MAGNESIUM OXIDE TAB 400 MG (241.3 MG ELEMENTAL MG) 400 MG: 400 (241.3 MG) TAB at 06:25

## 2023-07-20 RX ADMIN — SPIRONOLACTONE 25 MG: 25 TABLET, FILM COATED ORAL at 07:41

## 2023-07-20 RX ADMIN — HYDROXYZINE HYDROCHLORIDE 50 MG: 25 TABLET, FILM COATED ORAL at 06:25

## 2023-07-20 RX ADMIN — APIXABAN 5 MG: 5 TABLET, FILM COATED ORAL at 07:41

## 2023-07-20 RX ADMIN — POLYETHYLENE GLYCOL 3350 17 G: 17 POWDER, FOR SOLUTION ORAL at 07:41

## 2023-07-20 RX ADMIN — BUPROPION HYDROCHLORIDE 300 MG: 150 TABLET, FILM COATED, EXTENDED RELEASE ORAL at 07:42

## 2023-07-20 RX ADMIN — OXYCODONE HYDROCHLORIDE AND ACETAMINOPHEN 1 TABLET: 10; 325 TABLET ORAL at 07:41

## 2023-07-20 RX ADMIN — SACUBITRIL AND VALSARTAN 1 TABLET: 24; 26 TABLET, FILM COATED ORAL at 11:22

## 2023-07-20 ASSESSMENT — ACTIVITIES OF DAILY LIVING (ADL)
ADLS_ACUITY_SCORE: 21
ADLS_ACUITY_SCORE: 21
ADLS_ACUITY_SCORE: 22
ADLS_ACUITY_SCORE: 21
ADLS_ACUITY_SCORE: 22

## 2023-07-20 NOTE — PROGRESS NOTES
Home Infusion  Jose Enrique is discharging today and going home on continuous dobutamine therapy.   He requires a hook up of home medication and pump prior to discharge.    Met with Jose Enrique and his dad Neal at bedside once supplies arrived.  Provided hook up of the home dobutamine pump after reviewing pump settings and verifying with orders while Jose Enrique and his dad observed.   Reminded them to never flush thedobutamine lumen, about the back up pump and 24/7 support of [facility] staff while on home IV therapy.   Jose Enrique will be going to clinic for weekly PICC dressing changes in [place].  Neal has had PLC which he said went very well and he feels very comfortable managing the daily dobutamine bag changes independently.    Jose Enrique and Neal verbalized understanding of information given.  They feel comfortable discharging and managing the IV therapy at home.  His home dobutamine infusion is running and he is ready for discharge from [facility] perspective.      Marietta ESPINAL  Nurse Liaison  [facility] I [email or web address]  [address] [place] [address]  [email or web address]  [phone] M-F I [facility] main: [phone]

## 2023-07-20 NOTE — PLAN OF CARE
Care Management Discharge Note    Discharge Date: 07/20/2023       Discharge Disposition: Home, Home Infusion, Outpatient Rehab (PT, OT, SLP, Cardiac or Pulmonary)    Discharge Services: Mental Health Resources, Outpatient cardiac rehab, [facility]    Discharge DME: None    Discharge Transportation: family or friend will provide    Private pay costs discussed: insurance costs out of pocket expenses and deductibles    Does the patient's insurance plan have a 3 day qualifying hospital stay waiver?  No    PAS Confirmation Code:  N/a   Patient/family educated on Medicare website which has current facility and service quality ratings: no (patient had home infusions prior to admissions)    Education Provided on the Discharge Plan: Yes  Persons Notified of Discharge Plans: patient, father and RN  Patient/Family in Agreement with the Plan: yes    Handoff Referral Completed: Yes    Additional Information:  RNCC confirmed with [facility] ([facility]) that patient will be hooked up to Home Dobutamine by their staff prior to discharge.   Patient will have labs and PICC line care at the clinic.   Outpatient Cardiac rehab will be scheduled by cards team.     Hand off report sent to patient's PCP.      Father at bedside to provide the discharge transportation.     Ghada Treadwell, MSN, MA, CCM, RN  4C/4A ICU Care Coordinator  Phone:[phone]  Pager: [phone]    For Weekend & Holiday on call RN Care Coordinator:  [facility] ([phone]) Saturday & Sunday; ([phone]) [facility] Holidays   Pager: [phone] Units: 4A, 4C, 4E, 5A & 5B

## 2023-07-20 NOTE — PROGRESS NOTES
Addiction Team Social Work Note    Date of  Intervention: 07/20/23  Collaborated with:  Dr. Choudhary, Addiction Attending; patient      Patient information: Addiction Medicine team following for support, resources and linkage to care.      Intervention:  Chart reviewed.  Discussed pt's care and plan in Team rounds this morning.  Per report, pt is going to discharge home today.  Yesterday, pt had informed Dr. Choudhary that he would like an appointment with a talk therapist.  Writer to see today to establish this appointment.    Writer called [facility] and inquired about individual therapy availability.  (Pt has seen a Psychiatry NP here in the past.)  Writer informed they are 9 months out on their waiting list so they are referring patients to speak to their Care Coordinator to get established with psychotherapy at other area clinics instead, if they do not want to wait 9 months.    Writer met with pt, who confirms he plans to discharge today.  He states he would like to see a therapist, a female, in-person.  Pt asks writer to make an appointment.  Writer informed him of the long wait at the [facility] and pt is okay with writer making an appointment with any other mental health clinic in the [facility].  He would also like an appointment made with Agatha Romero, Psych NP, at [facility].    Writer reviewed chart again and noticed that an appt has already been made with Agatha Romero.  Writer called [facility]  and made an appointment.  Detail information below.  Placed info in pt's Discharge Instructions.    Assessment:  Linkage to care.     [facility] Care Coordinator   Phone: [phone]  Address: [address] [place] [address]  *If you need help with arranging appointments with psychiatry or any of their other chemical health or mental health programs.     Peer  from  [facility] ([facility]):   Phone  (call or txt): Tiara [phone]     Addiction Medicine Clinic  [facility]   Phone: [phone]. Fax: [phone]  Address: [address] [facility] [place] [address]  *No appointment made at [facility]; this info is for your reference if you want an appointment with a specialized substance use doctor.     You have an appointment for talk therapy at:  [facility]  Tuesday, August 1st at 12:30pm  With Angie Lee  [address] [place] [address]  [place] [address]  [phone]  *New Intake paperwork sent to your email ([email or web address]) to complete on your own; OR you can come to the appointment 30 minutes earlier to do the Intake paperwork in the clinic.    Plan:    Anticipated Disposition:  Home.    Follow Up:  Writer available as needed.    Due to regulation of Title 42 of the Code of Federal Regulations (CFR) Part 2: Confidentiality laws apply to this note and the information wherein.  Thus, this note cannot be copy and pasted into any other health care staff's note nor can it be included in general medical records sent to ANY outside agency without the patient's written consent.    DULCE Polanco  She/her/hers  Social Work Services  Inpatient Addiction Medicine Team  [phone] work cell phone  [phone] pager  8:00am-4:30pm M/T/Th/F; Off Wednesday's

## 2023-07-20 NOTE — PLAN OF CARE
Goal Outcome Evaluation: met      Plan of Care Reviewed With: patient, parent             Patient d/c'd to home. All discharge instructions went over with father and patient. Patient left ambulatory with father and all personal belongs. Home infusion pump of dobutamine set up by home infusion RN. Meds picked up from discharge pharmacy.

## 2023-07-20 NOTE — PROGRESS NOTES
ICU End of Shift Summary. See flowsheets for vital signs and detailed assessment.    Major Changes: No acute changes overnight    Neuro: Pt A/Ox4, no complaints, pleasant, calm. ICD incisional pain managed with prn percocet and anxiety with prn atarax.   Cardiac: SR-ST 's, MAPs > 65, afebrile  Respiratory: RA, no SOB  GI/: Voiding spontaneously, no BM, denied stool softeners  Skin: ICD primapores    Drips: Dobutamine 5  Labs: Mg 2, replaced    Plan: Discharge in AM, Father will be picking him up, expected arrival 13:00    Jimmie Lugo RN  July 20, 2023

## 2023-07-20 NOTE — DISCHARGE SUMMARY
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 psychiatry during his hospitalization for his methamphetamine and alcohol use disorders.  He was started on Wellbutrin for anxiety and ramelteon for sleep.  He has psychiatry follow-up scheduled on 8/3/2023 and 8/23/2023.    He has follow-up scheduled with Dr. Dove on 8/9/2023 for further assessment of his candidacy for advanced heart failure therapies.     PENDING RESULTS:   None     DISCHARGE MEDICATIONS:  Current Discharge Medication List      START taking these medications    Details   apixaban ANTICOAGULANT (ELIQUIS) 5 MG tablet Take 1 tablet (5 mg) by mouth 2 times daily  Qty: 60 tablet, Refills: 1    Comments: Indication is LV thrombus  Associated Diagnoses: Cardiogenic shock (H); Toxic cardiomyopathy (H)      buPROPion (WELLBUTRIN XL) 300 MG 24 hr tablet Take 1 tablet (300 mg) by mouth daily  Qty: 90 tablet, Refills: 3    Associated Diagnoses: Anxiety      cephALEXin (KEFLEX) 500 MG capsule Take 1 capsule (500 mg) by mouth 3 times daily for 2 days  Qty: 6 capsule, Refills: 0    Comments: Indication is post implanted cardiac defibrillator implant  Associated Diagnoses: Cardiogenic shock (H);  Toxic cardiomyopathy (H)      D5W 5 % SOLN 170 mL with DOBUTamine 250 MG/20ML SOLN 1,000 mg Inject 0.397 mg/min into the vein continuous    Associated Diagnoses: Toxic cardiomyopathy (H)      empagliflozin (JARDIANCE) 10 MG TABS tablet Take 1 tablet (10 mg) by mouth daily  Qty: 90 tablet, Refills: 1    Associated Diagnoses: Cardiogenic shock (H); Toxic cardiomyopathy (H)      oxyCODONE (ROXICODONE) 5 MG tablet Take 1 tablet (5 mg) by mouth every 6 hours as needed for severe pain  Qty: 10 tablet, Refills: 0    Associated Diagnoses: Intercostal pain      ramelteon (ROZEREM) 8 MG tablet Take 1 tablet (8 mg) by mouth At Bedtime  Qty: 90 tablet, Refills: 1    Associated Diagnoses: Cardiogenic shock (H); Toxic cardiomyopathy (H)      sacubitril-valsartan (ENTRESTO) 24-26 MG per tablet Take 1 tablet by mouth 2 times daily  Qty: 180 tablet, Refills: 3    Associated Diagnoses: Toxic cardiomyopathy (H)      torsemide (DEMADEX) 20 MG tablet Take 1 tablet (20 mg) by mouth daily for 30 days  Qty: 30 tablet, Refills: 0    Comments: Indication is systolic heart failure  Associated Diagnoses: Cardiogenic shock (H); Toxic cardiomyopathy (H)         CONTINUE these medications which have CHANGED    Details   ferrous sulfate (FE TABS) 325 (65 Fe) MG EC tablet Take 1 tablet (325 mg) by mouth daily for 30 days  Qty: 30 tablet, Refills: 0    Comments: Indication is iron deficiency anemia  Associated Diagnoses: Toxic cardiomyopathy (H); Iron deficiency      ondansetron (ZOFRAN ODT) 4 MG ODT tab Take 1 tablet (4 mg) by mouth every 8 hours as needed for nausea or vomiting  Qty: 30 tablet, Refills: 0    Associated Diagnoses: Cardiogenic shock (H); Toxic cardiomyopathy (H)      spironolactone (ALDACTONE) 25 MG tablet Take 1 tablet (25 mg) by mouth daily  Qty: 30 tablet, Refills: 1    Comments: Indication is systolic heart failure  Associated Diagnoses: Cardiogenic shock (H); Toxic cardiomyopathy (H)         STOP taking these medications        furosemide (LASIX) 20 MG tablet Comments:   Reason for Stopping:         lisinopril (ZESTRIL) 2.5 MG tablet Comments:   Reason for Stopping:         metoprolol succinate ER (TOPROL XL) 25 MG 24 hr tablet Comments:   Reason for Stopping:         sildenafil (VIAGRA) 100 MG tablet Comments:   Reason for Stopping:               DISCHARGE DISPOSITION: Jose Enrique Engel will discharge to home in stable condition.     DISCHARGE INSTRUCTIONS:  Discharge Procedure Orders   Home Infusion Referral   Referral Priority: Routine: Next available opening Referral Type: Consultation   Number of Visits Requested: 1     Cardiac Rehab Referral   Standing Status: Future   Referral Priority: Routine: Next available opening Referral Type: Rehab Therapy Cardiac Therapy   Number of Visits Requested: 1     Follow Up and recommended labs and tests   Order Comments: PICC Line [facility]  Fax: [phone]    For weekly PICC line dressing changes, next due Tuesday 7/25/2023.     Reason for your hospital stay   Order Comments: Acute systolic heart failure secondary to methamphetamine use     Activity   Order Comments: Your activity upon discharge: activity as tolerated     Order Specific Question Answer Comments   Is discharge order? Yes      Diet   Order Comments: Follow this diet upon discharge: Orders Placed This Encounter      Fluid restriction 2000 ML FLUID      Advance Diet as Tolerated: Regular Diet Adult     Order Specific Question Answer Comments   Is discharge order? Yes        It was our pleasure to care for Jose Enrique Engel during this hospitalization. Please do not hesitate to contact me should there be questions regarding the hospital course or discharge plan.      León Beaulieu MD  Cardiology Fellow  7/20/2023

## 2023-07-22 LAB
MDC_IDC_LEAD_IMPLANT_DT: NORMAL
MDC_IDC_LEAD_LOCATION: NORMAL
MDC_IDC_LEAD_LOCATION_DETAIL_1: NORMAL
MDC_IDC_LEAD_MFG: NORMAL
MDC_IDC_LEAD_MODEL: NORMAL
MDC_IDC_LEAD_POLARITY_TYPE: NORMAL
MDC_IDC_LEAD_SERIAL: NORMAL
MDC_IDC_MSMT_BATTERY_DTM: NORMAL
MDC_IDC_MSMT_BATTERY_REMAINING_PERCENTAGE: 100 %
MDC_IDC_MSMT_BATTERY_STATUS: NORMAL
MDC_IDC_PG_IMPLANT_DTM: NORMAL
MDC_IDC_PG_MFG: NORMAL
MDC_IDC_PG_MODEL: NORMAL
MDC_IDC_PG_SERIAL: NORMAL
MDC_IDC_PG_TYPE: NORMAL
MDC_IDC_SESS_CLINIC_NAME: NORMAL
MDC_IDC_SESS_DTM: NORMAL
MDC_IDC_SESS_TYPE: NORMAL
MDC_IDC_SET_ZONE_DETECTION_INTERVAL: 285 MS
MDC_IDC_SET_ZONE_DETECTION_INTERVAL: 285 MS
MDC_IDC_SET_ZONE_TYPE: NORMAL
MDC_IDC_SET_ZONE_TYPE: NORMAL
MDC_IDC_SET_ZONE_VENDOR_TYPE: NORMAL
MDC_IDC_SET_ZONE_VENDOR_TYPE: NORMAL
MDC_IDC_STAT_EPISODE_RECENT_COUNT: 0
MDC_IDC_STAT_EPISODE_RECENT_COUNT_DTM_END: NORMAL
MDC_IDC_STAT_EPISODE_RECENT_COUNT_DTM_START: NORMAL
MDC_IDC_STAT_EPISODE_TOTAL_COUNT: 0
MDC_IDC_STAT_EPISODE_TOTAL_COUNT: 0
MDC_IDC_STAT_EPISODE_TOTAL_COUNT_DTM_END: NORMAL
MDC_IDC_STAT_EPISODE_TOTAL_COUNT_DTM_END: NORMAL
MDC_IDC_STAT_EPISODE_TOTAL_COUNT_DTM_START: NORMAL
MDC_IDC_STAT_EPISODE_TOTAL_COUNT_DTM_START: NORMAL
MDC_IDC_STAT_EPISODE_TYPE: NORMAL
MDC_IDC_STAT_EPISODE_VENDOR_TYPE: NORMAL
MDC_IDC_STAT_TACHYTHERAPY_RECENT_DTM_END: NORMAL
MDC_IDC_STAT_TACHYTHERAPY_RECENT_DTM_START: NORMAL
MDC_IDC_STAT_TACHYTHERAPY_SHOCKS_DELIVERED_RECENT: 0
MDC_IDC_STAT_TACHYTHERAPY_SHOCKS_DELIVERED_TOTAL: 0
MDC_IDC_STAT_TACHYTHERAPY_TOTAL_DTM_END: NORMAL
MDC_IDC_STAT_TACHYTHERAPY_TOTAL_DTM_START: NORMAL

## 2023-08-08 DIAGNOSIS — R57.0 CARDIOGENIC SHOCK (H): Primary | ICD-10-CM

## 2023-08-09 ENCOUNTER — ANCILLARY PROCEDURE (OUTPATIENT)
Dept: CARDIOLOGY | Facility: CLINIC | Age: 38
End: 2023-08-09
Attending: INTERNAL MEDICINE
Payer: COMMERCIAL

## 2023-08-09 ENCOUNTER — OFFICE VISIT (OUTPATIENT)
Dept: CARDIOLOGY | Facility: CLINIC | Age: 38
End: 2023-08-09
Attending: STUDENT IN AN ORGANIZED HEALTH CARE EDUCATION/TRAINING PROGRAM
Payer: COMMERCIAL

## 2023-08-09 ENCOUNTER — INFUSION THERAPY VISIT (OUTPATIENT)
Dept: INFUSION THERAPY | Facility: CLINIC | Age: 38
End: 2023-08-09
Attending: STUDENT IN AN ORGANIZED HEALTH CARE EDUCATION/TRAINING PROGRAM
Payer: COMMERCIAL

## 2023-08-09 ENCOUNTER — LAB (OUTPATIENT)
Dept: LAB | Facility: CLINIC | Age: 38
End: 2023-08-09
Payer: COMMERCIAL

## 2023-08-09 VITALS
SYSTOLIC BLOOD PRESSURE: 95 MMHG | DIASTOLIC BLOOD PRESSURE: 66 MMHG | OXYGEN SATURATION: 98 % | BODY MASS INDEX: 23.43 KG/M2 | WEIGHT: 173 LBS | HEART RATE: 93 BPM | HEIGHT: 72 IN

## 2023-08-09 DIAGNOSIS — Z95.810 ICD (IMPLANTABLE CARDIOVERTER-DEFIBRILLATOR) IN PLACE: ICD-10-CM

## 2023-08-09 DIAGNOSIS — R57.0 CARDIOGENIC SHOCK (H): ICD-10-CM

## 2023-08-09 DIAGNOSIS — F15.11 METHAMPHETAMINE ABUSE IN REMISSION (H): ICD-10-CM

## 2023-08-09 DIAGNOSIS — I42.8 NONISCHEMIC CARDIOMYOPATHY (H): ICD-10-CM

## 2023-08-09 DIAGNOSIS — R57.0 CARDIOGENIC SHOCK (H): Primary | ICD-10-CM

## 2023-08-09 DIAGNOSIS — I50.21 ACUTE HFREF (HEART FAILURE WITH REDUCED EJECTION FRACTION) (H): Primary | ICD-10-CM

## 2023-08-09 DIAGNOSIS — N17.9 AKI (ACUTE KIDNEY INJURY) (H): ICD-10-CM

## 2023-08-09 LAB
ALBUMIN SERPL BCG-MCNC: 4.5 G/DL (ref 3.5–5.2)
ALP SERPL-CCNC: 127 U/L (ref 40–129)
ALT SERPL W P-5'-P-CCNC: 98 U/L (ref 0–70)
AMPHETAMINES UR QL SCN: ABNORMAL
ANION GAP SERPL CALCULATED.3IONS-SCNC: 14 MMOL/L (ref 7–15)
AST SERPL W P-5'-P-CCNC: 40 U/L (ref 0–45)
BARBITURATES UR QL SCN: ABNORMAL
BENZODIAZ UR QL SCN: ABNORMAL
BILIRUB SERPL-MCNC: 0.9 MG/DL
BUN SERPL-MCNC: 24.9 MG/DL (ref 6–20)
BZE UR QL SCN: ABNORMAL
CALCIUM SERPL-MCNC: 9.7 MG/DL (ref 8.6–10)
CANNABINOIDS UR QL SCN: ABNORMAL
CHLORIDE SERPL-SCNC: 101 MMOL/L (ref 98–107)
CREAT SERPL-MCNC: 1.4 MG/DL (ref 0.67–1.17)
DEPRECATED HCO3 PLAS-SCNC: 25 MMOL/L (ref 22–29)
ETHANOL UR QL SCN: ABNORMAL
GFR SERPL CREATININE-BSD FRML MDRD: 66 ML/MIN/1.73M2
GLUCOSE SERPL-MCNC: 101 MG/DL (ref 70–99)
OPIATES UR QL SCN: ABNORMAL
POTASSIUM SERPL-SCNC: 4.1 MMOL/L (ref 3.4–5.3)
PROT SERPL-MCNC: 8.1 G/DL (ref 6.4–8.3)
SODIUM SERPL-SCNC: 140 MMOL/L (ref 136–145)

## 2023-08-09 PROCEDURE — 96374 THER/PROPH/DIAG INJ IV PUSH: CPT

## 2023-08-09 PROCEDURE — 99000 SPECIMEN HANDLING OFFICE-LAB: CPT | Performed by: PATHOLOGY

## 2023-08-09 PROCEDURE — 36415 COLL VENOUS BLD VENIPUNCTURE: CPT | Performed by: PATHOLOGY

## 2023-08-09 PROCEDURE — 80307 DRUG TEST PRSMV CHEM ANLYZR: CPT | Performed by: STUDENT IN AN ORGANIZED HEALTH CARE EDUCATION/TRAINING PROGRAM

## 2023-08-09 PROCEDURE — 93260 PRGRMG DEV EVAL IMPLTBL SYS: CPT | Performed by: INTERNAL MEDICINE

## 2023-08-09 PROCEDURE — G0480 DRUG TEST DEF 1-7 CLASSES: HCPCS | Mod: 90 | Performed by: PATHOLOGY

## 2023-08-09 PROCEDURE — 99205 OFFICE O/P NEW HI 60 MIN: CPT | Mod: 25 | Performed by: STUDENT IN AN ORGANIZED HEALTH CARE EDUCATION/TRAINING PROGRAM

## 2023-08-09 PROCEDURE — G0463 HOSPITAL OUTPT CLINIC VISIT: HCPCS | Mod: 25 | Performed by: STUDENT IN AN ORGANIZED HEALTH CARE EDUCATION/TRAINING PROGRAM

## 2023-08-09 PROCEDURE — 80053 COMPREHEN METABOLIC PANEL: CPT | Performed by: PATHOLOGY

## 2023-08-09 PROCEDURE — 250N000013 HC RX MED GY IP 250 OP 250 PS 637: Performed by: STUDENT IN AN ORGANIZED HEALTH CARE EDUCATION/TRAINING PROGRAM

## 2023-08-09 PROCEDURE — 250N000011 HC RX IP 250 OP 636: Mod: JZ | Performed by: STUDENT IN AN ORGANIZED HEALTH CARE EDUCATION/TRAINING PROGRAM

## 2023-08-09 RX ORDER — HEPARIN SODIUM,PORCINE 10 UNIT/ML
3 VIAL (ML) INTRAVENOUS ONCE
Status: COMPLETED | OUTPATIENT
Start: 2023-08-09 | End: 2023-08-09

## 2023-08-09 RX ORDER — TORSEMIDE 20 MG/1
40 TABLET ORAL DAILY
Qty: 180 TABLET | Refills: 1 | Status: SHIPPED | OUTPATIENT
Start: 2023-08-09

## 2023-08-09 RX ORDER — POTASSIUM CHLORIDE 1500 MG/1
20 TABLET, EXTENDED RELEASE ORAL ONCE
Status: COMPLETED | OUTPATIENT
Start: 2023-08-09 | End: 2023-08-09

## 2023-08-09 RX ORDER — FUROSEMIDE 10 MG/ML
80 INJECTION INTRAMUSCULAR; INTRAVENOUS ONCE
Status: COMPLETED | OUTPATIENT
Start: 2023-08-09 | End: 2023-08-09

## 2023-08-09 RX ORDER — FUROSEMIDE 20 MG
20 TABLET ORAL DAILY
COMMUNITY
End: 2023-08-09

## 2023-08-09 RX ADMIN — HEPARIN, PORCINE (PF) 10 UNIT/ML INTRAVENOUS SYRINGE 3 ML: at 14:32

## 2023-08-09 RX ADMIN — POTASSIUM CHLORIDE 20 MEQ: 1500 TABLET, EXTENDED RELEASE ORAL at 14:32

## 2023-08-09 RX ADMIN — FUROSEMIDE 80 MG: 10 INJECTION, SOLUTION INTRAVENOUS at 14:32

## 2023-08-09 ASSESSMENT — PAIN SCALES - GENERAL: PAINLEVEL: NO PAIN (0)

## 2023-08-09 NOTE — PATIENT INSTRUCTIONS
It was a pleasure to see you in clinic today.  Please do not hesitate to call with any questions or concerns.  We look forward to seeing you in clinic at your next device check in 3 months.    Odalis Sevilla, RN, MS, CCRN  Electrophysiology Nurse Clinician  [facility]    During Business Hours Please Call:  [phone]  After Hours Please Call:  [phone] - select option #[phone] and ask for job code [phone]
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
## 2023-08-09 NOTE — NURSING NOTE
Diet: Patient instructed regarding a heart failure healthy diet, including discussion of reduced fat and 2000 mg daily sodium restriction, daily weights, medication purpose and compliance, fluid restrictions and resources for patient and family to access for assistance with heart failure management.       Labs: Patient was given results of the laboratory testing obtained today and patient was instructed about when to return for the next laboratory testing. Repeat CMP on Friday locally. Order faxed    Med Reconcile: Reviewed and verified all current medications with the patient. The updated medication list was printed and given to the patient. INCREASE torsemide to 40mg daily.     Return Appointment: Patient given instructions regarding scheduling next clinic visit. Follow up in 3-4 weeks in clinic.    Patient stated he understood all health information given and agreed to call with further questions or concerns.       [place] RN      Clinic Administered Medication Documentation        Patient was given lasix 80mg and PO potassium 20meq. Prior to medication administration, verified patient's identity using patient s name and date of birth. Please see MAR and medication order for additional information. Patient instructed to remain in clinic for 15 minutes and report any adverse reaction to staff immediately.    Vial/Syringe: Single dose vial. Was entire vial of medication used? Yes      IV medication given through red PICC line. Heparin locked after administration. Line flushed well but did not have blood return.
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
## 2023-08-09 NOTE — PROGRESS NOTES
Infusion Nursing Note:  Jose Enrique Engel presents today for PICC dressing change.    Patient seen by provider today: Yes: Dr Dove in clinic   present during visit today: Not Applicable.    Note: Pt has PICC. Pt and his father change the dressing at home as they were not set up for home care. There are rashes distal to the PICC which his cardiology team saw and thick taped over dressing. The dressing itself and skin underneath it is intact. Dressing changes per orders. Pt tolerated procedure well. Educated patient on dressing changes and to try to not itch around site as it may potentially open the skin up which is an infection risk.       Intravenous Access:  PICC    Discharge Plan:   Patient and/or family verbalized understanding of discharge instructions and all questions answered.  Patient discharged in stable condition accompanied by: self.    Kelly Palm RN

## 2023-08-09 NOTE — PATIENT INSTRUCTIONS
Dear Jose Enrique Engel    Thank you for choosing [facility] ([facility]) for your infusion.  The following information is a summary of our appointment as well as important reminders.      If you are a transplant patient and require transplant education, please click on this link: [email or web address].    We look forward in seeing you on your next appointment here at [facility] ([facility]).  Please don t hesitate to call us at [phone] to reschedule any of your appointments or to speak with one of the [facility] registered nurses.  It was a pleasure taking care of you today.    Sincerely,    [facility]  [address] [place] [address]  [address]  Phone:  [phone]

## 2023-08-09 NOTE — NURSING NOTE
Chief Complaint   Patient presents with    New Patient     reason for visit: NEW HFL 38 year old male presents with systolic heart failure, on home dobutamine, for hospital follow up and to establish care with labs prior       Vitals were taken, medications reconciled.    Taya Combs, Facilitator   1:51 PM  Vitals were taken. Medications reconciled.   Taya Combs - Visit Facilitator

## 2023-08-09 NOTE — PATIENT INSTRUCTIONS
Cardiology Providers you saw during your visit:  Dr. Schmidt     Medication changes:  1- INCREASE torsemide to 40mg daily starting tomorrow.    Today, we gave you 80mg IV lasix and 20mEq potassium by mouth.       [id number]  Follow up:  1- Keep local appointment for Friday with labs prior  2 - Dressing change today - second floor  3 - Follow up with Dr Schmidt in 3-4 weeks       Please call if you have:  1. Weight gain of more than 2 pounds in a day or 5 pounds in a week  2. Increased shortness of breath, swelling or bloating  3. Dizziness, lightheadedness   4. Any questions or concerns.      Heart Failure Support Group  Virtual meetings will continue in 2023 Please reach out if you would like to attend and we can get you the information you need to log in.     2023 dates for Support Group    Monday, September 11th, 1-2pm  Monday, October 2nd, 1-2pm  Monday, November 6th, 1-2pm  Monday, December 4th, 1-2pm    Follow the American Heart Association Diet and Lifestyle recommendations:  Limit saturated fat, trans fat, sodium, red meat, sweets and sugar-sweetened beverages. If you choose to eat red meat, compare labels and select the leanest cuts available.  Aim for at least 150 minutes of moderate physical activity or 75 minutes of vigorous physical activity - or an equal combination of both - each week.     During business hours: [phone], press option # 1 to schedule an appointment or send a message to your care team     After hours, weekends or holidays: On Call Cardiologist- [phone]   option #4 and ask to speak to the on-call Cardiologist. Inform them you are a CORE/heart failure patient at the [facility].     [place], RN BSN CHFN  Cardiology Nurse Care Coordinator (Heart Failure / C.O.R.E.)

## 2023-08-12 LAB
MDC_IDC_LEAD_IMPLANT_DT: NORMAL
MDC_IDC_LEAD_LOCATION: NORMAL
MDC_IDC_LEAD_LOCATION_DETAIL_1: NORMAL
MDC_IDC_LEAD_MFG: NORMAL
MDC_IDC_LEAD_MODEL: NORMAL
MDC_IDC_LEAD_POLARITY_TYPE: NORMAL
MDC_IDC_LEAD_SERIAL: NORMAL
MDC_IDC_MSMT_BATTERY_DTM: NORMAL
MDC_IDC_MSMT_BATTERY_REMAINING_PERCENTAGE: 99 %
MDC_IDC_MSMT_BATTERY_STATUS: NORMAL
MDC_IDC_PG_IMPLANT_DTM: NORMAL
MDC_IDC_PG_MFG: NORMAL
MDC_IDC_PG_MODEL: NORMAL
MDC_IDC_PG_SERIAL: NORMAL
MDC_IDC_PG_TYPE: NORMAL
MDC_IDC_SESS_CLINIC_NAME: NORMAL
MDC_IDC_SESS_DTM: NORMAL
MDC_IDC_SESS_TYPE: NORMAL
MDC_IDC_SET_ZONE_DETECTION_INTERVAL: 285 MS
MDC_IDC_SET_ZONE_DETECTION_INTERVAL: 285 MS
MDC_IDC_SET_ZONE_TYPE: NORMAL
MDC_IDC_SET_ZONE_TYPE: NORMAL
MDC_IDC_SET_ZONE_VENDOR_TYPE: NORMAL
MDC_IDC_SET_ZONE_VENDOR_TYPE: NORMAL
MDC_IDC_STAT_AT_BURDEN_PERCENT: 0 %
MDC_IDC_STAT_EPISODE_RECENT_COUNT: 0
MDC_IDC_STAT_EPISODE_RECENT_COUNT_DTM_END: NORMAL
MDC_IDC_STAT_EPISODE_RECENT_COUNT_DTM_START: NORMAL
MDC_IDC_STAT_EPISODE_TOTAL_COUNT: 0
MDC_IDC_STAT_EPISODE_TOTAL_COUNT: 0
MDC_IDC_STAT_EPISODE_TOTAL_COUNT_DTM_END: NORMAL
MDC_IDC_STAT_EPISODE_TOTAL_COUNT_DTM_END: NORMAL
MDC_IDC_STAT_EPISODE_TOTAL_COUNT_DTM_START: NORMAL
MDC_IDC_STAT_EPISODE_TOTAL_COUNT_DTM_START: NORMAL
MDC_IDC_STAT_EPISODE_TYPE: NORMAL
MDC_IDC_STAT_EPISODE_VENDOR_TYPE: NORMAL
MDC_IDC_STAT_TACHYTHERAPY_RECENT_DTM_END: NORMAL
MDC_IDC_STAT_TACHYTHERAPY_RECENT_DTM_START: NORMAL
MDC_IDC_STAT_TACHYTHERAPY_SHOCKS_DELIVERED_RECENT: 0
MDC_IDC_STAT_TACHYTHERAPY_SHOCKS_DELIVERED_TOTAL: 0
MDC_IDC_STAT_TACHYTHERAPY_TOTAL_DTM_END: NORMAL
MDC_IDC_STAT_TACHYTHERAPY_TOTAL_DTM_START: NORMAL

## 2023-08-13 LAB
COTININE SERPL-MCNC: <5 NG/ML
NICOTINE SERPL-MCNC: <5 NG/ML

## 2023-08-14 LAB
LABORATORY REPORT: NORMAL
PLPETH BLD-MCNC: <10 NG/ML
POPETH BLD-MCNC: 11 NG/ML

## 2023-09-05 DIAGNOSIS — R57.0 CARDIOGENIC SHOCK (H): Primary | ICD-10-CM

## 2023-10-05 ENCOUNTER — TELEPHONE (OUTPATIENT)
Dept: CARDIOLOGY | Facility: CLINIC | Age: 38
End: 2023-10-05
Payer: COMMERCIAL

## 2023-10-05 NOTE — TELEPHONE ENCOUNTER
----- Message from La Norman RN sent at 10/2/2023  3:37 PM CDT -----  Regarding: appt  Hello,  Please call Jose Enrique to get him scheduled for follow up with Dr Schmidt for RHF.  Thanks  EFREN Knowles

## 2023-11-17 ENCOUNTER — TELEPHONE (OUTPATIENT)
Dept: CARDIOLOGY | Facility: CLINIC | Age: 38
End: 2023-11-17
Payer: COMMERCIAL

## 2023-11-17 NOTE — TELEPHONE ENCOUNTER
[facility] Call Center    Phone Message    May a detailed message be left on voicemail: yes     Reason for Call: Other: [facility] called on behalf of the patient to update his team on his overall health and treatments. Patient was admitted 11/11 and is declining to continuing his dobutamine. Please call back to further discuss.    Action Taken: Message routed to:  Other: Cardiology    Travel Screening: Not Applicable    Thank you!  [facility]

## 2023-11-17 NOTE — TELEPHONE ENCOUNTER
Returned call. Discussed that Jose Enrique has been admitted since 11/11. Was amphetamine and thc positive on admission. Since admission, patient has refused to resume dobutamine, PICC removed. Per report, patient has also declined chem dep and is not compliant with medications and diet recommendations outpatient. Per report, patient states he wants a heart transplant.   Reviewed unfortunately we cannot offer heart transplant in near future due to patients current drug testing and compliance issues. Patient no showed clinic appointment with Dr Schmidt last week. Reviewed appt in February is for remote device check, not in person appt. At this time, will not schedule appointment as patient lives out of town and will leave that up to him whether he wants to follow up at [facility] or not. Advised them to call us if anything changes or if patient wants to schedule an appointment.

## 2024-03-03 ENCOUNTER — HOSPITAL ENCOUNTER (OUTPATIENT)
Age: 39
End: 2024-03-03
Payer: COMMERCIAL

## 2024-04-03 ENCOUNTER — TRANSCRIBE ORDERS (OUTPATIENT)
Dept: OTHER | Age: 39
End: 2024-04-03

## 2024-04-03 DIAGNOSIS — I50.9 HEART FAILURE, UNSPECIFIED (H): Primary | ICD-10-CM

## 2024-07-17 ENCOUNTER — TELEPHONE (OUTPATIENT)
Dept: CARDIOLOGY | Facility: CLINIC | Age: 39
End: 2024-07-17

## 2024-07-17 DIAGNOSIS — Z95.810 ICD (IMPLANTABLE CARDIOVERTER-DEFIBRILLATOR) IN PLACE: Primary | ICD-10-CM

## 2024-07-17 NOTE — TELEPHONE ENCOUNTER
[facility] Call Center    Phone Message    May a detailed message be left on voicemail: yes     Reason for Call: Other: Patient would like to know if he can do a video visit. Patient lives 4 hours away. Please reach out ASAP. Patient would have to leave by 10am. Thank you      Action Taken: Other: cardiology     Travel Screening: Not Applicable     Date of Service:

## 2024-07-17 NOTE — TELEPHONE ENCOUNTER
Returned call to Jose Enrique.  Reviewed that we would need to see him in person as we haven't seen him in nearly 1 year.   He states he would be willing to come, but isn't sure it would be beneficial as he states he is currently declining a VAD or transplant and states he wouldn't want to proceed with these. States his medications have been changed since we last saw him and they are working for him. Reviewed that we can cancel his visit for today but reiterated the importance of him following with his local team closely.   Jose Enrique tells me that he wants his defibrillator out. States that he feels it has shocked him a few times that, in his opinion, weren't necessary. Reviewed that without seeing the tracings, I cannot confirm/deny that the shocks were unnecessary and that they could have been life prolonging for him. He states understanding. REviewed that we don't recommend he remove his defibrillator. If he wanted to avoid shocks, the device could be turned off. He again states that his device is on his side, not on his chest and he doesn't like it and wants it removed. Reviewed we can refer him to our EP team to discuss this further. He was in agreement with this.

## 2024-10-07 ENCOUNTER — TELEPHONE (OUTPATIENT)
Dept: CARDIOLOGY | Facility: CLINIC | Age: 39
End: 2024-10-07
Payer: COMMERCIAL

## 2024-11-05 NOTE — TELEPHONE ENCOUNTER
Action 11/5/24 [place]  Fax #[phone]   Action Taken Requested:  Echo  Cardiac cath  CTA Chest 10-4-24, 2-13-24  3-1-24, 11-13 11-11-23     Resolved images in PACS 11/11

## 2024-11-13 ENCOUNTER — PRE VISIT (OUTPATIENT)
Dept: CARDIOLOGY | Facility: CLINIC | Age: 39
End: 2024-11-13

## 2024-12-10 ENCOUNTER — TRANSCRIBE ORDERS (OUTPATIENT)
Dept: OTHER | Age: 39
End: 2024-12-10

## 2024-12-10 DIAGNOSIS — I50.23 ACUTE ON CHRONIC SYSTOLIC HEART FAILURE (H): ICD-10-CM

## 2024-12-10 DIAGNOSIS — I42.0 DILATED CARDIOMYOPATHY (H): Primary | ICD-10-CM

## 2024-12-10 DIAGNOSIS — I50.42 CHRONIC HEART FAILURE WITH REDUCED EJECTION FRACTION AND DIASTOLIC DYSFUNCTION (H): ICD-10-CM

## 2025-01-14 DIAGNOSIS — I50.21 ACUTE HFREF (HEART FAILURE WITH REDUCED EJECTION FRACTION) (H): Primary | ICD-10-CM

## 2025-01-17 ENCOUNTER — LAB (OUTPATIENT)
Dept: LAB | Facility: CLINIC | Age: 40
End: 2025-01-17
Payer: COMMERCIAL

## 2025-01-17 DIAGNOSIS — I50.21 ACUTE HFREF (HEART FAILURE WITH REDUCED EJECTION FRACTION) (H): ICD-10-CM

## 2025-01-17 LAB
ALBUMIN SERPL BCG-MCNC: 4.2 G/DL (ref 3.5–5.2)
ALP SERPL-CCNC: 122 U/L (ref 40–150)
ALT SERPL W P-5'-P-CCNC: 32 U/L (ref 0–70)
ANION GAP SERPL CALCULATED.3IONS-SCNC: 11 MMOL/L (ref 7–15)
AST SERPL W P-5'-P-CCNC: 41 U/L (ref 0–45)
BILIRUB SERPL-MCNC: 1.1 MG/DL
BUN SERPL-MCNC: 28.5 MG/DL (ref 6–20)
CALCIUM SERPL-MCNC: 9.5 MG/DL (ref 8.8–10.4)
CHLORIDE SERPL-SCNC: 100 MMOL/L (ref 98–107)
CREAT SERPL-MCNC: 1.54 MG/DL (ref 0.67–1.17)
EGFRCR SERPLBLD CKD-EPI 2021: 58 ML/MIN/1.73M2
GLUCOSE SERPL-MCNC: 48 MG/DL (ref 70–99)
HCO3 SERPL-SCNC: 26 MMOL/L (ref 22–29)
POTASSIUM SERPL-SCNC: 3.8 MMOL/L (ref 3.4–5.3)
PROT SERPL-MCNC: 8 G/DL (ref 6.4–8.3)
SODIUM SERPL-SCNC: 137 MMOL/L (ref 135–145)

## 2025-01-17 PROCEDURE — G0480 DRUG TEST DEF 1-7 CLASSES: HCPCS | Mod: 90 | Performed by: PATHOLOGY

## 2025-01-17 PROCEDURE — 99000 SPECIMEN HANDLING OFFICE-LAB: CPT | Performed by: PATHOLOGY

## 2025-01-17 PROCEDURE — 80053 COMPREHEN METABOLIC PANEL: CPT | Performed by: PATHOLOGY

## 2025-01-17 PROCEDURE — 80307 DRUG TEST PRSMV CHEM ANLYZR: CPT | Mod: 90 | Performed by: PATHOLOGY

## 2025-01-17 PROCEDURE — 36415 COLL VENOUS BLD VENIPUNCTURE: CPT | Performed by: PATHOLOGY

## 2025-01-19 LAB
AMPHETAMINES SERPL QL SCN: NEGATIVE NG/ML
ANNOTATION COMMENT IMP: NORMAL
BARBITURATES SERPL QL SCN: NEGATIVE NG/ML
BENZODIAZ SERPL QL SCN: NEGATIVE NG/ML
BUPRENORPHINE SERPL-MCNC: NEGATIVE NG/ML
CANNABINOIDS SERPL QL SCN: POSITIVE NG/ML
COCAINE SERPL QL SCN: NEGATIVE NG/ML
METHADONE SERPL QL SCN: NEGATIVE NG/ML
METHAMPHET SERPL QL: NEGATIVE NG/ML
OPIATES SERPL QL SCN: NEGATIVE NG/ML
OXYCODONE SERPL QL: NEGATIVE NG/ML
PCP SERPL QL SCN: NEGATIVE NG/ML

## 2025-01-21 LAB
LABORATORY REPORT: NORMAL
PETH INTERPRETATION: NORMAL
PLPETH BLD-MCNC: <10 NG/ML
POPETH BLD-MCNC: <10 NG/ML

## 2025-01-22 LAB
CANNABINOIDS SERPL-MCNC: 23 NG/ML
COTININE SERPL-MCNC: <5 NG/ML
NICOTINE SERPL-MCNC: <5 NG/ML

## 2025-01-29 ENCOUNTER — CARE COORDINATION (OUTPATIENT)
Dept: CARDIOLOGY | Facility: CLINIC | Age: 40
End: 2025-01-29

## 2025-01-29 ENCOUNTER — HOSPITAL ENCOUNTER (OUTPATIENT)
Facility: CLINIC | Age: 40
Discharge: HOME | End: 2025-01-29
Attending: INTERNAL MEDICINE | Admitting: INTERNAL MEDICINE
Payer: COMMERCIAL

## 2025-01-29 ENCOUNTER — HOSPITAL ENCOUNTER (OUTPATIENT)
Dept: CARDIOLOGY | Facility: CLINIC | Age: 40
Discharge: HOME | End: 2025-01-29
Attending: STUDENT IN AN ORGANIZED HEALTH CARE EDUCATION/TRAINING PROGRAM
Payer: COMMERCIAL

## 2025-01-29 VITALS — WEIGHT: 177.25 LBS | BODY MASS INDEX: 24.01 KG/M2 | HEIGHT: 72 IN

## 2025-01-29 VITALS
DIASTOLIC BLOOD PRESSURE: 80 MMHG | OXYGEN SATURATION: 99 % | RESPIRATION RATE: 16 BRPM | TEMPERATURE: 97.8 F | SYSTOLIC BLOOD PRESSURE: 92 MMHG

## 2025-01-29 DIAGNOSIS — N18.30 STAGE 3 CHRONIC KIDNEY DISEASE, UNSPECIFIED WHETHER STAGE 3A OR 3B CKD (H): ICD-10-CM

## 2025-01-29 DIAGNOSIS — I50.23 ACUTE ON CHRONIC SYSTOLIC HEART FAILURE (H): Primary | ICD-10-CM

## 2025-01-29 DIAGNOSIS — I50.42 CHRONIC HEART FAILURE WITH REDUCED EJECTION FRACTION AND DIASTOLIC DYSFUNCTION (H): ICD-10-CM

## 2025-01-29 LAB
ALBUMIN SERPL BCG-MCNC: 3.8 G/DL (ref 3.5–5.2)
ALP SERPL-CCNC: 121 U/L (ref 40–150)
ALT SERPL W P-5'-P-CCNC: 33 U/L (ref 0–70)
ANION GAP SERPL CALCULATED.3IONS-SCNC: 16 MMOL/L (ref 7–15)
AST SERPL W P-5'-P-CCNC: 48 U/L (ref 0–45)
BASOPHILS # BLD AUTO: 0 10E3/UL (ref 0–0.2)
BASOPHILS NFR BLD AUTO: 1 %
BILIRUB SERPL-MCNC: 2.1 MG/DL
BUN SERPL-MCNC: 32.5 MG/DL (ref 6–20)
CALCIUM SERPL-MCNC: 9.5 MG/DL (ref 8.8–10.4)
CHLORIDE SERPL-SCNC: 97 MMOL/L (ref 98–107)
CREAT SERPL-MCNC: 1.6 MG/DL (ref 0.67–1.17)
EGFRCR SERPLBLD CKD-EPI 2021: 56 ML/MIN/1.73M2
EOSINOPHIL # BLD AUTO: 0.3 10E3/UL (ref 0–0.7)
EOSINOPHIL NFR BLD AUTO: 4 %
ERYTHROCYTE [DISTWIDTH] IN BLOOD BY AUTOMATED COUNT: 16.2 % (ref 10–15)
GLUCOSE BLDC GLUCOMTR-MCNC: 103 MG/DL (ref 70–99)
GLUCOSE SERPL-MCNC: 105 MG/DL (ref 70–99)
HCO3 SERPL-SCNC: 23 MMOL/L (ref 22–29)
HCT VFR BLD AUTO: 49.8 % (ref 40–53)
HGB BLD-MCNC: 16.1 G/DL (ref 13.3–17.7)
IMM GRANULOCYTES # BLD: 0 10E3/UL
IMM GRANULOCYTES NFR BLD: 0 %
LYMPHOCYTES # BLD AUTO: 2.2 10E3/UL (ref 0.8–5.3)
LYMPHOCYTES NFR BLD AUTO: 30 %
MCH RBC QN AUTO: 30.1 PG (ref 26.5–33)
MCHC RBC AUTO-ENTMCNC: 32.3 G/DL (ref 31.5–36.5)
MCV RBC AUTO: 93 FL (ref 78–100)
MONOCYTES # BLD AUTO: 0.7 10E3/UL (ref 0–1.3)
MONOCYTES NFR BLD AUTO: 10 %
NEUTROPHILS # BLD AUTO: 4 10E3/UL (ref 1.6–8.3)
NEUTROPHILS NFR BLD AUTO: 55 %
NRBC # BLD AUTO: 0 10E3/UL
NRBC BLD AUTO-RTO: 0 /100
NT-PROBNP SERPL-MCNC: 5026 PG/ML (ref 0–450)
PLATELET # BLD AUTO: 205 10E3/UL (ref 150–450)
POTASSIUM SERPL-SCNC: 3.6 MMOL/L (ref 3.4–5.3)
PROT SERPL-MCNC: 7.2 G/DL (ref 6.4–8.3)
RBC # BLD AUTO: 5.34 10E6/UL (ref 4.4–5.9)
SODIUM SERPL-SCNC: 136 MMOL/L (ref 135–145)
WBC # BLD AUTO: 7.3 10E3/UL (ref 4–11)

## 2025-01-29 PROCEDURE — 82310 ASSAY OF CALCIUM: CPT | Performed by: STUDENT IN AN ORGANIZED HEALTH CARE EDUCATION/TRAINING PROGRAM

## 2025-01-29 PROCEDURE — 250N000009 HC RX 250: Performed by: STUDENT IN AN ORGANIZED HEALTH CARE EDUCATION/TRAINING PROGRAM

## 2025-01-29 PROCEDURE — 82040 ASSAY OF SERUM ALBUMIN: CPT | Performed by: STUDENT IN AN ORGANIZED HEALTH CARE EDUCATION/TRAINING PROGRAM

## 2025-01-29 PROCEDURE — 94621 CARDIOPULM EXERCISE TESTING: CPT

## 2025-01-29 PROCEDURE — 36415 COLL VENOUS BLD VENIPUNCTURE: CPT | Performed by: STUDENT IN AN ORGANIZED HEALTH CARE EDUCATION/TRAINING PROGRAM

## 2025-01-29 PROCEDURE — 93451 RIGHT HEART CATH: CPT | Performed by: INTERNAL MEDICINE

## 2025-01-29 PROCEDURE — 250N000009 HC RX 250: Performed by: INTERNAL MEDICINE

## 2025-01-29 PROCEDURE — 83880 ASSAY OF NATRIURETIC PEPTIDE: CPT | Performed by: STUDENT IN AN ORGANIZED HEALTH CARE EDUCATION/TRAINING PROGRAM

## 2025-01-29 PROCEDURE — 85004 AUTOMATED DIFF WBC COUNT: CPT | Performed by: STUDENT IN AN ORGANIZED HEALTH CARE EDUCATION/TRAINING PROGRAM

## 2025-01-29 PROCEDURE — 82962 GLUCOSE BLOOD TEST: CPT

## 2025-01-29 PROCEDURE — 85049 AUTOMATED PLATELET COUNT: CPT | Performed by: STUDENT IN AN ORGANIZED HEALTH CARE EDUCATION/TRAINING PROGRAM

## 2025-01-29 PROCEDURE — 272N000001 HC OR GENERAL SUPPLY STERILE: Performed by: INTERNAL MEDICINE

## 2025-01-29 PROCEDURE — 80307 DRUG TEST PRSMV CHEM ANLYZR: CPT | Performed by: STUDENT IN AN ORGANIZED HEALTH CARE EDUCATION/TRAINING PROGRAM

## 2025-01-29 PROCEDURE — C1751 CATH, INF, PER/CENT/MIDLINE: HCPCS | Performed by: INTERNAL MEDICINE

## 2025-01-29 RX ORDER — LIDOCAINE 40 MG/G
CREAM TOPICAL
Status: COMPLETED | OUTPATIENT
Start: 2025-01-29 | End: 2025-01-29

## 2025-01-29 RX ORDER — TORSEMIDE 20 MG/1
40 TABLET ORAL DAILY
Qty: 180 TABLET | Refills: 1 | Status: SHIPPED | OUTPATIENT
Start: 2025-01-29

## 2025-01-29 RX ORDER — POTASSIUM CHLORIDE 750 MG/1
20 CAPSULE, EXTENDED RELEASE ORAL 2 TIMES DAILY
Qty: 360 CAPSULE | Refills: 1 | Status: SHIPPED | OUTPATIENT
Start: 2025-01-29

## 2025-01-29 RX ADMIN — LIDOCAINE: 40 CREAM TOPICAL at 13:29

## 2025-01-29 ASSESSMENT — ACTIVITIES OF DAILY LIVING (ADL)
ADLS_ACUITY_SCORE: 56
ADLS_ACUITY_SCORE: 58
ADLS_ACUITY_SCORE: 56
ADLS_ACUITY_SCORE: 58
ADLS_ACUITY_SCORE: 58
ADLS_ACUITY_SCORE: 56
ADLS_ACUITY_SCORE: 56

## 2025-01-29 NOTE — Clinical Note
dry, intact, no bleeding and no hematoma. 7F venous sheath removed from the RIJ, manual pressure held.  No complications.

## 2025-01-29 NOTE — DISCHARGE INSTRUCTIONS
[facility]                        Interventional Cardiology  Discharge Instructions   Post Right Heart Cath and/or Heart Biopsy      AFTER YOU GO HOME:  DO drink plenty of fluids  DO resume your regular diet and medications unless otherwise instructed by your Primary Physician  Do Not scrub the procedure site vigorously  No lotion or powder to the puncture site for 3 days    CALL YOUR PRIMARY PHYSICIAN IF: You may resume all normal activity.  Monitor neck site for bleeding, swelling, or voice changes. If you notice bleeding or swelling immediately apply pressure to the site and call number below to speak with Cardiology Fellow.  If you experience any changes in your breathing you should call your doctor immediately or come to the closest Emergency Department.  Do not drive yourself.    ADDITIONAL INSTRUCTIONS: Medications: You are to resume all home medications including anticoagulation therapy unless otherwise advised by your primary cardiologist or nurse coordinator.    Follow Up: Per your primary cardiology team    If you have any questions or concerns regarding your procedure site please call [phone] at anytime and ask for Cardiology Fellow on call.  They are available 24 hours a day.  You may also contact the Cardiology Clinic after hours number at [phone].                                                       Telephone Numbers [phone] Monday-Friday 8:00 am to 4:30 pm    [phone] After 4:30 pm Monday-Friday, Weekends & Holidays  Ask for Interventional Cardiologist on call. Someone is on call 24 hours/day   [facility] toll free number [phone] Monday-Friday 8:00 am to 4:30 pm   [facility] Emergency Dept [phone]

## 2025-01-29 NOTE — PROGRESS NOTES
Pt is ready to go home per primary RN, Scarlett.   Discharge instructions reviewed with patient.  Pt has no questions answered at this time.

## 2025-01-29 NOTE — PROGRESS NOTES
Consenting/Education for Cardiology Procedure: Right heart catheterization     Patient understands we would like to perform the listed procedure(s) due to heart failure.    The patient understands the following:     The procedure was described to the patient in detail.    No sedation is planned for this procedure. Patient understands risks and complications of the procedure which include but are not limited to bruising/swelling around the incision site, infection, bleeding, allergic reaction to local anesthetic, air embolism, arterial puncture, stroke, heart attack, need for emergency heart surgery, death.       Patient verbalized understanding of risks and benefits and has elected to proceed with the procedure or procedures listed above.    DARNELL Richardson CNP  Cardiology

## 2025-01-29 NOTE — PROGRESS NOTES
Arrived to unit 2A, room 11, via litter from the cardiac cath lab s/p C. Right neck site has a bandage that is CDI, site soft and flat to palpation. No pain or discomfort. He is alert and oriented x 4 and able to make needs known. Will continue to monitor until discharge criteria is met.

## 2025-01-29 NOTE — PROGRESS NOTES
Patient prepped for a right heart catherization. He is reporting that he feels warm and anxious about today's procedure, given a fan to help with the warmth sensation. Lidocaine cream on right neck. VSS: /80 (BP Location: Right arm, Cuff Size: Adult Regular)   Temp (!) 96.7  F (35.9  C) (Axillary)   Resp 17   SpO2 100%  Blood glucose 103. He is alert and oriented x 4 and able to make needs known. He has a partner here with him in the waiting room. Labs resulted except for drug screen is still in process. Waiting for consent to be signed.

## 2025-01-29 NOTE — PROGRESS NOTES
Date: 1/29/2025    Time of Call: 4:12 PM     Diagnosis:  heart failure     [ VORB ] Ordering provider: Heather Dove MD    Order: increase torsemide to 40mg daily. Increase potassium to 20meq bid.      Order received by: La Norman RN      Follow-up/additional notes: Called Jose Enrique. Left message with above. Asked for call back when able.

## 2025-01-30 LAB
CARDIOPULMONARY BLOOD PRESSURE REST: NORMAL MMHG
CARDIOPULMONARY BREATHING RESERVE REST: 87.2
CARDIOPULMONARY BREATHING RESERVE V02MAX: 64
CARDIOPULMONARY CO2 OUTPUT REST: 283 ML/MIN
CARDIOPULMONARY CO2 OUTPUT VO2MAX: 794 ML/MIN
CARDIOPULMONARY FEV 1.0 (L) ACTUAL: 3.81
CARDIOPULMONARY FEV 1.0 (L) PRECENT: 85 %
CARDIOPULMONARY FEV 1.0 (L) PREDICTED: 4.46
CARDIOPULMONARY FEV 1.0 FVC (%) ACTUAL: 77.5
CARDIOPULMONARY FEV 1.0 FVC (%) PERCENT: 97 %
CARDIOPULMONARY FEV 1.0 FVC (%) PREDICTED: 80
CARDIOPULMONARY FUNCTIONAL CAPACITY MAX ML/KG/MIN: 7.9 ML/KG/MIN
CARDIOPULMONARY FUNCTIONAL CAPACITY PERCENT: 21 %
CARDIOPULMONARY FUNCTIONAL CAPACITY PREDICTED: 37.3 ML/KG/MIN
CARDIOPULMONARY FVC (L) ACTUAL: 4.92
CARDIOPULMONARY FVC (L) PERCENT: 88 %
CARDIOPULMONARY FVC (L) PREDICTED: 5.61
CARDIOPULMONARY HEART RATE REST: 101 BPM
CARDIOPULMONARY MET'S REST: 0.8
CARDIOPULMONARY MINUTE VENTILATION REST: 17.1 L/MIN
CARDIOPULMONARY MINUTE VENTILATION VO2MAX: 48 L/MIN
CARDIOPULMONARY OXYGEN CONSUMPTION REST: 2.9 ML/KG/MIN
CARDIOPULMONARY OXYGEN CONSUMPTION VO2MAX: 7.9 ML/KG/MIN
CARDIOPULMONARY OXYGEN PULSE REST: 2 ML/BEAT
CARDIOPULMONARY OXYGEN PULSE VO2MAX: 6 ML/BEAT
CARDIOPULMONARY OXYGEN SATURATION- OXIMETRY REST: 100 %
CARDIOPULMONARY OXYGEN SATURATION- OXIMETRY VO2MAX: 99 %
CARDIOPULMONARY PET C02 REST: 19
CARDIOPULMONARY PET C02 VO2MAX: 18
CARDIOPULMONARY PET02 REST: 127
CARDIOPULMONARY PET02 V02 MAX: 125
CARDIOPULMONARY RER: 1
CARDIOPULMONARY RESPIRALORY EXCHANGE RATIO VO2MAX: 1
CARDIOPULMONARY RESPIRALORY EXCHANGE RATIO: 1.21
CARDIOPULMONARY RESPIRATORY RATE REST: 12 BR/MIN
CARDIOPULMONARY RESPIRATORY RATE VO2MAX: 22 BR/MIN
CARDIOPULMONARY STRESS BASE 1 BP MMHG: NORMAL MMHG
CARDIOPULMONARY STRESS BASE 1 BPA: 108 BPM
CARDIOPULMONARY STRESS BASE 1 SPO2: 100 % SPO2
CARDIOPULMONARY STRESS BASE 1 TIME SEC: 0 SEC
CARDIOPULMONARY STRESS BASE 1 TIME: 1 MINS
CARDIOPULMONARY STRESS BASE 2 BP MMHG: NORMAL MMHG
CARDIOPULMONARY STRESS BASE 2 BPA: 103 BPM
CARDIOPULMONARY STRESS BASE 2 SPO2: 99 % SPO2
CARDIOPULMONARY STRESS BASE 2 TIME SEC: 0 SEC
CARDIOPULMONARY STRESS BASE 2 TIME: 3 MINS
CARDIOPULMONARY STRESS BASE 3 BP MMHG: NORMAL MMHG
CARDIOPULMONARY STRESS BASE 3 BPA: 102 BPM
CARDIOPULMONARY STRESS BASE 3 SPO2: 99 % SPO2
CARDIOPULMONARY STRESS BASE 3 TIME SEC: 0 SEC
CARDIOPULMONARY STRESS BASE 3 TIME: 5 MINS
CARDIOPULMONARY STRESS PHASE 1 BP MMHG: NORMAL MMHG
CARDIOPULMONARY STRESS PHASE 1 BPM: 108 BPM
CARDIOPULMONARY STRESS PHASE 1 SPO2: 99 % SPO2
CARDIOPULMONARY STRESS PHASE 1 TIME SEC: 0 SEC
CARDIOPULMONARY STRESS PHASE 1 TIME: 2 MINS
CARDIOPULMONARY STRESS PHASE 2 BP MMHG: NORMAL MMHG
CARDIOPULMONARY STRESS PHASE 2 BPM: 112 BPM
CARDIOPULMONARY STRESS PHASE 2 SPO2: 99 % SPO2
CARDIOPULMONARY STRESS PHASE 2 TIME SEC: 0 SEC
CARDIOPULMONARY STRESS PHASE 2 TIME: 4 MINS
CARDIOPULMONARY STRESS PHASE 3 BPM: 116 BPM
CARDIOPULMONARY STRESS PHASE 3 SPO2: 99 % SPO2
CARDIOPULMONARY STRESS PHASE 3 TIME SEC: 21 SEC
CARDIOPULMONARY STRESS PHASE 3 TIME: 4 MINS
CARDIOPULMONARY SVC (L) ACTUAL: 5.03
CARDIOPULMONARY SVC (L) PERCENT: 90 %
CARDIOPULMONARY SVC (L) PREDICTED: 5.61
CARDIOPULMONARY TIDAL VOLUME REST: 1427 ML
CARDIOPULMONARY TIDAL VOLUME VO2MAX: 2201 ML
CARDIOPULMONARY VE/VCO2 SLOPE: 70.57
CARDIOPULMONARY VENTILATORY EQUIVALENT 02 REST: 73
CARDIOPULMONARY VENTILATORY EQUIVALENT 02 V02: 62
CARDIOPULMONARY VENTILATORY EQUIVALENT C02 REST: 61
CARDIOPULMONARY VENTILATORY EQUIVALENT C02 SLOPE VO2MAX: 70.57
CARDIOPULMONARY VENTILATORY EQUIVALENT C02 VO2MAX: 60
CV STRESS MAX HR HE: 116
PREDICTED VO2MAX: 37.3
RATED PERCEIVED EXERTION: 18
STRESS ANGINA INDEX: 0
STRESS ECHO BASELINE BP: NORMAL MMHG
STRESS ECHO BASELINE HR: 96 BPM
STRESS ECHO CALCULATED PERCENT HR: 64 %
STRESS ECHO LAST STRESS BP: NORMAL MMHG
STRESS ECHO POST ESTIMATED WORKLOAD: 2.3 METS
STRESS ECHO POST EXERCISE DUR MIN: 4 MIN
STRESS ECHO POST EXERCISE DUR SEC: 21 SEC
STRESS ECHO TARGET HR: 181

## 2025-02-02 LAB — CANNABINOIDS SERPL-MCNC: 27 NG/ML

## 2025-02-03 ENCOUNTER — HOSPITAL ENCOUNTER (INPATIENT)
Facility: CLINIC | Age: 40
End: 2025-02-03
Attending: STUDENT IN AN ORGANIZED HEALTH CARE EDUCATION/TRAINING PROGRAM | Admitting: STUDENT IN AN ORGANIZED HEALTH CARE EDUCATION/TRAINING PROGRAM
Payer: COMMERCIAL

## 2025-02-03 DIAGNOSIS — I50.9 HEART FAILURE (H): Primary | ICD-10-CM

## 2025-02-03 DIAGNOSIS — R57.0 CARDIOGENIC SHOCK (H): ICD-10-CM

## 2025-02-03 DIAGNOSIS — I42.7: ICD-10-CM

## 2025-02-03 DIAGNOSIS — I50.42 CHRONIC HEART FAILURE WITH REDUCED EJECTION FRACTION AND DIASTOLIC DYSFUNCTION (H): ICD-10-CM

## 2025-02-03 DIAGNOSIS — G47.09 OTHER INSOMNIA: ICD-10-CM

## 2025-02-03 DIAGNOSIS — Z45.2 PICC (PERIPHERALLY INSERTED CENTRAL CATHETER) IN PLACE: ICD-10-CM

## 2025-02-03 DIAGNOSIS — N18.30 STAGE 3 CHRONIC KIDNEY DISEASE, UNSPECIFIED WHETHER STAGE 3A OR 3B CKD (H): ICD-10-CM

## 2025-02-03 ASSESSMENT — COLUMBIA-SUICIDE SEVERITY RATING SCALE - C-SSRS
2. HAVE YOU ACTUALLY HAD ANY THOUGHTS OF KILLING YOURSELF IN THE PAST MONTH?: NO
1. IN THE PAST MONTH, HAVE YOU WISHED YOU WERE DEAD OR WISHED YOU COULD GO TO SLEEP AND NOT WAKE UP?: NO
6. HAVE YOU EVER DONE ANYTHING, STARTED TO DO ANYTHING, OR PREPARED TO DO ANYTHING TO END YOUR LIFE?: NO

## 2025-02-03 ASSESSMENT — ACTIVITIES OF DAILY LIVING (ADL): ADLS_ACUITY_SCORE: 58

## 2025-02-04 ENCOUNTER — APPOINTMENT (OUTPATIENT)
Dept: CARDIOLOGY | Facility: CLINIC | Age: 40
End: 2025-02-04
Payer: COMMERCIAL

## 2025-02-04 ENCOUNTER — APPOINTMENT (OUTPATIENT)
Dept: OCCUPATIONAL THERAPY | Facility: CLINIC | Age: 40
End: 2025-02-04
Payer: COMMERCIAL

## 2025-02-04 PROBLEM — I50.9 HEART FAILURE (H): Status: ACTIVE | Noted: 2025-02-04

## 2025-02-04 LAB
ALBUMIN SERPL BCG-MCNC: 3.5 G/DL (ref 3.5–5.2)
ALBUMIN SERPL BCG-MCNC: 3.8 G/DL (ref 3.5–5.2)
ALP SERPL-CCNC: 129 U/L (ref 40–150)
ALP SERPL-CCNC: 141 U/L (ref 40–150)
ALT SERPL W P-5'-P-CCNC: 206 U/L (ref 0–70)
ALT SERPL W P-5'-P-CCNC: 242 U/L (ref 0–70)
AMPHETAMINES UR QL SCN: ABNORMAL
ANION GAP SERPL CALCULATED.3IONS-SCNC: 13 MMOL/L (ref 7–15)
ANION GAP SERPL CALCULATED.3IONS-SCNC: 19 MMOL/L (ref 7–15)
ANION GAP SERPL CALCULATED.3IONS-SCNC: 20 MMOL/L (ref 7–15)
AST SERPL W P-5'-P-CCNC: 195 U/L (ref 0–45)
AST SERPL W P-5'-P-CCNC: 280 U/L (ref 0–45)
BARBITURATES UR QL SCN: ABNORMAL
BENZODIAZ UR QL SCN: ABNORMAL
BILIRUB DIRECT SERPL-MCNC: 1.64 MG/DL (ref 0–0.3)
BILIRUB SERPL-MCNC: 3.4 MG/DL
BILIRUB SERPL-MCNC: 4.9 MG/DL
BUN SERPL-MCNC: 30.8 MG/DL (ref 6–20)
BUN SERPL-MCNC: 31.4 MG/DL (ref 6–20)
BUN SERPL-MCNC: 32.4 MG/DL (ref 6–20)
BZE UR QL SCN: ABNORMAL
CALCIUM SERPL-MCNC: 8.4 MG/DL (ref 8.8–10.4)
CALCIUM SERPL-MCNC: 9.1 MG/DL (ref 8.8–10.4)
CALCIUM SERPL-MCNC: 9.7 MG/DL (ref 8.8–10.4)
CANNABINOIDS UR QL SCN: ABNORMAL
CHLORIDE SERPL-SCNC: 91 MMOL/L (ref 98–107)
CHLORIDE SERPL-SCNC: 93 MMOL/L (ref 98–107)
CHLORIDE SERPL-SCNC: 97 MMOL/L (ref 98–107)
CREAT SERPL-MCNC: 1.4 MG/DL (ref 0.67–1.17)
CREAT SERPL-MCNC: 1.46 MG/DL (ref 0.67–1.17)
CREAT SERPL-MCNC: 1.5 MG/DL (ref 0.67–1.17)
CREAT UR-MCNC: 20 MG/DL
EGFRCR SERPLBLD CKD-EPI 2021: 60 ML/MIN/1.73M2
EGFRCR SERPLBLD CKD-EPI 2021: 62 ML/MIN/1.73M2
EGFRCR SERPLBLD CKD-EPI 2021: 66 ML/MIN/1.73M2
ERYTHROCYTE [DISTWIDTH] IN BLOOD BY AUTOMATED COUNT: 16.3 % (ref 10–15)
ERYTHROCYTE [DISTWIDTH] IN BLOOD BY AUTOMATED COUNT: 16.4 % (ref 10–15)
FENTANYL UR QL: ABNORMAL
GLUCOSE SERPL-MCNC: 129 MG/DL (ref 70–99)
GLUCOSE SERPL-MCNC: 81 MG/DL (ref 70–99)
GLUCOSE SERPL-MCNC: 96 MG/DL (ref 70–99)
HCO3 SERPL-SCNC: 16 MMOL/L (ref 22–29)
HCO3 SERPL-SCNC: 22 MMOL/L (ref 22–29)
HCO3 SERPL-SCNC: 23 MMOL/L (ref 22–29)
HCT VFR BLD AUTO: 48.8 % (ref 40–53)
HCT VFR BLD AUTO: 50 % (ref 40–53)
HGB BLD-MCNC: 16.7 G/DL (ref 13.3–17.7)
HGB BLD-MCNC: 17 G/DL (ref 13.3–17.7)
LACTATE SERPL-SCNC: 1.6 MMOL/L (ref 0.7–2)
LACTATE SERPL-SCNC: 2.2 MMOL/L (ref 0.7–2)
LVEF ECHO: NORMAL
MAGNESIUM SERPL-MCNC: 2.2 MG/DL (ref 1.7–2.3)
MAGNESIUM SERPL-MCNC: 2.3 MG/DL (ref 1.7–2.3)
MAGNESIUM SERPL-MCNC: 2.5 MG/DL (ref 1.7–2.3)
MCH RBC QN AUTO: 30.3 PG (ref 26.5–33)
MCH RBC QN AUTO: 30.8 PG (ref 26.5–33)
MCHC RBC AUTO-ENTMCNC: 33.4 G/DL (ref 31.5–36.5)
MCHC RBC AUTO-ENTMCNC: 34.8 G/DL (ref 31.5–36.5)
MCV RBC AUTO: 88 FL (ref 78–100)
MCV RBC AUTO: 91 FL (ref 78–100)
NT-PROBNP SERPL-MCNC: 5979 PG/ML (ref 0–450)
OPIATES UR QL SCN: ABNORMAL
PCP QUAL URINE (ROCHE): ABNORMAL
PLATELET # BLD AUTO: 201 10E3/UL (ref 150–450)
PLATELET # BLD AUTO: 206 10E3/UL (ref 150–450)
POTASSIUM SERPL-SCNC: 3.4 MMOL/L (ref 3.4–5.3)
POTASSIUM SERPL-SCNC: 4 MMOL/L (ref 3.4–5.3)
POTASSIUM SERPL-SCNC: 4.2 MMOL/L (ref 3.4–5.3)
PROT SERPL-MCNC: 6.4 G/DL (ref 6.4–8.3)
PROT SERPL-MCNC: 7.2 G/DL (ref 6.4–8.3)
RBC # BLD AUTO: 5.52 10E6/UL (ref 4.4–5.9)
RBC # BLD AUTO: 5.52 10E6/UL (ref 4.4–5.9)
SODIUM SERPL-SCNC: 129 MMOL/L (ref 135–145)
SODIUM SERPL-SCNC: 132 MMOL/L (ref 135–145)
SODIUM SERPL-SCNC: 133 MMOL/L (ref 135–145)
T4 FREE SERPL-MCNC: 1.43 NG/DL (ref 0.9–1.7)
TSH SERPL DL<=0.005 MIU/L-ACNC: 8 UIU/ML (ref 0.3–4.2)
WBC # BLD AUTO: 7.2 10E3/UL (ref 4–11)
WBC # BLD AUTO: 7.2 10E3/UL (ref 4–11)

## 2025-02-04 PROCEDURE — 250N000013 HC RX MED GY IP 250 OP 250 PS 637: Performed by: STUDENT IN AN ORGANIZED HEALTH CARE EDUCATION/TRAINING PROGRAM

## 2025-02-04 PROCEDURE — 80307 DRUG TEST PRSMV CHEM ANLYZR: CPT

## 2025-02-04 PROCEDURE — 83605 ASSAY OF LACTIC ACID: CPT | Performed by: STUDENT IN AN ORGANIZED HEALTH CARE EDUCATION/TRAINING PROGRAM

## 2025-02-04 PROCEDURE — 83880 ASSAY OF NATRIURETIC PEPTIDE: CPT

## 2025-02-04 PROCEDURE — 80048 BASIC METABOLIC PNL TOTAL CA: CPT

## 2025-02-04 PROCEDURE — C8929 TTE W OR WO FOL WCON,DOPPLER: HCPCS

## 2025-02-04 PROCEDURE — 83735 ASSAY OF MAGNESIUM: CPT

## 2025-02-04 PROCEDURE — 120N000005 HC R&B MS OVERFLOW UMMC

## 2025-02-04 PROCEDURE — 82310 ASSAY OF CALCIUM: CPT

## 2025-02-04 PROCEDURE — 250N000013 HC RX MED GY IP 250 OP 250 PS 637

## 2025-02-04 PROCEDURE — 80321 ALCOHOLS BIOMARKERS 1OR 2: CPT

## 2025-02-04 PROCEDURE — 258N000003 HC RX IP 258 OP 636: Performed by: STUDENT IN AN ORGANIZED HEALTH CARE EDUCATION/TRAINING PROGRAM

## 2025-02-04 PROCEDURE — 97530 THERAPEUTIC ACTIVITIES: CPT | Mod: GO

## 2025-02-04 PROCEDURE — 999N000147 HC STATISTIC PT IP EVAL DEFER: Performed by: REHABILITATION PRACTITIONER

## 2025-02-04 PROCEDURE — 80349 CANNABINOIDS NATURAL: CPT

## 2025-02-04 PROCEDURE — 999N000208 ECHOCARDIOGRAM COMPLETE

## 2025-02-04 PROCEDURE — 255N000002 HC RX 255 OP 636

## 2025-02-04 PROCEDURE — 83605 ASSAY OF LACTIC ACID: CPT

## 2025-02-04 PROCEDURE — 250N000011 HC RX IP 250 OP 636: Performed by: STUDENT IN AN ORGANIZED HEALTH CARE EDUCATION/TRAINING PROGRAM

## 2025-02-04 PROCEDURE — 36415 COLL VENOUS BLD VENIPUNCTURE: CPT

## 2025-02-04 PROCEDURE — 84443 ASSAY THYROID STIM HORMONE: CPT

## 2025-02-04 PROCEDURE — 93306 TTE W/DOPPLER COMPLETE: CPT | Mod: 26 | Performed by: INTERNAL MEDICINE

## 2025-02-04 PROCEDURE — 80053 COMPREHEN METABOLIC PANEL: CPT

## 2025-02-04 PROCEDURE — 83735 ASSAY OF MAGNESIUM: CPT | Performed by: STUDENT IN AN ORGANIZED HEALTH CARE EDUCATION/TRAINING PROGRAM

## 2025-02-04 PROCEDURE — 84439 ASSAY OF FREE THYROXINE: CPT

## 2025-02-04 PROCEDURE — 99223 1ST HOSP IP/OBS HIGH 75: CPT | Mod: 25 | Performed by: INTERNAL MEDICINE

## 2025-02-04 PROCEDURE — 250N000011 HC RX IP 250 OP 636

## 2025-02-04 PROCEDURE — 97165 OT EVAL LOW COMPLEX 30 MIN: CPT | Mod: GO

## 2025-02-04 PROCEDURE — 85027 COMPLETE CBC AUTOMATED: CPT

## 2025-02-04 PROCEDURE — 36415 COLL VENOUS BLD VENIPUNCTURE: CPT | Performed by: STUDENT IN AN ORGANIZED HEALTH CARE EDUCATION/TRAINING PROGRAM

## 2025-02-04 RX ORDER — HYDRALAZINE HYDROCHLORIDE 25 MG/1
25 TABLET, FILM COATED ORAL EVERY 8 HOURS
Status: DISCONTINUED | OUTPATIENT
Start: 2025-02-04 | End: 2025-02-05

## 2025-02-04 RX ORDER — ACETAMINOPHEN 650 MG/1
650 SUPPOSITORY RECTAL EVERY 4 HOURS PRN
Status: DISCONTINUED | OUTPATIENT
Start: 2025-02-04 | End: 2025-02-14 | Stop reason: HOSPADM

## 2025-02-04 RX ORDER — ONDANSETRON 4 MG/1
4 TABLET, ORALLY DISINTEGRATING ORAL EVERY 6 HOURS PRN
Status: DISCONTINUED | OUTPATIENT
Start: 2025-02-04 | End: 2025-02-14 | Stop reason: HOSPADM

## 2025-02-04 RX ORDER — POTASSIUM CHLORIDE 750 MG/1
10 TABLET, EXTENDED RELEASE ORAL DAILY
Status: ON HOLD | COMMUNITY
End: 2025-02-14

## 2025-02-04 RX ORDER — ONDANSETRON 2 MG/ML
4 INJECTION INTRAMUSCULAR; INTRAVENOUS EVERY 6 HOURS PRN
Status: DISCONTINUED | OUTPATIENT
Start: 2025-02-04 | End: 2025-02-14 | Stop reason: HOSPADM

## 2025-02-04 RX ORDER — ALBUTEROL SULFATE 90 UG/1
2 INHALANT RESPIRATORY (INHALATION) EVERY 6 HOURS PRN
COMMUNITY

## 2025-02-04 RX ORDER — TORSEMIDE 20 MG/1
80 TABLET ORAL DAILY
Status: DISCONTINUED | OUTPATIENT
Start: 2025-02-04 | End: 2025-02-04

## 2025-02-04 RX ORDER — ACETAMINOPHEN 325 MG/1
650 TABLET ORAL EVERY 4 HOURS PRN
Status: DISCONTINUED | OUTPATIENT
Start: 2025-02-04 | End: 2025-02-14 | Stop reason: HOSPADM

## 2025-02-04 RX ORDER — CALCIUM CARBONATE 500 MG/1
1000 TABLET, CHEWABLE ORAL 4 TIMES DAILY PRN
Status: DISCONTINUED | OUTPATIENT
Start: 2025-02-04 | End: 2025-02-14 | Stop reason: HOSPADM

## 2025-02-04 RX ORDER — SACUBITRIL AND VALSARTAN 24; 26 MG/1; MG/1
1 TABLET, FILM COATED ORAL 2 TIMES DAILY
Status: DISCONTINUED | OUTPATIENT
Start: 2025-02-04 | End: 2025-02-08

## 2025-02-04 RX ORDER — FUROSEMIDE 10 MG/ML
80 INJECTION INTRAMUSCULAR; INTRAVENOUS ONCE
Status: COMPLETED | OUTPATIENT
Start: 2025-02-04 | End: 2025-02-04

## 2025-02-04 RX ORDER — AMOXICILLIN 250 MG
1 CAPSULE ORAL 2 TIMES DAILY PRN
Status: DISCONTINUED | OUTPATIENT
Start: 2025-02-04 | End: 2025-02-14 | Stop reason: HOSPADM

## 2025-02-04 RX ORDER — DIGOXIN 125 MCG
125 TABLET ORAL DAILY
Status: DISCONTINUED | OUTPATIENT
Start: 2025-02-04 | End: 2025-02-14 | Stop reason: HOSPADM

## 2025-02-04 RX ORDER — AMOXICILLIN 250 MG
2 CAPSULE ORAL 2 TIMES DAILY PRN
Status: DISCONTINUED | OUTPATIENT
Start: 2025-02-04 | End: 2025-02-14 | Stop reason: HOSPADM

## 2025-02-04 RX ORDER — POTASSIUM CHLORIDE 750 MG/1
40 CAPSULE, EXTENDED RELEASE ORAL 2 TIMES DAILY
Status: DISCONTINUED | OUTPATIENT
Start: 2025-02-04 | End: 2025-02-14 | Stop reason: HOSPADM

## 2025-02-04 RX ORDER — BUMETANIDE 0.25 MG/ML
4 INJECTION, SOLUTION INTRAMUSCULAR; INTRAVENOUS ONCE
Status: COMPLETED | OUTPATIENT
Start: 2025-02-04 | End: 2025-02-04

## 2025-02-04 RX ORDER — ISOSORBIDE DINITRATE 20 MG/1
20 TABLET ORAL
Status: DISCONTINUED | OUTPATIENT
Start: 2025-02-04 | End: 2025-02-06

## 2025-02-04 RX ORDER — TORSEMIDE 20 MG/1
20 TABLET ORAL DAILY
Status: ON HOLD | COMMUNITY
End: 2025-02-14

## 2025-02-04 RX ORDER — HYDRALAZINE HYDROCHLORIDE 25 MG/1
25 TABLET, FILM COATED ORAL 4 TIMES DAILY
Status: DISCONTINUED | OUTPATIENT
Start: 2025-02-04 | End: 2025-02-04

## 2025-02-04 RX ORDER — LIDOCAINE 40 MG/G
CREAM TOPICAL
Status: DISCONTINUED | OUTPATIENT
Start: 2025-02-04 | End: 2025-02-14 | Stop reason: HOSPADM

## 2025-02-04 RX ORDER — BUPROPION HYDROCHLORIDE 300 MG/1
300 TABLET ORAL DAILY
Status: DISCONTINUED | OUTPATIENT
Start: 2025-02-04 | End: 2025-02-04

## 2025-02-04 RX ORDER — POTASSIUM CHLORIDE 750 MG/1
40 TABLET, EXTENDED RELEASE ORAL ONCE
Status: COMPLETED | OUTPATIENT
Start: 2025-02-04 | End: 2025-02-04

## 2025-02-04 RX ORDER — BUMETANIDE 0.25 MG/ML
0.5 INJECTION, SOLUTION INTRAMUSCULAR; INTRAVENOUS ONCE
Status: COMPLETED | OUTPATIENT
Start: 2025-02-04 | End: 2025-02-04

## 2025-02-04 RX ORDER — FERROUS SULFATE 325(65) MG
325 TABLET ORAL
Status: DISCONTINUED | OUTPATIENT
Start: 2025-02-04 | End: 2025-02-14 | Stop reason: HOSPADM

## 2025-02-04 RX ORDER — SPIRONOLACTONE 25 MG/1
25 TABLET ORAL DAILY
Status: DISCONTINUED | OUTPATIENT
Start: 2025-02-04 | End: 2025-02-08

## 2025-02-04 RX ORDER — BUMETANIDE 0.25 MG/ML
4 INJECTION, SOLUTION INTRAMUSCULAR; INTRAVENOUS ONCE
Status: DISCONTINUED | OUTPATIENT
Start: 2025-02-04 | End: 2025-02-04

## 2025-02-04 RX ADMIN — BUMETANIDE 4 MG: 0.25 INJECTION INTRAMUSCULAR; INTRAVENOUS at 01:28

## 2025-02-04 RX ADMIN — SACUBITRIL AND VALSARTAN 1 TABLET: 24; 26 TABLET, FILM COATED ORAL at 09:26

## 2025-02-04 RX ADMIN — FUROSEMIDE 10 MG/HR: 10 INJECTION, SOLUTION INTRAVENOUS at 11:00

## 2025-02-04 RX ADMIN — PERFLUTREN 7 ML: 6.52 INJECTION, SUSPENSION INTRAVENOUS at 14:08

## 2025-02-04 RX ADMIN — CALCIUM CARBONATE (ANTACID) CHEW TAB 500 MG 1000 MG: 500 CHEW TAB at 22:55

## 2025-02-04 RX ADMIN — FUROSEMIDE 20 MG/HR: 10 INJECTION, SOLUTION INTRAVENOUS at 17:01

## 2025-02-04 RX ADMIN — POTASSIUM CHLORIDE 40 MEQ: 750 TABLET, EXTENDED RELEASE ORAL at 18:12

## 2025-02-04 RX ADMIN — ISOSORBIDE DINITRATE 20 MG: 20 TABLET ORAL at 18:12

## 2025-02-04 RX ADMIN — HYDRALAZINE HYDROCHLORIDE 25 MG: 25 TABLET ORAL at 19:59

## 2025-02-04 RX ADMIN — ISOSORBIDE DINITRATE 20 MG: 20 TABLET ORAL at 12:32

## 2025-02-04 RX ADMIN — DIGOXIN 125 MCG: 125 TABLET ORAL at 09:26

## 2025-02-04 RX ADMIN — POTASSIUM CHLORIDE 40 MEQ: 750 CAPSULE, EXTENDED RELEASE ORAL at 09:27

## 2025-02-04 RX ADMIN — ZOLPIDEM TARTRATE 5 MG: 5 TABLET, FILM COATED ORAL at 22:55

## 2025-02-04 RX ADMIN — FUROSEMIDE 80 MG: 10 INJECTION, SOLUTION INTRAVENOUS at 10:26

## 2025-02-04 RX ADMIN — HYDRALAZINE HYDROCHLORIDE 25 MG: 25 TABLET ORAL at 12:32

## 2025-02-04 RX ADMIN — SPIRONOLACTONE 25 MG: 25 TABLET, FILM COATED ORAL at 09:26

## 2025-02-04 RX ADMIN — POTASSIUM CHLORIDE 40 MEQ: 750 CAPSULE, EXTENDED RELEASE ORAL at 19:58

## 2025-02-04 RX ADMIN — CALCIUM CARBONATE (ANTACID) CHEW TAB 500 MG 1000 MG: 500 CHEW TAB at 18:14

## 2025-02-04 RX ADMIN — FERROUS SULFATE TAB 325 MG (65 MG ELEMENTAL FE) 325 MG: 325 (65 FE) TAB at 09:26

## 2025-02-04 RX ADMIN — FUROSEMIDE 20 MG/HR: 10 INJECTION, SOLUTION INTRAVENOUS at 21:28

## 2025-02-04 ASSESSMENT — ACTIVITIES OF DAILY LIVING (ADL)
ADLS_ACUITY_SCORE: 44
ADLS_ACUITY_SCORE: 44
ADLS_ACUITY_SCORE: 43
ADLS_ACUITY_SCORE: 44
ADLS_ACUITY_SCORE: 44
ADLS_ACUITY_SCORE: 43
ADLS_ACUITY_SCORE: 44
ADLS_ACUITY_SCORE: 44
ADLS_ACUITY_SCORE: 43
ADLS_ACUITY_SCORE: 44
ADLS_ACUITY_SCORE: 43
ADLS_ACUITY_SCORE: 43
PREVIOUS_RESPONSIBILITIES: MEDICATION MANAGEMENT;FINANCES;DRIVING
ADLS_ACUITY_SCORE: 44
ADLS_ACUITY_SCORE: 43
ADLS_ACUITY_SCORE: 43
ADLS_ACUITY_SCORE: 44
ADLS_ACUITY_SCORE: 43
ADLS_ACUITY_SCORE: 44
ADLS_ACUITY_SCORE: 43
ADLS_ACUITY_SCORE: 44
ADLS_ACUITY_SCORE: 43

## 2025-02-04 NOTE — PLAN OF CARE
[id number] Shift Events:    IV push Lasix given this morning, Lasix gtt 20 mg/hr started shortly afterward.   I&Os listed below.  K 3.4. Replaced and recheck tomorrow morning.     Intake/Output Summary (Last 24 hours) at 2/4/2025 1845  Last data filed at 2/4/2025 1842  Gross per 24 hour   Intake 1820 ml   Output 2900 ml   Net -1080 ml      For vital signs and complete assessments, please see documentation flowsheets.     Goal Outcome Evaluation:  Plan of Care Reviewed With: patient  Overall Patient Progress: improving  Overall Patient Progress: improving

## 2025-02-04 NOTE — PROGRESS NOTES
Admission         2/3/2025 10:56 PM   -----------------------------------------------------------  Diagnosis:  HFrEF exacerbation    Admitted from:  [facility]  Report given from:  EMS  Via:  EMS  Accompanied by:  Paramedics  Family Aware of Admission:  No.  Patient states will call later.  Belongings:  Remains with pt.  States no medications brought with.  Admission Profile: Complete  Teaching: Orientation to unit, call don't fall, use of call light, meal times, visiting hours,  when to call for the RN (angina/sob/dizzyness, etc.), and enforced importance of safety.  Access: 20 G Rt PIV  Telemetry: Placed on pt  Ht./Wt.: Complete    Two nurse head-to-toe skin assessment performed by Cornelia FRANCO and Mannie MAYFIELD RNs.  Skin issues noted: Scattered scabs and dry, cracked heels.  See PCS for assessment and treatment of wounds and surgical sites.    Temp:  [97.9  F (36.6  C)] 97.9  F (36.6  C)  Pulse:  [97] 97  Resp:  [20] 20  BP: (112)/(84) 112/84  SpO2:  [98 %] 98 %

## 2025-02-04 NOTE — PROGRESS NOTES
Physical Therapy: Orders received. Chart reviewed and discussed with care team.? Physical Therapy not indicated due to patient is mobilizing well, no balance deficits, will benefit from continued participation in OT/CR.? Defer discharge recommendations to Occupational Therapy.? Will complete orders.

## 2025-02-04 NOTE — PLAN OF CARE
Hours of Care:  2300 - 0700    Temp:  [97.6  F (36.4  C)-97.9  F (36.6  C)] 97.8  F (36.6  C)  Pulse:  [96-99] 98  Resp:  [16-20] 16  BP: ()/() 98/83  SpO2:  [98 %-99 %] 99 %     D: Jose Enrique Engel is a 39 year old male admitted on 2/3/2025. He has a hx of HFrEF 2/2 NICM s/p ICD, cardiogenic shock previously on dobutamine, CKD3, and polysubstance use who presents for SOB concerning for ambulatory cardiogenic shock.     I: Monitored vitals and assessed pt status.     Changed: Admitted to 6C from [facility] at 2300  Tele: Sinus rhythm  O2: Room air  Mobility: SBA    A: Neuro: A/O x4.  Call light appropriate.  Able to make needs known.  Respiratory:  On room air.  Lung sounds clear.  Reports dyspnea on exertion.  Cardiac: VSS.  Sinus rhythm.  Received 4 mg IVP bumex.    GI: Last BM 2/3.  Reports loose stools.  No report of nausea or vomiting.  : Urinating adequate amounts of clear, yellow urine  Skin:  See PCS for assessment and treatment of wounds and surgical incisions.  LDA:  Rt PIV  Electrolytes: RN managed K and Mg.  AM labs pending.  Pain: Denies  Isolation:  Standard Precautions  Tests/procedures: None performed overnight.  Diet: 2 g Na.  2000 mL fluid restriction  Sleep:  No sleep disturbances noted or reported.    P: Continue to monitor Pt status and report changes to Cards 2.  Plan for echo today       Goal Outcome Evaluation:      Plan of Care Reviewed With: patient    Overall Patient Progress: no changeOverall Patient Progress: no change    Outcome Evaluation: Admitted to 6C from [facility] for heart failure.  4 mg IVP bumex administered.

## 2025-02-04 NOTE — PROGRESS NOTES
02/04/25 1402   Appointment Info   Signing Clinician's Name / Credentials (OT) MARCELLA Peterson   Student Supervision On-site supervision provided   Rehab Comments (OT) monitor SOB, BP   Living Environment   People in Home alone   Current Living Arrangements house   Home Accessibility stairs to enter home   Number of Stairs, Main Entrance 2   Stair Railings, Main Entrance railings safe and in good condition;railings on both sides of stairs   Transportation Anticipated car, drives self;family or friend will provide   Living Environment Comments Pt lives in a house by himself. The house has 2 stairs to enter, no stairs in the home. Bathroom has a walk in shower and another has a tub/shower. The house is ADA accessible as it was designed previously as a group home.   Self-Care   Usual Activity Tolerance good   Current Activity Tolerance poor   Regular Exercise No   Equipment Currently Used at Home grab bar, tub/shower;shower chair;grab bar, toilet   Fall history within last six months no   Activity/Exercise/Self-Care Comment Pt is IND in all ADLs. He does not exercise regularly. He reports that in the past month, his activity tolerance has decreased rapidly and he no longer can do things like he used to.   Instrumental Activities of Daily Living (IADL)   Previous Responsibilities medication management;finances;driving   IADL Comments Pt reports that most IADLs are done by his parents, meals are delivered to him, he has not gone shopping in a while, laundry and cleaning are done for him.   General Information   Onset of Illness/Injury or Date of Surgery 02/03/25   Referring Physician Sofya Elizabeth MD   Patient/Family Therapy Goal Statement (OT) Pt states that he would like to be able to walk more than a couple feet.   Additional Occupational Profile Info/Pertinent History of Current Problem 40 yo male with PMHx of HFrEF (15%) 2/2 NICM s/p ICD, cardiogenic shock previously on dobutamine, CKD3, polysubstance use  "admitted 2/3/25 for acute decompensated heart failure and concern for cardiogenic shock.   Existing Precautions/Restrictions cardiac   Cognitive Status Examination   Orientation Status orientation to person, place and time   Affect/Mental Status (Cognitive) WFL   Follows Commands WNL   Attention Deficit other (see comments)  (Pt states he has ADHD, easily distractable)   Visual Perception   Visual Impairment/Limitations WNL   Sensory   Bilateral LE Sensory Assessment general sensation;impaired   Sensory Comments Pt reports BLE feeling \"heavy\" and pins and needles after activity   Pain Assessment   Patient Currently in Pain No   Posture   Posture not impaired   Range of Motion Comprehensive   General Range of Motion no range of motion deficits identified   Strength Comprehensive (MMT)   General Manual Muscle Testing (MMT) Assessment other (see comments)   Comment, General Manual Muscle Testing (MMT) Assessment general weakness   Muscle Tone Assessment   Muscle Tone Quick Adds No deficits were identified   Coordination   Upper Extremity Coordination No deficits were identified   Bed Mobility   Bed Mobility No deficits identified   Transfers   Transfers sit-stand transfer   Sit-Stand Transfer   Sit-Stand [place] (Transfers) independent   Balance   Balance Assessment no deficits identified   Activities of Daily Living   BADL Assessment/Intervention upper body dressing;lower body dressing;bathing;toileting   Bathing Assessment/Intervention   [place]arden Level (Bathing) modified independence   Comment, (Bathing) Per pt report, needing breaks throughout bathing. Per clinical judgement, would need modified ind.   Assistive Devices (Bathing) shower chair;grab bar, tub/shower   Upper Body Dressing Assessment/Training   Comment, (Upper Body Dressing) Per clinical judgement, pt would need modified A I for UBD d/t decreased activity tolerance.   [place] Level (Upper Body Dressing) modified independence   Lower Body " Dressing Assessment/Training   Comment, (Lower Body Dressing) Per clinical judgement, pt would need modified A I for LBD d/t decreased activity tolerance.   [place] Level (Lower Body Dressing) modified independence;set up   Toileting   Comment, (Toileting) Per pt report, he needs to take breaks during bowel movemenrs d/t fatigue and SOB. Per clinical judgement, pt would need modified A I for toileting d/t decreased activity tolerance.   [place] Level (Toileting) modified independence;set up   Clinical Impression   Criteria for Skilled Therapeutic Interventions Met (OT) Yes, treatment indicated   OT Diagnosis decreased activity tolerance, strength, and endurance   Influenced by the following impairments SOB, dyspnea, fatigue   OT Problem List-Impairments impacting ADL problems related to;activity tolerance impaired;mobility;sensation   Assessment of Occupational Performance 3-5 Performance Deficits   Identified Performance Deficits decreased IND in I/ADLs d/t activity intolerance   Planned Therapy Interventions (OT) ADL retraining;IADL retraining;strengthening;progressive activity/exercise   Clinical Decision Making Complexity (OT) problem focused assessment/low complexity   Risk & Benefits of therapy have been explained evaluation/treatment results reviewed;care plan/treatment goals reviewed;risks/benefits reviewed;current/potential barriers reviewed;participants voiced agreement with care plan;participants included;patient   Clinical Impression Comments Pt would benefit from continued OT/CR services to increase activity tolerance, educate on energy conservation, and increase IND in I/ADLs.   OT Total Evaluation Time   OT Eval, Low Complexity Minutes (70712) 8   OT Goals   Therapy Frequency (OT) 3 times/week   OT Predicted Duration/Target Date for Goal Attainment 02/11/25   OT: Understanding of cardiac education to maximize quality of life, condition management, and health outcomes Patient;Demonstrate    OT: Perform aerobic activity with stable cardiovascular response continuous;10 minutes;ambulation;NuStep   OT: Functional/aerobic ambulation tolerance with stable cardiovascular response in order to return to home and community environment Greater than 300 feet;Independent   OT: Navigation of stairs simulating home set up with stable cardiovascular response in order to return to home and community environment Independent;2 stairs   OT Discharge Planning   OT Plan activity tolerance, energy conservation in ADLs, ambulation, CR   OT Discharge Recommendation (DC Rec) home with assist;home with outpatient cardiac rehab   OT Rationale for DC Rec Pt significantly below baseline in activity tolerance and endurance. Previously IND in ADLs, recieves assistance with IADLs. Anticipate pt will be safe to return home w/ assistance from family.   OT Brief overview of current status SBA   OT Total Distance Amb During Session (feet) 200   Total Session Time   Timed Code Treatment Minutes 21   Total Session Time (sum of timed and untimed services) 29

## 2025-02-04 NOTE — PROGRESS NOTES
"Cardiology / Advanced HF Service  Post-Rounds Update:    S: No acute events since admission. Patient reports minimal UOP from bumex, also it made his legs feel \"jittery\", would prefer lasix instead. Still feels significant dyspnea, orthopnea, fluid overload. Weight normally around 170, he's in 180-185 range now. Intermittently adherent to his medications - sounds like some issues regarding pharmacy/getting refills and some is patient's choice to take medications some days but not other days. Has mostly just been taking torsemide the last few days, felt like it still wasn't helping.    O: afebrile, systolics , MAP 89-94, on room air  - net even based on charted I/O, weight still 183 today  Gen: ill- and uncomfortable-appearing, lying in bed  Resp: normal work of breathing on RA, prominent bilateral inspiratory crackles  CV: elevated JVP to 13 cm H2O, mildly tachycardic but regular rhythm. No murmurs or extra heart sounds appreciated  Abd: soft, non-tender, non-distended. + BS  Ext: cool to touch, trace LE edema bilaterally    A: 38 yo male with PMHx of HFrEF (15%) 2/2 NICM s/p ICD, cardiogenic shock previously on dobutamine, CKD3, polysubstance use admitted 2/3/25 for acute decompensated heart failure and concern for cardiogenic shock.    Updates Today:  - Start hydralazine/isordil  - Start lasix gtt  - Hold PTA GDMT    P:  # Acute on chronic systolic heart failure (EF 15%), stage D, class IIIb  # Non-ischemic cardiomyopathy  Pt with hx of advanced HFrEF/NICM. Was previously on outpatient dobutamine, self-discontinued in 2023. Re-engaged with outpatient cardiologist Dr. Dove 1/17, also self-enrolled in chem dep treatment in past year. At recent office visit/RHC was concerning for decompensated CHF and possible cardiogenic shock, pt attempted to manage outpatient but felt worse and ultimately presented for admission. Interested in advanced therapies but per last clinic note not currently candidate due to " being lost for follow up > 1 year.  Last RHC 1/29/25: RA 15, PA 44/26, PCWP 21, CI 1.7, PVR 3.4, SVR ~1400  Last TTE 11/2024: EF 15%, moderately reduced RV function, mod MR, severe TR  - Volume: hypervolemic   - PTA torsemide 20 mg (recent increase to 80 mg) daily, held on admission   - Start lasix 80 mg IV x1, then gtt at 20 mg/hr, goal net negative 2-3 L today  - Afterload: start hydralazine 25 q8, isosorbide dinitrate 20 TID  - Inotrope: PTA digoxin 125 mcg daily  - MCS: not indicated  - Anticoagulation: PTA rivaroxaban  - SCD ppx: ICD in place  - Antiarrhythmic: none  - Antiplatelet: none  - Statin: none    # Abnormal LFTs  # Congestive hepatopathy  On admission AST/ALT in 200s. Bilirubin 4.9. INR 3.5. Pattern and clinical picture most c/w congestive hepatopathy. Less concern for shock liver / ischemic hepatitis given normal/elevated BP, negative lactate.  - Cardiac management per above  - Trend LFTs    Remainder of plan per admission H&P from today. Patient seen and plan of care discussed with Dr. Gracia.    Cherie Gómez MD, PhD  PGY2 Internal Medicine

## 2025-02-04 NOTE — PHARMACY-ADMISSION MEDICATION HISTORY
"Pharmacy Intern Admission Medication History    Admission medication history is complete. The information provided in this note is only as accurate as the sources available at the time of the update.    Information Source(s): Patient and CareEverywhere/SureScripts via in-person    Pertinent Information:   The patient states he takes Xarelto approximately once weekly due to it being \"strong\". He is unsure of his last dose but it was not in the past week.    The following medications have recent fill history and the patient verified he is not taking them  Banophen  Eplerenone  Furosemide  Metoprolol succinate  Digoxin  Buproprion  Last week, the patient was instructed by his provider to make the following dose change. He has not made any changes PTA.   Torsemide 20 mg daily --> 40 mg daily   Jardiance 10 mg daily --> 5 mg daily   Potassium 20 mEq daily --> 20 mEq BID    Changes made to PTA medication list:  Added:   Albuterol inhaler  Deleted:   Zofran  Changed:   Jardiance 5 mg daiy --> 10 mg daily  Torsemide 80 mg daily --> 20 mg daily     Allergies reviewed with patient and updates made in EHR: yes    Medication History Completed By: Luna Slater 2/4/2025 8:56 AM    PTA Med List   Medication Sig Last Dose/Taking    albuterol (PROAIR HFA/PROVENTIL HFA/VENTOLIN HFA) 108 (90 Base) MCG/ACT inhaler Inhale 2 puffs into the lungs every 6 hours as needed for shortness of breath, wheezing or cough. More than a month    calcium carbonate (OS-LENARD) 1500 (600 Ca) MG tablet Take 1 tablet by mouth daily at 2 pm. 1/30/2025    empagliflozin (JARDIANCE) 10 MG TABS tablet Take 10 mg by mouth daily. 1/30/2025    ferrous sulfate (FEROSUL) 325 (65 Fe) MG tablet Take 325 mg by mouth daily (with breakfast). 1/30/2025    potassium chloride rand ER (KLOR-CON M10) 10 MEQ CR tablet Take 10 mEq by mouth daily. 1/30/2025    rivaroxaban ANTICOAGULANT (XARELTO) 20 MG TABS tablet Take 20 mg by mouth daily (with dinner). Past Month    " sacubitril-valsartan (ENTRESTO) 24-26 MG per tablet Take 1 tablet by mouth 2 times daily 1/30/2025    spironolactone (ALDACTONE) 25 MG tablet Take 1 tablet (25 mg) by mouth daily 1/30/2025    torsemide (DEMADEX) 20 MG tablet Take 20 mg by mouth daily. 1/30/2025    VITAMIN C 250 MG tablet Take 1 tablet by mouth daily. 1/30/2025

## 2025-02-04 NOTE — H&P
[facility]    Cardiology History and Physical - Cardiology         Date of Admission:  2/3/2025    Assessment & Plan: [facility]    Jose Enrique Engel is a 39 year old male admitted on 2/3/2025. He has a hx of HFrEF 2/2 NICM s/p ICD, cardiogenic shock previously on dobutamine, CKD3, and polysubstance use who presents for SOB concerning for ambulatory cardiogenic shock.     Concern for ambulatory cardiogenic shock   Acute decompensated HFrEF (EF 10%) 2/2 NICM s/p ICD  AHA stage D, NYHA Class IIIb.   Pt presenting with dyspnea with exertion, orthopnea, abdominal distention, and weight gain. Also was not able to take his medications for the past 3 days. JVP ~16 cm on admission, lungs clear., BNP 6k Recently seen by Dr. Dove outpatient on 1/17 after previously being lost to follow up for > 15 months. Pt is interested in advanced therapies workup, but during recent office visit, it has royal discussed with pt that he is not a candidate at this time given his past compliance issues. Previously on dobutamine, but pt self-discontinued in 2023. RHC (1/29/25) with RA 15, PA 44/26, wedge 21, CI 1.7 (thermo). Stress test (1/29) showing significantly impaired exercise capacity.   - Baseline weight 170 lbs (pt reported), admission weight 184 lbs  - Volume status: hypervolemic  - Diuretic   - IV bumex 4 mg   - Holding PTA torsemide   - High replacement K and Mg protocols  - GDMT  - ACE/ARB/ARNI: PTA Entresto 24-26 mg BID  - BB: N/A given previously on dobutamine and significant history of low-output state   - MRA: PTA spironolactone 25 mg daily  - SGLT2i: empagliflozin 5 mg every day (recently decreased d/t hypoglycemia)  - SCD prophylaxis: s/p subcutaneous ICD, patient previously turned off tachytherapies, but now they are back on   - Anticoagulation: rivaroxaban     Hx of LV apical thrombus  Seen on echo in 07/2023 - large (~1.8 cm x 1.8 cm x 1.1 cm), protruding, and predominantly  "sessile.   - PTA DOAC    Hx of polysubstance use   Currently reporting only MJ use. Reports sobriety from EtOH and meth (last use 06/2024). Follows with local chem dep team.   - UDS, peth    CKD3  Baseline Cr 1.3-1.6. Cr 1.5 on admission.  - CTM    Subclinical hypothyroidism  TSH 8 on admission. Started on synthroid at OSH due to TSH of 9. Technically should treat subclinical hypothyroidism if TSH >10. Synthroid not on his PTA meds list.   - Managed by PCP    Deconditioning  - PT/OT consults placed       Diet:  Cardiac diet, fluid restriction 2L  DVT Prophylaxis: DOAC  [place] Catheter: Not present  Cardiac Monitoring: ACTIVE order. Indication: Acute decompensated heart failure (48 hours)  Code Status: Full Code          Clinically Significant Risk Factors Present on Admission                # Drug Induced Coagulation Defect: home medication list includes an anticoagulant medication     # Chronic heart failure with reduced ejection fraction: last echo with EF <40%          # Overweight: Estimated body mass index is 25.72 kg/m  as calculated from the following:    Height as of this encounter: 1.803 m (5' 11\").    Weight as of this encounter: 83.6 kg (184 lb 6.4 oz).        # ICD device present          Disposition Plan   Expected discharge: 4 - 7 days, recommended to prior living arrangement once fluid volume status optimized on oral medication.    Entered: Sofya Elizabeth MD 02/04/2025, 12:20 AM   The patient will be staffed in the AM.       Sofya Elizabeth MD  [facility]    ______________________________________________________________________    Chief Complaint   SOB    History is obtained from the patient    History of Present Illness   Jose Enrique Engel is a 39 year old male with HFrEF 2/2 NICM s/p ICD, cardiogenic shock previously on dobutamine, CKD3, and polysubstance use who presents for SOB.     Jose Enrique reports dyspnea with exertion, orthopnea, lightheadedness, " fatigue, and weight gain for at least the past week.  He has been waking up at night gasping for air.  He reports that when he attempts to ambulate, his legs feel heavy.  He endorses mild abdominal distention and says he retains fluid in his abdomen.  He does not typically have lower extremity edema.  Last Wednesday, he was recommended to increase his torsemide to 80 mg daily from 20 mg daily for his symptoms.  However, he does not feel like he is urinating more with the torsemide.  He was previously instructed that if he did not improve, he should go to the ED.     Of note, he reports running out of his cardiac medications over the weekend and has not taken them for 3 days now.      Past Medical History    No past medical history on file.    Past Surgical History   Past Surgical History:   Procedure Laterality Date    CV RIGHT HEART CATH MEASUREMENTS RECORDED N/A 1/29/2025    Procedure: Heart Cath Right Heart Cath;  Surgeon: Osman Canseco MD;  Location:  HEART CARDIAC CATH LAB    EP SICD INSERT N/A 7/17/2023    Procedure: Subcutaneous Implantable Cardioverter Defibrillator Implant;  Surgeon: Luis Zaragoza MD;  Location:  HEART CARDIAC CATH LAB    PICC DOUBLE LUMEN PLACEMENT Left 07/07/2023    Left basilic vein 48cm total 2cm external.       Prior to Admission Medications   Prior to Admission Medications   Prescriptions Last Dose Informant Patient Reported? Taking?   VITAMIN C 250 MG tablet   Yes No   Sig: Take 1 tablet by mouth daily.   buPROPion (WELLBUTRIN XL) 300 MG 24 hr tablet   No No   Sig: Take 1 tablet (300 mg) by mouth daily   Patient not taking: Reported on 1/17/2025   calcium carbonate (OS-LENARD) 1500 (600 Ca) MG tablet   Yes No   Sig: Take 1 tablet by mouth daily at 2 pm.   digoxin (LANOXIN) 125 MCG tablet   No No   Sig: Take 1 tablet (125 mcg) by mouth daily.   empagliflozin (JARDIANCE) 10 MG TABS tablet   No No   Sig: Take half tablet one time daily.   ferrous sulfate (FEROSUL) 325 (65 Fe) MG  tablet   Yes No   Sig: Take 325 mg by mouth daily (with breakfast).   ondansetron (ZOFRAN ODT) 4 MG ODT tab   No No   Sig: Take 1 tablet (4 mg) by mouth every 8 hours as needed for nausea or vomiting   Patient not taking: Reported on 1/17/2025   potassium chloride ER (MICRO-K) 10 MEQ CR capsule   No No   Sig: Take 4 capsules (40 mEq) by mouth 2 times daily.   rivaroxaban ANTICOAGULANT (XARELTO) 20 MG TABS tablet   Yes No   Sig: Take 20 mg by mouth.   sacubitril-valsartan (ENTRESTO) 24-26 MG per tablet   No No   Sig: Take 1 tablet by mouth 2 times daily   spironolactone (ALDACTONE) 25 MG tablet   No No   Sig: Take 1 tablet (25 mg) by mouth daily   torsemide (DEMADEX) 20 MG tablet   No No   Sig: Take 4 tablets (80 mg) by mouth daily.      Facility-Administered Medications: None           Physical Exam   Vital Signs: Temp: 97.9  F (36.6  C) Temp src: Oral BP: 112/84 Pulse: 97   Resp: 20 SpO2: 98 % O2 Device: None (Room air)    Weight: 184 lbs 6.4 oz    General Appearance: NAD, lying comfortably in bed   Respiratory: CTA bilaterally without crackles or wheezes   Cardiovascular: RR, holosystolic murmur, JVP estimated at ~16 cm  GI: Soft, nontender, mildly distended, normal active bowel sounds  Ext: No BLE   Neuro: A&Ox3  Skin: No rashes or lesions      Medical Decision Making       Please see A&P for additional details of medical decision making.      Data         Imaging results reviewed over the past 24 hrs:   Recent Results (from the past 24 hours)   XR Chest Port 1 View    Narrative    CHEST ONE VIEW    INDICATION: SOB       FINDINGS:   Portable AP view of the chest.    Enlarged cardiac silhouette, similar to prior.  No gross pulmonary vascular engorgement.  No gross lung consolidation or pleural effusion.    Finalized by: Anil Novoa MD on 2/3/2025 9:13 AM CST

## 2025-02-05 ENCOUNTER — ANCILLARY PROCEDURE (OUTPATIENT)
Dept: CARDIOLOGY | Facility: CLINIC | Age: 40
End: 2025-02-05
Attending: STUDENT IN AN ORGANIZED HEALTH CARE EDUCATION/TRAINING PROGRAM
Payer: COMMERCIAL

## 2025-02-05 ENCOUNTER — APPOINTMENT (OUTPATIENT)
Dept: OCCUPATIONAL THERAPY | Facility: CLINIC | Age: 40
End: 2025-02-05
Attending: STUDENT IN AN ORGANIZED HEALTH CARE EDUCATION/TRAINING PROGRAM
Payer: COMMERCIAL

## 2025-02-05 LAB
ALBUMIN SERPL BCG-MCNC: 3.7 G/DL (ref 3.5–5.2)
ALBUMIN SERPL BCG-MCNC: 3.7 G/DL (ref 3.5–5.2)
ALP SERPL-CCNC: 141 U/L (ref 40–150)
ALP SERPL-CCNC: 142 U/L (ref 40–150)
ALT SERPL W P-5'-P-CCNC: 187 U/L (ref 0–70)
ALT SERPL W P-5'-P-CCNC: 206 U/L (ref 0–70)
ANION GAP SERPL CALCULATED.3IONS-SCNC: 12 MMOL/L (ref 7–15)
ANION GAP SERPL CALCULATED.3IONS-SCNC: 13 MMOL/L (ref 7–15)
AST SERPL W P-5'-P-CCNC: 144 U/L (ref 0–45)
AST SERPL W P-5'-P-CCNC: 164 U/L (ref 0–45)
BILIRUB DIRECT SERPL-MCNC: 1.29 MG/DL (ref 0–0.3)
BILIRUB DIRECT SERPL-MCNC: 1.39 MG/DL (ref 0–0.3)
BILIRUB SERPL-MCNC: 2.4 MG/DL
BILIRUB SERPL-MCNC: 2.8 MG/DL
BUN SERPL-MCNC: 27.3 MG/DL (ref 6–20)
BUN SERPL-MCNC: 29.3 MG/DL (ref 6–20)
CALCIUM SERPL-MCNC: 8.6 MG/DL (ref 8.8–10.4)
CALCIUM SERPL-MCNC: 9 MG/DL (ref 8.8–10.4)
CANNABINOIDS UR CFM-MCNC: 14 NG/ML
CARBOXYTHC/CREAT UR: 70 NG/MG CREAT
CHLORIDE SERPL-SCNC: 100 MMOL/L (ref 98–107)
CHLORIDE SERPL-SCNC: 96 MMOL/L (ref 98–107)
CREAT SERPL-MCNC: 1.37 MG/DL (ref 0.67–1.17)
CREAT SERPL-MCNC: 1.44 MG/DL (ref 0.67–1.17)
EGFRCR SERPLBLD CKD-EPI 2021: 63 ML/MIN/1.73M2
EGFRCR SERPLBLD CKD-EPI 2021: 67 ML/MIN/1.73M2
GLUCOSE SERPL-MCNC: 72 MG/DL (ref 70–99)
GLUCOSE SERPL-MCNC: 90 MG/DL (ref 70–99)
HCO3 SERPL-SCNC: 22 MMOL/L (ref 22–29)
HCO3 SERPL-SCNC: 25 MMOL/L (ref 22–29)
INR PPP: 1.57 (ref 0.85–1.15)
LACTATE SERPL-SCNC: 2.5 MMOL/L (ref 0.7–2)
LACTATE SERPL-SCNC: 2.6 MMOL/L (ref 0.7–2)
MAGNESIUM SERPL-MCNC: 2.3 MG/DL (ref 1.7–2.3)
MAGNESIUM SERPL-MCNC: 2.4 MG/DL (ref 1.7–2.3)
MDC_IDC_EPISODE_DTM: NORMAL
MDC_IDC_EPISODE_DURATION: 18 S
MDC_IDC_EPISODE_DURATION: 21 S
MDC_IDC_EPISODE_DURATION: 23 S
MDC_IDC_EPISODE_DURATION: 27 S
MDC_IDC_EPISODE_DURATION: 45 S
MDC_IDC_EPISODE_DURATION: 45 S
MDC_IDC_EPISODE_DURATION: 52 S
MDC_IDC_EPISODE_DURATION: 53 S
MDC_IDC_EPISODE_ID: 1
MDC_IDC_EPISODE_ID: 2
MDC_IDC_EPISODE_ID: 3
MDC_IDC_EPISODE_ID: 4
MDC_IDC_EPISODE_ID: 5
MDC_IDC_EPISODE_ID: 6
MDC_IDC_EPISODE_ID: 7
MDC_IDC_EPISODE_ID: 8
MDC_IDC_EPISODE_TYPE: NORMAL
MDC_IDC_EPISODE_TYPE_INDUCED: NO
MDC_IDC_EPISODE_VENDOR_TYPE: NORMAL
MDC_IDC_LEAD_CONNECTION_STATUS: NORMAL
MDC_IDC_LEAD_IMPLANT_DT: NORMAL
MDC_IDC_LEAD_LOCATION: NORMAL
MDC_IDC_LEAD_LOCATION_DETAIL_1: NORMAL
MDC_IDC_LEAD_MFG: NORMAL
MDC_IDC_LEAD_MODEL: NORMAL
MDC_IDC_LEAD_POLARITY_TYPE: NORMAL
MDC_IDC_LEAD_SERIAL: NORMAL
MDC_IDC_MSMT_BATTERY_DTM: NORMAL
MDC_IDC_MSMT_BATTERY_REMAINING_PERCENTAGE: 76 %
MDC_IDC_MSMT_BATTERY_STATUS: NORMAL
MDC_IDC_PG_IMPLANT_DTM: NORMAL
MDC_IDC_PG_MFG: NORMAL
MDC_IDC_PG_MODEL: NORMAL
MDC_IDC_PG_SERIAL: NORMAL
MDC_IDC_PG_TYPE: NORMAL
MDC_IDC_SESS_CLINIC_NAME: NORMAL
MDC_IDC_SESS_DTM: NORMAL
MDC_IDC_SESS_TYPE: NORMAL
MDC_IDC_SET_ZONE_DETECTION_INTERVAL: 285 MS
MDC_IDC_SET_ZONE_DETECTION_INTERVAL: 285 MS
MDC_IDC_SET_ZONE_STATUS: NORMAL
MDC_IDC_SET_ZONE_STATUS: NORMAL
MDC_IDC_SET_ZONE_TYPE: NORMAL
MDC_IDC_SET_ZONE_TYPE: NORMAL
MDC_IDC_SET_ZONE_VENDOR_TYPE: NORMAL
MDC_IDC_SET_ZONE_VENDOR_TYPE: NORMAL
MDC_IDC_STAT_AT_BURDEN_PERCENT: 0 %
MDC_IDC_STAT_EPISODE_RECENT_COUNT: 0
MDC_IDC_STAT_EPISODE_RECENT_COUNT_DTM_END: NORMAL
MDC_IDC_STAT_EPISODE_RECENT_COUNT_DTM_START: NORMAL
MDC_IDC_STAT_EPISODE_TOTAL_COUNT: 3
MDC_IDC_STAT_EPISODE_TOTAL_COUNT_DTM_END: NORMAL
MDC_IDC_STAT_EPISODE_TOTAL_COUNT_DTM_START: NORMAL
MDC_IDC_STAT_EPISODE_TYPE: NORMAL
MDC_IDC_STAT_EPISODE_VENDOR_TYPE: NORMAL
MDC_IDC_STAT_TACHYTHERAPY_RECENT_DTM_END: NORMAL
MDC_IDC_STAT_TACHYTHERAPY_RECENT_DTM_START: NORMAL
MDC_IDC_STAT_TACHYTHERAPY_SHOCKS_DELIVERED_RECENT: 0
MDC_IDC_STAT_TACHYTHERAPY_SHOCKS_DELIVERED_TOTAL: 5
MDC_IDC_STAT_TACHYTHERAPY_TOTAL_DTM_END: NORMAL
MDC_IDC_STAT_TACHYTHERAPY_TOTAL_DTM_START: NORMAL
POTASSIUM SERPL-SCNC: 4.1 MMOL/L (ref 3.4–5.3)
POTASSIUM SERPL-SCNC: 4.3 MMOL/L (ref 3.4–5.3)
POTASSIUM SERPL-SCNC: 4.5 MMOL/L (ref 3.4–5.3)
PROT SERPL-MCNC: 6.8 G/DL (ref 6.4–8.3)
PROT SERPL-MCNC: 7 G/DL (ref 6.4–8.3)
SODIUM SERPL-SCNC: 133 MMOL/L (ref 135–145)
SODIUM SERPL-SCNC: 135 MMOL/L (ref 135–145)

## 2025-02-05 PROCEDURE — 250N000011 HC RX IP 250 OP 636: Performed by: STUDENT IN AN ORGANIZED HEALTH CARE EDUCATION/TRAINING PROGRAM

## 2025-02-05 PROCEDURE — 82565 ASSAY OF CREATININE: CPT

## 2025-02-05 PROCEDURE — 80053 COMPREHEN METABOLIC PANEL: CPT | Performed by: STUDENT IN AN ORGANIZED HEALTH CARE EDUCATION/TRAINING PROGRAM

## 2025-02-05 PROCEDURE — 120N000005 HC R&B MS OVERFLOW UMMC

## 2025-02-05 PROCEDURE — 97110 THERAPEUTIC EXERCISES: CPT | Mod: GO

## 2025-02-05 PROCEDURE — 83735 ASSAY OF MAGNESIUM: CPT

## 2025-02-05 PROCEDURE — 93261 INTERROGATE SUBQ DEFIB: CPT | Mod: 26 | Performed by: INTERNAL MEDICINE

## 2025-02-05 PROCEDURE — 93261 INTERROGATE SUBQ DEFIB: CPT

## 2025-02-05 PROCEDURE — 36415 COLL VENOUS BLD VENIPUNCTURE: CPT

## 2025-02-05 PROCEDURE — 83605 ASSAY OF LACTIC ACID: CPT | Performed by: STUDENT IN AN ORGANIZED HEALTH CARE EDUCATION/TRAINING PROGRAM

## 2025-02-05 PROCEDURE — 258N000003 HC RX IP 258 OP 636: Performed by: STUDENT IN AN ORGANIZED HEALTH CARE EDUCATION/TRAINING PROGRAM

## 2025-02-05 PROCEDURE — 85610 PROTHROMBIN TIME: CPT

## 2025-02-05 PROCEDURE — 250N000011 HC RX IP 250 OP 636

## 2025-02-05 PROCEDURE — 99232 SBSQ HOSP IP/OBS MODERATE 35: CPT | Mod: 25 | Performed by: INTERNAL MEDICINE

## 2025-02-05 PROCEDURE — 36415 COLL VENOUS BLD VENIPUNCTURE: CPT | Performed by: STUDENT IN AN ORGANIZED HEALTH CARE EDUCATION/TRAINING PROGRAM

## 2025-02-05 PROCEDURE — 80048 BASIC METABOLIC PNL TOTAL CA: CPT | Performed by: STUDENT IN AN ORGANIZED HEALTH CARE EDUCATION/TRAINING PROGRAM

## 2025-02-05 PROCEDURE — 82248 BILIRUBIN DIRECT: CPT

## 2025-02-05 PROCEDURE — 250N000013 HC RX MED GY IP 250 OP 250 PS 637

## 2025-02-05 PROCEDURE — 82310 ASSAY OF CALCIUM: CPT

## 2025-02-05 PROCEDURE — 82247 BILIRUBIN TOTAL: CPT

## 2025-02-05 PROCEDURE — 80048 BASIC METABOLIC PNL TOTAL CA: CPT

## 2025-02-05 PROCEDURE — 250N000013 HC RX MED GY IP 250 OP 250 PS 637: Performed by: STUDENT IN AN ORGANIZED HEALTH CARE EDUCATION/TRAINING PROGRAM

## 2025-02-05 PROCEDURE — 999N000128 HC STATISTIC PERIPHERAL IV START W/O US GUIDANCE

## 2025-02-05 RX ORDER — HYDRALAZINE HYDROCHLORIDE 50 MG/1
50 TABLET, FILM COATED ORAL EVERY 8 HOURS
Status: DISCONTINUED | OUTPATIENT
Start: 2025-02-05 | End: 2025-02-06

## 2025-02-05 RX ADMIN — HYDRALAZINE HYDROCHLORIDE 50 MG: 50 TABLET ORAL at 12:32

## 2025-02-05 RX ADMIN — ISOSORBIDE DINITRATE 20 MG: 20 TABLET ORAL at 08:09

## 2025-02-05 RX ADMIN — FUROSEMIDE 20 MG/HR: 10 INJECTION, SOLUTION INTRAVENOUS at 19:07

## 2025-02-05 RX ADMIN — ONDANSETRON 4 MG: 2 INJECTION, SOLUTION INTRAMUSCULAR; INTRAVENOUS at 10:21

## 2025-02-05 RX ADMIN — CALCIUM CARBONATE (ANTACID) CHEW TAB 500 MG 1000 MG: 500 CHEW TAB at 10:21

## 2025-02-05 RX ADMIN — CALCIUM CARBONATE (ANTACID) CHEW TAB 500 MG 1000 MG: 500 CHEW TAB at 18:13

## 2025-02-05 RX ADMIN — DIGOXIN 125 MCG: 125 TABLET ORAL at 08:10

## 2025-02-05 RX ADMIN — POTASSIUM CHLORIDE 40 MEQ: 750 CAPSULE, EXTENDED RELEASE ORAL at 19:53

## 2025-02-05 RX ADMIN — FUROSEMIDE 20 MG/HR: 10 INJECTION, SOLUTION INTRAVENOUS at 02:35

## 2025-02-05 RX ADMIN — ISOSORBIDE DINITRATE 20 MG: 20 TABLET ORAL at 12:32

## 2025-02-05 RX ADMIN — POTASSIUM CHLORIDE 40 MEQ: 750 CAPSULE, EXTENDED RELEASE ORAL at 08:09

## 2025-02-05 RX ADMIN — FUROSEMIDE 20 MG/HR: 10 INJECTION, SOLUTION INTRAVENOUS at 13:37

## 2025-02-05 RX ADMIN — FUROSEMIDE 20 MG/HR: 10 INJECTION, SOLUTION INTRAVENOUS at 23:57

## 2025-02-05 RX ADMIN — ISOSORBIDE DINITRATE 20 MG: 20 TABLET ORAL at 18:14

## 2025-02-05 RX ADMIN — ZOLPIDEM TARTRATE 5 MG: 5 TABLET, FILM COATED ORAL at 21:16

## 2025-02-05 RX ADMIN — HYDRALAZINE HYDROCHLORIDE 25 MG: 25 TABLET ORAL at 03:27

## 2025-02-05 RX ADMIN — FUROSEMIDE 20 MG/HR: 10 INJECTION, SOLUTION INTRAVENOUS at 08:11

## 2025-02-05 RX ADMIN — HYDRALAZINE HYDROCHLORIDE 50 MG: 50 TABLET ORAL at 19:53

## 2025-02-05 RX ADMIN — FERROUS SULFATE TAB 325 MG (65 MG ELEMENTAL FE) 325 MG: 325 (65 FE) TAB at 08:09

## 2025-02-05 ASSESSMENT — ACTIVITIES OF DAILY LIVING (ADL)
ADLS_ACUITY_SCORE: 43
ADLS_ACUITY_SCORE: 37
ADLS_ACUITY_SCORE: 43
ADLS_ACUITY_SCORE: 37
ADLS_ACUITY_SCORE: 37
ADLS_ACUITY_SCORE: 43
ADLS_ACUITY_SCORE: 37
ADLS_ACUITY_SCORE: 43
ADLS_ACUITY_SCORE: 37
ADLS_ACUITY_SCORE: 43

## 2025-02-05 NOTE — PROGRESS NOTES
[facility]    Cardiology History and Physical - Cardiology         Date of Admission:  2/3/2025    Assessment & Plan: HVSL    40 yo male with PMHx of HFrEF (15%) 2/2 NICM s/p ICD, cardiogenic shock previously on dobutamine, CKD3, polysubstance use admitted 2/3/25 for ambulatory cardiogenic shock.    # Acute on chronic systolic heart failure (EF 15%), stage D, class IIIb  # Ambulatory cardiogenic shock   # Non-ischemic cardiomyopathy  Pt with hx of advanced HFrEF/NICM. Was previously on outpatient dobutamine, self-discontinued in 2023. Re-engaged with outpatient cardiologist Dr. Dove 1/17, also self-enrolled in chem dep treatment in past year. At recent office visit/RHC was concerning for decompensated CHF and possible cardiogenic shock, pt attempted to manage outpatient but felt worse and ultimately presented for admission. Interested in advanced therapies but per last clinic note not currently candidate due to being lost for follow up > 1 year.  Last RHC 1/29/25: RA 15, PA 44/26, PCWP 21, CI 1.7, PVR 3.4, SVR ~1400  Last TTE 11/2024: EF 15%, moderately reduced RV function, mod MR, severe TR  - Volume: hypervolemic              - Lasix gtt at 20 mg/hr  - Afterload: start hydralazine 25 q8, isosorbide dinitrate 20 TID  - Inotrope: PTA digoxin 125 mcg daily  - MCS: not indicated  - Anticoagulation: PTA rivaroxaban  - SCD ppx: ICD in place  - Antiarrhythmic: none  - Antiplatelet: none  - Statin: none    # Abnormal LFTs  # Congestive hepatopathy  On admission AST/ALT in 200s. Bilirubin 4.9. INR 3.5. Pattern and clinical picture most c/w congestive hepatopathy. Less concern for shock liver / ischemic hepatitis given normal/elevated BP, negative lactate.  - Cardiac management per above  - Trend LFTs    # Hx of LV apical thrombus  Seen on echo in 07/2023 - large (~1.8 cm x 1.8 cm x 1.1 cm), protruding, and predominantly sessile.   - PTA DOAC    # Hx of polysubstance  use   Currently reporting only MJ use. Reports sobriety from EtOH and meth (last use 06/2024). Follows with local chem dep team.   - UDS, pet    # CKD3  Baseline Cr 1.3-1.6. Cr 1.5 on admission.  - CTM    # Subclinical hypothyroidism  TSH 8 on admission. Started on synthroid at OSH due to TSH of 9. Technically should treat subclinical hypothyroidism if TSH >10. Synthroid not on his PTA meds list.   - Managed by PCP    Deconditioning  - PT/OT consults placed       Diet: Low Saturated Fat Na <2400 mg  Fluid restriction 2000 ML FLUIDCardiac diet, fluid restriction 2L  DVT Prophylaxis: DOAC  Valle Catheter: Not present  Cardiac Monitoring: ACTIVE order. Indication: Acute decompensated heart failure (48 hours)  Code Status: Full Code          Clinically Significant Risk Factors         # Hyponatremia: Lowest Na = 129 mmol/L in last 2 days, will monitor as appropriate  # Hypochloremia: Lowest Cl = 91 mmol/L in last 2 days, will monitor as appropriate  # Hypocalcemia: Lowest Ca = 8.4 mg/dL in last 2 days, will monitor and replace as appropriate    # Anion Gap Metabolic Acidosis: Highest Anion Gap = 20 mmol/L in last 2 days, will monitor and treat as appropriate   # Coagulation Defect: INR = 1.57 (Ref range: 0.85 - 1.15) and/or PTT = N/A, will monitor for bleeding     # Heart failure, NOS: home medication list includes sacubitril/valsartan (Entresto) and last echo with EF 40-50%                # ICD device present          Disposition Plan   Expected discharge: 4 - 7 days, recommended to prior living arrangement once fluid volume status optimized on oral medication.    Entered: Rush Bowling MD 02/05/2025, 7:32 AM   The patient will be staffed in the AM.       Rush Bowling MD  [facility]    ______________________________________________________________________      Past Medical History    No past medical history on file.    Past Surgical History   Past Surgical History:    Procedure Laterality Date    CV RIGHT HEART CATH MEASUREMENTS RECORDED N/A 1/29/2025    Procedure: Heart Cath Right Heart Cath;  Surgeon: Osman Canseco MD;  Location:  HEART CARDIAC CATH LAB    EP SICD INSERT N/A 7/17/2023    Procedure: Subcutaneous Implantable Cardioverter Defibrillator Implant;  Surgeon: Luis Zaragoza MD;  Location:  HEART CARDIAC CATH LAB    PICC DOUBLE LUMEN PLACEMENT Left 07/07/2023    Left basilic vein 48cm total 2cm external.       Prior to Admission Medications   Prior to Admission Medications   Prescriptions Last Dose Informant Patient Reported? Taking?   VITAMIN C 250 MG tablet 1/30/2025 Self Yes Yes   Sig: Take 1 tablet by mouth daily.   albuterol (PROAIR HFA/PROVENTIL HFA/VENTOLIN HFA) 108 (90 Base) MCG/ACT inhaler More than a month  Yes Yes   Sig: Inhale 2 puffs into the lungs every 6 hours as needed for shortness of breath, wheezing or cough.   buPROPion (WELLBUTRIN XL) 300 MG 24 hr tablet  Self No No   Sig: Take 1 tablet (300 mg) by mouth daily   Patient not taking: Reported on 1/17/2025   calcium carbonate (OS-LENARD) 1500 (600 Ca) MG tablet 1/30/2025 Self Yes Yes   Sig: Take 1 tablet by mouth daily at 2 pm.   digoxin (LANOXIN) 125 MCG tablet Not Taking Self No No   Sig: Take 1 tablet (125 mcg) by mouth daily.   Patient not taking: Reported on 2/4/2025   empagliflozin (JARDIANCE) 10 MG TABS tablet 1/30/2025  Yes Yes   Sig: Take 10 mg by mouth daily.   ferrous sulfate (FEROSUL) 325 (65 Fe) MG tablet 1/30/2025 Self Yes Yes   Sig: Take 325 mg by mouth daily (with breakfast).   potassium chloride rand ER (KLOR-CON M10) 10 MEQ CR tablet 1/30/2025  Yes Yes   Sig: Take 10 mEq by mouth daily.   rivaroxaban ANTICOAGULANT (XARELTO) 20 MG TABS tablet Past Month Self Yes Yes   Sig: Take 20 mg by mouth daily (with dinner).   sacubitril-valsartan (ENTRESTO) 24-26 MG per tablet 1/30/2025 Self No Yes   Sig: Take 1 tablet by mouth 2 times daily   spironolactone (ALDACTONE) 25 MG tablet  2025 Self No Yes   Sig: Take 1 tablet (25 mg) by mouth daily   torsemide (DEMADEX) 20 MG tablet 2025  Yes Yes   Sig: Take 20 mg by mouth daily.      Facility-Administered Medications: None           Physical Exam   Vital Signs: Temp: 98  F (36.7  C) Temp src: Axillary BP: 118/87 Pulse: 110   Resp: 20 SpO2: 100 % O2 Device: None (Room air)    Weight: 178 lbs 0 oz    General Appearance: NAD, lying comfortably in bed   Respiratory: CTA bilaterally without crackles or wheezes   Cardiovascular: RR, holosystolic murmur, JVP estimated at ~16 cm  GI: Soft, nontender, mildly distended, normal active bowel sounds  Ext: No BLE   Neuro: A&Ox3  Skin: No rashes or lesions      Medical Decision Making       Please see A&P for additional details of medical decision making.      Data     I have personally reviewed the following data over the past 24 hrs:    N/A  \   N/A   / N/A     135 100 27.3 (H) /  72   4.1 22 1.37 (H) \     ALT: 206 (H) AST: 164 (H) AP: 141 TBILI: 2.8 (H)   ALB: 3.7 TOT PROTEIN: 7.0 LIPASE: N/A     Procal: N/A CRP: N/A Lactic Acid: 2.2 (H)       INR:  1.57 (H) PTT:  N/A   D-dimer:  N/A Fibrinogen:  N/A       Imaging results reviewed over the past 24 hrs:   Recent Results (from the past 24 hours)   Echocardiogram Complete   Result Value    LVEF  5-10% (severely reduced)    Narrative    [id number]  [id number]  [id number]  [id number]^ALL^PK     [facility],[place]  Echocardiography Laboratory  [address] [place] [id number]     Name: WILLIAM JIMÉNEZ  MRN: [id number]  : 1985  Study Date: 2025 01:19 PM  Age: 39 yrs  Gender: Male  Patient Location: [facility]  Reason For Study: Heart Failure  Ordering Physician: PK CARRIZALES  Referring Physician: SYSTEM, PROVIDER NOT IN  Performed By: Anne-Marie Alvarez RDCS     BSA: 2.0 m2  Height: 71 in  Weight: 184 lb  HR: 85  BP: 108/78  mmHg  ______________________________________________________________________________  Procedure  Echocardiogram with two-dimensional, color and spectral Doppler. Contrast  Definity. Definity (NDC #45219-524-02) given intravenously. Patient was given  7ml mixture of 1.5ml Definity and 8.5ml saline. 3 ml wasted.  ______________________________________________________________________________  Interpretation Summary  Left ventricular function is decreased. The ejection fraction is 5-10%  (severely reduced).  Severe left ventricular dilation is present.  Left ventricular filling pressures are increased.  Global right ventricular function is severely reduced.  Moderate right ventricular dilation is present.  Moderate to severe mitral insufficiency is present.  The cause of the mitral insufficiency appears to be altered left ventricular  size and geometry.  Severe tricuspid insufficiency is present.  Pulmonary artery systolic pressure cannot be assessed.  Dilation of the inferior vena cava is present with abnormal respiratory  variation in diameter.  No pericardial effusion is present.  There is no thrombus seen in the left ventricle.     This study was compared with the study from 7/2023 .  LV thrombus resolved; MR, TR worsened.  ______________________________________________________________________________  Left Ventricle  LV eccentric hypertrophy. Severe left ventricular dilation is present. Left  ventricular function is decreased. The ejection fraction is 5-10% (severely  reduced). Diastolic Doppler findings (E/E' ratio and/or other parameters)  suggest left ventricular filling pressures are increased. Grade III or  advanced diastolic dysfunction. Severe diffuse hypokinesis is present. There  is no thrombus seen in the left ventricle.     Right Ventricle  Moderate right ventricular dilation is present. Global right ventricular  function is severely reduced.     Atria  Moderate left atrial enlargement is present. Severe  right atrial enlargement  is present.     Mitral Valve  The mitral valve is normal. Moderate to severe mitral insufficiency is  present. The cause of the mitral insufficiency appears to be altered left  ventricular size and geometry.     Aortic Valve  The aortic valve is tricuspid.     Tricuspid Valve  The tricuspid valve is normal. Severe tricuspid insufficiency is present. The  right ventricular systolic pressure is approximated at 25.7 mmHg plus the  right atrial pressure. Pulmonary artery systolic pressure cannot be assessed.     Pulmonic Valve  Trace pulmonic insufficiency is present.     Vessels  The aorta root is normal. Dilation of the inferior vena cava is present with  abnormal respiratory variation in diameter. IVC diameter >2.1 cm collapsing  <50% with sniff suggests a high RA pressure estimated at 15 mmHg or greater.     Pericardium  No pericardial effusion is present.     Compared to Previous Study  This study was compared with the study from 2023 . LV thrombus resolved, MR,  TR, ivc, LV filling pr all worsened.  ______________________________________________________________________________  MMode/2D Measurements & Calculations  IVSd: 0.78 cm     LVIDd: 7.2 cm  LVIDs: 6.9 cm  LVPWd: 0.99 cm  FS: 4.7 %  LV mass(C)d: 292.9 grams  LV mass(C)dI: 143.9 grams/m2  Ao root diam: 3.3 cm  asc Aorta Diam: 3.0 cm  LVOT diam: 2.3 cm  LVOT area: 4.2 cm2  Ao root diam index Ht(cm/m): 1.8  Ao root diam index BSA (cm/m2): 1.6  Asc Ao diam index BSA (cm/m2): 1.5  Asc Ao diam index Ht(cm/m): 1.7  LA Volume (BP): 88.6 ml     LA Volume Index (BP): 43.4 ml/m2  RWT: 0.28  TAPSE: 1.0 cm     Doppler Measurements & Calculations  MV E max christoph: 82.3 cm/sec  MV A max christoph: 40.0 cm/sec  MV E/A: 2.1  MV dec slope: 543.3 cm/sec2  MV dec time: 0.15 sec  TR max christoph: 253.2 cm/sec  TR max P.7 mmHg  E/E' av.4  Lateral E/e': 10.7  Medial E/e': 14.1  RV S Christoph: 6.6 cm/sec      ______________________________________________________________________________  Report approved by: MARY COMBS MD on 02/04/2025 02:46 PM

## 2025-02-05 NOTE — PLAN OF CARE
Hours of Care:  1900 - 0700    Temp:  [96.8  F (36  C)-98  F (36.7  C)] 98  F (36.7  C)  Pulse:  [] 110  Resp:  [20-23] 20  BP: ()/(65-87) 118/87  SpO2:  [99 %-100 %] 100 %     D: Jose Enrique Engel is a 39 year old male admitted on 2/3/2025. He has a hx of HFrEF 2/2 NICM s/p ICD, cardiogenic shock previously on dobutamine, CKD3, and polysubstance use who presents for SOB concerning for ambulatory cardiogenic shock.      I: Monitored vitals and assessed pt status.      Running:  Lasix 20 mg/hr  Tele: Sinus rhythm  O2: Room air  Mobility: Independent     A: Neuro: A/O x4.  Call light appropriate.  Able to make needs known.  Respiratory:  On room air.  Lung sounds clear.  Reports dyspnea on exertion.  Cardiac: VSS.  Sinus rhythm.  On lasix gtt.   GI: Last BM 2/4.  Reports loose stools.  No report of nausea or vomiting.  : Urinating adequate amounts of clear, yellow urine.  2600 mL output this shift.  Skin:  See PCS for assessment and treatment of wounds and surgical incisions.  LDA:  Rt PIV  Electrolytes: RN managed K and Mg.  AM labs pending.  Pain: Denies  Isolation:  Standard Precautions  Tests/procedures: None performed overnight.    Diet: 2 g Na.  2000 mL fluid restriction  Sleep:  No sleep disturbances noted or reported. Received PRN Ambien at HS per request.     P: Continue to monitor Pt status and report changes to Cards 2.         Goal Outcome Evaluation:      Plan of Care Reviewed With: patient    Overall Patient Progress: improvingOverall Patient Progress: improving    Outcome Evaluation: Echo completed.  EF 5-10%.  Started lasix gtt.  replacing k and mg per protocol.

## 2025-02-06 ENCOUNTER — ENROLLMENT (OUTPATIENT)
Dept: HOME HEALTH SERVICES | Facility: HOME HEALTH | Age: 40
End: 2025-02-06
Payer: COMMERCIAL

## 2025-02-06 VITALS
HEIGHT: 71 IN | SYSTOLIC BLOOD PRESSURE: 118 MMHG | DIASTOLIC BLOOD PRESSURE: 83 MMHG | OXYGEN SATURATION: 100 % | HEART RATE: 92 BPM | TEMPERATURE: 97.8 F | WEIGHT: 179.2 LBS | RESPIRATION RATE: 18 BRPM | BODY MASS INDEX: 25.09 KG/M2

## 2025-02-06 LAB
ALBUMIN SERPL BCG-MCNC: 3.7 G/DL (ref 3.5–5.2)
ALBUMIN SERPL BCG-MCNC: 3.8 G/DL (ref 3.5–5.2)
ALP SERPL-CCNC: 135 U/L (ref 40–150)
ALP SERPL-CCNC: 141 U/L (ref 40–150)
ALT SERPL W P-5'-P-CCNC: 156 U/L (ref 0–70)
ALT SERPL W P-5'-P-CCNC: 181 U/L (ref 0–70)
ANION GAP SERPL CALCULATED.3IONS-SCNC: 12 MMOL/L (ref 7–15)
ANION GAP SERPL CALCULATED.3IONS-SCNC: 14 MMOL/L (ref 7–15)
AST SERPL W P-5'-P-CCNC: 117 U/L (ref 0–45)
AST SERPL W P-5'-P-CCNC: 93 U/L (ref 0–45)
BASE EXCESS BLDV CALC-SCNC: 6.5 MMOL/L (ref -3–3)
BILIRUB DIRECT SERPL-MCNC: 0.96 MG/DL (ref 0–0.3)
BILIRUB DIRECT SERPL-MCNC: 1.09 MG/DL (ref 0–0.3)
BILIRUB SERPL-MCNC: 1.8 MG/DL
BILIRUB SERPL-MCNC: 2.1 MG/DL
BUN SERPL-MCNC: 31.1 MG/DL (ref 6–20)
BUN SERPL-MCNC: 40.5 MG/DL (ref 6–20)
CALCIUM SERPL-MCNC: 9 MG/DL (ref 8.8–10.4)
CALCIUM SERPL-MCNC: 9.1 MG/DL (ref 8.8–10.4)
CHLORIDE SERPL-SCNC: 92 MMOL/L (ref 98–107)
CHLORIDE SERPL-SCNC: 97 MMOL/L (ref 98–107)
CREAT SERPL-MCNC: 1.29 MG/DL (ref 0.67–1.17)
CREAT SERPL-MCNC: 1.31 MG/DL (ref 0.67–1.17)
EGFRCR SERPLBLD CKD-EPI 2021: 71 ML/MIN/1.73M2
EGFRCR SERPLBLD CKD-EPI 2021: 72 ML/MIN/1.73M2
GLUCOSE SERPL-MCNC: 178 MG/DL (ref 70–99)
GLUCOSE SERPL-MCNC: 76 MG/DL (ref 70–99)
HCO3 BLDV-SCNC: 32 MMOL/L (ref 21–28)
HCO3 SERPL-SCNC: 22 MMOL/L (ref 22–29)
HCO3 SERPL-SCNC: 25 MMOL/L (ref 22–29)
INR PPP: 1.45 (ref 0.85–1.15)
LABORATORY REPORT: NORMAL
LACTATE SERPL-SCNC: 2 MMOL/L (ref 0.7–2)
MAGNESIUM SERPL-MCNC: 2 MG/DL (ref 1.7–2.3)
MAGNESIUM SERPL-MCNC: 2.2 MG/DL (ref 1.7–2.3)
O2/TOTAL GAS SETTING VFR VENT: 21 %
OXYHGB MFR BLDV: 44 % (ref 70–75)
PCO2 BLDV: 49 MM HG (ref 40–50)
PETH INTERPRETATION: NORMAL
PH BLDV: 7.43 [PH] (ref 7.32–7.43)
PLPETH BLD-MCNC: <10 NG/ML
PO2 BLDV: 28 MM HG (ref 25–47)
POPETH BLD-MCNC: <10 NG/ML
POTASSIUM SERPL-SCNC: 3.3 MMOL/L (ref 3.4–5.3)
POTASSIUM SERPL-SCNC: 4.4 MMOL/L (ref 3.4–5.3)
PROT SERPL-MCNC: 7 G/DL (ref 6.4–8.3)
PROT SERPL-MCNC: 7 G/DL (ref 6.4–8.3)
SAO2 % BLDV: 44.3 % (ref 70–75)
SODIUM SERPL-SCNC: 131 MMOL/L (ref 135–145)
SODIUM SERPL-SCNC: 131 MMOL/L (ref 135–145)

## 2025-02-06 PROCEDURE — 272N000278 HC DEVICE 5FR SECURACATH

## 2025-02-06 PROCEDURE — 82805 BLOOD GASES W/O2 SATURATION: CPT

## 2025-02-06 PROCEDURE — 250N000013 HC RX MED GY IP 250 OP 250 PS 637: Performed by: STUDENT IN AN ORGANIZED HEALTH CARE EDUCATION/TRAINING PROGRAM

## 2025-02-06 PROCEDURE — 80048 BASIC METABOLIC PNL TOTAL CA: CPT

## 2025-02-06 PROCEDURE — 83735 ASSAY OF MAGNESIUM: CPT

## 2025-02-06 PROCEDURE — 36569 INSJ PICC 5 YR+ W/O IMAGING: CPT

## 2025-02-06 PROCEDURE — 250N000013 HC RX MED GY IP 250 OP 250 PS 637

## 2025-02-06 PROCEDURE — 84460 ALANINE AMINO (ALT) (SGPT): CPT

## 2025-02-06 PROCEDURE — 82435 ASSAY OF BLOOD CHLORIDE: CPT

## 2025-02-06 PROCEDURE — 250N000011 HC RX IP 250 OP 636

## 2025-02-06 PROCEDURE — 82565 ASSAY OF CREATININE: CPT

## 2025-02-06 PROCEDURE — 83605 ASSAY OF LACTIC ACID: CPT | Performed by: STUDENT IN AN ORGANIZED HEALTH CARE EDUCATION/TRAINING PROGRAM

## 2025-02-06 PROCEDURE — 82248 BILIRUBIN DIRECT: CPT

## 2025-02-06 PROCEDURE — 82374 ASSAY BLOOD CARBON DIOXIDE: CPT

## 2025-02-06 PROCEDURE — 258N000003 HC RX IP 258 OP 636: Performed by: STUDENT IN AN ORGANIZED HEALTH CARE EDUCATION/TRAINING PROGRAM

## 2025-02-06 PROCEDURE — 99233 SBSQ HOSP IP/OBS HIGH 50: CPT | Mod: GC | Performed by: STUDENT IN AN ORGANIZED HEALTH CARE EDUCATION/TRAINING PROGRAM

## 2025-02-06 PROCEDURE — 120N000005 HC R&B MS OVERFLOW UMMC

## 2025-02-06 PROCEDURE — 36415 COLL VENOUS BLD VENIPUNCTURE: CPT

## 2025-02-06 PROCEDURE — 250N000009 HC RX 250

## 2025-02-06 PROCEDURE — 85610 PROTHROMBIN TIME: CPT

## 2025-02-06 PROCEDURE — 36415 COLL VENOUS BLD VENIPUNCTURE: CPT | Performed by: STUDENT IN AN ORGANIZED HEALTH CARE EDUCATION/TRAINING PROGRAM

## 2025-02-06 PROCEDURE — 250N000011 HC RX IP 250 OP 636: Performed by: STUDENT IN AN ORGANIZED HEALTH CARE EDUCATION/TRAINING PROGRAM

## 2025-02-06 PROCEDURE — 272N000451 HC KIT SHRLOCK 5FR POWER PICC DOUBLE LUMEN

## 2025-02-06 RX ORDER — HEPARIN SODIUM,PORCINE 10 UNIT/ML
5-20 VIAL (ML) INTRAVENOUS EVERY 24 HOURS
Status: DISCONTINUED | OUTPATIENT
Start: 2025-02-06 | End: 2025-02-14 | Stop reason: HOSPADM

## 2025-02-06 RX ORDER — LIDOCAINE 40 MG/G
CREAM TOPICAL
Status: ACTIVE | OUTPATIENT
Start: 2025-02-06 | End: 2025-02-09

## 2025-02-06 RX ORDER — DOBUTAMINE HYDROCHLORIDE 200 MG/100ML
5 INJECTION INTRAVENOUS CONTINUOUS
Status: DISCONTINUED | OUTPATIENT
Start: 2025-02-06 | End: 2025-02-14 | Stop reason: HOSPADM

## 2025-02-06 RX ORDER — CHLOROTHIAZIDE SODIUM 500 MG/1
1000 INJECTION INTRAVENOUS ONCE
Status: COMPLETED | OUTPATIENT
Start: 2025-02-06 | End: 2025-02-06

## 2025-02-06 RX ORDER — DOBUTAMINE HYDROCHLORIDE 200 MG/100ML
2.5 INJECTION INTRAVENOUS CONTINUOUS
Status: DISCONTINUED | OUTPATIENT
Start: 2025-02-06 | End: 2025-02-06

## 2025-02-06 RX ORDER — HEPARIN SODIUM,PORCINE 10 UNIT/ML
5-20 VIAL (ML) INTRAVENOUS
Status: DISCONTINUED | OUTPATIENT
Start: 2025-02-06 | End: 2025-02-14 | Stop reason: HOSPADM

## 2025-02-06 RX ADMIN — HYDRALAZINE HYDROCHLORIDE 75 MG: 50 TABLET ORAL at 12:50

## 2025-02-06 RX ADMIN — POTASSIUM CHLORIDE 40 MEQ: 750 CAPSULE, EXTENDED RELEASE ORAL at 10:11

## 2025-02-06 RX ADMIN — CALCIUM CARBONATE (ANTACID) CHEW TAB 500 MG 1000 MG: 500 CHEW TAB at 04:59

## 2025-02-06 RX ADMIN — Medication 5 ML: at 20:07

## 2025-02-06 RX ADMIN — DOBUTAMINE IN DEXTROSE 2.5 MCG/KG/MIN: 200 INJECTION, SOLUTION INTRAVENOUS at 20:07

## 2025-02-06 RX ADMIN — FUROSEMIDE 20 MG/HR: 10 INJECTION, SOLUTION INTRAVENOUS at 10:23

## 2025-02-06 RX ADMIN — CHLOROTHIAZIDE SODIUM 1000 MG: 500 INJECTION, POWDER, LYOPHILIZED, FOR SOLUTION INTRAVENOUS at 10:47

## 2025-02-06 RX ADMIN — HYDRALAZINE HYDROCHLORIDE 50 MG: 50 TABLET ORAL at 04:11

## 2025-02-06 RX ADMIN — ONDANSETRON 4 MG: 2 INJECTION, SOLUTION INTRAMUSCULAR; INTRAVENOUS at 10:10

## 2025-02-06 RX ADMIN — ISOSORBIDE DINITRATE 30 MG: 20 TABLET ORAL at 18:17

## 2025-02-06 RX ADMIN — FUROSEMIDE 20 MG/HR: 10 INJECTION, SOLUTION INTRAVENOUS at 04:59

## 2025-02-06 RX ADMIN — FUROSEMIDE 20 MG/HR: 10 INJECTION, SOLUTION INTRAVENOUS at 21:10

## 2025-02-06 RX ADMIN — FERROUS SULFATE TAB 325 MG (65 MG ELEMENTAL FE) 325 MG: 325 (65 FE) TAB at 10:12

## 2025-02-06 RX ADMIN — FUROSEMIDE 20 MG/HR: 10 INJECTION, SOLUTION INTRAVENOUS at 15:36

## 2025-02-06 RX ADMIN — POTASSIUM CHLORIDE 40 MEQ: 750 CAPSULE, EXTENDED RELEASE ORAL at 20:07

## 2025-02-06 RX ADMIN — ZOLPIDEM TARTRATE 5 MG: 5 TABLET, FILM COATED ORAL at 20:08

## 2025-02-06 RX ADMIN — CALCIUM CARBONATE (ANTACID) CHEW TAB 500 MG 1000 MG: 500 CHEW TAB at 10:12

## 2025-02-06 RX ADMIN — HYDRALAZINE HYDROCHLORIDE 75 MG: 50 TABLET ORAL at 20:07

## 2025-02-06 RX ADMIN — LIDOCAINE HYDROCHLORIDE ANHYDROUS 4 ML: 10 INJECTION, SOLUTION INFILTRATION at 19:54

## 2025-02-06 RX ADMIN — ISOSORBIDE DINITRATE 30 MG: 20 TABLET ORAL at 12:50

## 2025-02-06 RX ADMIN — DIGOXIN 125 MCG: 125 TABLET ORAL at 10:13

## 2025-02-06 ASSESSMENT — ACTIVITIES OF DAILY LIVING (ADL)
ADLS_ACUITY_SCORE: 37
ADLS_ACUITY_SCORE: 46
ADLS_ACUITY_SCORE: 37
ADLS_ACUITY_SCORE: 37
ADLS_ACUITY_SCORE: 46
ADLS_ACUITY_SCORE: 46
ADLS_ACUITY_SCORE: 37
ADLS_ACUITY_SCORE: 37
ADLS_ACUITY_SCORE: 46
ADLS_ACUITY_SCORE: 37
ADLS_ACUITY_SCORE: 46
ADLS_ACUITY_SCORE: 37
ADLS_ACUITY_SCORE: 46
ADLS_ACUITY_SCORE: 37
ADLS_ACUITY_SCORE: 37

## 2025-02-06 NOTE — PROGRESS NOTES
Vascular Access Services Notes:    PICC/Midline to be placed today as ordered. Primary RN informed to assure that patient have had Chlorhexidine Gluconate (CHG) bath & linen change prior to procedure. RN to contact VAS once done.       Lorenzo Pettit, BSN, RN [facility]  Vascular Access Services  [facility]  [phone]

## 2025-02-06 NOTE — PROGRESS NOTES
Care Management Follow Up    Additional information    2/6 - CHW delegated by RNCC, has found an outpatient cardiac rehabilitation program for pt near his home and near his [place] PCP.     [facility]  [address]  [place], [place] [address]  P: [phone]  F: [phone]    Referral not yet sent; awaiting OP CR discharge orders before faxing everything to the facility. Facility will then call pt to schedule.    RNCC notified, no further action taken. Will continue to monitor OP CR order status.       Kayce Gaytan  [facility] Health Worker  6A, 6B, 6C  Ph [phone]  Fax [phone]

## 2025-02-06 NOTE — PROGRESS NOTES
Vascular Access Services Notes:    Pt REFUSING PICC placement per primary RN. RN to contact VAS for update.      Lorenzo Pettit, WENDYN, RN [facility]  Vascular Access Services  [facility]  [phone]

## 2025-02-06 NOTE — PLAN OF CARE
Temp: 97.6  F (36.4  C) Temp src: Oral BP: 103/82 Pulse: 103   Resp: 21 SpO2: 96 % O2 Device: None (Room air)       Hours of care: 7011-6674    D: PMHx of HFrEF (15%) 2/2 NICM s/p ICD, cardiogenic shock previously on dobutamine, CKD3, polysubstance use admitted 2/3/25 for ambulatory cardiogenic shock.     I/A: Jose Enrique (he/him) is A/O x4. Calls appropriately, calm and cooperative. Tele in place, SR. VSS on RA. L PIV in place infusing. Scattered scabs. No new skin deficits noted. Tolerating cardiac diet but needs reminders on 2L fluid restriction. Up ad aron independently. Adequate UOP, no BM this shift. Appeared to sleep well overnight.    Changed:  Running: lasix gtt @20 mg/hr  PRN: ambien x1    P: Continue to monitor and follow POC. Notify CARDS 2 with changes.    Goal Outcome Evaluation:    Plan of Care Reviewed With: patient  Overall Patient Progress: improving  Outcome Evaluation: VSS on RA. Denies pain. Lasix gtt. Adequate UOP.

## 2025-02-06 NOTE — PLAN OF CARE
Shift: [id number]    A:   Neuro: A&Ox4, calls appropriately/able to make needs known  Cardiac: SR/ST w/ occasional PVC's, normotensive, diuresis via lasix gtt at 20 mg/hr  Respiratory: Lungs clear, on RA, denies shortness of breath  GI/: +BS, pt reports stools becoming more formed; reported heartburn in am and evening, tums provided with relief, voiding adequate amounts   Diet/Appetite: 2 gram Na, 2L FR, good appetite  Skin: See PCS Flowsheet for details  Pain: Denies  Labs/Lytes: WDL--no replacements needed today  LDA's: Left PIV with Lasix gtt infusing at 20 mg/hr  Activity: Up ad aron in room    P: Continue with current plan of care. Contact Cards 2 for questions or concerns.    Heather Moseley RN  Cardiology

## 2025-02-06 NOTE — PROGRESS NOTES
Care Coordinator  D/I: Per cards 2 rounds: Dr Cherie Gómez--they plan to start IV Dobutamine and discharge in approx 2 days. I informed him that he was on service with [facility] for IV Dobutamine and was non-compliant.  P: I sent an investigation referral to [facility] for IV Inotrope. Per chart note: Vascular access says:        Vascular Access Services Notes:     Pt REFUSING PICC placement per primary RN. RN to contact [facility] for update.        WENDY OliverN, RN [facility]  Vascular Access Services  [facility]  [phone]        I sent Dr Gómez a [employer] message. Will follow.    Per Jaci Savage [facility] liaison--because he was non compliant with the IV Dobutamine when he was previously on service with them, they will NOT take him back.    I have fax'd to [employer] to check benefits @ 4:10PM--I need to call their liaison Joanie Sewell cell: [phone]  ---[employer] needs to do 2 teaches and they do NOT do discharges over the weekend.  Wait for benefit check_____.

## 2025-02-06 NOTE — PLAN OF CARE
"Shift: [id number]    Today's Highlights/Changes:  Weight up ~1.2 lbs, Diuril x1 in addition to lasix gtt at 20 mg/hr  Awaiting PICC placement to initiate dobutamine infusion, monitor twice daily CVP's, and draw daily VBG levels. Pt received CHG wipe bath and linens were changed per Vascular Access request.    Neuro: A&Ox4, able to make needs known.  Cardiac: SR/ST, normotensive, denies dizziness and lightheadedness  Respiratory: Lungs clear, on RA, pt reports difficulty getting a deep breath after eating and drinking--this was occurring prior to admission, per pt   GI/: +BS, BM x1, voiding adequate amounts  Diet/Appetite: Low saturated fat, <2400 mg Na, 2L FR--pt has difficulty adhering to fluid restriction and will often fill up cups of water from sink in room; he reports feeling \"really thirsty\", likely d/t lasix gtt.  Skin: Enrike, scattered scabs, dry/cracked feet and heels  Pain: Denies  Labs/Lytes: WDL, pt refused 1700 labs, labs now to be collected after PICC is placed.  LDA's: Left PIV with lasix infusing at 20 mg/hr  Activity: Up ad aron in room.    P: Obtain VBG one hour after dobutamine is started. Continue with current plan of care. Contact Cards 2 for questions or concerns.    Heather Moseley RN  Cardiology      "

## 2025-02-06 NOTE — PROGRESS NOTES
[facility]  Cardiology II Service / Advanced Heart Failure  Daily Progress Note    Patient: Jose Enrique Engel      : 1985      MRN: [id number]    Assessment/Plan:   38 yo male with PMHx of HFrEF (15%) 2/2 NICM s/p ICD, cardiogenic shock previously on dobutamine, CKD3, polysubstance use admitted 2/3/25 for ambulatory cardiogenic shock.     Today's changes:  - Place PICC, start dobutamine 2.5 mcg/kg/min  - Increase afterload reduction to hydralazine 75 / isordil 30 q8H  - Diuril 1g IV x1, continue lasix at 20 mg/hr    # Ambulatory cardiogenic shock  # Acute on chronic systolic heart failure (EF 15%), stage D, class IIIb  # Non-ischemic cardiomyopathy  Pt with hx of advanced HFrEF/NICM. Was previously on outpatient dobutamine, self-discontinued in . Re-engaged with outpatient cardiologist Dr. Dove , also self-enrolled in chem dep treatment in past year. At recent office visit/RHC was concerning for decompensated CHF and possible cardiogenic shock, pt attempted to manage outpatient but felt worse and ultimately presented for admission. Interested in advanced therapies but not candidate yet due to being lost to follow up.  Last RHC 25: RA 15, PA 44/26, PCWP 21, CI 1.7, PVR 3.4, SVR ~1400  Last TTE 2025: EF 5-10%, severely reduced RV Function, mod-severe MR, severe TR  - Volume: hypervolemic,  lbs, 184 on admission, 179 today   - hold PTA torsemide 20 mg daily   - Chlorothiazide 1 g IV x1, continue lasix gtt at 20 mg/hr  - Inotrope: start dobutamine 2.5 mcg/kg/min today after PICC placement   - continue PTA digoxin 125 mcg daily  - Afterload Reduction: increase hydralazine to 75 mg, isosorbide dinitrate 30 mg q8H   - MAP goal 65-85  - GDMT: hold PTA entresto 24-26 BID   - not on PTA beta blocker (cardiogenic shock)   - hold PTA spironolactone 25 mg daily   - hold PTA empagliflozin 10 mg daily  - Anticoagulation: pt not taking PTA rivaroxaban, plan  to discontinue as LV thrombus not present on TTE this admission  - Statin: none  - Antiplatelet: none  - Antiarrhythmic: none  - SCD prophylaxis: ICD in place  - Electrolytes: BID monitoring with diuresis, goals K>4, Mag>2  - Strict I/O monitoring, daily weights  - Dietary restrictions: Na <2g, 2L fluids    # Congestive hepatopathy, improving  On admission AST/ALT in 200s. Bilirubin 4.9. INR 3.5. Pattern and clinical picture most c/w congestive hepatopathy. Improving since admission.  - Cardiac management per above  - Trend LFTs     # Hx of LV apical thrombus  Seen on echo in 07/2023 - large (~1.8 cm x 1.8 cm x 1.1 cm), protruding, and predominantly sessile. Not present on TTE this admission.  - Stop PTA rivaroxaban at discharge     # Hx of polysubstance use   Currently reporting only MJ use. Reports sobriety from EtOH and meth (last use 06/2024). Follows with local chem dep team. PETH negative, UDS only positive for THC.     # CKD3  Baseline Cr 1.3-1.6. Cr 1.5 on admission, 1.3 today.  - CTM     # Subclinical hypothyroidism  TSH 8 on admission. Started on synthroid at OSH due to TSH of 9. Technically should treat subclinical hypothyroidism if TSH >10. Synthroid not on his PTA meds list.   - Managed by PCP     Deconditioning  - PT/OT consults placed    Hospital Checklist:  DVT Prophylaxis: Low Risk/Ambulatory with no VTE prophylaxis indicated  Code Status: Full Code  Bowel regimen: PRNs  Diet/Nutrition: 2g Na / 2L FR  Lines: PIV, PICC being placed today    Disposition Planning:  Home in 1-2 days with home inotropes    Staffed w/ Dr. Dove.    Cherie Gómez MD, PhD  PGY2 Internal Medicine      Subjective:   Interval History: No acute events overnight    This morning: Patient still not feeling back to baseline. No chest pain, cramps, dyspnea at rest, nausea, or other new symptoms. Initially refusing PICC due to prior poor experience with it, but ultimately agreeable after discussing with   Derrell.      Objective:     Vitals:    02/04/25 0400 02/05/25 0326 02/06/25 0643   Weight: 82.7 kg (182 lb 4.8 oz) 80.7 kg (178 lb) 81.3 kg (179 lb 3.2 oz)     Vital Signs with Ranges  Temp:  [97  F (36.1  C)-98.3  F (36.8  C)] 97.6  F (36.4  C)  Pulse:  [] 97  Resp:  [20-21] 20  BP: (100-111)/(64-82) 111/69  SpO2:  [95 %-98 %] 98 %  I/O last 3 completed shifts:  In: 3175.33 [P.O.:2700; I.V.:475.33]  Out: 3445 [Urine:3445]    Exam:  General: No acute distress, resting comfortably in bed  HEENT: MMM  Neck: JVP to mid-neck  CV: RRR, no murmurs, S3/S4 absent, pulses faint  Lungs: On room air, CTA b/l w/o wheezes or crackles, no increased WOB  Abd: Soft, non-tender, non-distended  Ext: cool to touch, no lower extremity edema  Neuro: Awake, alert, conversational, moving all extremities spontaneously throughout examination    Medications   Current Facility-Administered Medications   Medication Dose Route Frequency Provider Last Rate Last Admin    furosemide (LASIX) 100 mg in sodium chloride 0.9 % 100 mL infusion  20 mg/hr Intravenous Continuous Rush Bowling MD 20 mL/hr at 02/06/25 1023 20 mg/hr at 02/06/25 1023     Current Facility-Administered Medications   Medication Dose Route Frequency Provider Last Rate Last Admin    digoxin (LANOXIN) tablet 125 mcg  125 mcg Oral Daily Sofya Elizabeth MD   125 mcg at 02/06/25 1013    [Held by provider] empagliflozin (JARDIANCE) half-tab 5 mg  5 mg Oral Daily Sofya Elizabeth MD        ferrous sulfate (FEROSUL) tablet 325 mg  325 mg Oral Daily with breakfast Sofya Elizabeth MD   325 mg at 02/06/25 1012    hydrALAZINE (APRESOLINE) tablet 75 mg  75 mg Oral Q8H Cherie Gómez MD   75 mg at 02/06/25 1250    isosorbide dinitrate (ISORDIL) tablet 30 mg  30 mg Oral TID Cherie Gómez MD   30 mg at 02/06/25 1250    potassium chloride ER (MICRO-K) CR capsule 40 mEq  40 mEq Oral BID Sofya Elizabeth MD   40 mEq at 02/06/25 1011    [Held by provider] rivaroxaban  ANTICOAGULANT (XARELTO) tablet 20 mg  20 mg Oral Daily with supper Sofya Elizabeth MD        [Held by provider] sacubitril-valsartan (ENTRESTO) 24-26 MG per tablet 1 tablet  1 tablet Oral BID oSfya Elizabeth MD   1 tablet at 02/04/25 0926    sodium chloride (PF) 0.9% PF flush 3 mL  3 mL Intracatheter Q8H Sofya Elizabeth MD   3 mL at 02/05/25 0810    [Held by provider] spironolactone (ALDACTONE) tablet 25 mg  25 mg Oral Daily Sofya Elizabeth MD   25 mg at 02/04/25 0926     Data   Recent Labs   Lab 02/06/25  0637 02/06/25  0453 02/05/25  1731 02/05/25  0601 02/04/25  1657 02/04/25  0652 02/04/25  0033   WBC  --   --   --   --   --  7.2 7.2   HGB  --   --   --   --   --  17.0 16.7   MCV  --   --   --   --   --  88 91   PLT  --   --   --   --   --  201 206   INR  --  1.45*  --  1.57*  --   --   --    *  --  133* 135   < > 129* 132*   POTASSIUM 4.4  --  4.3 4.1   < > 4.0 4.2   CHLORIDE 97*  --  96* 100   < > 93* 91*   CO2 22  --  25 22   < > 16* 22   BUN 31.1*  --  29.3* 27.3*   < > 32.4* 30.8*   CR 1.29*  --  1.44* 1.37*   < > 1.46* 1.50*   ANIONGAP 12  --  12 13   < > 20* 19*   LENARD 9.1  --  8.6* 9.0   < > 9.1 9.7   GLC 76  --  90 72   < > 96 81   ALBUMIN 3.7  --  3.7 3.7   < >  --  3.8   PROTTOTAL 7.0  --  6.8 7.0   < >  --  7.2   BILITOTAL 2.1*  --  2.4* 2.8*   < >  --  4.9*   ALKPHOS 141  --  142 141   < >  --  141   *  --  187* 206*   < >  --  242*   *  --  144* 164*   < >  --  280*    < > = values in this interval not displayed.     No results found for this or any previous visit (from the past 24 hours).

## 2025-02-07 LAB
ALBUMIN SERPL BCG-MCNC: 3.6 G/DL (ref 3.5–5.2)
ALBUMIN SERPL BCG-MCNC: 3.6 G/DL (ref 3.5–5.2)
ALP SERPL-CCNC: 131 U/L (ref 40–150)
ALP SERPL-CCNC: 137 U/L (ref 40–150)
ALT SERPL W P-5'-P-CCNC: 129 U/L (ref 0–70)
ALT SERPL W P-5'-P-CCNC: 140 U/L (ref 0–70)
ANION GAP SERPL CALCULATED.3IONS-SCNC: 14 MMOL/L (ref 7–15)
ANION GAP SERPL CALCULATED.3IONS-SCNC: 15 MMOL/L (ref 7–15)
AST SERPL W P-5'-P-CCNC: 74 U/L (ref 0–45)
AST SERPL W P-5'-P-CCNC: 78 U/L (ref 0–45)
BASE EXCESS BLDV CALC-SCNC: 3.4 MMOL/L (ref -3–3)
BILIRUB DIRECT SERPL-MCNC: 0.91 MG/DL (ref 0–0.3)
BILIRUB DIRECT SERPL-MCNC: 0.97 MG/DL (ref 0–0.3)
BILIRUB SERPL-MCNC: 1.8 MG/DL
BILIRUB SERPL-MCNC: 1.9 MG/DL
BUN SERPL-MCNC: 33 MG/DL (ref 6–20)
BUN SERPL-MCNC: 35 MG/DL (ref 6–20)
CALCIUM SERPL-MCNC: 8.7 MG/DL (ref 8.8–10.4)
CALCIUM SERPL-MCNC: 8.9 MG/DL (ref 8.8–10.4)
CHLORIDE SERPL-SCNC: 90 MMOL/L (ref 98–107)
CHLORIDE SERPL-SCNC: 95 MMOL/L (ref 98–107)
CREAT SERPL-MCNC: 1.22 MG/DL (ref 0.67–1.17)
CREAT SERPL-MCNC: 1.28 MG/DL (ref 0.67–1.17)
EGFRCR SERPLBLD CKD-EPI 2021: 73 ML/MIN/1.73M2
EGFRCR SERPLBLD CKD-EPI 2021: 77 ML/MIN/1.73M2
GLUCOSE SERPL-MCNC: 115 MG/DL (ref 70–99)
GLUCOSE SERPL-MCNC: 117 MG/DL (ref 70–99)
HCO3 BLDV-SCNC: 30 MMOL/L (ref 21–28)
HCO3 SERPL-SCNC: 24 MMOL/L (ref 22–29)
HCO3 SERPL-SCNC: 24 MMOL/L (ref 22–29)
HGB BLD-MCNC: 15.7 G/DL (ref 13.3–17.7)
INR PPP: 1.26 (ref 0.85–1.15)
MAGNESIUM SERPL-MCNC: 1.8 MG/DL (ref 1.7–2.3)
MAGNESIUM SERPL-MCNC: 1.9 MG/DL (ref 1.7–2.3)
O2/TOTAL GAS SETTING VFR VENT: 21 %
OXYHGB MFR BLDV: 45 % (ref 70–75)
PCO2 BLDV: 50 MM HG (ref 40–50)
PH BLDV: 7.39 [PH] (ref 7.32–7.43)
PO2 BLDV: 30 MM HG (ref 25–47)
POTASSIUM SERPL-SCNC: 3.3 MMOL/L (ref 3.4–5.3)
POTASSIUM SERPL-SCNC: 3.8 MMOL/L (ref 3.4–5.3)
PROT SERPL-MCNC: 6.8 G/DL (ref 6.4–8.3)
PROT SERPL-MCNC: 7 G/DL (ref 6.4–8.3)
SAO2 % BLDV: 45.8 % (ref 70–75)
SODIUM SERPL-SCNC: 129 MMOL/L (ref 135–145)
SODIUM SERPL-SCNC: 133 MMOL/L (ref 135–145)

## 2025-02-07 PROCEDURE — 83735 ASSAY OF MAGNESIUM: CPT

## 2025-02-07 PROCEDURE — 85610 PROTHROMBIN TIME: CPT

## 2025-02-07 PROCEDURE — 120N000003 HC R&B IMCU UMMC

## 2025-02-07 PROCEDURE — 82805 BLOOD GASES W/O2 SATURATION: CPT

## 2025-02-07 PROCEDURE — 250N000011 HC RX IP 250 OP 636: Performed by: STUDENT IN AN ORGANIZED HEALTH CARE EDUCATION/TRAINING PROGRAM

## 2025-02-07 PROCEDURE — 250N000013 HC RX MED GY IP 250 OP 250 PS 637: Performed by: STUDENT IN AN ORGANIZED HEALTH CARE EDUCATION/TRAINING PROGRAM

## 2025-02-07 PROCEDURE — 82248 BILIRUBIN DIRECT: CPT

## 2025-02-07 PROCEDURE — 82040 ASSAY OF SERUM ALBUMIN: CPT

## 2025-02-07 PROCEDURE — 250N000011 HC RX IP 250 OP 636

## 2025-02-07 PROCEDURE — 258N000003 HC RX IP 258 OP 636: Performed by: STUDENT IN AN ORGANIZED HEALTH CARE EDUCATION/TRAINING PROGRAM

## 2025-02-07 PROCEDURE — 80048 BASIC METABOLIC PNL TOTAL CA: CPT

## 2025-02-07 PROCEDURE — 99233 SBSQ HOSP IP/OBS HIGH 50: CPT | Mod: GC | Performed by: INTERNAL MEDICINE

## 2025-02-07 PROCEDURE — 250N000013 HC RX MED GY IP 250 OP 250 PS 637

## 2025-02-07 PROCEDURE — 82374 ASSAY BLOOD CARBON DIOXIDE: CPT

## 2025-02-07 PROCEDURE — 85018 HEMOGLOBIN: CPT

## 2025-02-07 PROCEDURE — 84132 ASSAY OF SERUM POTASSIUM: CPT | Performed by: STUDENT IN AN ORGANIZED HEALTH CARE EDUCATION/TRAINING PROGRAM

## 2025-02-07 PROCEDURE — 80076 HEPATIC FUNCTION PANEL: CPT

## 2025-02-07 PROCEDURE — 84460 ALANINE AMINO (ALT) (SGPT): CPT

## 2025-02-07 RX ORDER — CHLOROTHIAZIDE SODIUM 500 MG/1
1000 INJECTION INTRAVENOUS ONCE
Status: DISCONTINUED | OUTPATIENT
Start: 2025-02-07 | End: 2025-02-07

## 2025-02-07 RX ORDER — HYDRALAZINE HYDROCHLORIDE 50 MG/1
50 TABLET, FILM COATED ORAL EVERY 8 HOURS
Status: DISCONTINUED | OUTPATIENT
Start: 2025-02-07 | End: 2025-02-08

## 2025-02-07 RX ORDER — POTASSIUM CHLORIDE 750 MG/1
40 TABLET, EXTENDED RELEASE ORAL ONCE
Status: COMPLETED | OUTPATIENT
Start: 2025-02-07 | End: 2025-02-07

## 2025-02-07 RX ORDER — MAGNESIUM OXIDE 400 MG/1
400 TABLET ORAL EVERY 4 HOURS
Status: COMPLETED | OUTPATIENT
Start: 2025-02-07 | End: 2025-02-07

## 2025-02-07 RX ORDER — CHLOROTHIAZIDE SODIUM 500 MG/1
1000 INJECTION INTRAVENOUS ONCE
Status: COMPLETED | OUTPATIENT
Start: 2025-02-07 | End: 2025-02-07

## 2025-02-07 RX ADMIN — FERROUS SULFATE TAB 325 MG (65 MG ELEMENTAL FE) 325 MG: 325 (65 FE) TAB at 08:50

## 2025-02-07 RX ADMIN — FUROSEMIDE 10 MG/HR: 10 INJECTION, SOLUTION INTRAVENOUS at 23:42

## 2025-02-07 RX ADMIN — ISOSORBIDE DINITRATE 30 MG: 20 TABLET ORAL at 08:50

## 2025-02-07 RX ADMIN — DIGOXIN 125 MCG: 125 TABLET ORAL at 08:50

## 2025-02-07 RX ADMIN — SACUBITRIL AND VALSARTAN 1 TABLET: 24; 26 TABLET, FILM COATED ORAL at 21:25

## 2025-02-07 RX ADMIN — Medication 5 ML: at 23:41

## 2025-02-07 RX ADMIN — ZOLPIDEM TARTRATE 5 MG: 5 TABLET, FILM COATED ORAL at 21:24

## 2025-02-07 RX ADMIN — ISOSORBIDE DINITRATE 30 MG: 20 TABLET ORAL at 11:38

## 2025-02-07 RX ADMIN — MAGNESIUM OXIDE TAB 400 MG (241.3 MG ELEMENTAL MG) 400 MG: 400 (241.3 MG) TAB at 23:41

## 2025-02-07 RX ADMIN — POTASSIUM CHLORIDE 40 MEQ: 750 CAPSULE, EXTENDED RELEASE ORAL at 21:25

## 2025-02-07 RX ADMIN — FUROSEMIDE 20 MG/HR: 10 INJECTION, SOLUTION INTRAVENOUS at 03:49

## 2025-02-07 RX ADMIN — FUROSEMIDE 20 MG/HR: 10 INJECTION, SOLUTION INTRAVENOUS at 09:00

## 2025-02-07 RX ADMIN — MAGNESIUM OXIDE TAB 400 MG (241.3 MG ELEMENTAL MG) 400 MG: 400 (241.3 MG) TAB at 08:50

## 2025-02-07 RX ADMIN — ONDANSETRON 4 MG: 4 TABLET, ORALLY DISINTEGRATING ORAL at 13:33

## 2025-02-07 RX ADMIN — FUROSEMIDE 10 MG/HR: 10 INJECTION, SOLUTION INTRAVENOUS at 14:47

## 2025-02-07 RX ADMIN — MAGNESIUM OXIDE TAB 400 MG (241.3 MG ELEMENTAL MG) 400 MG: 400 (241.3 MG) TAB at 13:33

## 2025-02-07 RX ADMIN — CALCIUM CARBONATE (ANTACID) CHEW TAB 500 MG 1000 MG: 500 CHEW TAB at 19:55

## 2025-02-07 RX ADMIN — MAGNESIUM OXIDE TAB 400 MG (241.3 MG ELEMENTAL MG) 400 MG: 400 (241.3 MG) TAB at 19:55

## 2025-02-07 RX ADMIN — HYDRALAZINE HYDROCHLORIDE 75 MG: 50 TABLET ORAL at 06:26

## 2025-02-07 RX ADMIN — CHLOROTHIAZIDE SODIUM 1000 MG: 500 INJECTION, POWDER, LYOPHILIZED, FOR SOLUTION INTRAVENOUS at 06:26

## 2025-02-07 RX ADMIN — DOBUTAMINE IN DEXTROSE 5 MCG/KG/MIN: 200 INJECTION, SOLUTION INTRAVENOUS at 22:37

## 2025-02-07 RX ADMIN — POTASSIUM CHLORIDE 40 MEQ: 750 CAPSULE, EXTENDED RELEASE ORAL at 08:50

## 2025-02-07 RX ADMIN — POTASSIUM CHLORIDE 40 MEQ: 750 TABLET, EXTENDED RELEASE ORAL at 20:42

## 2025-02-07 RX ADMIN — HYDRALAZINE HYDROCHLORIDE 75 MG: 50 TABLET ORAL at 11:38

## 2025-02-07 ASSESSMENT — ACTIVITIES OF DAILY LIVING (ADL)
ADLS_ACUITY_SCORE: 39
ADLS_ACUITY_SCORE: 46
ADLS_ACUITY_SCORE: 46
ADLS_ACUITY_SCORE: 39
ADLS_ACUITY_SCORE: 46
ADLS_ACUITY_SCORE: 39
ADLS_ACUITY_SCORE: 46
ADLS_ACUITY_SCORE: 39
ADLS_ACUITY_SCORE: 46
ADLS_ACUITY_SCORE: 39
ADLS_ACUITY_SCORE: 46
ADLS_ACUITY_SCORE: 46
ADLS_ACUITY_SCORE: 39
ADLS_ACUITY_SCORE: 46
ADLS_ACUITY_SCORE: 39
ADLS_ACUITY_SCORE: 46
ADLS_ACUITY_SCORE: 39
ADLS_ACUITY_SCORE: 46
ADLS_ACUITY_SCORE: 46

## 2025-02-07 NOTE — PROGRESS NOTES
Care Coordinator  D/I: per Email from [employer] liaison Joanie Sewell:  Hes covered at 100% His case must be reviewed by management due to hx of non-compliance.      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 Will follow.

## 2025-02-07 NOTE — PLAN OF CARE
"Goal Outcome Evaluation:      Plan of Care Reviewed With: patient    Overall Patient Progress: improving    [id number]     Neuro: A&O x4. This morning pt reported tiredness because didn't sleep well overnight. Mood improving throughout this shift.   Cardiac: SR/ST 80-low 100s w/PVCs. Denied chest pain, dizziness, palpitations. VSS, afebrile.   Respiratory: RA, sating >95%.   GI/: WDL.   Diet/Appetite: Regular diet, good appetite.  Skin: No new deficits noted.  LDA: Left PIV infusing lasix at 10mg/hr. R DL PICC infusing Dobutamine at 5mcg/kg/hr.   Activity: Independent   Pain: abdominal cramps, offered tylenol pt declined. See note below.   Plan: Continue with POC. Notify CARDS 2 of pertinent changes.       Paged Bill Mina at around 1:20PM.  \"Pt is having very bad stomach cramps. He's very uncomfortable.\" Pt described the pain as muscle tightness around is abdomen and all over. Pt reported feeling this way before when he takes too much lasix.   Provider acknowledge, suggested trying zofran and slow down lasix drip.   Interventions: warm shower, warm blanket, gave zofran and decreased lasix drip from 20mg/hr to 10mg/hr.     Pt reported feeling better throughout the shift.     "

## 2025-02-07 NOTE — PROGRESS NOTES
[facility]  Cardiology II Service / Advanced Heart Failure  Daily Progress Note    Patient: Jose Enrique Engel      : 1985      MRN: [id number]    Assessment/Plan:   40 yo male with PMHx of HFrEF (15%) 2/2 NICM s/p ICD, cardiogenic shock previously on dobutamine, CKD3, polysubstance use admitted 2/3/25 for ambulatory cardiogenic shock.     Today's changes:  - Continue Dobutamine 5 mcg/kg/min  - Continue Lasix gtt @ 20mg/hr  - Restart Entresto     # Ambulatory cardiogenic shock  # Acute on chronic systolic heart failure (EF 15%), stage D, class IIIb  # Non-ischemic cardiomyopathy  Pt with hx of advanced HFrEF/NICM. Was previously on outpatient dobutamine, self-discontinued in . Re-engaged with outpatient cardiologist Dr. Dove , also self-enrolled in chem dep treatment in past year. At recent office visit/RHC was concerning for decompensated CHF and possible cardiogenic shock, pt attempted to manage outpatient but felt worse and ultimately presented for admission. Interested in advanced therapies but not candidate yet due to being lost to follow up.  Last RHC 25: RA 15, PA 44/26, PCWP 21, CI 1.7, PVR 3.4, SVR ~1400  Last TTE 2025: EF 5-10%, severely reduced RV Function, mod-severe MR, severe TR  - Volume: hypervolemic,  lbs, 184 on admission, 174 today  - continue lasix gtt at 20 mg/hr  - Inotrope: continue dobutamine @ 5mcg/kg/min   - continue PTA digoxin 125 mcg daily  - Afterload Reduction: Restart PTA Entresto 24/26mg BID, DC ISDN, continue hydralazine 50mg q8h with hold parameters   - MAP goal 65-85  - GDMT:  PTA entresto 24-26 BID   - not on PTA beta blocker (cardiogenic shock)   - hold PTA spironolactone 25 mg daily   - restart PTA empagliflozin 10 mg daily  - Anticoagulation: DC rivaroxaban given resolution of LV thrombus   - Statin: none  - Antiplatelet: none  - Antiarrhythmic: none  - SCD prophylaxis: ICD in place  - Electrolytes: BID  monitoring with diuresis, goals K>4, Mag>2  - Strict I/O monitoring, daily weights  - Dietary restrictions: Na <2g, 2L fluids    # Congestive hepatopathy, improving  On admission AST/ALT in 200s. Bilirubin 4.9. INR 3.5. Pattern and clinical picture most c/w congestive hepatopathy. Improving since admission.  - Cardiac management per above  - Trend LFTs     # Hx of LV apical thrombus  Seen on echo in 07/2023 - large (~1.8 cm x 1.8 cm x 1.1 cm), protruding, and predominantly sessile. Not present on TTE this admission.     # Hx of polysubstance use   Currently reporting only MJ use. Reports sobriety from EtOH and meth (last use 06/2024). Follows with local chem dep team. PETH negative, UDS only positive for THC.     # CKD3  Baseline Cr 1.3-1.6. Cr 1.5 on admission, 1.3 today.  - CTM     # Subclinical hypothyroidism  TSH 8 on admission. Started on synthroid at OSH due to TSH of 9. Technically should treat subclinical hypothyroidism if TSH >10. Synthroid not on his PTA meds list.   - Managed by PCP     Deconditioning  - PT/OT consults placed    Hospital Checklist:  DVT Prophylaxis: Low Risk/Ambulatory with no VTE prophylaxis indicated  Code Status: Full Code  Bowel regimen: PRNs  Diet/Nutrition: 2g Na / 2L FR  Lines: PIV, PICC being placed today    Disposition Planning:  Home in 1-2 days with home inotropes    Staffed w/ Dr. Ogden.     Sammy Enriquez MD  Cardiology Fellow, PGY-4  Pager: [phone]        Subjective:   Interval History: No acute events overnight    Patient doing well. Feels much better now that weight is off. Has no acute concerns.     Objective:     Vitals:    02/05/25 0326 02/06/25 0643 02/07/25 0623   Weight: 80.7 kg (178 lb) 81.3 kg (179 lb 3.2 oz) 78.9 kg (174 lb)     Vital Signs with Ranges  Temp:  [97.5  F (36.4  C)-98.5  F (36.9  C)] 97.8  F (36.6  C)  Pulse:  [] 101  Resp:  [16-20] 18  BP: ()/(66-92) 96/69  SpO2:  [98 %-100 %] 99 %  I/O last 3 completed shifts:  In: 3958.67  [P.O.:3714; I.V.:244.67]  Out: 7975 [Urine:7975]    Exam:  General: No acute distress, resting comfortably in bed  HEENT: MMM  Neck: JVP to mid-neck  CV: RRR, no murmurs, S3/S4 absent, pulses faint  Lungs: On room air, CTA b/l w/o wheezes or crackles, no increased WOB  Abd: Soft, non-tender, non-distended  Ext: cool to touch, no lower extremity edema  Neuro: Awake, alert, conversational, moving all extremities spontaneously throughout examination    Medications   Current Facility-Administered Medications   Medication Dose Route Frequency Provider Last Rate Last Admin    DOBUTamine (DOBUTREX) 500 mg in D5W 250 mL infusion (adult std conc)  5 mcg/kg/min (Dosing Weight) Intravenous Continuous Sofya Elizabeth MD 12.2 mL/hr at 02/07/25 0737 5 mcg/kg/min at 02/07/25 0737    furosemide (LASIX) 100 mg in sodium chloride 0.9 % 100 mL infusion  20 mg/hr Intravenous Continuous Rush Bowling MD 20 mL/hr at 02/07/25 0737 20 mg/hr at 02/07/25 0737     Current Facility-Administered Medications   Medication Dose Route Frequency Provider Last Rate Last Admin    digoxin (LANOXIN) tablet 125 mcg  125 mcg Oral Daily Sofya Elizabeth MD   125 mcg at 02/06/25 1013    [Held by provider] empagliflozin (JARDIANCE) tablet 10 mg  10 mg Oral Daily Cherie Gómez MD        ferrous sulfate (FEROSUL) tablet 325 mg  325 mg Oral Daily with breakfast Sofya Elizabeth MD   325 mg at 02/06/25 1012    heparin lock flush 10 unit/mL injection 5-20 mL  5-20 mL Intracatheter Q24H Cherie Gómez MD   5 mL at 02/06/25 2007    hydrALAZINE (APRESOLINE) tablet 75 mg  75 mg Oral Q8H Cherie Gómez MD   75 mg at 02/07/25 0626    isosorbide dinitrate (ISORDIL) tablet 30 mg  30 mg Oral TID Cherie Gómez MD   30 mg at 02/06/25 1817    potassium chloride ER (MICRO-K) CR capsule 40 mEq  40 mEq Oral BID Sofya Elizabeth MD   40 mEq at 02/06/25 2007    [Held by provider] rivaroxaban ANTICOAGULANT (XARELTO) tablet 20 mg  20 mg Oral  Daily with supper Sofya Elizabeth MD        [Held by provider] sacubitril-valsartan (ENTRESTO) 24-26 MG per tablet 1 tablet  1 tablet Oral BID Sofya Elizabeth MD   1 tablet at 02/04/25 0926    sodium chloride (PF) 0.9% PF flush 3 mL  3 mL Intracatheter Q8H Sofya Elizabeth MD   3 mL at 02/05/25 0810    [Held by provider] spironolactone (ALDACTONE) tablet 25 mg  25 mg Oral Daily Sofya Elizabeth MD   25 mg at 02/04/25 0926     Data   Recent Labs   Lab 02/07/25  0628 02/07/25  0058 02/06/25  2019 02/06/25  0637 02/06/25  0453 02/05/25  1731 02/05/25  0601 02/04/25  1657 02/04/25  0652 02/04/25  0033   WBC  --   --   --   --   --   --   --   --  7.2 7.2   HGB  --  15.7  --   --   --   --   --   --  17.0 16.7   MCV  --   --   --   --   --   --   --   --  88 91   PLT  --   --   --   --   --   --   --   --  201 206   INR 1.26*  --   --   --  1.45*  --  1.57*  --   --   --    *  --  131* 131*  --    < > 135   < > 129* 132*   POTASSIUM 3.8  --  3.3* 4.4  --    < > 4.1   < > 4.0 4.2   CHLORIDE 95*  --  92* 97*  --    < > 100   < > 93* 91*   CO2 24  --  25 22  --    < > 22   < > 16* 22   BUN 33.0*  --  40.5* 31.1*  --    < > 27.3*   < > 32.4* 30.8*   CR 1.28*  --  1.31* 1.29*  --    < > 1.37*   < > 1.46* 1.50*   ANIONGAP 14  --  14 12  --    < > 13   < > 20* 19*   LENARD 8.7*  --  9.0 9.1  --    < > 9.0   < > 9.1 9.7   *  --  178* 76  --    < > 72   < > 96 81   ALBUMIN 3.6  --  3.8 3.7  --    < > 3.7   < >  --  3.8   PROTTOTAL 6.8  --  7.0 7.0  --    < > 7.0   < >  --  7.2   BILITOTAL 1.9*  --  1.8* 2.1*  --    < > 2.8*   < >  --  4.9*   ALKPHOS 131  --  135 141  --    < > 141   < >  --  141   *  --  156* 181*  --    < > 206*   < >  --  242*   AST 78*  --  93* 117*  --    < > 164*   < >  --  280*    < > = values in this interval not displayed.     No results found for this or any previous visit (from the past 24 hours).

## 2025-02-07 NOTE — PROCEDURES
[facility]    Double Lumen PICC Placement    Date/Time: 2/6/2025 7:37 PM    Performed by: Mark Khan RN  Authorized by: Cherie Gómez MD  Indications: vascular access      UNIVERSAL PROTOCOL   Site Marked: Yes  Prior Images Obtained and Reviewed:  Yes  Required items: Required blood products, implants, devices and special equipment available    Patient identity confirmed:  Verbally with patient, arm band, provided demographic data, hospital-assigned identification number and anonymous protocol, patient vented/unresponsive  NA - No sedation, light sedation, or local anesthesia  Confirmation Checklist:  Patient's identity using two indicators, relevant allergies, procedure was appropriate and matched the consent or emergent situation and correct equipment/implants were available  Time out: Immediately prior to the procedure a time out was called (Mark)    Universal Protocol: the Joint Commission Universal Protocol was followed    Preparation: Patient was prepped and draped in usual sterile fashion       ANESTHESIA    Anesthesia:  Local infiltration  Local Anesthetic:  Lidocaine 1% without epinephrine  Anesthetic Total (mL):  4      SEDATION    Patient Sedated: No        Preparation: skin prepped with ChloraPrep  Skin prep agent: skin prep agent completely dried prior to procedure  Sterile barriers: maximum sterile barriers were used: cap, mask, sterile gown, sterile gloves, and large sterile sheet  Hand hygiene: hand hygiene performed prior to central venous catheter insertion  Type of line used: PICC  Catheter type: double lumen  Lumen type: non-valved and power PICC  Lumen Identification: Purple and Red  Catheter size: 5 Fr  Brand: Bard  Lot number: [id number]  Placement method: venipuncture, MST, ultrasound and tip navigation system  Number of attempts: 1  Difficulty threading catheter: no  Successful placement: yes  Orientation: right    Location: basilic  vein (0.61cm)  Tip Location: SVC  Extremity circumference: 32  Visible catheter length: 5  Total catheter length: 48  Dressing and securement: adhesive securement device, alcohol impregnated caps, blood cleaned with CHG, chlorhexidine patch applied, blood removed, fixation device, gloves changed prior to final dressing, glue, line secured, secure lock, securement device, site cleansed and transparent securement dressing  Post procedure assessment: blood return through all ports, free fluid flow and placement verified by 3CG technology  PROCEDURE Describe Procedure: Right basilic vein 0.61cm dm. 5cm external.Placement verified by Richar 3CG.PICC okay to use.  Disposal: sharps and needle count correct at the end of procedure, needles and guidewire disposed in sharps container  Patient Tolerance:  Patient tolerated the procedure well with no immediate complications

## 2025-02-07 NOTE — PLAN OF CARE
Neuro: alert and oriented x4  Cardiac: First CVP completed this shift at 0044, 20mmHg. SR/ST w PVCs.  Respiratory: RA, no SOB  : continent b/b, voids frequently in urinal  Diet/appetite: Low saturated fat, <2400 mg Na, 2L FR--pt has difficulty adhering to fluid restriction   Activity: independent  Pain: denied  Skin: no significant changes  LDA's: L PIV w lasix 20mg/hr. R DL PICC w purple lumen running dobutamine at 5mcg/kg/min  Labs: awaiting labs           Goal Outcome Evaluation:    Plan of Care Reviewed With: patient    Overall Patient Progress: improvingOverall Patient Progress: improving    Outcome Evaluation: VSS on RA. Pt started on Dobutamine drip 2.5 mcg/kg/min. Large UOP from lasix.

## 2025-02-08 LAB
ALBUMIN SERPL BCG-MCNC: 3.5 G/DL (ref 3.5–5.2)
ALP SERPL-CCNC: 127 U/L (ref 40–150)
ALT SERPL W P-5'-P-CCNC: 112 U/L (ref 0–70)
ANION GAP SERPL CALCULATED.3IONS-SCNC: 10 MMOL/L (ref 7–15)
ANION GAP SERPL CALCULATED.3IONS-SCNC: 13 MMOL/L (ref 7–15)
AST SERPL W P-5'-P-CCNC: 59 U/L (ref 0–45)
BASE EXCESS BLDV CALC-SCNC: 5.6 MMOL/L (ref -3–3)
BILIRUB DIRECT SERPL-MCNC: 0.9 MG/DL (ref 0–0.3)
BILIRUB SERPL-MCNC: 1.8 MG/DL
BUN SERPL-MCNC: 23.2 MG/DL (ref 6–20)
BUN SERPL-MCNC: 28.9 MG/DL (ref 6–20)
CALCIUM SERPL-MCNC: 8.7 MG/DL (ref 8.8–10.4)
CALCIUM SERPL-MCNC: 8.8 MG/DL (ref 8.8–10.4)
CHLORIDE SERPL-SCNC: 94 MMOL/L (ref 98–107)
CHLORIDE SERPL-SCNC: 95 MMOL/L (ref 98–107)
CREAT SERPL-MCNC: 0.92 MG/DL (ref 0.67–1.17)
CREAT SERPL-MCNC: 1.11 MG/DL (ref 0.67–1.17)
EGFRCR SERPLBLD CKD-EPI 2021: 87 ML/MIN/1.73M2
EGFRCR SERPLBLD CKD-EPI 2021: >90 ML/MIN/1.73M2
GLUCOSE SERPL-MCNC: 110 MG/DL (ref 70–99)
GLUCOSE SERPL-MCNC: 126 MG/DL (ref 70–99)
HCO3 BLDV-SCNC: 32 MMOL/L (ref 21–28)
HCO3 SERPL-SCNC: 24 MMOL/L (ref 22–29)
HCO3 SERPL-SCNC: 26 MMOL/L (ref 22–29)
INR PPP: 1.19 (ref 0.85–1.15)
MAGNESIUM SERPL-MCNC: 2.2 MG/DL (ref 1.7–2.3)
MAGNESIUM SERPL-MCNC: 2.2 MG/DL (ref 1.7–2.3)
O2/TOTAL GAS SETTING VFR VENT: 21 %
OXYHGB MFR BLDV: 53 % (ref 70–75)
PCO2 BLDV: 50 MM HG (ref 40–50)
PH BLDV: 7.41 [PH] (ref 7.32–7.43)
PO2 BLDV: 32 MM HG (ref 25–47)
POTASSIUM SERPL-SCNC: 3.9 MMOL/L (ref 3.4–5.3)
POTASSIUM SERPL-SCNC: 3.9 MMOL/L (ref 3.4–5.3)
POTASSIUM SERPL-SCNC: 4.1 MMOL/L (ref 3.4–5.3)
PROT SERPL-MCNC: 6.7 G/DL (ref 6.4–8.3)
SAO2 % BLDV: 54 % (ref 70–75)
SODIUM SERPL-SCNC: 130 MMOL/L (ref 135–145)
SODIUM SERPL-SCNC: 132 MMOL/L (ref 135–145)

## 2025-02-08 PROCEDURE — 250N000013 HC RX MED GY IP 250 OP 250 PS 637

## 2025-02-08 PROCEDURE — 250N000011 HC RX IP 250 OP 636

## 2025-02-08 PROCEDURE — 83735 ASSAY OF MAGNESIUM: CPT

## 2025-02-08 PROCEDURE — 250N000013 HC RX MED GY IP 250 OP 250 PS 637: Performed by: STUDENT IN AN ORGANIZED HEALTH CARE EDUCATION/TRAINING PROGRAM

## 2025-02-08 PROCEDURE — 82310 ASSAY OF CALCIUM: CPT

## 2025-02-08 PROCEDURE — 85610 PROTHROMBIN TIME: CPT

## 2025-02-08 PROCEDURE — 250N000009 HC RX 250: Performed by: INTERNAL MEDICINE

## 2025-02-08 PROCEDURE — 80053 COMPREHEN METABOLIC PANEL: CPT

## 2025-02-08 PROCEDURE — 99233 SBSQ HOSP IP/OBS HIGH 50: CPT | Mod: GC | Performed by: INTERNAL MEDICINE

## 2025-02-08 PROCEDURE — 250N000011 HC RX IP 250 OP 636: Performed by: STUDENT IN AN ORGANIZED HEALTH CARE EDUCATION/TRAINING PROGRAM

## 2025-02-08 PROCEDURE — 82805 BLOOD GASES W/O2 SATURATION: CPT

## 2025-02-08 PROCEDURE — 82374 ASSAY BLOOD CARBON DIOXIDE: CPT

## 2025-02-08 PROCEDURE — 258N000003 HC RX IP 258 OP 636: Performed by: STUDENT IN AN ORGANIZED HEALTH CARE EDUCATION/TRAINING PROGRAM

## 2025-02-08 PROCEDURE — 82248 BILIRUBIN DIRECT: CPT

## 2025-02-08 PROCEDURE — 120N000003 HC R&B IMCU UMMC

## 2025-02-08 RX ORDER — SPIRONOLACTONE 25 MG/1
25 TABLET ORAL DAILY
Status: DISCONTINUED | OUTPATIENT
Start: 2025-02-08 | End: 2025-02-14 | Stop reason: HOSPADM

## 2025-02-08 RX ORDER — FUROSEMIDE 10 MG/ML
80 INJECTION INTRAMUSCULAR; INTRAVENOUS ONCE
Status: COMPLETED | OUTPATIENT
Start: 2025-02-08 | End: 2025-02-08

## 2025-02-08 RX ORDER — SACUBITRIL AND VALSARTAN 49; 51 MG/1; MG/1
1 TABLET, FILM COATED ORAL 2 TIMES DAILY
Status: DISCONTINUED | OUTPATIENT
Start: 2025-02-08 | End: 2025-02-11

## 2025-02-08 RX ORDER — ENOXAPARIN SODIUM 100 MG/ML
40 INJECTION SUBCUTANEOUS EVERY 24 HOURS
Status: DISCONTINUED | OUTPATIENT
Start: 2025-02-08 | End: 2025-02-14 | Stop reason: HOSPADM

## 2025-02-08 RX ADMIN — SPIRONOLACTONE 25 MG: 25 TABLET, FILM COATED ORAL at 12:30

## 2025-02-08 RX ADMIN — FUROSEMIDE 10 MG/HR: 10 INJECTION, SOLUTION INTRAVENOUS at 09:04

## 2025-02-08 RX ADMIN — CALCIUM CARBONATE (ANTACID) CHEW TAB 500 MG 1000 MG: 500 CHEW TAB at 12:30

## 2025-02-08 RX ADMIN — DOBUTAMINE IN DEXTROSE 5 MCG/KG/MIN: 200 INJECTION, SOLUTION INTRAVENOUS at 20:58

## 2025-02-08 RX ADMIN — SACUBITRIL AND VALSARTAN 1 TABLET: 24; 26 TABLET, FILM COATED ORAL at 09:00

## 2025-02-08 RX ADMIN — POTASSIUM CHLORIDE 40 MEQ: 750 CAPSULE, EXTENDED RELEASE ORAL at 20:58

## 2025-02-08 RX ADMIN — ENOXAPARIN SODIUM 40 MG: 40 INJECTION SUBCUTANEOUS at 12:30

## 2025-02-08 RX ADMIN — ZOLPIDEM TARTRATE 5 MG: 5 TABLET, FILM COATED ORAL at 20:58

## 2025-02-08 RX ADMIN — FUROSEMIDE 10 MG/HR: 10 INJECTION, SOLUTION INTRAVENOUS at 17:49

## 2025-02-08 RX ADMIN — Medication 5 ML: at 05:21

## 2025-02-08 RX ADMIN — SACUBITRIL AND VALSARTAN 1 TABLET: 49; 51 TABLET, FILM COATED ORAL at 20:58

## 2025-02-08 RX ADMIN — POTASSIUM CHLORIDE 40 MEQ: 750 CAPSULE, EXTENDED RELEASE ORAL at 09:00

## 2025-02-08 RX ADMIN — EMPAGLIFLOZIN 10 MG: 10 TABLET, FILM COATED ORAL at 09:00

## 2025-02-08 RX ADMIN — FUROSEMIDE 80 MG: 10 INJECTION, SOLUTION INTRAVENOUS at 21:28

## 2025-02-08 RX ADMIN — FERROUS SULFATE TAB 325 MG (65 MG ELEMENTAL FE) 325 MG: 325 (65 FE) TAB at 09:00

## 2025-02-08 RX ADMIN — CALCIUM CARBONATE (ANTACID) CHEW TAB 500 MG 1000 MG: 500 CHEW TAB at 01:26

## 2025-02-08 RX ADMIN — DIGOXIN 125 MCG: 125 TABLET ORAL at 09:00

## 2025-02-08 RX ADMIN — FUROSEMIDE 20 MG/HR: 10 INJECTION, SOLUTION INTRAVENOUS at 23:27

## 2025-02-08 RX ADMIN — Medication 5 ML: at 20:58

## 2025-02-08 ASSESSMENT — ACTIVITIES OF DAILY LIVING (ADL)
ADLS_ACUITY_SCORE: 39

## 2025-02-08 NOTE — PROGRESS NOTES
[id number]  Neuro: A&Ox4. Able to make needs known properly.  Cardiac: SR. VSS. Denies chest pain.   Respiratory: Sating >93% on RA. Denies SOB  GI/: Adequate urine output. No BM overnight.   Diet: Tolerating regular diet w/ 2L FR.   Activity:  Up ad aron.  Pain: At acceptable level on current regimen.   Skin: No new deficits noted.  LDA's:  PIV to left arm infusing bumex at 10mg/h Double lumen PICC infusing dobutamine at 5 mcg/kg/min  Plan: Continue with POC. Notify primary team with changes.

## 2025-02-08 NOTE — PROGRESS NOTES
[facility]  Cardiology II Service / Advanced Heart Failure  Daily Progress Note    Patient: Jose Enrique Engel      : 1985      MRN: [id number]    Assessment/Plan:   40 yo male with PMHx of HFrEF (15%) 2/2 NICM s/p ICD, cardiogenic shock previously on dobutamine, CKD3, polysubstance use admitted 2/3/25 for ambulatory cardiogenic shock.     Today's changes:  - Continue Dobutamine 5 mcg/kg/min  - Continue Lasix gtt @ 10 mg/hr  - Increase entresto, unhold spironolactone, stop hydralazine    # Ambulatory cardiogenic shock  # Acute on chronic systolic heart failure (EF 15%), stage D, class IIIb  # Non-ischemic cardiomyopathy  Pt with hx of advanced HFrEF/NICM. Was previously on outpatient dobutamine, self-discontinued in . Re-engaged with outpatient cardiologist Dr. Dove , also self-enrolled in chem dep treatment in past year. At recent office visit/RHC was concerning for decompensated CHF and possible cardiogenic shock, pt attempted to manage outpatient but felt worse and ultimately presented for admission. Interested in advanced therapies but not candidate yet due to being lost to follow up.  Last RHC 25: RA 15, PA 44/26, PCWP 21, CI 1.7, PVR 3.4, SVR ~1400  Last TTE 2025: EF 5-10%, severely reduced RV Function, mod-severe MR, severe TR  - Volume: hypervolemic,  lbs, 184 on admission, 174 today  - continue lasix gtt at 10 mg/hr  - Inotrope: continue dobutamine @ 5mcg/kg/min   - continue PTA digoxin 125 mcg daily  - Afterload Reduction: Increase PTA Entresto 49/51 mg BID, stop hydralazine   - MAP goal 65-85  - GDMT: entresto 49-51 BID   - not on PTA beta blocker (cardiogenic shock)   - restart PTA spironolactone 25 mg daily   - restart PTA empagliflozin 10 mg daily  - Anticoagulation: DC rivaroxaban given resolution of LV thrombus   - Statin: none  - Antiplatelet: none  - Antiarrhythmic: none  - SCD prophylaxis: ICD in place  - Electrolytes: BID  monitoring with diuresis, goals K>4, Mag>2  - Strict I/O monitoring, daily weights  - Dietary restrictions: Na <2g, 2L fluids    # Congestive hepatopathy, improving  On admission AST/ALT in 200s. Bilirubin 4.9. INR 3.5. Pattern and clinical picture most c/w congestive hepatopathy. Improving since admission.  - Cardiac management per above  - Trend LFTs     # Hx of LV apical thrombus  Seen on echo in 07/2023 - large (~1.8 cm x 1.8 cm x 1.1 cm), protruding, and predominantly sessile. Not present on TTE this admission.     # Hx of polysubstance use   Currently reporting only MJ use. Reports sobriety from EtOH and meth (last use 06/2024). Follows with local chem dep team. PETH negative, UDS only positive for THC.     # CKD3  Baseline Cr 1.3-1.6. Cr 1.5 on admission, 0.9 today.  - CTM     # Subclinical hypothyroidism  TSH 8 on admission. Started on synthroid at OSH due to TSH of 9. Technically should treat subclinical hypothyroidism if TSH >10. Synthroid not on his PTA meds list.   - Managed by PCP     Deconditioning  - PT/OT consults placed    Hospital Checklist:  DVT Prophylaxis: Low Risk/Ambulatory with no VTE prophylaxis indicated  Code Status: Full Code  Bowel regimen: PRNs  Diet/Nutrition: 2g Na / 2L FR  Lines: PIV, PICC being placed today    Disposition Planning:  Home in 1-2 days with home inotropes    Staffed w/ Dr. Ogden.     Cherie Gómez MD, PhD  PGY2 Internal Medicine      Subjective:   Interval History: No acute events overnight    Patient doing well. Ambulating independently around unit. Feeling better with weight off, more energy. No new concerns or symptoms. Spent some time discussing dietary recommendations this morning.       Objective:     Vitals:    02/06/25 0643 02/07/25 0623 02/08/25 0500   Weight: 81.3 kg (179 lb 3.2 oz) 78.9 kg (174 lb) 78.5 kg (173 lb)     Vital Signs with Ranges  Temp:  [97.3  F (36.3  C)-97.9  F (36.6  C)] 97.9  F (36.6  C)  Pulse:  [] 87  Resp:  [18] 18  BP:  ()/(50-77) 91/55  SpO2:  [97 %-99 %] 98 %  I/O last 3 completed shifts:  In: 2912.32 [P.O.:1998; I.V.:914.32]  Out: 3850 [Urine:3850]    Exam:  General: No acute distress, resting comfortably in bed  HEENT: MMM  Neck: JVP 10-11 cm H2O  CV: RRR, no murmurs, S3/S4 absent  Lungs: On room air, CTA b/l w/o wheezes or crackles, no increased WOB  Abd: Soft, non-tender, non-distended  Ext: warm/well perfused, no lower extremity edema  Neuro: Awake, alert, conversational, moving all extremities spontaneously throughout examination    Medications   Current Facility-Administered Medications   Medication Dose Route Frequency Provider Last Rate Last Admin    DOBUTamine (DOBUTREX) 500 mg in D5W 250 mL infusion (adult std conc)  5 mcg/kg/min (Dosing Weight) Intravenous Continuous Sofya Elizabeth MD 12.2 mL/hr at 02/08/25 0909 5 mcg/kg/min at 02/08/25 0909    furosemide (LASIX) 100 mg in sodium chloride 0.9 % 100 mL infusion  10 mg/hr Intravenous Continuous Bill Enriquez MD 10 mL/hr at 02/08/25 0904 10 mg/hr at 02/08/25 0904     Current Facility-Administered Medications   Medication Dose Route Frequency Provider Last Rate Last Admin    digoxin (LANOXIN) tablet 125 mcg  125 mcg Oral Daily Sofya Elizabeth MD   125 mcg at 02/08/25 0900    empagliflozin (JARDIANCE) tablet 10 mg  10 mg Oral Daily Bill Enriquez MD   10 mg at 02/08/25 0900    enoxaparin ANTICOAGULANT (LOVENOX) injection 40 mg  40 mg Subcutaneous Q24H Cherie Gómez MD   40 mg at 02/08/25 1230    ferrous sulfate (FEROSUL) tablet 325 mg  325 mg Oral Daily with breakfast Sofya Elizabeth MD   325 mg at 02/08/25 0900    heparin lock flush 10 unit/mL injection 5-20 mL  5-20 mL Intracatheter Q24H Cherie Gómez MD   5 mL at 02/07/25 2341    potassium chloride ER (MICRO-K) CR capsule 40 mEq  40 mEq Oral BID Sofya Elizabeth MD   40 mEq at 02/08/25 0900    sacubitril-valsartan (ENTRESTO) 49-51 MG per tablet 1 tablet  1 tablet Oral BID  Cherie Gómez MD        sodium chloride (PF) 0.9% PF flush 3 mL  3 mL Intracatheter Q8H Sofya Elizabeth MD   3 mL at 02/07/25 2341    spironolactone (ALDACTONE) tablet 25 mg  25 mg Oral Daily Cherie Gómez MD   25 mg at 02/08/25 1230     Data   Recent Labs   Lab 02/08/25  0458 02/07/25  2348 02/07/25  1737 02/07/25  0628 02/07/25  0058 02/06/25  0637 02/06/25  0453 02/04/25  1657 02/04/25  0652 02/04/25  0033   WBC  --   --   --   --   --   --   --   --  7.2 7.2   HGB  --   --   --   --  15.7  --   --   --  17.0 16.7   MCV  --   --   --   --   --   --   --   --  88 91   PLT  --   --   --   --   --   --   --   --  201 206   INR 1.19*  --   --  1.26*  --   --  1.45*   < >  --   --    *  --  129* 133*  --    < >  --    < > 129* 132*   POTASSIUM 4.1 3.9 3.3* 3.8  --    < >  --    < > 4.0 4.2   CHLORIDE 94*  --  90* 95*  --    < >  --    < > 93* 91*   CO2 26  --  24 24  --    < >  --    < > 16* 22   BUN 28.9*  --  35.0* 33.0*  --    < >  --    < > 32.4* 30.8*   CR 0.92  --  1.22* 1.28*  --    < >  --    < > 1.46* 1.50*   ANIONGAP 10  --  15 14  --    < >  --    < > 20* 19*   LENARD 8.7*  --  8.9 8.7*  --    < >  --    < > 9.1 9.7   *  --  115* 117*  --    < >  --    < > 96 81   ALBUMIN 3.5  --  3.6 3.6  --    < >  --    < >  --  3.8   PROTTOTAL 6.7  --  7.0 6.8  --    < >  --    < >  --  7.2   BILITOTAL 1.8*  --  1.8* 1.9*  --    < >  --    < >  --  4.9*   ALKPHOS 127  --  137 131  --    < >  --    < >  --  141   *  --  129* 140*  --    < >  --    < >  --  242*   AST 59*  --  74* 78*  --    < >  --    < >  --  280*    < > = values in this interval not displayed.     No results found for this or any previous visit (from the past 24 hours).

## 2025-02-08 NOTE — PLAN OF CARE
Goal Outcome Evaluation:      Plan of Care Reviewed With: patient    Overall Patient Progress: improving    [id number]     Neuro: A&O x4.   Cardiac: SR/ST w/PVCs (90-100s). Denied chest pain, dizziness, palpitations. VSS, afebrile. Respiratory: RA, sating >95%.   GI/: WDL.   Diet/Appetite: Regular diet, good appetite.  Skin: No new deficits noted.  LDA: Left PIV infusing lasix at 10mg/hr. R DL PICC infusing Dobutamine at 5mcg/kg/hr.   Activity: Independent   Pain: Denies pain.   Plan: Continue with POC. Notify CARDS 2 of pertinent changes.     Notified provider (Cherie Gómez) : FYI pt had 14 beats of VT at 3:19 pm. Pt asymptomatic, vitally stable.

## 2025-02-09 LAB
ALBUMIN SERPL BCG-MCNC: 3.5 G/DL (ref 3.5–5.2)
ALP SERPL-CCNC: 130 U/L (ref 40–150)
ALT SERPL W P-5'-P-CCNC: 94 U/L (ref 0–70)
ANION GAP SERPL CALCULATED.3IONS-SCNC: 12 MMOL/L (ref 7–15)
ANION GAP SERPL CALCULATED.3IONS-SCNC: 9 MMOL/L (ref 7–15)
AST SERPL W P-5'-P-CCNC: 53 U/L (ref 0–45)
BASE EXCESS BLDV CALC-SCNC: 2.5 MMOL/L (ref -3–3)
BILIRUB DIRECT SERPL-MCNC: 0.86 MG/DL (ref 0–0.3)
BILIRUB SERPL-MCNC: 1.5 MG/DL
BUN SERPL-MCNC: 25.1 MG/DL (ref 6–20)
BUN SERPL-MCNC: 27.2 MG/DL (ref 6–20)
CALCIUM SERPL-MCNC: 8.6 MG/DL (ref 8.8–10.4)
CALCIUM SERPL-MCNC: 8.7 MG/DL (ref 8.8–10.4)
CHLORIDE SERPL-SCNC: 94 MMOL/L (ref 98–107)
CHLORIDE SERPL-SCNC: 97 MMOL/L (ref 98–107)
CREAT SERPL-MCNC: 0.89 MG/DL (ref 0.67–1.17)
CREAT SERPL-MCNC: 1.29 MG/DL (ref 0.67–1.17)
EGFRCR SERPLBLD CKD-EPI 2021: 72 ML/MIN/1.73M2
EGFRCR SERPLBLD CKD-EPI 2021: >90 ML/MIN/1.73M2
ERYTHROCYTE [DISTWIDTH] IN BLOOD BY AUTOMATED COUNT: 15.9 % (ref 10–15)
GLUCOSE SERPL-MCNC: 113 MG/DL (ref 70–99)
GLUCOSE SERPL-MCNC: 119 MG/DL (ref 70–99)
HCO3 BLDV-SCNC: 29 MMOL/L (ref 21–28)
HCO3 SERPL-SCNC: 24 MMOL/L (ref 22–29)
HCO3 SERPL-SCNC: 24 MMOL/L (ref 22–29)
HCT VFR BLD AUTO: 46.7 % (ref 40–53)
HGB BLD-MCNC: 15.9 G/DL (ref 13.3–17.7)
INR PPP: 1.11 (ref 0.85–1.15)
LACTATE SERPL-SCNC: 1 MMOL/L (ref 0.7–2)
MAGNESIUM SERPL-MCNC: 2.3 MG/DL (ref 1.7–2.3)
MAGNESIUM SERPL-MCNC: 2.5 MG/DL (ref 1.7–2.3)
MCH RBC QN AUTO: 31.3 PG (ref 26.5–33)
MCHC RBC AUTO-ENTMCNC: 34 G/DL (ref 31.5–36.5)
MCV RBC AUTO: 92 FL (ref 78–100)
O2/TOTAL GAS SETTING VFR VENT: 21 %
OXYHGB MFR BLDV: 55 % (ref 70–75)
PCO2 BLDV: 48 MM HG (ref 40–50)
PH BLDV: 7.38 [PH] (ref 7.32–7.43)
PLATELET # BLD AUTO: 189 10E3/UL (ref 150–450)
PO2 BLDV: 32 MM HG (ref 25–47)
POTASSIUM SERPL-SCNC: 4.1 MMOL/L (ref 3.4–5.3)
POTASSIUM SERPL-SCNC: 4.8 MMOL/L (ref 3.4–5.3)
PROT SERPL-MCNC: 6.6 G/DL (ref 6.4–8.3)
RBC # BLD AUTO: 5.08 10E6/UL (ref 4.4–5.9)
SAO2 % BLDV: 55.7 % (ref 70–75)
SODIUM SERPL-SCNC: 127 MMOL/L (ref 135–145)
SODIUM SERPL-SCNC: 133 MMOL/L (ref 135–145)
WBC # BLD AUTO: 5.6 10E3/UL (ref 4–11)

## 2025-02-09 PROCEDURE — 85014 HEMATOCRIT: CPT

## 2025-02-09 PROCEDURE — 85048 AUTOMATED LEUKOCYTE COUNT: CPT

## 2025-02-09 PROCEDURE — 83735 ASSAY OF MAGNESIUM: CPT

## 2025-02-09 PROCEDURE — 82565 ASSAY OF CREATININE: CPT

## 2025-02-09 PROCEDURE — 250N000013 HC RX MED GY IP 250 OP 250 PS 637

## 2025-02-09 PROCEDURE — 250N000009 HC RX 250: Performed by: INTERNAL MEDICINE

## 2025-02-09 PROCEDURE — 82805 BLOOD GASES W/O2 SATURATION: CPT

## 2025-02-09 PROCEDURE — 84155 ASSAY OF PROTEIN SERUM: CPT

## 2025-02-09 PROCEDURE — 84460 ALANINE AMINO (ALT) (SGPT): CPT

## 2025-02-09 PROCEDURE — 120N000003 HC R&B IMCU UMMC

## 2025-02-09 PROCEDURE — 83605 ASSAY OF LACTIC ACID: CPT

## 2025-02-09 PROCEDURE — 250N000011 HC RX IP 250 OP 636

## 2025-02-09 PROCEDURE — 99233 SBSQ HOSP IP/OBS HIGH 50: CPT | Mod: GC | Performed by: INTERNAL MEDICINE

## 2025-02-09 PROCEDURE — 82310 ASSAY OF CALCIUM: CPT

## 2025-02-09 PROCEDURE — 250N000013 HC RX MED GY IP 250 OP 250 PS 637: Performed by: STUDENT IN AN ORGANIZED HEALTH CARE EDUCATION/TRAINING PROGRAM

## 2025-02-09 PROCEDURE — 85610 PROTHROMBIN TIME: CPT

## 2025-02-09 RX ORDER — ACETAZOLAMIDE 500 MG/5ML
500 INJECTION, POWDER, LYOPHILIZED, FOR SOLUTION INTRAVENOUS DAILY
Status: DISCONTINUED | OUTPATIENT
Start: 2025-02-09 | End: 2025-02-10

## 2025-02-09 RX ADMIN — DOBUTAMINE IN DEXTROSE 5 MCG/KG/MIN: 200 INJECTION, SOLUTION INTRAVENOUS at 15:55

## 2025-02-09 RX ADMIN — ZOLPIDEM TARTRATE 5 MG: 5 TABLET, FILM COATED ORAL at 20:55

## 2025-02-09 RX ADMIN — ENOXAPARIN SODIUM 40 MG: 40 INJECTION SUBCUTANEOUS at 13:30

## 2025-02-09 RX ADMIN — SACUBITRIL AND VALSARTAN 1 TABLET: 49; 51 TABLET, FILM COATED ORAL at 08:22

## 2025-02-09 RX ADMIN — FUROSEMIDE 20 MG/HR: 10 INJECTION, SOLUTION INTRAVENOUS at 20:48

## 2025-02-09 RX ADMIN — EMPAGLIFLOZIN 10 MG: 10 TABLET, FILM COATED ORAL at 08:22

## 2025-02-09 RX ADMIN — CALCIUM CARBONATE (ANTACID) CHEW TAB 500 MG 1000 MG: 500 CHEW TAB at 11:03

## 2025-02-09 RX ADMIN — Medication 5 ML: at 20:55

## 2025-02-09 RX ADMIN — SPIRONOLACTONE 25 MG: 25 TABLET, FILM COATED ORAL at 08:22

## 2025-02-09 RX ADMIN — ACETAZOLAMIDE 500 MG: 500 INJECTION, POWDER, LYOPHILIZED, FOR SOLUTION INTRAVENOUS at 13:31

## 2025-02-09 RX ADMIN — POTASSIUM CHLORIDE 40 MEQ: 750 CAPSULE, EXTENDED RELEASE ORAL at 20:55

## 2025-02-09 RX ADMIN — FERROUS SULFATE TAB 325 MG (65 MG ELEMENTAL FE) 325 MG: 325 (65 FE) TAB at 08:22

## 2025-02-09 RX ADMIN — POTASSIUM CHLORIDE 40 MEQ: 750 CAPSULE, EXTENDED RELEASE ORAL at 08:22

## 2025-02-09 RX ADMIN — Medication 5 ML: at 04:40

## 2025-02-09 RX ADMIN — SACUBITRIL AND VALSARTAN 1 TABLET: 49; 51 TABLET, FILM COATED ORAL at 20:55

## 2025-02-09 RX ADMIN — DIGOXIN 125 MCG: 125 TABLET ORAL at 08:51

## 2025-02-09 ASSESSMENT — ACTIVITIES OF DAILY LIVING (ADL)
ADLS_ACUITY_SCORE: 39

## 2025-02-09 NOTE — PLAN OF CARE
Goal Outcome Evaluation:           [id number]  Neuro: A&Ox4. Able to make needs known properly.   Cardiac: SR. VSS. Denies chest pain  Respiratory: Sating >92% on RA denies SOB.  GI/: Adequate urine output. No BM overnight.   Diet: Tolerating regular diet, 2L FR reminded about importance of 2L FR.   Activity:  up ad aron.  Pain: At acceptable level on current regimen.   Skin: No new deficits noted.  LDA's:  DL PICC to right arm infusing dobutamine at 5 mcg/kg/min, PIV to left arm infusing lasix at 20mg/hr  Plan: Continue with POC. Notify primary team with changes.

## 2025-02-09 NOTE — PLAN OF CARE
Goal Outcome Evaluation:      Plan of Care Reviewed With: patient    Overall Patient Progress: no change    [id number]     Neuro: A&O x4.   Cardiac: SR/ST 80-low 100s w/PVCs. Denied chest pain, dizziness, palpitations. Soft BP-sys (80-90s) today, provider aware, afebrile.   Respiratory: RA, sating >95%.   GI/: WDL.   Diet/Appetite: Regular diet, good appetite.  Skin: No new deficits noted.  LDA: Left PIV infusing lasix at 20mg/hr. R DL PICC infusing Dobutamine at 5mcg/kg/hr.   Activity: Independent   Pain: Denies pain.   Plan: Continue with POC. Notify CARDS 2 of pertinent changes.

## 2025-02-09 NOTE — PROGRESS NOTES
[facility]  Cardiology II Service / Advanced Heart Failure  Daily Progress Note    Patient: Jose Enrique Engel      : 1985      MRN: [id number]    Assessment/Plan:   38 yo male with PMHx of HFrEF (15%) 2/2 NICM s/p ICD, cardiogenic shock previously on dobutamine, CKD3, polysubstance use admitted 2/3/25 for ambulatory cardiogenic shock.     Today's changes:  - Continue Dobutamine 5 mcg/kg/min  - Lasix gtt at 20 mg/hr, increased overnight  - Acetazolamide 500 mg IV x3 to improve decongestion  - Continue current GDMT medications    # Ambulatory cardiogenic shock  # Acute on chronic systolic heart failure (EF 15%), stage D, class IIIb  # Non-ischemic cardiomyopathy  Pt with hx of advanced HFrEF/NICM. Was previously on outpatient dobutamine, self-discontinued in . Re-engaged with outpatient cardiologist Dr. Dove , also self-enrolled in chem dep treatment in past year. At recent office visit/RHC was concerning for decompensated CHF and possible cardiogenic shock, pt attempted to manage outpatient but felt worse and ultimately presented for admission. Interested in advanced therapies but not candidate yet due to being lost to follow up.  Last RHC 25: RA 15, PA 44/26, PCWP 21, CI 1.7, PVR 3.4, SVR ~1400  Last TTE 2025: EF 5-10%, severely reduced RV Function, mod-severe MR, severe TR  - Volume: hypervolemic,  lbs, 184 on admission, 178 today (increase from yesterday)  - continue lasix gtt at 20 mg/hr  - start acetazolamide 500 mg IV daily x3 doses  - Inotrope: continue dobutamine @ 5mcg/kg/min   - continue PTA digoxin 125 mcg daily  - Afterload Reduction: Increase PTA Entresto 49/51 mg BID, stop hydralazine   - MAP goal 65-85  - GDMT: entresto 49-51 BID   - not on PTA beta blocker (cardiogenic shock)   - PTA spironolactone 25 mg daily   - PTA empagliflozin 10 mg daily  - Anticoagulation: discontinue PTA rivaroxaban given resolution of LV thrombus   -  Statin: none  - Antiplatelet: none  - Antiarrhythmic: none  - SCD prophylaxis: ICD in place  - Electrolytes: BID monitoring with diuresis, goals K>4, Mag>2  - Strict I/O monitoring, daily weights  - Dietary restrictions: regular diet (pt request), 2L fluids (pt intermittently following)    # Congestive hepatopathy, resolved  On admission AST/ALT in 200s. Bilirubin 4.9. INR 3.5. Pattern and clinical picture most c/w congestive hepatopathy. INR/bili normalized, AST/ALT much improved since admission.  - Cardiac management per above  - Trend LFTs     # Hx of LV apical thrombus  Seen on echo in 07/2023 - large (~1.8 cm x 1.8 cm x 1.1 cm), protruding, and predominantly sessile. Not present on TTE this admission.  - Stop PTA rivaroxaban     # Hx of polysubstance use   Currently reporting only MJ use. Reports sobriety from EtOH and meth (last use 06/2024). Follows with local chem dep team. PETH negative, UDS only positive for THC.     # CKD3  Baseline Cr 1.3-1.6. Cr 1.5 on admission, 0.9 today.  - CTM     # Subclinical hypothyroidism  TSH 8 on admission. Started on synthroid at OSH due to TSH of 9. Technically should treat subclinical hypothyroidism if TSH >10. Synthroid not on his PTA meds list.   - Managed by PCP     Deconditioning  - PT/OT consults placed    Hospital Checklist:  DVT Prophylaxis: Low Risk/Ambulatory with no VTE prophylaxis indicated  Code Status: Full Code  Bowel regimen: PRNs  Diet/Nutrition: regular diet / 2L FR  Lines: PIV, PICC    Disposition Planning:  Home in 1-2 days with home inotropes pending better diuresis    Staffed w/ Dr. Ogden.     Cherie Gómez MD, PhD  PGY2 Internal Medicine      Subjective:   Interval History: No acute events overnight. Family visited yesterday which he appreciated. Feeling ok otherwise, no new concerns or symptoms.      Objective:     Vitals:    02/07/25 0623 02/08/25 0500 02/09/25 0400   Weight: 78.9 kg (174 lb) 78.5 kg (173 lb) 80.9 kg (178 lb 4.8 oz)     Vital  Signs with Ranges  Temp:  [96.4  F (35.8  C)-97.8  F (36.6  C)] 96.4  F (35.8  C)  Pulse:  [90-99] 94  Resp:  [16-18] 18  BP: ()/(62-90) 90/62  SpO2:  [98 %-100 %] 98 %  I/O last 3 completed shifts:  In: 2853.93 [P.O.:2398; I.V.:455.93]  Out: 3410 [Urine:3410]    Exam:  General: No acute distress, resting comfortably in bed  HEENT: MMM  Neck: JVP 11 cm H2O  CV: RRR, no murmurs, S3/S4 absent  Lungs: On room air, CTA b/l w/o wheezes or crackles, no increased WOB  Abd: Soft, non-tender, non-distended  Ext: warm/well perfused, no lower extremity edema  Neuro: Awake, alert, conversational, moving all extremities spontaneously throughout examination    Medications   Current Facility-Administered Medications   Medication Dose Route Frequency Provider Last Rate Last Admin    DOBUTamine (DOBUTREX) 500 mg in D5W 250 mL infusion (adult std conc)  5 mcg/kg/min (Dosing Weight) Intravenous Continuous Sofya Elizabeth MD 12.2 mL/hr at 02/09/25 0724 5 mcg/kg/min at 02/09/25 0724    furosemide (LASIX) 500 mg/50mL infusion ADULT MAX CONC  20 mg/hr Intravenous Continuous Carmelo Ogden MD 2 mL/hr at 02/09/25 0724 20 mg/hr at 02/09/25 0724     Current Facility-Administered Medications   Medication Dose Route Frequency Provider Last Rate Last Admin    acetaZOLAMIDE (DIAMOX) injection 500 mg  500 mg Intravenous Daily Cherie Gómez MD        digoxin (LANOXIN) tablet 125 mcg  125 mcg Oral Daily Sofya Elizabeth MD   125 mcg at 02/09/25 0851    empagliflozin (JARDIANCE) tablet 10 mg  10 mg Oral Daily Bill Enriquez MD   10 mg at 02/09/25 0822    enoxaparin ANTICOAGULANT (LOVENOX) injection 40 mg  40 mg Subcutaneous Q24H Cherie Gómez MD   40 mg at 02/08/25 1230    ferrous sulfate (FEROSUL) tablet 325 mg  325 mg Oral Daily with breakfast Sofya Elizabeth MD   325 mg at 02/09/25 0822    heparin lock flush 10 unit/mL injection 5-20 mL  5-20 mL Intracatheter Q24H Cherie Gómez MD   5 mL at 02/08/25  2058    potassium chloride ER (MICRO-K) CR capsule 40 mEq  40 mEq Oral BID Sofya Elizabeth MD   40 mEq at 02/09/25 0822    sacubitril-valsartan (ENTRESTO) 49-51 MG per tablet 1 tablet  1 tablet Oral BID Cherie Gómez MD   1 tablet at 02/09/25 0822    sodium chloride (PF) 0.9% PF flush 3 mL  3 mL Intracatheter Q8H Sofya Elizabeth MD   3 mL at 02/07/25 2341    spironolactone (ALDACTONE) tablet 25 mg  25 mg Oral Daily Cherie Gómez MD   25 mg at 02/09/25 0822     Data   Recent Labs   Lab 02/09/25  0441 02/08/25  1745 02/08/25  0458 02/07/25  1737 02/07/25  0628 02/07/25  0058 02/04/25  1657 02/04/25  0652 02/04/25  0033   WBC 5.6  --   --   --   --   --   --  7.2 7.2   HGB 15.9  --   --   --   --  15.7  --  17.0 16.7   MCV 92  --   --   --   --   --   --  88 91     --   --   --   --   --   --  201 206   INR 1.11  --  1.19*  --  1.26*  --    < >  --   --    * 132* 130*   < > 133*  --    < > 129* 132*   POTASSIUM 4.8 3.9 4.1   < > 3.8  --    < > 4.0 4.2   CHLORIDE 94* 95* 94*   < > 95*  --    < > 93* 91*   CO2 24 24 26   < > 24  --    < > 16* 22   BUN 25.1* 23.2* 28.9*   < > 33.0*  --    < > 32.4* 30.8*   CR 0.89 1.11 0.92   < > 1.28*  --    < > 1.46* 1.50*   ANIONGAP 9 13 10   < > 14  --    < > 20* 19*   LENARD 8.6* 8.8 8.7*   < > 8.7*  --    < > 9.1 9.7   * 126* 110*   < > 117*  --    < > 96 81   ALBUMIN 3.5  --  3.5   < > 3.6  --    < >  --  3.8   PROTTOTAL 6.6  --  6.7   < > 6.8  --    < >  --  7.2   BILITOTAL 1.5*  --  1.8*   < > 1.9*  --    < >  --  4.9*   ALKPHOS 130  --  127   < > 131  --    < >  --  141   ALT 94*  --  112*   < > 140*  --    < >  --  242*   AST 53*  --  59*   < > 78*  --    < >  --  280*    < > = values in this interval not displayed.     No results found for this or any previous visit (from the past 24 hours).

## 2025-02-10 ENCOUNTER — APPOINTMENT (OUTPATIENT)
Dept: OCCUPATIONAL THERAPY | Facility: CLINIC | Age: 40
End: 2025-02-10
Attending: STUDENT IN AN ORGANIZED HEALTH CARE EDUCATION/TRAINING PROGRAM
Payer: COMMERCIAL

## 2025-02-10 LAB
ALBUMIN SERPL BCG-MCNC: 3.5 G/DL (ref 3.5–5.2)
ALP SERPL-CCNC: 123 U/L (ref 40–150)
ALT SERPL W P-5'-P-CCNC: 86 U/L (ref 0–70)
ANION GAP SERPL CALCULATED.3IONS-SCNC: 12 MMOL/L (ref 7–15)
ANION GAP SERPL CALCULATED.3IONS-SCNC: 9 MMOL/L (ref 7–15)
AST SERPL W P-5'-P-CCNC: 45 U/L (ref 0–45)
BASE EXCESS BLDV CALC-SCNC: 1.2 MMOL/L (ref -3–3)
BILIRUB DIRECT SERPL-MCNC: 0.77 MG/DL (ref 0–0.3)
BILIRUB SERPL-MCNC: 1.5 MG/DL
BUN SERPL-MCNC: 35.5 MG/DL (ref 6–20)
BUN SERPL-MCNC: 37 MG/DL (ref 6–20)
CALCIUM SERPL-MCNC: 8.9 MG/DL (ref 8.8–10.4)
CALCIUM SERPL-MCNC: 9.1 MG/DL (ref 8.8–10.4)
CHLORIDE SERPL-SCNC: 96 MMOL/L (ref 98–107)
CHLORIDE SERPL-SCNC: 97 MMOL/L (ref 98–107)
CREAT SERPL-MCNC: 1.04 MG/DL (ref 0.67–1.17)
CREAT SERPL-MCNC: 1.45 MG/DL (ref 0.67–1.17)
DIGOXIN SERPL-MCNC: 0.4 NG/ML (ref 0.6–1.2)
EGFRCR SERPLBLD CKD-EPI 2021: 63 ML/MIN/1.73M2
EGFRCR SERPLBLD CKD-EPI 2021: >90 ML/MIN/1.73M2
GLUCOSE SERPL-MCNC: 108 MG/DL (ref 70–99)
GLUCOSE SERPL-MCNC: 72 MG/DL (ref 70–99)
HCO3 BLDV-SCNC: 28 MMOL/L (ref 21–28)
HCO3 SERPL-SCNC: 24 MMOL/L (ref 22–29)
HCO3 SERPL-SCNC: 24 MMOL/L (ref 22–29)
INR PPP: 1.1 (ref 0.85–1.15)
MAGNESIUM SERPL-MCNC: 2.6 MG/DL (ref 1.7–2.3)
MAGNESIUM SERPL-MCNC: 2.7 MG/DL (ref 1.7–2.3)
O2/TOTAL GAS SETTING VFR VENT: 21 %
OXYHGB MFR BLDV: 55 % (ref 70–75)
PCO2 BLDV: 51 MM HG (ref 40–50)
PH BLDV: 7.35 [PH] (ref 7.32–7.43)
PO2 BLDV: 33 MM HG (ref 25–47)
POTASSIUM SERPL-SCNC: 4.3 MMOL/L (ref 3.4–5.3)
POTASSIUM SERPL-SCNC: 4.5 MMOL/L (ref 3.4–5.3)
PROT SERPL-MCNC: 6.8 G/DL (ref 6.4–8.3)
SAO2 % BLDV: 55.4 % (ref 70–75)
SODIUM SERPL-SCNC: 130 MMOL/L (ref 135–145)
SODIUM SERPL-SCNC: 132 MMOL/L (ref 135–145)

## 2025-02-10 PROCEDURE — 250N000013 HC RX MED GY IP 250 OP 250 PS 637

## 2025-02-10 PROCEDURE — 120N000003 HC R&B IMCU UMMC

## 2025-02-10 PROCEDURE — 80048 BASIC METABOLIC PNL TOTAL CA: CPT

## 2025-02-10 PROCEDURE — 82310 ASSAY OF CALCIUM: CPT

## 2025-02-10 PROCEDURE — 82248 BILIRUBIN DIRECT: CPT

## 2025-02-10 PROCEDURE — 83735 ASSAY OF MAGNESIUM: CPT

## 2025-02-10 PROCEDURE — 82805 BLOOD GASES W/O2 SATURATION: CPT

## 2025-02-10 PROCEDURE — 97530 THERAPEUTIC ACTIVITIES: CPT | Mod: GO

## 2025-02-10 PROCEDURE — 97535 SELF CARE MNGMENT TRAINING: CPT | Mod: GO

## 2025-02-10 PROCEDURE — 85610 PROTHROMBIN TIME: CPT

## 2025-02-10 PROCEDURE — 999N000007 HC SITE CHECK

## 2025-02-10 PROCEDURE — 80162 ASSAY OF DIGOXIN TOTAL: CPT | Performed by: INTERNAL MEDICINE

## 2025-02-10 PROCEDURE — 250N000013 HC RX MED GY IP 250 OP 250 PS 637: Performed by: STUDENT IN AN ORGANIZED HEALTH CARE EDUCATION/TRAINING PROGRAM

## 2025-02-10 PROCEDURE — 250N000011 HC RX IP 250 OP 636

## 2025-02-10 PROCEDURE — 82040 ASSAY OF SERUM ALBUMIN: CPT

## 2025-02-10 PROCEDURE — 82565 ASSAY OF CREATININE: CPT

## 2025-02-10 PROCEDURE — 99233 SBSQ HOSP IP/OBS HIGH 50: CPT | Mod: GC | Performed by: INTERNAL MEDICINE

## 2025-02-10 PROCEDURE — 97110 THERAPEUTIC EXERCISES: CPT | Mod: GO

## 2025-02-10 PROCEDURE — 82374 ASSAY BLOOD CARBON DIOXIDE: CPT

## 2025-02-10 RX ADMIN — Medication 5 ML: at 20:05

## 2025-02-10 RX ADMIN — SACUBITRIL AND VALSARTAN 1 TABLET: 49; 51 TABLET, FILM COATED ORAL at 08:52

## 2025-02-10 RX ADMIN — POTASSIUM CHLORIDE 40 MEQ: 750 CAPSULE, EXTENDED RELEASE ORAL at 20:03

## 2025-02-10 RX ADMIN — POTASSIUM CHLORIDE 40 MEQ: 750 CAPSULE, EXTENDED RELEASE ORAL at 08:44

## 2025-02-10 RX ADMIN — FERROUS SULFATE TAB 325 MG (65 MG ELEMENTAL FE) 325 MG: 325 (65 FE) TAB at 08:44

## 2025-02-10 RX ADMIN — EMPAGLIFLOZIN 10 MG: 10 TABLET, FILM COATED ORAL at 08:44

## 2025-02-10 RX ADMIN — Medication 5 ML: at 05:51

## 2025-02-10 RX ADMIN — ZOLPIDEM TARTRATE 5 MG: 5 TABLET, FILM COATED ORAL at 20:08

## 2025-02-10 RX ADMIN — DIGOXIN 125 MCG: 125 TABLET ORAL at 08:44

## 2025-02-10 RX ADMIN — ENOXAPARIN SODIUM 40 MG: 40 INJECTION SUBCUTANEOUS at 11:48

## 2025-02-10 RX ADMIN — Medication 5 ML: at 23:49

## 2025-02-10 RX ADMIN — SPIRONOLACTONE 25 MG: 25 TABLET, FILM COATED ORAL at 08:44

## 2025-02-10 RX ADMIN — DOBUTAMINE IN DEXTROSE 5 MCG/KG/MIN: 200 INJECTION, SOLUTION INTRAVENOUS at 11:55

## 2025-02-10 ASSESSMENT — ACTIVITIES OF DAILY LIVING (ADL)
ADLS_ACUITY_SCORE: 39
ADLS_ACUITY_SCORE: 37
ADLS_ACUITY_SCORE: 39
ADLS_ACUITY_SCORE: 37
ADLS_ACUITY_SCORE: 39

## 2025-02-10 NOTE — PLAN OF CARE
Goal Outcome Evaluation:    [id number]  Neuro: A&Ox4. Able to make needs known properly.  Cardiac: SR. VSS. Denies chest pain. 11 beat run of Vtach denies symptoms BP soft team aware  Respiratory: Sating >95% on RA. Denies SOB  GI/: Adequate urine output. No BM overnight.   Diet: Tolerating regular diet w/ 2L FR.   Activity:  Up ad aron.  Pain: At acceptable level on current regimen.   Skin: No new deficits noted.  LDA's:  Double lumen PICC to right arm infusing dobutamine at 5 mcg/kg/min PIV to left arm infusing lasix at 20 mg/hr.  Plan: Continue with POC. Notify primary team with changes.

## 2025-02-10 NOTE — PROGRESS NOTES
Care Management Follow Up    Length of Stay (days): 6    Expected Discharge Date: 02/14/2025     Concerns to be Addressed: Discharge planning  Patient plan of care discussed at interdisciplinary rounds: Yes    Anticipated Discharge Disposition: Home     Anticipated Discharge Services: Home Infusion   Anticipated Discharge DME: None    Referrals Placed by CM/SW: Home Infusion    Additional Information:  This writer received update from [facility] that their management team reviewed and are declining pt due to history of non-compliance when he was previously on dobutamine. They also noted concern that pt's home is so far away from their closest office.     This writer called [facility] in [place] and in [place], neither do IV inotropes.     Spoke with Mary at [facility] and confirmed they no longer do IV inotropes.     This writer called [facility] services- they do not have home infusion services.    Spoke with Deysi at [facility]- they do IV dobutamine but unable to service the [place] area.     Spoke with Eric at [facility]- they do IV dobutamine but stated that [place] is too far due to the life sustaining nature of the drug. They have a 2 hour radius cutoff.     Spoke with Ivette at [facility] in [place]- they do not provide IV dobutamine.     Updated cardiology team of current barriers to securing home infusion. Confirmed pt is dobutamine dependent, unable to wean. There are also no skilled nursing facilities that provide IV dobutamine.    Summary of Home Infusion Companies that have been attempted:  [facility]- declined due to history of non compliance  [facility]- declined due to history of non compliance  [facility]- no longer providing inotropes  [facility]- outpatient infusion only  [facility]- do IV dobutamine, but [place] outside of service  "area  [facility]- do IV dobutamine, but [place] is outside of their service area (intrope pts must be within 2 hours)  [facility]- do not provide IV inotropes  [facility]- do not provide IV inotropes    Updated pt at bedside of current barriers to securing home infusion. Inquired more into why he self discontinued last time. Pt states he \"did not take it seriously\" and that it was \"most inconvenient with showering\". Pt states he did not go to his weekly PICC dressing appointments and eventually the \"PICC fell out.\" Pt states he did notify his local provider who recommended it be replaced. Pt states he did not notify home infusion right away. Pt states that he is still not looking forward to doing home infusion but understands how important it is and that \"it is a bridge to getting me to getting a heart.\"     Will continue to try to explore home infusion options but it may very well be that there is not an option at this time. Escalated case.    CC will continue to monitor patient's medical condition and progress towards discharge.  Krystal Bonds RN BSN  6C Unit Care Coordinator  Phone number: [phone]    SEARCHABLE in [facility] - search CARE COORDINATOR      [place] & [place] ([phone]) Saturday & Sunday; ([phone]) [facility] Recognized Holidays      Units: 5A Onc Vocera & 5C Vocera      Units: 6B Vocera & 6C Vocera       Units: 7A SOT RNCC Vocera, 7B Med Surg Vocera, & 7C Med Surg Vocera      Units: 6A Vocera & 4A CVICU Vocera, 4C MICU Vocera, and 4E SICU Vocera       Units: 5 Ortho Vocera & 5 Med Surg Vocera      Units: 6 Med Surg Vocera & 8 Med Surg Vocera        "

## 2025-02-10 NOTE — PROGRESS NOTES
[facility]  Cardiology II Service / Advanced Heart Failure  Daily Progress Note    Patient: Jose Enrique Engel      : 1985      MRN: [id number]    Assessment/Plan:   38 yo male with PMHx of HFrEF (15%) 2/2 NICM s/p ICD, cardiogenic shock previously on dobutamine, CKD3, polysubstance use admitted 2/3/25 for ambulatory cardiogenic shock.     Today's changes:  - Continue Dobutamine 5 mcg/kg/min--SW working to see if can get OP infusion  - Lasix gtt reduced to 10 cc/hr  - Discontinue Acetazolamide 500 mg IV   - Continue current GDMT medications    # Ambulatory cardiogenic shock  # Acute on chronic systolic heart failure (EF 15%), stage D, class IIIb  # Non-ischemic cardiomyopathy  Pt with hx of advanced HFrEF/NICM. Was previously on outpatient dobutamine, self-discontinued in . Re-engaged with outpatient cardiologist Dr. Dove , also self-enrolled in chem dep treatment in past year. At recent office visit/RHC was concerning for decompensated CHF and possible cardiogenic shock, pt attempted to manage outpatient but felt worse and ultimately presented for admission. Interested in advanced therapies but not candidate yet due to being lost to follow up.  Last RHC 25: RA 15, PA 44/26, PCWP 21, CI 1.7, PVR 3.4, SVR ~1400  Last TTE 2025: EF 5-10%, severely reduced RV Function, mod-severe MR, severe TR  - Volume: hypervolemic,  lbs, 184 on admission, 172 today (increase from yesterday)  - Lasix gtt reduced to 10 cc/hr  - Discontinue Acetazolamide 500 mg IV   - Inotrope: continue dobutamine @ 5mcg/kg/min   - continue PTA digoxin 125 mcg daily  - Afterload Reduction: Increase PTA Entresto 49/51 mg BID (hold if MAP <65), stop hydralazine   - MAP goal 65-85  - GDMT: entresto 49-51 BID   - not on PTA beta blocker (cardiogenic shock)   - PTA spironolactone 25 mg daily   - PTA empagliflozin 10 mg daily  - Anticoagulation: discontinue PTA rivaroxaban given  resolution of LV thrombus   - Statin: none  - Antiplatelet: none  - Antiarrhythmic: none  - SCD prophylaxis: ICD in place  - Electrolytes: BID monitoring with diuresis, goals K>4, Mag>2  - Strict I/O monitoring, daily weights  - Dietary restrictions: regular diet (pt request), 2L fluids (pt intermittently following)    # Congestive hepatopathy, resolved  On admission AST/ALT in 200s. Bilirubin 4.9. INR 3.5. Pattern and clinical picture most c/w congestive hepatopathy. INR/bili normalized, AST/ALT much improved since admission.  - Cardiac management per above  - Trend LFTs     # Hx of LV apical thrombus  Seen on echo in 07/2023 - large (~1.8 cm x 1.8 cm x 1.1 cm), protruding, and predominantly sessile. Not present on TTE this admission.  - Stop PTA rivaroxaban     # Hx of polysubstance use   Currently reporting only MJ use. Reports sobriety from EtOH and meth (last use 06/2024). Follows with local chem dep team. PETH negative, UDS only positive for THC.     # CKD3  Baseline Cr 1.3-1.6. Cr 1.5 on admission, 0.9 today.  - CTM     # Subclinical hypothyroidism  TSH 8 on admission. Started on synthroid at OSH due to TSH of 9. Technically should treat subclinical hypothyroidism if TSH >10. Synthroid not on his PTA meds list.   - Managed by PCP     Deconditioning  - PT/OT consults placed    Hospital Checklist:  DVT Prophylaxis: Low Risk/Ambulatory with no VTE prophylaxis indicated  Code Status: Full Code  Bowel regimen: PRNs  Diet/Nutrition: regular diet / 2L FR  Lines: PIV, PICC    Disposition Planning:  Home in 1-2 days with home inotropes pending better diuresis    Staffed w/ Dr. Ogden.     Sissy Limon DO   Internal Medicine PGY3  [facility]  Please use on call sytem for paging    Subjective:   Interval History: 11 beats VT, MAP 70 asymptomatic. MAP of 65, diuril held in AM. Pt feels well, no chest pain, shortness of breath. He wants to talk with a SW today about disability.     Objective:     Vitals:     02/08/25 0500 02/09/25 0400 02/10/25 0500   Weight: 78.5 kg (173 lb) 80.9 kg (178 lb 4.8 oz) 78.2 kg (172 lb 6.4 oz)     Vital Signs with Ranges  Temp:  [96.4  F (35.8  C)-97.8  F (36.6  C)] 97.6  F (36.4  C)  Pulse:  [] 82  Resp:  [16-18] 18  BP: ()/(39-72) 83/61  SpO2:  [98 %-100 %] 100 %  I/O last 3 completed shifts:  In: 2148.8 [P.O.:1808; I.V.:340.8]  Out: 7650 [Urine:7650]    Exam:  General: No acute distress, resting comfortably in bed  HEENT: MMM  Neck: JVP 11 cm H2O  CV: RRR, no murmurs, S3/S4 absent  Lungs: On room air, CTA b/l w/o wheezes or crackles, no increased WOB  Abd: Soft, non-tender, non-distended  Ext: warm/well perfused, no lower extremity edema  Neuro: Awake, alert, conversational, moving all extremities spontaneously throughout examination    Medications   Current Facility-Administered Medications   Medication Dose Route Frequency Provider Last Rate Last Admin    DOBUTamine (DOBUTREX) 500 mg in D5W 250 mL infusion (adult std conc)  5 mcg/kg/min (Dosing Weight) Intravenous Continuous Sofya Elizabeth MD 12.2 mL/hr at 02/10/25 0400 5 mcg/kg/min at 02/10/25 0400    furosemide (LASIX) 500 mg/50mL infusion ADULT MAX CONC  20 mg/hr Intravenous Continuous Carmelo Ogden MD 2 mL/hr at 02/10/25 0400 20 mg/hr at 02/10/25 0400     Current Facility-Administered Medications   Medication Dose Route Frequency Provider Last Rate Last Admin    acetaZOLAMIDE (DIAMOX) injection 500 mg  500 mg Intravenous Daily Cherie Gómez MD   500 mg at 02/09/25 1331    digoxin (LANOXIN) tablet 125 mcg  125 mcg Oral Daily Sofya Elizabeth MD   125 mcg at 02/09/25 0851    empagliflozin (JARDIANCE) tablet 10 mg  10 mg Oral Daily Bill Enriquez MD   10 mg at 02/09/25 0822    enoxaparin ANTICOAGULANT (LOVENOX) injection 40 mg  40 mg Subcutaneous Q24H Cherie Gómez MD   40 mg at 02/09/25 1330    ferrous sulfate (FEROSUL) tablet 325 mg  325 mg Oral Daily with breakfast Sofya Elizabeth MD    325 mg at 02/09/25 0822    heparin lock flush 10 unit/mL injection 5-20 mL  5-20 mL Intracatheter Q24H Cherie Gómez MD   5 mL at 02/09/25 2055    potassium chloride ER (MICRO-K) CR capsule 40 mEq  40 mEq Oral BID Sofya Elizabeth MD   40 mEq at 02/09/25 2055    sacubitril-valsartan (ENTRESTO) 49-51 MG per tablet 1 tablet  1 tablet Oral BID Cherie Gómez MD   1 tablet at 02/09/25 2055    sodium chloride (PF) 0.9% PF flush 3 mL  3 mL Intracatheter Q8H Sofya Elizabeth MD   3 mL at 02/07/25 2341    spironolactone (ALDACTONE) tablet 25 mg  25 mg Oral Daily Cherie Gómez MD   25 mg at 02/09/25 0822     Data   Recent Labs   Lab 02/10/25  0517 02/09/25  1654 02/09/25  0441 02/08/25  1745 02/08/25  0458 02/07/25  0628 02/07/25  0058 02/04/25  1657 02/04/25  0652 02/04/25  0033   WBC  --   --  5.6  --   --   --   --   --  7.2 7.2   HGB  --   --  15.9  --   --   --  15.7  --  17.0 16.7   MCV  --   --  92  --   --   --   --   --  88 91   PLT  --   --  189  --   --   --   --   --  201 206   INR 1.10  --  1.11  --  1.19*   < >  --    < >  --   --    * 133* 127*   < > 130*   < >  --    < > 129* 132*   POTASSIUM 4.5 4.1 4.8   < > 4.1   < >  --    < > 4.0 4.2   CHLORIDE 97* 97* 94*   < > 94*   < >  --    < > 93* 91*   CO2 24 24 24   < > 26   < >  --    < > 16* 22   BUN 35.5* 27.2* 25.1*   < > 28.9*   < >  --    < > 32.4* 30.8*   CR 1.04 1.29* 0.89   < > 0.92   < >  --    < > 1.46* 1.50*   ANIONGAP 9 12 9   < > 10   < >  --    < > 20* 19*   LENARD 8.9 8.7* 8.6*   < > 8.7*   < >  --    < > 9.1 9.7   * 113* 119*   < > 110*   < >  --    < > 96 81   ALBUMIN 3.5  --  3.5  --  3.5   < >  --    < >  --  3.8   PROTTOTAL 6.8  --  6.6  --  6.7   < >  --    < >  --  7.2   BILITOTAL 1.5*  --  1.5*  --  1.8*   < >  --    < >  --  4.9*   ALKPHOS 123  --  130  --  127   < >  --    < >  --  141   ALT 86*  --  94*  --  112*   < >  --    < >  --  242*   AST 45  --  53*  --  59*   < >  --    < >  --  280*    < >  = values in this interval not displayed.     No results found for this or any previous visit (from the past 24 hours).

## 2025-02-11 LAB
ALBUMIN SERPL BCG-MCNC: 3.4 G/DL (ref 3.5–5.2)
ALP SERPL-CCNC: 109 U/L (ref 40–150)
ALT SERPL W P-5'-P-CCNC: 72 U/L (ref 0–70)
ANION GAP SERPL CALCULATED.3IONS-SCNC: 10 MMOL/L (ref 7–15)
ANION GAP SERPL CALCULATED.3IONS-SCNC: 12 MMOL/L (ref 7–15)
AST SERPL W P-5'-P-CCNC: 41 U/L (ref 0–45)
ATRIAL RATE - MUSE: 98 BPM
BASE EXCESS BLDV CALC-SCNC: -1.9 MMOL/L (ref -3–3)
BILIRUB DIRECT SERPL-MCNC: 0.74 MG/DL (ref 0–0.3)
BILIRUB SERPL-MCNC: 1.4 MG/DL
BUN SERPL-MCNC: 33.5 MG/DL (ref 6–20)
BUN SERPL-MCNC: 34.6 MG/DL (ref 6–20)
CALCIUM SERPL-MCNC: 9 MG/DL (ref 8.8–10.4)
CALCIUM SERPL-MCNC: 9.2 MG/DL (ref 8.8–10.4)
CHLORIDE SERPL-SCNC: 100 MMOL/L (ref 98–107)
CHLORIDE SERPL-SCNC: 100 MMOL/L (ref 98–107)
CREAT SERPL-MCNC: 1.2 MG/DL (ref 0.67–1.17)
CREAT SERPL-MCNC: 1.25 MG/DL (ref 0.67–1.17)
DIASTOLIC BLOOD PRESSURE - MUSE: NORMAL MMHG
EGFRCR SERPLBLD CKD-EPI 2021: 75 ML/MIN/1.73M2
EGFRCR SERPLBLD CKD-EPI 2021: 79 ML/MIN/1.73M2
GLUCOSE SERPL-MCNC: 104 MG/DL (ref 70–99)
GLUCOSE SERPL-MCNC: 75 MG/DL (ref 70–99)
HCO3 BLDV-SCNC: 23 MMOL/L (ref 21–28)
HCO3 SERPL-SCNC: 20 MMOL/L (ref 22–29)
HCO3 SERPL-SCNC: 21 MMOL/L (ref 22–29)
HOLD SPECIMEN: NORMAL
INR PPP: 1.03 (ref 0.85–1.15)
INTERPRETATION ECG - MUSE: NORMAL
MAGNESIUM SERPL-MCNC: 2.6 MG/DL (ref 1.7–2.3)
MAGNESIUM SERPL-MCNC: 2.8 MG/DL (ref 1.7–2.3)
O2/TOTAL GAS SETTING VFR VENT: 21 %
OXYHGB MFR BLDV: 89 % (ref 70–75)
P AXIS - MUSE: 57 DEGREES
PCO2 BLDV: 37 MM HG (ref 40–50)
PH BLDV: 7.39 [PH] (ref 7.32–7.43)
PLATELET # BLD AUTO: 184 10E3/UL (ref 150–450)
PO2 BLDV: 58 MM HG (ref 25–47)
POTASSIUM SERPL-SCNC: 4.8 MMOL/L (ref 3.4–5.3)
POTASSIUM SERPL-SCNC: 4.8 MMOL/L (ref 3.4–5.3)
PR INTERVAL - MUSE: 208 MS
PROT SERPL-MCNC: 6.6 G/DL (ref 6.4–8.3)
QRS DURATION - MUSE: 100 MS
QT - MUSE: 350 MS
QTC - MUSE: 446 MS
R AXIS - MUSE: 183 DEGREES
SAO2 % BLDV: 90.2 % (ref 70–75)
SODIUM SERPL-SCNC: 130 MMOL/L (ref 135–145)
SODIUM SERPL-SCNC: 133 MMOL/L (ref 135–145)
SYSTOLIC BLOOD PRESSURE - MUSE: NORMAL MMHG
T AXIS - MUSE: 40 DEGREES
VENTRICULAR RATE- MUSE: 98 BPM

## 2025-02-11 PROCEDURE — 36415 COLL VENOUS BLD VENIPUNCTURE: CPT

## 2025-02-11 PROCEDURE — 250N000013 HC RX MED GY IP 250 OP 250 PS 637

## 2025-02-11 PROCEDURE — 82248 BILIRUBIN DIRECT: CPT

## 2025-02-11 PROCEDURE — 83735 ASSAY OF MAGNESIUM: CPT

## 2025-02-11 PROCEDURE — 250N000011 HC RX IP 250 OP 636

## 2025-02-11 PROCEDURE — 82805 BLOOD GASES W/O2 SATURATION: CPT

## 2025-02-11 PROCEDURE — 80048 BASIC METABOLIC PNL TOTAL CA: CPT

## 2025-02-11 PROCEDURE — 82947 ASSAY GLUCOSE BLOOD QUANT: CPT

## 2025-02-11 PROCEDURE — 80053 COMPREHEN METABOLIC PANEL: CPT

## 2025-02-11 PROCEDURE — 250N000013 HC RX MED GY IP 250 OP 250 PS 637: Performed by: STUDENT IN AN ORGANIZED HEALTH CARE EDUCATION/TRAINING PROGRAM

## 2025-02-11 PROCEDURE — 85049 AUTOMATED PLATELET COUNT: CPT

## 2025-02-11 PROCEDURE — 93005 ELECTROCARDIOGRAM TRACING: CPT

## 2025-02-11 PROCEDURE — 85610 PROTHROMBIN TIME: CPT

## 2025-02-11 PROCEDURE — 120N000003 HC R&B IMCU UMMC

## 2025-02-11 PROCEDURE — 99233 SBSQ HOSP IP/OBS HIGH 50: CPT | Mod: GC | Performed by: INTERNAL MEDICINE

## 2025-02-11 RX ORDER — TORSEMIDE 20 MG/1
40 TABLET ORAL DAILY
Status: DISCONTINUED | OUTPATIENT
Start: 2025-02-11 | End: 2025-02-11

## 2025-02-11 RX ORDER — SACUBITRIL AND VALSARTAN 49; 51 MG/1; MG/1
1 TABLET, FILM COATED ORAL 2 TIMES DAILY
Status: DISCONTINUED | OUTPATIENT
Start: 2025-02-11 | End: 2025-02-13

## 2025-02-11 RX ORDER — TORSEMIDE 20 MG/1
40 TABLET ORAL
Status: DISCONTINUED | OUTPATIENT
Start: 2025-02-11 | End: 2025-02-13

## 2025-02-11 RX ADMIN — POTASSIUM CHLORIDE 40 MEQ: 750 CAPSULE, EXTENDED RELEASE ORAL at 19:35

## 2025-02-11 RX ADMIN — ENOXAPARIN SODIUM 40 MG: 40 INJECTION SUBCUTANEOUS at 12:29

## 2025-02-11 RX ADMIN — DIGOXIN 125 MCG: 125 TABLET ORAL at 08:08

## 2025-02-11 RX ADMIN — TORSEMIDE 40 MG: 20 TABLET ORAL at 08:57

## 2025-02-11 RX ADMIN — ALTEPLASE 1 MG: 2.2 INJECTION, POWDER, LYOPHILIZED, FOR SOLUTION INTRAVENOUS at 05:47

## 2025-02-11 RX ADMIN — ZOLPIDEM TARTRATE 5 MG: 5 TABLET, FILM COATED ORAL at 21:00

## 2025-02-11 RX ADMIN — SPIRONOLACTONE 25 MG: 25 TABLET, FILM COATED ORAL at 08:08

## 2025-02-11 RX ADMIN — SACUBITRIL AND VALSARTAN 1 TABLET: 49; 51 TABLET, FILM COATED ORAL at 19:35

## 2025-02-11 RX ADMIN — Medication 5 ML: at 19:36

## 2025-02-11 RX ADMIN — TORSEMIDE 40 MG: 20 TABLET ORAL at 16:26

## 2025-02-11 RX ADMIN — FERROUS SULFATE TAB 325 MG (65 MG ELEMENTAL FE) 325 MG: 325 (65 FE) TAB at 08:08

## 2025-02-11 RX ADMIN — DOBUTAMINE IN DEXTROSE 5 MCG/KG/MIN: 200 INJECTION, SOLUTION INTRAVENOUS at 08:57

## 2025-02-11 RX ADMIN — POTASSIUM CHLORIDE 40 MEQ: 750 CAPSULE, EXTENDED RELEASE ORAL at 08:08

## 2025-02-11 RX ADMIN — EMPAGLIFLOZIN 10 MG: 10 TABLET, FILM COATED ORAL at 08:08

## 2025-02-11 ASSESSMENT — ACTIVITIES OF DAILY LIVING (ADL)
ADLS_ACUITY_SCORE: 37

## 2025-02-11 NOTE — PLAN OF CARE
9602-1607: Bp soft however maps within parameters, >65. Team aware. Lasix infusion held, diamox injection discontinued, entresto held. Up independently in room. Non compliant to fluid restriction, team aware. No change to redness on BLE. No acute issues during shift.     For detailed assessments, please refer to flowsheets.    Plan:  Pending home infusion services for Dobutamine on discharge. Monitor and inform team with any changes.    Heart Failure Care Map  GOALS TO BE MET BEFORE DISCHARGE:    1. Decrease congestion and/or edema with diuretic therapy to achieve near optimal volume status.     Dyspnea improved: Yes, satisfactory for discharge.   Edema improved: Yes, satisfactory for discharge.        Last 24 hour I/O:   Intake/Output Summary (Last 24 hours) at 2/10/2025 1935  Last data filed at 2/10/2025 1900  Gross per 24 hour   Intake 1505.2 ml   Output 7625 ml   Net -6119.8 ml           Net I/O and Weights since admission:   01/11 2300 - 02/10 2259  In: 67838.79 [P.O.:91144; I.V.:3029.79]  Out: 70254 [Urine:88558]  Net: -98589.21     Vitals:    02/03/25 2300 02/04/25 0400 02/05/25 0326 02/06/25 0643   Weight: 83.6 kg (184 lb 6.4 oz) 82.7 kg (182 lb 4.8 oz) 80.7 kg (178 lb) 81.3 kg (179 lb 3.2 oz)    02/07/25 0623 02/08/25 0500 02/09/25 0400 02/10/25 0500   Weight: 78.9 kg (174 lb) 78.5 kg (173 lb) 80.9 kg (178 lb 4.8 oz) 78.2 kg (172 lb 6.4 oz)       2.  O2 sats > 90% on room air, or at prior home O2 therapy level.      Able to wean O2 this shift to keep sats above 90%?: Yes, satisfactory for discharge.   Does patient use Home O2? No          Current oxygenation status:   SpO2: 96 %     O2 Device: None (Room air),      3.  Tolerates ambulation and mobility near baseline.     Ambulation: Yes, satisfactory for discharge.   Times patient ambulated with staff this shift: 1    Please review the Heart Failure Care Map for additional HF goal outcomes.    Ngozi Cruz, RN  2/10/2025    Problem: Adult Inpatient Plan  of Care  Goal: Absence of Hospital-Acquired Illness or Injury  Intervention: Identify and Manage Fall Risk  Recent Flowsheet Documentation  Taken 2/10/2025 0855 by Ngozi Cruz, RN  Safety Promotion/Fall Prevention:   clutter free environment maintained   nonskid shoes/slippers when out of bed   patient and family education   room near nurse's station   safety round/check completed     Problem: Heart Failure  Goal: Stable Heart Rate and Rhythm  Outcome: Progressing     Problem: Heart Failure  Goal: Optimal Cardiac Output  Outcome: Progressing     Problem: Heart Failure  Goal: Optimal Functional Ability  Outcome: Progressing  Intervention: Optimize Functional Ability  Recent Flowsheet Documentation  Taken 2/10/2025 0855 by Ngozi Cruz, RN  Activity Management: activity adjusted per tolerance   Goal Outcome Evaluation:      Plan of Care Reviewed With: patient    Overall Patient Progress: no changeOverall Patient Progress: no change    Outcome Evaluation: BP soft, team aware. Maps within parameters, >65. Lasix stopped. Ongoing Dobutamine drip.

## 2025-02-11 NOTE — PROGRESS NOTES
"CLINICAL NUTRITION SERVICES    Reviewed nutrition risk factors due to LOS. No concerns with oral intake per [employer] (room service meal ordering system). Reviewed wt hx: 79.7 kg (2/12/2024), 78 kg (8/20/2024), 80.8 kg (10/3/2024), 87.4 kg (12/5/2024), 77.3 kg (1/14/2025), 80.4 kg (1/29/2025), 83.6 kg (2/3/2025, admit), 79/9 kg (2/11/2025) - Weight variable at times due to fluid status changes, diuresis. No indication of pressure injury.    Follow Up / Monitoring:   Per policy unless consulted     Sofya Clement, MS, RD, LD, CNSC      No longer available via pager  6C (beds [phone] and [phone]): Vocera \"6C Clinical Dietitian\"   Weekend/Holiday: Vocera \"Weekend Clinical Dietitian\"   "

## 2025-02-11 NOTE — PROGRESS NOTES
[facility]  Cardiology II Service / Advanced Heart Failure  Daily Progress Note    Patient: Jose Enrique Engel      : 1985      MRN: [id number]    Assessment/Plan:   40 yo male with PMHx of HFrEF (15%) 2/2 NICM s/p ICD, cardiogenic shock previously on dobutamine, CKD3, polysubstance use admitted 2/3/25 for ambulatory cardiogenic shock.     Today's changes:  - Continue Dobutamine 5 mcg/kg/min--SW working to see if can get OP infusion  - transition to PO- Torsemide 40 BID  - Continue current GDMT medications    # Ambulatory cardiogenic shock  # Acute on chronic systolic heart failure (EF 15%), stage D, class IIIb  # Non-ischemic cardiomyopathy  Pt with hx of advanced HFrEF/NICM. Was previously on outpatient dobutamine, self-discontinued in . Re-engaged with outpatient cardiologist Dr. Dove , also self-enrolled in chem dep treatment in past year. At recent office visit/RHC was concerning for decompensated CHF and possible cardiogenic shock, pt attempted to manage outpatient but felt worse and ultimately presented for admission. Interested in advanced therapies but not candidate yet due to being lost to follow up.  Last RHC 25: RA 15, PA 44/26, PCWP 21, CI 1.7, PVR 3.4, SVR ~1400  Last TTE 2025: EF 5-10%, severely reduced RV Function, mod-severe MR, severe TR  - Volume: hypervolemic,  lbs, though possibly closer to 174. Was 184 on admission.   - Transition off lasix gtt to PO. Was previously on torsemide, will try 40 BID  - Discontinue Acetazolamide 500 mg IV   - Inotrope: continue dobutamine @ 5mcg/kg/min   - continue PTA digoxin 125 mcg daily  - Afterload Reduction: Increase PTA Entresto 49/51 mg BID (hold if MAP <65), stop hydralazine   - MAP goal 65-85  - GDMT: entresto 49-51 BID   - not on PTA beta blocker (cardiogenic shock)   - PTA spironolactone 25 mg daily   - PTA empagliflozin 10 mg daily  - Anticoagulation: discontinue PTA rivaroxaban  given resolution of LV thrombus   - Statin: none  - Antiplatelet: none  - Antiarrhythmic: none  - SCD prophylaxis: ICD in place  - Electrolytes: BID monitoring with diuresis, goals K>4, Mag>2  - Strict I/O monitoring, daily weights  - Dietary restrictions: regular diet (pt request), 2L fluids (pt intermittently following)    # Congestive hepatopathy, resolved  On admission AST/ALT in 200s. Bilirubin 4.9. INR 3.5. Pattern and clinical picture most c/w congestive hepatopathy. INR/bili normalized, AST/ALT much improved since admission.  - Cardiac management per above  - Trend LFTs     # Hx of LV apical thrombus  Seen on echo in 07/2023 - large (~1.8 cm x 1.8 cm x 1.1 cm), protruding, and predominantly sessile. Not present on TTE this admission.  - Stop PTA rivaroxaban     # Hx of polysubstance use   Currently reporting only MJ use. Reports sobriety from EtOH and meth (last use 06/2024). Follows with local chem dep team. PETH negative, UDS only positive for THC.     # CKD3  Baseline Cr 1.3-1.6. Cr 1.5 on admission, 0.9 today.  - CTM     # Subclinical hypothyroidism  TSH 8 on admission. Started on synthroid at OSH due to TSH of 9. Technically should treat subclinical hypothyroidism if TSH >10. Synthroid not on his PTA meds list.   - Managed by PCP     Deconditioning  - PT/OT consults placed    Hospital Checklist:  DVT Prophylaxis: Low Risk/Ambulatory with no VTE prophylaxis indicated  Code Status: Full Code  Bowel regimen: PRNs  Diet/Nutrition: regular diet / 2L FR  Lines: PIV, PICC    Disposition Planning:  Home in 1-2 days with home inotropes pending better diuresis    Staffed w/ Dr. Ogden.     Sissy Limon DO   Internal Medicine PGY3  [facility]  Please use on call sytem for paging    Subjective:   Interval History: 11 beats VT, MAP 70 asymptomatic. ECG with PVC every 3rd beat, recent ICD interrogation without episodes of VT. MAP of 65, diuril held in AM. Pt feels well, no chest pain, shortness of  breath. He wants to talk with a SW today about disability.     Objective:     Vitals:    02/09/25 0400 02/10/25 0500 02/11/25 0304   Weight: 80.9 kg (178 lb 4.8 oz) 78.2 kg (172 lb 6.4 oz) 79.9 kg (176 lb 3.2 oz)     Vital Signs with Ranges  Temp:  [97.6  F (36.4  C)-97.8  F (36.6  C)] 97.7  F (36.5  C)  Pulse:  [] 101  Resp:  [16-18] 16  BP: ()/() 127/105  SpO2:  [96 %-100 %] 99 %  I/O last 3 completed shifts:  In: 1622.81 [P.O.:1320; I.V.:302.81]  Out: 4650 [Urine:4650]    Exam:  General: No acute distress, resting comfortably in bed  HEENT: MMM  Neck: JVP 11 cm H2O  CV: RRR, no murmurs, S3/S4 absent  Lungs: On room air, CTA b/l w/o wheezes or crackles, no increased WOB  Abd: Soft, non-tender, non-distended  Ext: warm/well perfused, no lower extremity edema  Neuro: Awake, alert, conversational, moving all extremities spontaneously throughout examination    Medications   Current Facility-Administered Medications   Medication Dose Route Frequency Provider Last Rate Last Admin    DOBUTamine (DOBUTREX) 500 mg in D5W 250 mL infusion (adult std conc)  5 mcg/kg/min (Dosing Weight) Intravenous Continuous Sofya Elizabeth MD 12.2 mL/hr at 02/11/25 0304 5 mcg/kg/min at 02/11/25 0304    [Held by provider] furosemide (LASIX) 500 mg/50mL infusion ADULT MAX CONC  10 mg/hr Intravenous Continuous Scarlett Moreno MD   Stopped at 02/10/25 1727     Current Facility-Administered Medications   Medication Dose Route Frequency Provider Last Rate Last Admin    digoxin (LANOXIN) tablet 125 mcg  125 mcg Oral Daily Sofya Elizabeth MD   125 mcg at 02/10/25 0844    empagliflozin (JARDIANCE) tablet 10 mg  10 mg Oral Daily Bill Enriquez MD   10 mg at 02/10/25 0844    enoxaparin ANTICOAGULANT (LOVENOX) injection 40 mg  40 mg Subcutaneous Q24H Cherie Gómez MD   40 mg at 02/10/25 1144    ferrous sulfate (FEROSUL) tablet 325 mg  325 mg Oral Daily with breakfast Sofya Elizabeth MD   325 mg at 02/10/25 7508     heparin lock flush 10 unit/mL injection 5-20 mL  5-20 mL Intracatheter Q24H Cherie Gómez MD   5 mL at 02/10/25 2005    potassium chloride ER (MICRO-K) CR capsule 40 mEq  40 mEq Oral BID Sofya Elizabeth MD   40 mEq at 02/10/25 2003    [Held by provider] sacubitril-valsartan (ENTRESTO) 49-51 MG per tablet 1 tablet  1 tablet Oral BID Bela Landaverde MD   1 tablet at 02/10/25 0852    sodium chloride (PF) 0.9% PF flush 3 mL  3 mL Intracatheter Q8H Sofya Elizabeth MD   3 mL at 02/10/25 2349    spironolactone (ALDACTONE) tablet 25 mg  25 mg Oral Daily Cherie Gómez MD   25 mg at 02/10/25 0844     Data   Recent Labs   Lab 02/11/25  0548 02/11/25  0148 02/10/25  1718 02/10/25  0517 02/09/25  1654 02/09/25  0441 02/07/25  0628 02/07/25  0058   WBC  --   --   --   --   --  5.6  --   --    HGB  --   --   --   --   --  15.9  --  15.7   MCV  --   --   --   --   --  92  --   --    PLT  --  184  --   --   --  189  --   --    INR 1.03  --   --  1.10  --  1.11   < >  --    *  --  132* 130*   < > 127*   < >  --    POTASSIUM 4.8  --  4.3 4.5   < > 4.8   < >  --    CHLORIDE 100  --  96* 97*   < > 94*   < >  --    CO2 20*  --  24 24   < > 24   < >  --    BUN 33.5*  --  37.0* 35.5*   < > 25.1*   < >  --    CR 1.25*  --  1.45* 1.04   < > 0.89   < >  --    ANIONGAP 10  --  12 9   < > 9   < >  --    LENARD 9.2  --  9.1 8.9   < > 8.6*   < >  --    GLC 75  --  72 108*   < > 119*   < >  --    ALBUMIN 3.4*  --   --  3.5  --  3.5   < >  --    PROTTOTAL 6.6  --   --  6.8  --  6.6   < >  --    BILITOTAL 1.4*  --   --  1.5*  --  1.5*   < >  --    ALKPHOS 109  --   --  123  --  130   < >  --    ALT 72*  --   --  86*  --  94*   < >  --    AST 41  --   --  45  --  53*   < >  --     < > = values in this interval not displayed.     No results found for this or any previous visit (from the past 24 hours).

## 2025-02-11 NOTE — PLAN OF CARE
Shift 7223-4615: No significant changes overnight. Ventricular trigeminy noted on strips, AM EKG ordered. Patient continues on dobutamine drip.     Neuro: A&Ox4. Able to make needs known.   Cardiac: On and off ventricular trigemony - provider notified at 0520. Otherwise SR with PVCs. VSS on room air. Denies chest pain. BP soft, team aware. CVP reading 13 this morning.   Respiratory: Sating >95% on RA. Denies SOB  GI/: Adequate urine output. Last BM 2/10.   Diet: Tolerating regular diet, non compliant with  2L FR, compliance encouraged.   Activity:  Up ad aron.  Pain: Denies  Skin: No new deficits noted.  LDA's:  Double lumen PICC to right arm infusing dobutamine at 5 mcg/kg/min in purple lumen. Both lumens not drawing blood back, cathflo administered to red lumen this morning. Red lumen hep locked. PIV to left arm SL.   Plan: Continue with POC. Notify primary team with changes.

## 2025-02-11 NOTE — PLAN OF CARE
4230-8948: VSS. Still having Trigeminy and multiform PVCs - team aware. Output adequate. Complained of L PIV pain, IV removed, no signs of phlebitis noted. Up independently. No acute issues during shift.     For detailed assessments, please refer to flowsheets.    Plan:  Awaiting home infusion services to be setup for Dobutamine on discharge. Monitor and inform team with any changes.     Heart Failure Care Map  GOALS TO BE MET BEFORE DISCHARGE:    1. Decrease congestion and/or edema with diuretic therapy to achieve near optimal volume status.     Dyspnea improved: Yes, satisfactory for discharge.   Edema improved: Yes, satisfactory for discharge.        Last 24 hour I/O:   Intake/Output Summary (Last 24 hours) at 2/11/2025 1831  Last data filed at 2/11/2025 1635  Gross per 24 hour   Intake 2729.4 ml   Output 3575 ml   Net -845.6 ml           Net I/O and Weights since admission:   01/12 2300 - 02/11 2259  In: 09131.19 [P.O.:54984; I.V.:3176.19]  Out: 52257 [Urine:36202]  Net: -09063.81     Vitals:    02/03/25 2300 02/04/25 0400 02/05/25 0326 02/06/25 0643   Weight: 83.6 kg (184 lb 6.4 oz) 82.7 kg (182 lb 4.8 oz) 80.7 kg (178 lb) 81.3 kg (179 lb 3.2 oz)    02/07/25 0623 02/08/25 0500 02/09/25 0400 02/10/25 0500   Weight: 78.9 kg (174 lb) 78.5 kg (173 lb) 80.9 kg (178 lb 4.8 oz) 78.2 kg (172 lb 6.4 oz)    02/11/25 0304   Weight: 79.9 kg (176 lb 3.2 oz)       2.  O2 sats > 90% on room air, or at prior home O2 therapy level.      Able to wean O2 this shift to keep sats above 90%?: Yes, satisfactory for discharge.   Does patient use Home O2? No          Current oxygenation status:   SpO2: 98 %     O2 Device: None (Room air),      3.  Tolerates ambulation and mobility near baseline.     Ambulation: Yes, satisfactory for discharge.   Times patient ambulated with staff this shift: 3    Please review the Heart Failure Care Map for additional HF goal outcomes.    Ngozi Cruz, RN  2/11/2025    Problem: Heart Failure  Goal:  Optimal Coping  Outcome: Progressing     Problem: Heart Failure  Goal: Fluid and Electrolyte Balance  Outcome: Progressing     Problem: Heart Failure  Goal: Optimal Functional Ability  Outcome: Progressing  Intervention: Optimize Functional Ability  Recent Flowsheet Documentation  Taken 2/11/2025 0815 by Ngzoi Cruz, RN  Self-Care Promotion: independence encouraged  Activity Management: activity adjusted per tolerance     Problem: Heart Failure  Goal: Effective Oxygenation and Ventilation  Intervention: Optimize Oxygenation and Ventilation  Recent Flowsheet Documentation  Taken 2/11/2025 0815 by Ngozi Cruz, RN  Head of Bed (HOB) Positioning: HOB at 20-30 degrees   Goal Outcome Evaluation:      Plan of Care Reviewed With: patient    Overall Patient Progress: no changeOverall Patient Progress: no change    Outcome Evaluation: VSS. Dobutamine drip ongoing. Output adequate.

## 2025-02-12 ENCOUNTER — APPOINTMENT (OUTPATIENT)
Dept: OCCUPATIONAL THERAPY | Facility: CLINIC | Age: 40
End: 2025-02-12
Attending: STUDENT IN AN ORGANIZED HEALTH CARE EDUCATION/TRAINING PROGRAM
Payer: COMMERCIAL

## 2025-02-12 LAB
ALBUMIN SERPL BCG-MCNC: 3.8 G/DL (ref 3.5–5.2)
ALP SERPL-CCNC: 110 U/L (ref 40–150)
ALT SERPL W P-5'-P-CCNC: 78 U/L (ref 0–70)
ANION GAP SERPL CALCULATED.3IONS-SCNC: 12 MMOL/L (ref 7–15)
AST SERPL W P-5'-P-CCNC: 50 U/L (ref 0–45)
BASE EXCESS BLDV CALC-SCNC: 0.1 MMOL/L (ref -3–3)
BILIRUB DIRECT SERPL-MCNC: 0.67 MG/DL (ref 0–0.3)
BILIRUB SERPL-MCNC: 1.2 MG/DL
BUN SERPL-MCNC: 33.7 MG/DL (ref 6–20)
CALCIUM SERPL-MCNC: 9.3 MG/DL (ref 8.8–10.4)
CHLORIDE SERPL-SCNC: 101 MMOL/L (ref 98–107)
CREAT SERPL-MCNC: 1.23 MG/DL (ref 0.67–1.17)
EGFRCR SERPLBLD CKD-EPI 2021: 77 ML/MIN/1.73M2
GLUCOSE SERPL-MCNC: 73 MG/DL (ref 70–99)
HCO3 BLDV-SCNC: 26 MMOL/L (ref 21–28)
HCO3 SERPL-SCNC: 22 MMOL/L (ref 22–29)
INR PPP: 1.02 (ref 0.85–1.15)
MAGNESIUM SERPL-MCNC: 2.6 MG/DL (ref 1.7–2.3)
MDC_IDC_EPISODE_DTM: NORMAL
MDC_IDC_EPISODE_DURATION: 18 S
MDC_IDC_EPISODE_DURATION: 21 S
MDC_IDC_EPISODE_DURATION: 23 S
MDC_IDC_EPISODE_DURATION: 27 S
MDC_IDC_EPISODE_DURATION: 45 S
MDC_IDC_EPISODE_DURATION: 45 S
MDC_IDC_EPISODE_DURATION: 52 S
MDC_IDC_EPISODE_DURATION: 53 S
MDC_IDC_EPISODE_ID: 1
MDC_IDC_EPISODE_ID: 2
MDC_IDC_EPISODE_ID: 3
MDC_IDC_EPISODE_ID: 4
MDC_IDC_EPISODE_ID: 5
MDC_IDC_EPISODE_ID: 6
MDC_IDC_EPISODE_ID: 7
MDC_IDC_EPISODE_ID: 8
MDC_IDC_EPISODE_TYPE: NORMAL
MDC_IDC_EPISODE_TYPE_INDUCED: NO
MDC_IDC_EPISODE_VENDOR_TYPE: NORMAL
MDC_IDC_LEAD_CONNECTION_STATUS: NORMAL
MDC_IDC_LEAD_IMPLANT_DT: NORMAL
MDC_IDC_LEAD_LOCATION: NORMAL
MDC_IDC_LEAD_LOCATION_DETAIL_1: NORMAL
MDC_IDC_LEAD_MFG: NORMAL
MDC_IDC_LEAD_MODEL: NORMAL
MDC_IDC_LEAD_POLARITY_TYPE: NORMAL
MDC_IDC_LEAD_SERIAL: NORMAL
MDC_IDC_MSMT_BATTERY_DTM: NORMAL
MDC_IDC_MSMT_BATTERY_REMAINING_PERCENTAGE: 76 %
MDC_IDC_MSMT_BATTERY_STATUS: NORMAL
MDC_IDC_PG_IMPLANT_DTM: NORMAL
MDC_IDC_PG_MFG: NORMAL
MDC_IDC_PG_MODEL: NORMAL
MDC_IDC_PG_SERIAL: NORMAL
MDC_IDC_PG_TYPE: NORMAL
MDC_IDC_SESS_CLINIC_NAME: NORMAL
MDC_IDC_SESS_DTM: NORMAL
MDC_IDC_SESS_TYPE: NORMAL
MDC_IDC_SET_ZONE_DETECTION_INTERVAL: 285 MS
MDC_IDC_SET_ZONE_DETECTION_INTERVAL: 285 MS
MDC_IDC_SET_ZONE_STATUS: NORMAL
MDC_IDC_SET_ZONE_STATUS: NORMAL
MDC_IDC_SET_ZONE_TYPE: NORMAL
MDC_IDC_SET_ZONE_TYPE: NORMAL
MDC_IDC_SET_ZONE_VENDOR_TYPE: NORMAL
MDC_IDC_SET_ZONE_VENDOR_TYPE: NORMAL
MDC_IDC_STAT_AT_BURDEN_PERCENT: 0 %
MDC_IDC_STAT_EPISODE_RECENT_COUNT: 0
MDC_IDC_STAT_EPISODE_RECENT_COUNT_DTM_END: NORMAL
MDC_IDC_STAT_EPISODE_RECENT_COUNT_DTM_START: NORMAL
MDC_IDC_STAT_EPISODE_TOTAL_COUNT: 3
MDC_IDC_STAT_EPISODE_TOTAL_COUNT_DTM_END: NORMAL
MDC_IDC_STAT_EPISODE_TOTAL_COUNT_DTM_START: NORMAL
MDC_IDC_STAT_EPISODE_TYPE: NORMAL
MDC_IDC_STAT_EPISODE_VENDOR_TYPE: NORMAL
MDC_IDC_STAT_TACHYTHERAPY_RECENT_DTM_END: NORMAL
MDC_IDC_STAT_TACHYTHERAPY_RECENT_DTM_START: NORMAL
MDC_IDC_STAT_TACHYTHERAPY_SHOCKS_DELIVERED_RECENT: 0
MDC_IDC_STAT_TACHYTHERAPY_SHOCKS_DELIVERED_TOTAL: 5
MDC_IDC_STAT_TACHYTHERAPY_TOTAL_DTM_END: NORMAL
MDC_IDC_STAT_TACHYTHERAPY_TOTAL_DTM_START: NORMAL
O2/TOTAL GAS SETTING VFR VENT: 21 %
OXYHGB MFR BLDV: 52 % (ref 70–75)
PCO2 BLDV: 48 MM HG (ref 40–50)
PH BLDV: 7.35 [PH] (ref 7.32–7.43)
PO2 BLDV: 31 MM HG (ref 25–47)
POTASSIUM SERPL-SCNC: 4.9 MMOL/L (ref 3.4–5.3)
PROT SERPL-MCNC: 7 G/DL (ref 6.4–8.3)
SAO2 % BLDV: 53.3 % (ref 70–75)
SODIUM SERPL-SCNC: 135 MMOL/L (ref 135–145)

## 2025-02-12 PROCEDURE — 120N000003 HC R&B IMCU UMMC

## 2025-02-12 PROCEDURE — 85610 PROTHROMBIN TIME: CPT

## 2025-02-12 PROCEDURE — 99232 SBSQ HOSP IP/OBS MODERATE 35: CPT | Mod: GC | Performed by: INTERNAL MEDICINE

## 2025-02-12 PROCEDURE — 82310 ASSAY OF CALCIUM: CPT

## 2025-02-12 PROCEDURE — 97535 SELF CARE MNGMENT TRAINING: CPT | Mod: GO

## 2025-02-12 PROCEDURE — 250N000013 HC RX MED GY IP 250 OP 250 PS 637: Performed by: STUDENT IN AN ORGANIZED HEALTH CARE EDUCATION/TRAINING PROGRAM

## 2025-02-12 PROCEDURE — 82805 BLOOD GASES W/O2 SATURATION: CPT

## 2025-02-12 PROCEDURE — 83735 ASSAY OF MAGNESIUM: CPT

## 2025-02-12 PROCEDURE — 82248 BILIRUBIN DIRECT: CPT

## 2025-02-12 PROCEDURE — 250N000013 HC RX MED GY IP 250 OP 250 PS 637

## 2025-02-12 PROCEDURE — 84460 ALANINE AMINO (ALT) (SGPT): CPT

## 2025-02-12 PROCEDURE — 250N000011 HC RX IP 250 OP 636

## 2025-02-12 PROCEDURE — 97110 THERAPEUTIC EXERCISES: CPT | Mod: GO

## 2025-02-12 PROCEDURE — 80053 COMPREHEN METABOLIC PANEL: CPT

## 2025-02-12 RX ADMIN — SPIRONOLACTONE 25 MG: 25 TABLET, FILM COATED ORAL at 08:05

## 2025-02-12 RX ADMIN — DIGOXIN 125 MCG: 125 TABLET ORAL at 08:05

## 2025-02-12 RX ADMIN — TORSEMIDE 40 MG: 20 TABLET ORAL at 08:05

## 2025-02-12 RX ADMIN — EMPAGLIFLOZIN 10 MG: 10 TABLET, FILM COATED ORAL at 08:05

## 2025-02-12 RX ADMIN — TORSEMIDE 40 MG: 20 TABLET ORAL at 15:50

## 2025-02-12 RX ADMIN — DOBUTAMINE IN DEXTROSE 5 MCG/KG/MIN: 200 INJECTION, SOLUTION INTRAVENOUS at 06:45

## 2025-02-12 RX ADMIN — SACUBITRIL AND VALSARTAN 1 TABLET: 49; 51 TABLET, FILM COATED ORAL at 08:05

## 2025-02-12 RX ADMIN — ZOLPIDEM TARTRATE 5 MG: 5 TABLET, FILM COATED ORAL at 21:35

## 2025-02-12 RX ADMIN — FERROUS SULFATE TAB 325 MG (65 MG ELEMENTAL FE) 325 MG: 325 (65 FE) TAB at 08:05

## 2025-02-12 RX ADMIN — CALCIUM CARBONATE (ANTACID) CHEW TAB 500 MG 1000 MG: 500 CHEW TAB at 01:51

## 2025-02-12 RX ADMIN — Medication 5 ML: at 21:05

## 2025-02-12 RX ADMIN — POTASSIUM CHLORIDE 40 MEQ: 750 CAPSULE, EXTENDED RELEASE ORAL at 21:02

## 2025-02-12 RX ADMIN — POTASSIUM CHLORIDE 40 MEQ: 750 CAPSULE, EXTENDED RELEASE ORAL at 08:05

## 2025-02-12 RX ADMIN — ENOXAPARIN SODIUM 40 MG: 40 INJECTION SUBCUTANEOUS at 12:21

## 2025-02-12 RX ADMIN — SACUBITRIL AND VALSARTAN 1 TABLET: 49; 51 TABLET, FILM COATED ORAL at 21:03

## 2025-02-12 ASSESSMENT — ACTIVITIES OF DAILY LIVING (ADL)
ADLS_ACUITY_SCORE: 37

## 2025-02-12 NOTE — PROGRESS NOTES
[facility]  Cardiology II Service / Advanced Heart Failure  Daily Progress Note    Patient: Jose Enrique Engel      : 1985      MRN: [id number]    Assessment/Plan:   38 yo male with PMHx of HFrEF (15%) 2/2 NICM s/p ICD, cardiogenic shock previously on dobutamine, CKD3, polysubstance use admitted 2/3/25 for ambulatory cardiogenic shock.     Today's changes:  - Continue Dobutamine 5 mcg/kg/min--SW working to see if can get OP infusion  - Continue current GDMT and PO Torsemide 40 BID    # Ambulatory cardiogenic shock  # Acute on chronic systolic heart failure (EF 15%), stage D, class IIIb  # Non-ischemic cardiomyopathy  Pt with hx of advanced HFrEF/NICM. Was previously on outpatient dobutamine, self-discontinued in . Re-engaged with outpatient cardiologist Dr. Dove , also self-enrolled in chem dep treatment in past year. At recent office visit/RHC was concerning for decompensated CHF and possible cardiogenic shock, pt attempted to manage outpatient but felt worse and ultimately presented for admission. Interested in advanced therapies but not candidate yet due to being lost to follow up.  Last RHC 25: RA 15, PA 44/26, PCWP 21, CI 1.7, PVR 3.4, SVR ~1400  Last TTE 2025: EF 5-10%, severely reduced RV Function, mod-severe MR, severe TR  - Volume: hypervolemic,  lbs, though possibly closer to 174. Was 184 on admission.   - Transition off lasix gtt to PO torsemide 40 BID  - Inotrope: continue dobutamine @ 5mcg/kg/min   - continue PTA digoxin 125 mcg daily  - Afterload Reduction: Increase PTA Entresto 49/51 mg BID (hold if MAP <65)   - MAP goal 65-85  - GDMT: entresto 49-51 BID   - not on PTA beta blocker (cardiogenic shock)   - PTA spironolactone 25 mg daily   - PTA empagliflozin 10 mg daily  - Anticoagulation: discontinued PTA rivaroxaban given resolution of LV thrombus   - Statin: none  - Antiplatelet: none  - Antiarrhythmic: none  - SCD  prophylaxis: ICD in place  - Electrolytes: BID monitoring with diuresis, goals K>4, Mag>2  - Strict I/O monitoring, daily weights  - Dietary restrictions: regular diet (pt request), 2L fluids (pt intermittently following)    # Congestive hepatopathy, resolved  On admission AST/ALT in 200s. Bilirubin 4.9. INR 3.5. Pattern and clinical picture most c/w congestive hepatopathy. INR/bili normalized, AST/ALT much improved since admission.  - Cardiac management per above  - Trend LFTs     # Hx of LV apical thrombus  Seen on echo in 07/2023 - large (~1.8 cm x 1.8 cm x 1.1 cm), protruding, and predominantly sessile. Not present on TTE this admission.  - Stop PTA rivaroxaban     # Hx of polysubstance use   Currently reporting only MJ use. Reports sobriety from EtOH and meth (last use 06/2024). Follows with local chem dep team. PETH negative, UDS only positive for THC.     # CKD3  Baseline Cr 1.3-1.6. Cr 1.5 on admission, 0.9 today.  - CTM     # Subclinical hypothyroidism  TSH 8 on admission. Started on synthroid at OSH due to TSH of 9. Technically should treat subclinical hypothyroidism if TSH >10. Synthroid not on his PTA meds list.   - Managed by PCP     Deconditioning  - PT/OT consults placed    Hospital Checklist:  DVT Prophylaxis: Low Risk/Ambulatory with no VTE prophylaxis indicated  Code Status: Full Code  Bowel regimen: PRNs  Diet/Nutrition: regular diet / 2L FR  Lines: PIV, PICC    Disposition Planning:  Home in 1-2 days with home inotropes pending better diuresis    Staffed w/ Dr. Ogden.     Scarlett Moreno MD  Cardiology Fellow PGY-4      Subjective:   Feeling well and much improved with urination.    Objective:     Vitals:    02/11/25 0304 02/12/25 0150 02/12/25 0151   Weight: 79.9 kg (176 lb 3.2 oz) 82.2 kg (181 lb 4.8 oz) 82.2 kg (181 lb 4.8 oz)     Vital Signs with Ranges  Temp:  [97.8  F (36.6  C)-98.2  F (36.8  C)] 98  F (36.7  C)  Pulse:  [] 92  Resp:  [16-19] 19  BP: (102-121)/(57-83) 121/57  Cuff  Mean (mmHg):  [80] 80  SpO2:  [97 %-99 %] 97 %  I/O last 3 completed shifts:  In: 2921.21 [P.O.:2640; I.V.:281.21]  Out: 5475 [Urine:5475]    Exam:  General: No acute distress, resting comfortably in bed. Walking around unit.  HEENT: MMM  Neck: CVP 9  CV: RRR, no murmurs, S3/S4 absent  Lungs: On room air, CTA b/l w/o wheezes or crackles, no increased WOB  Abd: Soft, non-tender, non-distended  Ext: warm/well perfused, no lower extremity edema  Neuro: Awake, alert, conversational, moving all extremities spontaneously throughout examination    Medications   Current Facility-Administered Medications   Medication Dose Route Frequency Provider Last Rate Last Admin    DOBUTamine (DOBUTREX) 500 mg in D5W 250 mL infusion (adult std conc)  5 mcg/kg/min (Dosing Weight) Intravenous Continuous Sofya Elizabeth MD 12.2 mL/hr at 02/12/25 0645 5 mcg/kg/min at 02/12/25 0645     Current Facility-Administered Medications   Medication Dose Route Frequency Provider Last Rate Last Admin    digoxin (LANOXIN) tablet 125 mcg  125 mcg Oral Daily Sofya Elizabeth MD   125 mcg at 02/12/25 0805    empagliflozin (JARDIANCE) tablet 10 mg  10 mg Oral Daily Bill Enriquez MD   10 mg at 02/12/25 0805    enoxaparin ANTICOAGULANT (LOVENOX) injection 40 mg  40 mg Subcutaneous Q24H Cherie Gómez MD   40 mg at 02/11/25 1229    ferrous sulfate (FEROSUL) tablet 325 mg  325 mg Oral Daily with breakfast Sofya Elizabeth MD   325 mg at 02/12/25 0805    heparin lock flush 10 unit/mL injection 5-20 mL  5-20 mL Intracatheter Q24H Cherie Gómez MD   5 mL at 02/11/25 1936    potassium chloride ER (MICRO-K) CR capsule 40 mEq  40 mEq Oral BID Sofya Elizabeth MD   40 mEq at 02/12/25 0805    sacubitril-valsartan (ENTRESTO) 49-51 MG per tablet 1 tablet  1 tablet Oral BID Mitzi Limon MD   1 tablet at 02/12/25 0805    sodium chloride (PF) 0.9% PF flush 3 mL  3 mL Intracatheter Q8H Sofya Elizabeth MD   3 mL at 02/11/25 0808     spironolactone (ALDACTONE) tablet 25 mg  25 mg Oral Daily Cherie Gómez MD   25 mg at 02/12/25 0805    torsemide (DEMADEX) tablet 40 mg  40 mg Oral BID Mitzi Limon MD   40 mg at 02/12/25 0805     Data   Recent Labs   Lab 02/12/25  0209 02/11/25  1628 02/11/25  0548 02/11/25  0148 02/10/25  1718 02/10/25  0517 02/09/25  1654 02/09/25  0441 02/07/25  0628 02/07/25  0058   WBC  --   --   --   --   --   --   --  5.6  --   --    HGB  --   --   --   --   --   --   --  15.9  --  15.7   MCV  --   --   --   --   --   --   --  92  --   --    PLT  --   --   --  184  --   --   --  189  --   --    INR 1.02  --  1.03  --   --  1.10  --  1.11   < >  --     133* 130*  --    < > 130*   < > 127*   < >  --    POTASSIUM 4.9 4.8 4.8  --    < > 4.5   < > 4.8   < >  --    CHLORIDE 101 100 100  --    < > 97*   < > 94*   < >  --    CO2 22 21* 20*  --    < > 24   < > 24   < >  --    BUN 33.7* 34.6* 33.5*  --    < > 35.5*   < > 25.1*   < >  --    CR 1.23* 1.20* 1.25*  --    < > 1.04   < > 0.89   < >  --    ANIONGAP 12 12 10  --    < > 9   < > 9   < >  --    LENARD 9.3 9.0 9.2  --    < > 8.9   < > 8.6*   < >  --    GLC 73 104* 75  --    < > 108*   < > 119*   < >  --    ALBUMIN 3.8  --  3.4*  --   --  3.5  --  3.5   < >  --    PROTTOTAL 7.0  --  6.6  --   --  6.8  --  6.6   < >  --    BILITOTAL 1.2  --  1.4*  --   --  1.5*  --  1.5*   < >  --    ALKPHOS 110  --  109  --   --  123  --  130   < >  --    ALT 78*  --  72*  --   --  86*  --  94*   < >  --    AST 50*  --  41  --   --  45  --  53*   < >  --     < > = values in this interval not displayed.     No results found for this or any previous visit (from the past 24 hours).

## 2025-02-12 NOTE — PROGRESS NOTES
Care Coordinator  D: Per previous RN CC note: [employer] [facility] is the ONLY Infusion company that has NOT declined him HOWEVER they can ONLY provide service with in a 2 hour radius of the [facility].  I:This case was escalated to:  my manager Fran Huggins and her Director Tiesha Waterman.  I called Fran and she asked me to call:  Nataly SAPP: I called liaison Harold[facility] Pretty Ph: [phone]---they do not take IV Dobutamine   2. I had my CHW: Kayce ORTIZ ask pt IF he has a family/relative he could live with within 2 hours of the [facility] that miltonld also be able to learn the IV Dobutamine with him and he does NOT.  3. Fran will call [facility] again, to see IF they will re-consider.    P: Will follow.

## 2025-02-12 NOTE — PLAN OF CARE
Shift 4529-2126: No significant changes overnight. Ventricular trigeminy continues on strips. Patient continues on dobutamine drip. Non compliant with fluid intake recommendations. Weight up this morning. Patient becoming agitated with staff while trying to complete CVP, will re attempt.     Neuro: A&Ox4. Able to make needs known. Mood liable. Irritable with staff at times.   Cardiac: On and off ventricular trigemony - providers aware. Otherwise SR with PVCs. VSS on room air. Denies chest pain. CVP reading LYNDSAY this morning,will re attempt.  Respiratory: Sating >95% on RA. Denies SOB  GI/: Adequate urine output. Last BM 2/12  Diet: Tolerating regular diet, non compliant with  2L FR, compliance encouraged.   Activity:  Up ad aron.  Pain: Denies  Skin: No new deficits noted.  LDA's:  Double lumen PICC to right arm infusing dobutamine at 5 mcg/kg/min in purple lumen. Red lumen hep locked.   Plan: Continue with POC. Notify primary team with changes

## 2025-02-12 NOTE — PROGRESS NOTES
Care Management Follow Up    Additional information    2/12 - CHW delegated by RNCC to inquire with pt if he has a trusted friend/family member within 2 hours of the [facility] that are willing and able to come for education on how to assist pt with home infusion.     I met with pt at bedside and asked if there is anyone he can think of. Pt took a few moments to think but ultimately stated there is nobody in his life that meets the above criteria and has capacity to take this responsibility.    Per RNCC, I noted that the other vendors have declined which is why we are asking. There are 2 of these vendors we may be able to ask to reconsider their decision, but there are no guarantees right now.     Pt is aware of and understands the reasons for facilities declining. He had no further questions but is hopeful that some solution will come through.    RNCC notified, no further action taken.       Kayce Gaytan  Community Health Worker  6A, 6B, 6C  Ph [phone]  Fax [phone]

## 2025-02-13 ENCOUNTER — CARE COORDINATION (OUTPATIENT)
Dept: CARDIOLOGY | Facility: CLINIC | Age: 40
End: 2025-02-13
Payer: COMMERCIAL

## 2025-02-13 ENCOUNTER — ENROLLMENT (OUTPATIENT)
Dept: HOME HEALTH SERVICES | Facility: HOME HEALTH | Age: 40
End: 2025-02-13
Payer: COMMERCIAL

## 2025-02-13 ENCOUNTER — HOME INFUSION (OUTPATIENT)
Dept: HOME HEALTH SERVICES | Facility: HOME HEALTH | Age: 40
End: 2025-02-13
Payer: COMMERCIAL

## 2025-02-13 VITALS
DIASTOLIC BLOOD PRESSURE: 65 MMHG | TEMPERATURE: 98.3 F | SYSTOLIC BLOOD PRESSURE: 129 MMHG | HEIGHT: 71 IN | BODY MASS INDEX: 24.99 KG/M2 | RESPIRATION RATE: 20 BRPM | WEIGHT: 178.5 LBS | OXYGEN SATURATION: 98 % | HEART RATE: 110 BPM

## 2025-02-13 DIAGNOSIS — I50.23 ACUTE ON CHRONIC SYSTOLIC HEART FAILURE (H): Primary | ICD-10-CM

## 2025-02-13 LAB
ANION GAP SERPL CALCULATED.3IONS-SCNC: 11 MMOL/L (ref 7–15)
BASE EXCESS BLDV CALC-SCNC: 3.6 MMOL/L (ref -3–3)
BUN SERPL-MCNC: 30.8 MG/DL (ref 6–20)
CALCIUM SERPL-MCNC: 9.5 MG/DL (ref 8.8–10.4)
CHLORIDE SERPL-SCNC: 98 MMOL/L (ref 98–107)
CREAT SERPL-MCNC: 0.81 MG/DL (ref 0.67–1.17)
EGFRCR SERPLBLD CKD-EPI 2021: >90 ML/MIN/1.73M2
ERYTHROCYTE [DISTWIDTH] IN BLOOD BY AUTOMATED COUNT: 16.4 % (ref 10–15)
GLUCOSE SERPL-MCNC: 141 MG/DL (ref 70–99)
HCO3 BLDV-SCNC: 30 MMOL/L (ref 21–28)
HCO3 SERPL-SCNC: 25 MMOL/L (ref 22–29)
HCT VFR BLD AUTO: 48.6 % (ref 40–53)
HGB BLD-MCNC: 16.7 G/DL (ref 13.3–17.7)
MAGNESIUM SERPL-MCNC: 2.4 MG/DL (ref 1.7–2.3)
MCH RBC QN AUTO: 31.3 PG (ref 26.5–33)
MCHC RBC AUTO-ENTMCNC: 34.4 G/DL (ref 31.5–36.5)
MCV RBC AUTO: 91 FL (ref 78–100)
O2/TOTAL GAS SETTING VFR VENT: 21 %
OXYHGB MFR BLDV: 52 % (ref 70–75)
PCO2 BLDV: 48 MM HG (ref 40–50)
PH BLDV: 7.4 [PH] (ref 7.32–7.43)
PLATELET # BLD AUTO: 190 10E3/UL (ref 150–450)
PO2 BLDV: 31 MM HG (ref 25–47)
POTASSIUM SERPL-SCNC: 4.8 MMOL/L (ref 3.4–5.3)
RBC # BLD AUTO: 5.34 10E6/UL (ref 4.4–5.9)
SAO2 % BLDV: 53.5 % (ref 70–75)
SODIUM SERPL-SCNC: 134 MMOL/L (ref 135–145)
WBC # BLD AUTO: 5.3 10E3/UL (ref 4–11)

## 2025-02-13 PROCEDURE — 999N000007 HC SITE CHECK

## 2025-02-13 PROCEDURE — 82805 BLOOD GASES W/O2 SATURATION: CPT | Performed by: STUDENT IN AN ORGANIZED HEALTH CARE EDUCATION/TRAINING PROGRAM

## 2025-02-13 PROCEDURE — 250N000011 HC RX IP 250 OP 636

## 2025-02-13 PROCEDURE — 85041 AUTOMATED RBC COUNT: CPT

## 2025-02-13 PROCEDURE — 80048 BASIC METABOLIC PNL TOTAL CA: CPT | Performed by: STUDENT IN AN ORGANIZED HEALTH CARE EDUCATION/TRAINING PROGRAM

## 2025-02-13 PROCEDURE — 85018 HEMOGLOBIN: CPT

## 2025-02-13 PROCEDURE — 250N000013 HC RX MED GY IP 250 OP 250 PS 637

## 2025-02-13 PROCEDURE — 999N000044 HC STATISTIC CVC DRESSING CHANGE

## 2025-02-13 PROCEDURE — 250N000013 HC RX MED GY IP 250 OP 250 PS 637: Performed by: STUDENT IN AN ORGANIZED HEALTH CARE EDUCATION/TRAINING PROGRAM

## 2025-02-13 PROCEDURE — 99232 SBSQ HOSP IP/OBS MODERATE 35: CPT | Mod: GC | Performed by: INTERNAL MEDICINE

## 2025-02-13 PROCEDURE — 120N000003 HC R&B IMCU UMMC

## 2025-02-13 PROCEDURE — 82310 ASSAY OF CALCIUM: CPT | Performed by: STUDENT IN AN ORGANIZED HEALTH CARE EDUCATION/TRAINING PROGRAM

## 2025-02-13 PROCEDURE — 83735 ASSAY OF MAGNESIUM: CPT | Performed by: STUDENT IN AN ORGANIZED HEALTH CARE EDUCATION/TRAINING PROGRAM

## 2025-02-13 RX ORDER — TORSEMIDE 20 MG/1
60 TABLET ORAL DAILY
Status: DISCONTINUED | OUTPATIENT
Start: 2025-02-14 | End: 2025-02-14 | Stop reason: HOSPADM

## 2025-02-13 RX ORDER — SACUBITRIL AND VALSARTAN 24; 26 MG/1; MG/1
3 TABLET, FILM COATED ORAL 2 TIMES DAILY
Status: DISCONTINUED | OUTPATIENT
Start: 2025-02-13 | End: 2025-02-14

## 2025-02-13 RX ORDER — SACUBITRIL AND VALSARTAN 24; 26 MG/1; MG/1
3 TABLET, FILM COATED ORAL 2 TIMES DAILY
Qty: 180 TABLET | Refills: 1 | Status: CANCELLED | OUTPATIENT
Start: 2025-02-13

## 2025-02-13 RX ORDER — HYDRALAZINE HYDROCHLORIDE 20 MG/ML
10 INJECTION INTRAMUSCULAR; INTRAVENOUS ONCE
Status: COMPLETED | OUTPATIENT
Start: 2025-02-13 | End: 2025-02-13

## 2025-02-13 RX ORDER — TORSEMIDE 20 MG/1
40 TABLET ORAL EVERY EVENING
Status: DISCONTINUED | OUTPATIENT
Start: 2025-02-13 | End: 2025-02-14 | Stop reason: HOSPADM

## 2025-02-13 RX ORDER — DOBUTAMINE HYDROCHLORIDE 200 MG/100ML
5 INJECTION INTRAVENOUS CONTINUOUS
Qty: 250 ML | Refills: 99 | Status: SHIPPED | OUTPATIENT
Start: 2025-02-13 | End: 2025-02-14

## 2025-02-13 RX ADMIN — EMPAGLIFLOZIN 10 MG: 10 TABLET, FILM COATED ORAL at 08:03

## 2025-02-13 RX ADMIN — HYDRALAZINE HYDROCHLORIDE 10 MG: 20 INJECTION INTRAMUSCULAR; INTRAVENOUS at 16:44

## 2025-02-13 RX ADMIN — Medication 5 ML: at 20:23

## 2025-02-13 RX ADMIN — ZOLPIDEM TARTRATE 5 MG: 5 TABLET, FILM COATED ORAL at 20:32

## 2025-02-13 RX ADMIN — DIGOXIN 125 MCG: 125 TABLET ORAL at 08:03

## 2025-02-13 RX ADMIN — SACUBITRIL AND VALSARTAN 1 TABLET: 49; 51 TABLET, FILM COATED ORAL at 08:03

## 2025-02-13 RX ADMIN — ENOXAPARIN SODIUM 40 MG: 40 INJECTION SUBCUTANEOUS at 12:38

## 2025-02-13 RX ADMIN — SPIRONOLACTONE 25 MG: 25 TABLET, FILM COATED ORAL at 08:03

## 2025-02-13 RX ADMIN — POTASSIUM CHLORIDE 40 MEQ: 750 CAPSULE, EXTENDED RELEASE ORAL at 08:03

## 2025-02-13 RX ADMIN — FERROUS SULFATE TAB 325 MG (65 MG ELEMENTAL FE) 325 MG: 325 (65 FE) TAB at 08:03

## 2025-02-13 RX ADMIN — DOBUTAMINE IN DEXTROSE 5 MCG/KG/MIN: 200 INJECTION, SOLUTION INTRAVENOUS at 23:22

## 2025-02-13 RX ADMIN — SACUBITRIL AND VALSARTAN 3 TABLET: 24; 26 TABLET, FILM COATED ORAL at 20:33

## 2025-02-13 RX ADMIN — TORSEMIDE 40 MG: 20 TABLET ORAL at 20:23

## 2025-02-13 RX ADMIN — POTASSIUM CHLORIDE 40 MEQ: 750 CAPSULE, EXTENDED RELEASE ORAL at 20:23

## 2025-02-13 RX ADMIN — TORSEMIDE 40 MG: 20 TABLET ORAL at 08:03

## 2025-02-13 RX ADMIN — DOBUTAMINE IN DEXTROSE 5 MCG/KG/MIN: 200 INJECTION, SOLUTION INTRAVENOUS at 01:57

## 2025-02-13 ASSESSMENT — ACTIVITIES OF DAILY LIVING (ADL)
ADLS_ACUITY_SCORE: 37

## 2025-02-13 NOTE — PLAN OF CARE
"Neuro: A&O x4, denied pain and dizziness.  Respiratory:  On RA, lung sounds WDL.  Cardiac: Ventricular Trigeminy w/ BBB and oPVCs.    GI: Denied nausea, bowel sounds audible.  Diet: 2L fluid restriction and regular diet.  : Had good urine output, see I&O flowsheet.  Skin: See PCS for assessment and treatment of wounds and surgical incisions.   VS: In Ventricular Trigeminy w/ BBB and oPVCs rhythm with HR 70s-100s, SBP 90s, SaO2 >92% on RA.    Drips:  Dobutamine gtt continued at 5 mcg/kg/min in purple lumen.  IV Access:  R 2X lumen PICC; Purple lumen infusing dobutamine; Red lumen heparin locked.  Electrolytes: Potassium replaced per protocol. Magnesium protocol ran.   Pain: No pain reported throughout shift.   Mobility: Ambulated in room and outside room independently, was steady on his feet.      Plan:  Continue to monitor pain, VS, heart rhythm, fluid status, bowel status, cardiac and respiratory status.  Notify care team of changes in patient condition or other concerns. Potential discharge today to outpatient home infusions with [facility]. Care coordinator talk scheduled today. CVP Friday morning.      Goal Outcome Evaluation:      Plan of Care Reviewed With: patient    Overall Patient Progress: no changeOverall Patient Progress: no change      Problem: Adult Inpatient Plan of Care  Goal: Plan of Care Review  Description: The Plan of Care Review/Shift note should be completed every shift.  The Outcome Evaluation is a brief statement about your assessment that the patient is improving, declining, or no change.  This information will be displayed automatically on your shift  note.  Outcome: Progressing  Flowsheets (Taken 2/13/2025 0211)  Plan of Care Reviewed With: patient  Overall Patient Progress: no change  Goal: Patient-Specific Goal (Individualized)  Description: You can add care plan individualizations to a care plan. Examples of Individualization might be:  \"Parent requests to be called daily at 9am " "for status\", \"I have a hard time hearing out of my right ear\", or \"Do not touch me to wake me up as it startles  me\".  Outcome: Progressing  Goal: Absence of Hospital-Acquired Illness or Injury  Outcome: Progressing  Intervention: Identify and Manage Fall Risk  Flowsheets  Taken 2/13/2025 0211  Safety Promotion/Fall Prevention:   assistive device/personal items within reach   clutter free environment maintained   increased rounding and observation   increase visualization of patient   lighting adjusted   nonskid shoes/slippers when out of bed   room near nurse's station   room organization consistent   safety round/check completed  Taken 2/12/2025 2000  Safety Promotion/Fall Prevention:   assistive device/personal items within reach   clutter free environment maintained   increased rounding and observation   increase visualization of patient   lighting adjusted   nonskid shoes/slippers when out of bed   room near nurse's station   room organization consistent   safety round/check completed  Intervention: Prevent Skin Injury  Flowsheets  Taken 2/13/2025 0211  Body Position: position changed independently  Skin Protection: adhesive use limited  Taken 2/12/2025 2000  Body Position: position changed independently  Skin Protection: adhesive use limited  Intervention: Prevent and Manage VTE (Venous Thromboembolism) Risk  Flowsheets  Taken 2/13/2025 0211  VTE Prevention/Management: (lovenox) other (see comments)  Taken 2/12/2025 2000  VTE Prevention/Management: (Lovenox) other (see comments)  Intervention: Prevent Infection  Flowsheets  Taken 2/13/2025 0211  Infection Prevention:   cohorting utilized   environmental surveillance performed   equipment surfaces disinfected   hand hygiene promoted   rest/sleep promoted   single patient room provided  Taken 2/12/2025 2000  Infection Prevention:   cohorting utilized   environmental surveillance performed   equipment surfaces disinfected   hand hygiene promoted   rest/sleep " promoted   single patient room provided  Goal: Optimal Comfort and Wellbeing  Outcome: Progressing  Goal: Readiness for Transition of Care  Outcome: Progressing     Problem: Heart Failure  Goal: Optimal Coping  Outcome: Progressing  Intervention: Support Psychosocial Response  Flowsheets  Taken 2/13/2025 0211  Family/Support System Care: self-care encouraged  Taken 2/12/2025 2000  Family/Support System Care: self-care encouraged  Goal: Optimal Cardiac Output  Outcome: Progressing  Intervention: Optimize Cardiac Output  Flowsheets  Taken 2/13/2025 0211  Environmental Support:   calm environment promoted   distractions minimized   environmental consistency promoted   rest periods encouraged  Taken 2/12/2025 2000  Environmental Support:   calm environment promoted   distractions minimized   environmental consistency promoted   rest periods encouraged  Goal: Stable Heart Rate and Rhythm  Outcome: Progressing  Goal: Fluid and Electrolyte Balance  Outcome: Progressing  Intervention: Monitor and Manage Fluid and Electrolyte Balance  Flowsheets  Taken 2/13/2025 0211  Fluid/Electrolyte Management:   electrolyte supplement initiated   fluids provided  Taken 2/12/2025 2000  Fluid/Electrolyte Management:   electrolyte supplement initiated   fluids provided  Goal: Optimal Functional Ability  Outcome: Progressing  Intervention: Optimize Functional Ability  Flowsheets  Taken 2/13/2025 0211  Self-Care Promotion: independence encouraged  Activity Management: activity adjusted per tolerance  Taken 2/12/2025 2000  Self-Care Promotion: independence encouraged  Activity Management: activity adjusted per tolerance  Goal: Improved Oral Intake  Outcome: Progressing  Goal: Effective Oxygenation and Ventilation  Outcome: Progressing  Intervention: Promote Airway Secretion Clearance  Flowsheets  Taken 2/13/2025 0211  Cough And Deep Breathing: done independently per patient  Activity Management: activity adjusted per tolerance  Taken 2/12/2025  2000  Cough And Deep Breathing: done independently per patient  Activity Management: activity adjusted per tolerance  Intervention: Optimize Oxygenation and Ventilation  Flowsheets  Taken 2/13/2025 0211  Airway/Ventilation Management: airway patency maintained  Head of Bed (HOB) Positioning: HOB at 30-45 degrees  Taken 2/12/2025 2000  Airway/Ventilation Management: airway patency maintained  Head of Bed (HOB) Positioning: HOB at 30-45 degrees  Goal: Effective Breathing Pattern During Sleep  Outcome: Progressing  Intervention: Monitor and Manage Obstructive Sleep Apnea  Flowsheets  Taken 2/13/2025 0211  Medication Review/Management: medications reviewed  Taken 2/12/2025 2000  Medication Review/Management: medications reviewed

## 2025-02-13 NOTE — PROGRESS NOTES
[facility]  Cardiology II Service / Advanced Heart Failure  Daily Progress Note    Patient: Jose Enrique Engel      : 1985      MRN: [id number]    Assessment/Plan:   40 yo male with PMHx of HFrEF (15%) 2/2 NICM s/p ICD, cardiogenic shock previously on dobutamine, CKD3, polysubstance use admitted 2/3/25 for ambulatory cardiogenic shock.       Today's changes:  - Medically stable will discharge tomorrow with [facility] infusion after teaching with his father present.     - Reinforce 2L fluid restriction as >2L   - Continue Dobutamine 5 mcg/kg/min--SW working to see if can get OP infusion  - Continue current GDMT   - Torsemide 60/40 starting tomorrow as not likely to follow strict 2L fluid restriction from home.     # Ambulatory cardiogenic shock  # Acute on chronic systolic heart failure (EF 15%), stage D, class IIIb  # Non-ischemic cardiomyopathy  Pt with hx of advanced HFrEF/NICM. Was previously on outpatient dobutamine, self-discontinued in . Re-engaged with outpatient cardiologist Dr. Dove , also self-enrolled in chem dep treatment in past year. At recent office visit/RHC was concerning for decompensated CHF and possible cardiogenic shock, pt attempted to manage outpatient but felt worse and ultimately presented for admission. Interested in advanced therapies but not candidate yet due to being lost to follow up. Will discharge on home infusion, as he self-discontinued previously, was accepted by [facility] on conditional terms, including no missed appointments and understands that if he does not follow through he has limited options with end stage HF.   Last RHC 25: RA 15, PA 44/26, PCWP 21, CI 1.7, PVR 3.4, SVR ~1400  Last TTE 2025: EF 5-10%, severely reduced RV Function, mod-severe MR, severe TR  - Volume: hypervolemic,  lbs, though possibly closer to 174. Was 184 on admission.   - PO torsemide 60 AM and 40 PM as not  likely to sick to fluid restriction as OP  - Inotrope: continue dobutamine @ 5mcg/kg/min   - continue PTA digoxin 125 mcg daily  - Afterload Reduction: Increase PTA Entresto 49/51 mg BID (hold if MAP <65)   - MAP goal 65-85  - GDMT: entresto 49-51 BID   - not on PTA beta blocker (cardiogenic shock)   - PTA spironolactone 25 mg daily   - PTA empagliflozin 10 mg daily  - Anticoagulation: discontinued PTA rivaroxaban given resolution of LV thrombus   - Statin: none  - Antiplatelet: none  - Antiarrhythmic: none  - SCD prophylaxis: ICD in place  - Electrolytes: BID monitoring with diuresis, goals K>4, Mag>2  - Strict I/O monitoring, daily weights  - Dietary restrictions: regular diet (pt request), 2L fluids (pt intermittently following)    # Congestive hepatopathy, resolved  On admission AST/ALT in 200s. Bilirubin 4.9. INR 3.5. Pattern and clinical picture most c/w congestive hepatopathy. INR/bili normalized, AST/ALT much improved since admission.  - Cardiac management per above  - Trend LFTs     # Hx of LV apical thrombus  Seen on echo in 07/2023 - large (~1.8 cm x 1.8 cm x 1.1 cm), protruding, and predominantly sessile. Not present on TTE this admission.  - Stop PTA rivaroxaban     # Hx of polysubstance use   Currently reporting only MJ use. Reports sobriety from EtOH and meth (last use 06/2024). Follows with local chem dep team. PETH negative, UDS only positive for THC.     # CKD3  Baseline Cr 1.3-1.6. Cr 1.5 on admission, 0.9 today.  - CTM     # Subclinical hypothyroidism  TSH 8 on admission. Started on synthroid at OSH due to TSH of 9. Technically should treat subclinical hypothyroidism if TSH >10. Synthroid not on his PTA meds list.   - Managed by PCP     Deconditioning  - PT/OT consults placed    Hospital Checklist:  DVT Prophylaxis: Low Risk/Ambulatory with no VTE prophylaxis indicated  Code Status: Full Code  Bowel regimen: PRNs  Diet/Nutrition: regular diet / 2L FR  Lines: PIV, PICC    Disposition  Planning:  Home in 1-2 days with home inotropes pending better diuresis    Staffed w/ Dr. Ogden.     Sissy Limon DO   Internal Medicine PGY3  [facility]  Please use on call sytem for paging      Subjective:   Pt feeling well. Struggling with fluid restriction.     Objective:     Vitals:    02/12/25 0150 02/12/25 0151 02/13/25 0610   Weight: 82.2 kg (181 lb 4.8 oz) 82.2 kg (181 lb 4.8 oz) 79.2 kg (174 lb 9.6 oz)     Vital Signs with Ranges  Temp:  [97.4  F (36.3  C)-98.9  F (37.2  C)] 97.4  F (36.3  C)  Pulse:  [] 92  Resp:  [16-18] 18  BP: ()/(48-86) 115/67  Cuff Mean (mmHg):  [79] 79  SpO2:  [98 %-99 %] 99 %  I/O last 3 completed shifts:  In: 2511.78 [P.O.:2196; I.V.:315.78]  Out: 3525 [Urine:3525]    Exam:  General: No acute distress, resting comfortably in bed. Walking around unit.  HEENT: MMM  Neck: CVP 9  CV: RRR, no murmurs, S3/S4 absent  Lungs: On room air, CTA b/l w/o wheezes or crackles, no increased WOB  Abd: Soft, non-tender, non-distended  Ext: warm/well perfused, no lower extremity edema  Neuro: Awake, alert, conversational, moving all extremities spontaneously throughout examination    Medications   Current Facility-Administered Medications   Medication Dose Route Frequency Provider Last Rate Last Admin    DOBUTamine (DOBUTREX) 500 mg in D5W 250 mL infusion (adult std conc)  5 mcg/kg/min (Dosing Weight) Intravenous Continuous Sofya Elizabeth MD 12.2 mL/hr at 02/13/25 0400 5 mcg/kg/min at 02/13/25 0400     Current Facility-Administered Medications   Medication Dose Route Frequency Provider Last Rate Last Admin    digoxin (LANOXIN) tablet 125 mcg  125 mcg Oral Daily Sofya Elizabeth MD   125 mcg at 02/12/25 0805    empagliflozin (JARDIANCE) tablet 10 mg  10 mg Oral Daily Bill Enriquez MD   10 mg at 02/12/25 0805    enoxaparin ANTICOAGULANT (LOVENOX) injection 40 mg  40 mg Subcutaneous Q24H Cherie Gómez MD   40 mg at 02/12/25 1221    ferrous sulfate (FEROSUL)  tablet 325 mg  325 mg Oral Daily with breakfast Sofya Elizabeth MD   325 mg at 02/12/25 0805    heparin lock flush 10 unit/mL injection 5-20 mL  5-20 mL Intracatheter Q24H Cherie Gómez MD   5 mL at 02/12/25 2105    potassium chloride ER (MICRO-K) CR capsule 40 mEq  40 mEq Oral BID Sofya Elizabeth MD   40 mEq at 02/12/25 2102    sacubitril-valsartan (ENTRESTO) 49-51 MG per tablet 1 tablet  1 tablet Oral BID Mitzi Limon MD   1 tablet at 02/12/25 2103    sodium chloride (PF) 0.9% PF flush 3 mL  3 mL Intracatheter Q8H Sofya Elizabeth MD   3 mL at 02/11/25 0808    spironolactone (ALDACTONE) tablet 25 mg  25 mg Oral Daily Cherie Gómez MD   25 mg at 02/12/25 0805    torsemide (DEMADEX) tablet 40 mg  40 mg Oral BID Mitzi Limon MD   40 mg at 02/12/25 1550     Data   Recent Labs   Lab 02/12/25  0209 02/11/25  1628 02/11/25  0548 02/11/25  0148 02/10/25  1718 02/10/25  0517 02/09/25  1654 02/09/25  0441 02/07/25  0628 02/07/25  0058   WBC  --   --   --   --   --   --   --  5.6  --   --    HGB  --   --   --   --   --   --   --  15.9  --  15.7   MCV  --   --   --   --   --   --   --  92  --   --    PLT  --   --   --  184  --   --   --  189  --   --    INR 1.02  --  1.03  --   --  1.10  --  1.11   < >  --     133* 130*  --    < > 130*   < > 127*   < >  --    POTASSIUM 4.9 4.8 4.8  --    < > 4.5   < > 4.8   < >  --    CHLORIDE 101 100 100  --    < > 97*   < > 94*   < >  --    CO2 22 21* 20*  --    < > 24   < > 24   < >  --    BUN 33.7* 34.6* 33.5*  --    < > 35.5*   < > 25.1*   < >  --    CR 1.23* 1.20* 1.25*  --    < > 1.04   < > 0.89   < >  --    ANIONGAP 12 12 10  --    < > 9   < > 9   < >  --    LENARD 9.3 9.0 9.2  --    < > 8.9   < > 8.6*   < >  --    GLC 73 104* 75  --    < > 108*   < > 119*   < >  --    ALBUMIN 3.8  --  3.4*  --   --  3.5  --  3.5   < >  --    PROTTOTAL 7.0  --  6.6  --   --  6.8  --  6.6   < >  --    BILITOTAL 1.2  --  1.4*  --   --  1.5*  --  1.5*   < >  --     ALKPHOS 110  --  109  --   --  123  --  130   < >  --    ALT 78*  --  72*  --   --  86*  --  94*   < >  --    AST 50*  --  41  --   --  45  --  53*   < >  --     < > = values in this interval not displayed.     No results found for this or any previous visit (from the past 24 hours).

## 2025-02-13 NOTE — PROGRESS NOTES
Date: 2/13/2025    Time of Call: 12:29 PM     Diagnosis:  heart failure     [ VORB ] Ordering provider: Heather Dove MD    Order: CMP. NT pro BNP. UDS     Order received by: La Norman RN l     Follow-up/additional notes:

## 2025-02-13 NOTE — PROGRESS NOTES
"Care Management Follow Up    Length of Stay (days): 9    Expected Discharge Date: 02/13/2025  To go home on IV Dobutmine  Dad to come late afternoon 2/14/25--Dad/pt need [facility] liaison teach approx 3:30pm 2/14 and bedside hook up.     Concerns to be Addressed:  Per pt's non compliance with previous IV Dobutamine/PICC and No show follow ups with cardiologist-- I could find no home infusion vendor to accept him until my manager Fran Huggins talked with [facility] manager\"BunnY\" and they escalated and [facility] HAS accepted him on a contract :  2/13/2025    I  Isaiahjaqui Engel:   [birth year] agree to abide by this contract with:  [facility] Ph: [phone]  To provide Home IV Dobutamine via PICC line    I agree to answer my phone when they call--or I am to  call them back on the SAME day.  [facility] is giving you 3 chances, IF you do NOT answer their calls OR call them back, they will CANCEL you as a client.  I agree to go to [facility] [address]  [place] [address]  Ph: [phone]  Fax: [phone]  -- for weekly PICC dressing changes to begin on Friday, 2/21/25 or whatever day they can get you in. Pt is aware that they will call him and he verbally agreed to go EVERY week.  Pt said RN changed his dressing on 2/12/25.      I agree to follow up  with [facility] Cardiologist--beginning next week: they are going to  fit you in  and will call you.    I agree to follow up  with [facility]: Heather Kellogg as scheduled.    Myself and Dr Mitzi Limon cards 2 were present in going over the contract. Pt was informed by doctor that IF his IV dobutamine stops, he will likely die.  Pt signed and has a copy. I have put a copy in his hard chart and emailed to [email or web address].        Patient plan of care discussed at interdisciplinary rounds: Yes    Anticipated Discharge Disposition:    Home to [place]--Dad is an RN " "works nights and is very involved and willing to learn the IV Dobutamine.    I verified that pt's cell phone # is: [phone]--it works.  Pt's Address: [facility] [address]   [place] [address]    PCP: Bebeto Moore [phone] [address] [facility]   , [place] [address]   Pt says he has seen him in the last year      Anticipated Discharge Services:   [email or web address]  [facility]  [address] [place] [address]  [phone]  Fax: [phone]  Local Cardiologist  I called lisset @ 12:23pm--he has been a \"no show\" x2. He was scheduled with  Dr Shola Rodrigues but never arrived.  My cards 2 team wants him to be seen in 1 week: Per Lisset he will be an \"Add On\" and they will call him and he may have to sit and wait. I have explained this to pt and he verbalized that he understands and will do\"whatever I have to do.\"    CORE clinic [facility]: Scheduled with DR Heather Dove on 2/26/25 lab @ 1pm and MD @ 1:45pm.  ---pt agreed to return to [facility] for follow up visits with me.    Anticipated Discharge DME:  [facility] for IV Dobutamine supplies: home pump/tubing/drug    Patient/family educated on Medicare website which has current facility and service quality ratings:  n/a  Education Provided on the Discharge Plan:  Yes  Patient/Family in Agreement with the Plan:  Yes    Referrals Placed by CM/SW:  [facility]  Private pay costs discussed: [facility] Jaci Savage liashiloh will discuss      Discussed  Partnership in Safe Discharge Planning  document with patient/family: No     Handoff Completed: Yes, non-[facility] PCP: External handoff communication completed        Zainab Gant RN     "

## 2025-02-13 NOTE — PLAN OF CARE
Hours of care: [id number]   Problem: Adult Inpatient Plan of Care  Goal: Plan of Care Review  Flowsheets (Taken 2/13/2025 1106)  Outcome Evaluation: No changes to care plan, awaiting discharge planning with home infusion/Dobutamine gtt  Plan of Care Reviewed With: patient  Overall Patient Progress: no change     Shift highlights:   -Elevated BP around 1645 with SBP>200; provider notified and one-time dose IV hydralazine effective. If BP returns elevated again plan to notify provider and per discussion, give updated dose of Entresto early.    -Planning for discharge tomorrow with [facility]    Please see flowsheets for complete nursing assessment and interventions.

## 2025-02-13 NOTE — PLAN OF CARE
[id number]: VSS. Team aware of 5lbs added weight this morning, to continue with scheduled Torsemide for now, no new orders or changes. Dobutamine continued as per mar. Ambulates independently around unit. Intermittently compliant to fluid restriction, will write down intake on board. No acute issues during shift.    For detailed assessments, please refer to flowsheets.    Plan:  Awaiting Dobutamine infusion setup at home prior to discharge. Monitor and inform team with any changes.     Heart Failure Care Map  GOALS TO BE MET BEFORE DISCHARGE:    1. Decrease congestion and/or edema with diuretic therapy to achieve near optimal volume status.     Dyspnea improved: Yes, satisfactory for discharge.   Edema improved: Yes, satisfactory for discharge.        Last 24 hour I/O:   Intake/Output Summary (Last 24 hours) at 2/12/2025 1848  Last data filed at 2/12/2025 1358  Gross per 24 hour   Intake 2629.22 ml   Output 3575 ml   Net -945.78 ml           Net I/O and Weights since admission:   01/13 2300 - 02/12 2259  In: 31243.41 [P.O.:90204; I.V.:3409.41]  Out: 41424 [Urine:54509]  Net: -01709.59     Vitals:    02/03/25 2300 02/04/25 0400 02/05/25 0326 02/06/25 0643   Weight: 83.6 kg (184 lb 6.4 oz) 82.7 kg (182 lb 4.8 oz) 80.7 kg (178 lb) 81.3 kg (179 lb 3.2 oz)    02/07/25 0623 02/08/25 0500 02/09/25 0400 02/10/25 0500   Weight: 78.9 kg (174 lb) 78.5 kg (173 lb) 80.9 kg (178 lb 4.8 oz) 78.2 kg (172 lb 6.4 oz)    02/11/25 0304 02/12/25 0150 02/12/25 0151   Weight: 79.9 kg (176 lb 3.2 oz) 82.2 kg (181 lb 4.8 oz) 82.2 kg (181 lb 4.8 oz)       2.  O2 sats > 90% on room air, or at prior home O2 therapy level.      Able to wean O2 this shift to keep sats above 90%?: Yes, satisfactory for discharge.   Does patient use Home O2? No          Current oxygenation status:   SpO2: 99 %     O2 Device: None (Room air),      3.  Tolerates ambulation and mobility near baseline.     Ambulation: Yes, satisfactory for discharge.   Times patient  ambulated with staff this shift: 2    Please review the Heart Failure Care Map for additional HF goal outcomes.    Ngozi Cruz, EFREN  2/12/2025    Problem: Heart Failure  Goal: Optimal Coping  Intervention: Support Psychosocial Response  Recent Flowsheet Documentation  Taken 2/12/2025 0822 by Ngozi Cruz, RN  Family/Support System Care: support provided     Problem: Heart Failure  Goal: Optimal Cardiac Output  Outcome: Progressing     Problem: Heart Failure  Goal: Stable Heart Rate and Rhythm  Outcome: Progressing     Problem: Heart Failure  Goal: Fluid and Electrolyte Balance  Outcome: Progressing   Goal Outcome Evaluation:      Plan of Care Reviewed With: patient    Overall Patient Progress: no changeOverall Patient Progress: no change    Outcome Evaluation: VSS. Output adequate. Awaiting discharge disposition.

## 2025-02-13 NOTE — PROGRESS NOTES
Home Infusion  Received referral from Zainab Gant RNCC for IV Dobutamine.  Benefits verified.  Called and spoke with Jose Enrique to review home infusion services, review benefits and offer choice of providers.  Patient would like to remain in the [facility] system and will use [facility] for home infusion.  Confirmed discharge address, phone, and emergency contact information. Patient denies recent illness (not related to pre-existing conditions) or travel in the house hold. Confirmed allergies. No home health agency has been seeing the patient.     Neal is willing to learn and manage home IV therapy.  Questions answered.  [facility] will continue to follow until discharge and update pt once final orders are determined.    Thank you for the referral    Brett David LPN, Coordinator   [facility]   [email or web address]  Office: [phone]

## 2025-02-13 NOTE — CONSULTS
"39 year old male presenting with HFrEF. CORE consult requested. Jose Enrique is currently admitted with HFrEF.     Jose Enrique was provided with a CHF book.  I reviewed the importance of daily weights, 2 gm sodium diet, 2L fluid restriction and compliance with medications upon hospital discharge.  CORE contact phone numbers provided and patient is encouraged to call with any questions or concerns, including any weight gain or loss of 2 or more pounds in 24 hours or 5 or more pounds in 1 week.      Pt is discharging back to [place] with [facility] infusion managing his Dobutamine. He will Follow-up with [facility] team within one week of discharge;  Zainab to arrange appt.     CMP, BNP, Urine Tox per Dr. Dove faxed to [place] lab; pt will make appt for Monday/Tuesday of next week for lab draw.     Appointment made with Dr. Dove clinic on 25 at 1:45 pm.  I will follow-up with the patient at that time, sooner if needed.  Thank you for the consult.    Bela Small RN   Cardiology Care Coordinator - [facility]  Questions and [phone]  First press #1 for the Invaluable and then press #3 for \"Medical Advise\" to reach us Cardiology Nurses.      "

## 2025-02-13 NOTE — DISCHARGE SUMMARY
"  [facility]   Cardiology II Service / Advanced Heart Failure  Discharge Summary     Jose Enrique Engel MRN# [id number]   YOB: 1985 Age: 39 year old     DATE OF ADMISSION:  2/3/2025  DATE OF DISCHARGE: 2/13/2025  ADMITTING PHYSICIAN: Heather Dove MD  DISCHARGE PHYSICIAN: ***  PRIMARY PROVIDER:  [employer]    ADMIT DIAGNOSES:   Concern for ambulatory cardiogenic shock   Acute decompensated HFrEF (EF 10%) 2/2 NICM s/p ICD AHA stage D, NYHA Class IIIb.   Hx of LV apical thrombus  Hx of polysubstance use  CKD3  Subclinical Hypothyroidism  Deconditioning    DISCHARGE DIAGNOSES:   Ambulatory cardiogenic shock  Acute on chronic systolic heart failure (EF 15%), stage D, class IIIb  Non-ischemic cardiomyopathy  Congestive hepatopathy, resolved   Hx of LV apical thrombus- resolved    NEW MEDICATIONS:   - dobutamine gtt at 5mcg/kg/min  - Restart jardiance 10mg daily  - Entresto 72/78 BID as BP allows ***  - restart digoxin 125mcg daily  - potassium 40 BID  - spironolactone 25mg daily  - Torsemide 60am/40pm    MEDICATIONS TO HOLD OR STOP:   - DOAC, LV thrombus has resolved    FOLLOW-UP:  - Advanced HF Dr. Dove clinic on 2/26/25 at 1:45 pm   - Cardiologist in [place] next week--will work to fit him in  - dressing change for PICC 2/21    PENDING RESULTS:   none    HPI: Please see the detailed H & P from 2/3/2025. Briefly:     \"Jose Enrique Engel is a 39 year old male with HFrEF 2/2 NICM s/p ICD, cardiogenic shock previously on dobutamine, CKD3, and polysubstance use who presents for SOB. Jose Enrique reports dyspnea with exertion, orthopnea, lightheadedness, fatigue, and weight gain for at least the past week.  He has been waking up at night gasping for air.  He reports that when he attempts to ambulate, his legs feel heavy.  He endorses mild abdominal distention and says he retains fluid in his abdomen.  He does not typically have lower extremity edema.  Last Wednesday, he was recommended to increase his " "torsemide to 80 mg daily from 20 mg daily for his symptoms.  However, he does not feel like he is urinating more with the torsemide.  He was previously instructed that if he did not improve, he should go to the ED. Of note, he reports running out of his cardiac medications over the weekend and has not taken them for 3 days now.\"      HOSPITAL COURSE:   40 yo male with PMHx of HFrEF (15%) 2/2 NICM s/p ICD, cardiogenic shock previously on dobutamine, CKD3, polysubstance use admitted 2/3/25 for ambulatory cardiogenic shock. Ultimately discharged on dobutamine gtt at 5mcg/kg/min with [facility] home infusion after agreement pending no missed appointments given history of self-discontinuation. Will work outpatient with heart failure treatment, once he becomes candidate for advanced therapies can readdress.      # Ambulatory cardiogenic shock  # Acute on chronic systolic heart failure (EF 15%), stage D, class IIIb  # Non-ischemic cardiomyopathy  Pt with hx of advanced HFrEF/NICM. Was previously on outpatient dobutamine, self-discontinued in 2023. Re-engaged with outpatient cardiologist Dr. Dove 1/17, also self-enrolled in chem dep treatment in past year. At recent office visit/RHC was concerning for decompensated CHF and possible cardiogenic shock, pt attempted to manage outpatient but felt worse and ultimately presented for admission. Interested in advanced therapies but not candidate yet due to being lost to follow up. Will discharge on home infusion, as he self-discontinued previously, was accepted by [facility] home infusion on conditional terms, including no missed appointments and understands that if he does not follow through he has limited options with end stage HF.   Last RHC 1/29/25: RA 15, PA 44/26, PCWP 21, CI 1.7, PVR 3.4, SVR ~1400  Last TTE 02/2025: EF 5-10%, severely reduced RV Function, mod-severe MR, severe TR  - Volume:Euvolemic.  lbs. Was 184 on admission.   - PO torsemide 60 AM and 40 PM as not " "likely to sick to fluid restriction as OP  - Inotrope: continue dobutamine @ 5mcg/kg/min               - continue PTA digoxin 125 mcg daily  - Afterload Reduction: Increase PTA Entresto 72/78 mg BID, can reduce if blood pressures run low at home ***              GDMT:    - Diuretic: Torsemide 60/40   - ACE/ARB/ARNI: entresto 72/78 mg BID,   - MRA spironolactone 25   - BB- none not while on dobutamine   - SGLT2-  empagliflozin 10 mg daily  - Anticoagulation: discontinued PTA rivaroxaban given resolution of LV thrombus   - Statin: none  - Antiplatelet: none  - Antiarrhythmic: none  - SCD prophylaxis: ICD in place  - Electrolytes: BID monitoring with diuresis, goals K>4, Mag>2  - Strict I/O monitoring, daily weights  - Dietary restrictions: regular diet (pt request), 2L fluids (pt intermittently following)     # Congestive hepatopathy, resolved  On admission AST/ALT in 200s. Bilirubin 4.9. INR 3.5. Pattern and clinical picture most c/w congestive hepatopathy. INR/bili normalized, AST/ALT much improved since admission.     # Hx of LV apical thrombus  Seen on echo in 07/2023 - large (~1.8 cm x 1.8 cm x 1.1 cm), protruding, and predominantly sessile. Not present on TTE this admission.  - Stop PTA rivaroxaban     # Hx of polysubstance use   Currently reporting only MJ use. Reports sobriety from EtOH and meth (last use 06/2024). Follows with local chem dep team. PETH negative, UDS only positive for THC.  - continue to follow up OP     # CKD3  Baseline Cr 1.3-1.6. Cr 1.5 on admission, 0.9 today.  - CTM     # Subclinical hypothyroidism  TSH 8 on admission. Started on synthroid at OSH due to TSH of 9. Technically should treat subclinical hypothyroidism if TSH >10. Synthroid not on his PTA meds list.   - Managed by PCP    PHYSICAL EXAM:  Blood pressure 112/54, pulse 102, temperature 98.2  F (36.8  C), temperature source Oral, resp. rate 20, height 1.803 m (5' 11\"), weight 79.2 kg (174 lb 9.6 oz), SpO2 94%.    General: No acute " distress, resting comfortably in bed. Walking around unit.  HEENT: MMM  Neck: CVP 9  CV: RRR, no murmurs, S3/S4 absent  Lungs: On room air, CTA b/l w/o wheezes or crackles, no increased WOB  Abd: Soft, non-tender, non-distended  Ext: warm/well perfused, no lower extremity edema  Neuro: Awake, alert, conversational, moving all extremities spontaneously throughout examination    LABS:    Recent Labs   Lab Test 02/13/25  0807   WBC 5.3   RBC 5.34   HGB 16.7   HCT 48.6   MCV 91   MCH 31.3   MCHC 34.4   RDW 16.4*          Recent Labs   Lab Test 02/13/25  0807 02/12/25  0209   * 135   POTASSIUM 4.8 4.9   CHLORIDE 98 101   LENARD 9.5 9.3   CO2 25 22   BUN 30.8* 33.7*   CR 0.81 1.23*   * 73   AST  --  50*   ALT  --  78*   BILITOTAL  --  1.2   ALBUMIN  --  3.8   PROTTOTAL  --  7.0   ALKPHOS  --  110       IMAGING:  See EMR    PROCEDURES:  none    CONSULTATIONS:   none    DISCHARGE MEDICATIONS:  Current Discharge Medication List        START taking these medications    Details   DOBUTamine (DOBUTREX) 500 mg in D5W 250 mL infusion Inject 406.5 mcg/min at 12.2 mL/hr into the vein continuously.  Qty: 250 mL, Refills: 99    Associated Diagnoses: Cardiogenic shock (H)           CONTINUE these medications which have NOT CHANGED    Details   albuterol (PROAIR HFA/PROVENTIL HFA/VENTOLIN HFA) 108 (90 Base) MCG/ACT inhaler Inhale 2 puffs into the lungs every 6 hours as needed for shortness of breath, wheezing or cough.      calcium carbonate (OS-LENARD) 1500 (600 Ca) MG tablet Take 1 tablet by mouth daily at 2 pm.      empagliflozin (JARDIANCE) 10 MG TABS tablet Take 10 mg by mouth daily.      ferrous sulfate (FEROSUL) 325 (65 Fe) MG tablet Take 325 mg by mouth daily (with breakfast).      potassium chloride rand ER (KLOR-CON M10) 10 MEQ CR tablet Take 10 mEq by mouth daily.      rivaroxaban ANTICOAGULANT (XARELTO) 20 MG TABS tablet Take 20 mg by mouth daily (with dinner).      sacubitril-valsartan (ENTRESTO) 24-26 MG per  tablet Take 1 tablet by mouth 2 times daily  Qty: 180 tablet, Refills: 3    Associated Diagnoses: Toxic cardiomyopathy      spironolactone (ALDACTONE) 25 MG tablet Take 1 tablet (25 mg) by mouth daily  Qty: 30 tablet, Refills: 1    Comments: Indication is systolic heart failure  Associated Diagnoses: Cardiogenic shock (H); Toxic cardiomyopathy      torsemide (DEMADEX) 20 MG tablet Take 20 mg by mouth daily.      VITAMIN C 250 MG tablet Take 1 tablet by mouth daily.      buPROPion (WELLBUTRIN XL) 300 MG 24 hr tablet Take 1 tablet (300 mg) by mouth daily  Qty: 90 tablet, Refills: 3    Associated Diagnoses: Anxiety      digoxin (LANOXIN) 125 MCG tablet Take 1 tablet (125 mcg) by mouth daily.  Qty: 90 tablet, Refills: 1    Associated Diagnoses: Stage 3 chronic kidney disease, unspecified whether stage 3a or 3b CKD (H)           STOP taking these medications       potassium chloride ER (MICRO-K) 10 MEQ CR capsule Comments:   Reason for Stopping:               DISCHARGE DISPOSITION: Jose Enrique Engel will discharge to home with home infusion in stable condition.     DISCHARGE INSTRUCTIONS:  Discharge Procedure Orders   Home Infusion Referral   Referral Priority: Routine: Next available opening Referral Type: Consultation   Number of Visits Requested: 30       Patient was seen and plan of care discussed with attending Dr. Ogden *** on day of discharge. Patient understood and agreed with above plan of care. All questions answered.      Sissy Limon DO   Internal Medicine PGY3  [facility]  Please use on call [employer] for paging

## 2025-02-13 NOTE — DISCHARGE INSTRUCTIONS
" Outpatient Infusion Center @ [address] [place] [address]  Ph: [phone]  Fax: [phone]  -- for weekly PICC dressing changes to begin on Friday, 2/21/25 or whatever day they can get you in. Pt is aware that they will call him and he verbally agreed to go EVERY week.  RN changed his dressing on 2/12/25.  ---Jose Enrique--they will call you with start date  ________________________________________________________________________________________________    [facility]  [address] [place] [address]., SHAH[place] Rios [address]  [phone]  Fax: [phone]  Jose Enrique--I asked for an appointment the week of 2/17/25  They will call you with the date and time  You might have to go to the clinic and wait for them to \"fit\" you in  "

## 2025-02-14 ENCOUNTER — HOME INFUSION BILLING (OUTPATIENT)
Dept: HOME HEALTH SERVICES | Facility: HOME HEALTH | Age: 40
End: 2025-02-14
Payer: COMMERCIAL

## 2025-02-14 VITALS
OXYGEN SATURATION: 97 % | BODY MASS INDEX: 24.47 KG/M2 | WEIGHT: 174.8 LBS | TEMPERATURE: 97.7 F | HEART RATE: 95 BPM | HEIGHT: 71 IN | SYSTOLIC BLOOD PRESSURE: 108 MMHG | RESPIRATION RATE: 18 BRPM | DIASTOLIC BLOOD PRESSURE: 60 MMHG

## 2025-02-14 PROBLEM — G47.09 OTHER INSOMNIA: Status: ACTIVE | Noted: 2025-02-14

## 2025-02-14 LAB
ANION GAP SERPL CALCULATED.3IONS-SCNC: 13 MMOL/L (ref 7–15)
BASE EXCESS BLDV CALC-SCNC: 2.7 MMOL/L (ref -3–3)
BUN SERPL-MCNC: 35.7 MG/DL (ref 6–20)
CALCIUM SERPL-MCNC: 9.4 MG/DL (ref 8.8–10.4)
CHLORIDE SERPL-SCNC: 98 MMOL/L (ref 98–107)
CREAT SERPL-MCNC: 0.82 MG/DL (ref 0.67–1.17)
EGFRCR SERPLBLD CKD-EPI 2021: >90 ML/MIN/1.73M2
ERYTHROCYTE [DISTWIDTH] IN BLOOD BY AUTOMATED COUNT: 16.2 % (ref 10–15)
GLUCOSE SERPL-MCNC: 135 MG/DL (ref 70–99)
HCO3 BLDV-SCNC: 29 MMOL/L (ref 21–28)
HCO3 SERPL-SCNC: 23 MMOL/L (ref 22–29)
HCT VFR BLD AUTO: 49.6 % (ref 40–53)
HGB BLD-MCNC: 16.4 G/DL (ref 13.3–17.7)
MAGNESIUM SERPL-MCNC: 2.4 MG/DL (ref 1.7–2.3)
MCH RBC QN AUTO: 30.8 PG (ref 26.5–33)
MCHC RBC AUTO-ENTMCNC: 33.1 G/DL (ref 31.5–36.5)
MCV RBC AUTO: 93 FL (ref 78–100)
O2/TOTAL GAS SETTING VFR VENT: 94 %
OXYHGB MFR BLDV: 60 % (ref 70–75)
PCO2 BLDV: 47 MM HG (ref 40–50)
PH BLDV: 7.39 [PH] (ref 7.32–7.43)
PLATELET # BLD AUTO: 202 10E3/UL (ref 150–450)
PO2 BLDV: 34 MM HG (ref 25–47)
POTASSIUM SERPL-SCNC: 5.1 MMOL/L (ref 3.4–5.3)
RBC # BLD AUTO: 5.32 10E6/UL (ref 4.4–5.9)
SAO2 % BLDV: 60.8 % (ref 70–75)
SODIUM SERPL-SCNC: 134 MMOL/L (ref 135–145)
WBC # BLD AUTO: 5.2 10E3/UL (ref 4–11)

## 2025-02-14 PROCEDURE — 99239 HOSP IP/OBS DSCHRG MGMT >30: CPT | Mod: GC | Performed by: INTERNAL MEDICINE

## 2025-02-14 PROCEDURE — 250N000013 HC RX MED GY IP 250 OP 250 PS 637

## 2025-02-14 PROCEDURE — 83735 ASSAY OF MAGNESIUM: CPT

## 2025-02-14 PROCEDURE — E1399 DURABLE MEDICAL EQUIPMENT MI: HCPCS

## 2025-02-14 PROCEDURE — A4245 ALCOHOL WIPES PER BOX: HCPCS

## 2025-02-14 PROCEDURE — 82374 ASSAY BLOOD CARBON DIOXIDE: CPT

## 2025-02-14 PROCEDURE — 85014 HEMATOCRIT: CPT

## 2025-02-14 PROCEDURE — 85048 AUTOMATED LEUKOCYTE COUNT: CPT

## 2025-02-14 PROCEDURE — 82805 BLOOD GASES W/O2 SATURATION: CPT

## 2025-02-14 PROCEDURE — 250N000011 HC RX IP 250 OP 636

## 2025-02-14 PROCEDURE — 80048 BASIC METABOLIC PNL TOTAL CA: CPT

## 2025-02-14 PROCEDURE — 250N000013 HC RX MED GY IP 250 OP 250 PS 637: Performed by: STUDENT IN AN ORGANIZED HEALTH CARE EDUCATION/TRAINING PROGRAM

## 2025-02-14 PROCEDURE — A9270 NON-COVERED ITEM OR SERVICE: HCPCS

## 2025-02-14 PROCEDURE — S9348 HIT SYMPATHOMIM DIEM: HCPCS

## 2025-02-14 PROCEDURE — E0781 EXTERNAL AMBULATORY INFUS PU: HCPCS | Mod: RR

## 2025-02-14 PROCEDURE — A4452 WATERPROOF TAPE: HCPCS

## 2025-02-14 RX ORDER — SACUBITRIL AND VALSARTAN 49; 51 MG/1; MG/1
1 TABLET, FILM COATED ORAL 2 TIMES DAILY
Status: DISCONTINUED | OUTPATIENT
Start: 2025-02-14 | End: 2025-02-14 | Stop reason: HOSPADM

## 2025-02-14 RX ORDER — DIGOXIN 125 MCG
125 TABLET ORAL DAILY
Qty: 90 TABLET | Refills: 1 | Status: SHIPPED | OUTPATIENT
Start: 2025-02-14

## 2025-02-14 RX ORDER — TORSEMIDE 20 MG/1
TABLET ORAL
Qty: 150 TABLET | Refills: 1 | Status: SHIPPED | OUTPATIENT
Start: 2025-02-14 | End: 2025-02-26

## 2025-02-14 RX ORDER — ZOLPIDEM TARTRATE 5 MG/1
2.5 TABLET ORAL
Qty: 4 TABLET | Refills: 0 | Status: SHIPPED | OUTPATIENT
Start: 2025-02-14

## 2025-02-14 RX ORDER — SPIRONOLACTONE 25 MG/1
25 TABLET ORAL DAILY
Qty: 30 TABLET | Refills: 1 | Status: SHIPPED | OUTPATIENT
Start: 2025-02-14 | End: 2025-02-26

## 2025-02-14 RX ORDER — SACUBITRIL AND VALSARTAN 49; 51 MG/1; MG/1
1 TABLET, FILM COATED ORAL 2 TIMES DAILY
Qty: 60 TABLET | Refills: 1 | Status: SHIPPED | OUTPATIENT
Start: 2025-02-14 | End: 2025-02-26

## 2025-02-14 RX ADMIN — FERROUS SULFATE TAB 325 MG (65 MG ELEMENTAL FE) 325 MG: 325 (65 FE) TAB at 08:27

## 2025-02-14 RX ADMIN — DIGOXIN 125 MCG: 125 TABLET ORAL at 08:28

## 2025-02-14 RX ADMIN — POTASSIUM CHLORIDE 40 MEQ: 750 CAPSULE, EXTENDED RELEASE ORAL at 08:28

## 2025-02-14 RX ADMIN — SACUBITRIL AND VALSARTAN 1 TABLET: 49; 51 TABLET, FILM COATED ORAL at 08:28

## 2025-02-14 RX ADMIN — ENOXAPARIN SODIUM 40 MG: 40 INJECTION SUBCUTANEOUS at 12:26

## 2025-02-14 RX ADMIN — EMPAGLIFLOZIN 10 MG: 10 TABLET, FILM COATED ORAL at 08:27

## 2025-02-14 RX ADMIN — SPIRONOLACTONE 25 MG: 25 TABLET, FILM COATED ORAL at 08:27

## 2025-02-14 RX ADMIN — TORSEMIDE 60 MG: 20 TABLET ORAL at 08:26

## 2025-02-14 ASSESSMENT — ACTIVITIES OF DAILY LIVING (ADL)
ADLS_ACUITY_SCORE: 35

## 2025-02-14 NOTE — PLAN OF CARE
Hours of Care: 3056-8823    Neuro: A&O x4, denied pain and dizziness. Call light appropriate.  Respiratory:  On RA >92%, lung sounds WDL.  Cardiac: WDL heart sounds noted. Radial pulse irregular at times. CVP at 8.  GI: Denied nausea, bowel sounds audible. Last BM 1/13.  : Had good urine output, see I&O flowsheet. Uses urinal at bedside.  Skin: See PCS for assessment and treatment of wounds and surgical incisions.   VS: In Ventricular Trigeminy w/ BBB and oPVCs rhythm with HR 100s-110s, -120s, SaO2 >90% on RA.    Drips:  Dobutamine gtt continued at 5 mcg/kg/min in purple lumen.    IV Access: R x2 lumen PICC. Purple infusing dobutamine and red is saline locked.  Electrolytes: On magnesium and potassium protocols.  Pain: Denied pain throughout shift.  Mobility: Ambulated in room and out of room independently, was steady on his feet.    Events: DND from 9574-0604. Dobutamine bag changed at 2300 running at 5 mcg/kg/min and  changed. CVP done at 2300 due to patient request and DND.     Plan:  Continue to monitor pain, VS, heart rhythm, fluid status, bowel status, cardiac and respiratory status.  Notify care team of changes in patient condition or other concerns.  Prepare for discharge today. Patient discharge to outpatient infusion center at [facility] for weekly PICC dressing changes. [facility] [place] home infusion is coming to teach approximately 3:30 pm today and bedside hookup. rFances is coming for education purposes and is RN.      Goal Outcome Evaluation:      Plan of Care Reviewed With: patient    Overall Patient Progress: no changeOverall Patient Progress: no change    Problem: Adult Inpatient Plan of Care  Goal: Plan of Care Review  Description: The Plan of Care Review/Shift note should be completed every shift.  The Outcome Evaluation is a brief statement about your assessment that the patient is improving, declining, or no change.  This information will be displayed automatically on your  "shift  note.  Outcome: Progressing  Flowsheets (Taken 2/14/2025 0351)  Plan of Care Reviewed With: patient  Overall Patient Progress: no change  Goal: Patient-Specific Goal (Individualized)  Description: You can add care plan individualizations to a care plan. Examples of Individualization might be:  \"Parent requests to be called daily at 9am for status\", \"I have a hard time hearing out of my right ear\", or \"Do not touch me to wake me up as it startles  me\".  Outcome: Progressing  Goal: Absence of Hospital-Acquired Illness or Injury  Outcome: Progressing  Intervention: Identify and Manage Fall Risk  Flowsheets  Taken 2/14/2025 0351  Safety Promotion/Fall Prevention:   assistive device/personal items within reach   clutter free environment maintained   increased rounding and observation   increase visualization of patient   nonskid shoes/slippers when out of bed   safety round/check completed  Taken 2/13/2025 2000  Safety Promotion/Fall Prevention:   assistive device/personal items within reach   clutter free environment maintained   increased rounding and observation   increase visualization of patient   nonskid shoes/slippers when out of bed   safety round/check completed  Intervention: Prevent Skin Injury  Recent Flowsheet Documentation  Taken 2/14/2025 0400 by Yasmine Zhao, RN  Body Position: position changed independently  2/14/2025 0351 by Yasmine Zhao RN  Flowsheets  Taken 2/14/2025 0351  Body Position: position changed independently  Skin Protection: adhesive use limited  Taken 2/13/2025 2300  Body Position: position changed independently  Taken 2/13/2025 2200  Body Position: position changed independently  Taken 2/13/2025 2000  Body Position: position changed independently  Skin Protection: adhesive use limited  Taken 2/13/2025 1947  Body Position: position changed independently  Intervention: Prevent Infection  Flowsheets  Taken 2/14/2025 0351  Infection Prevention:   cohorting utilized   environmental " surveillance performed   equipment surfaces disinfected   hand hygiene promoted   rest/sleep promoted   single patient room provided  Taken 2/13/2025 2000  Infection Prevention:   cohorting utilized   environmental surveillance performed   equipment surfaces disinfected   hand hygiene promoted   rest/sleep promoted   single patient room provided  Goal: Readiness for Transition of Care  Outcome: Progressing     Problem: Heart Failure  Goal: Optimal Coping  Outcome: Progressing  Intervention: Support Psychosocial Response  Flowsheets  Taken 2/14/2025 0351  Family/Support System Care: self-care encouraged  Taken 2/13/2025 2000  Family/Support System Care: self-care encouraged  Goal: Optimal Cardiac Output  Outcome: Progressing  Intervention: Optimize Cardiac Output  Flowsheets  Taken 2/14/2025 0351  Environmental Support:   calm environment promoted   distractions minimized   environmental consistency promoted   rest periods encouraged  Taken 2/13/2025 2000  Environmental Support:   calm environment promoted   distractions minimized   environmental consistency promoted   rest periods encouraged  Goal: Stable Heart Rate and Rhythm  Outcome: Progressing  Goal: Fluid and Electrolyte Balance  Outcome: Progressing  Intervention: Monitor and Manage Fluid and Electrolyte Balance  Flowsheets  Taken 2/14/2025 0351  Fluid/Electrolyte Management: fluids provided  Taken 2/13/2025 2000  Fluid/Electrolyte Management: fluids provided  Goal: Optimal Functional Ability  Outcome: Progressing  Intervention: Optimize Functional Ability  Recent Flowsheet Documentation  Taken 2/14/2025 0400 by Yasmine Zhao, RN  Activity Management: activity adjusted per tolerance  2/14/2025 0351 by Yasmine Zhao, RN  Flowsheets  Taken 2/14/2025 0351  Self-Care Promotion: independence encouraged  Activity Management: activity adjusted per tolerance  Taken 2/13/2025 2300  Self-Care Promotion: independence encouraged  Taken 2/13/2025 2000  Self-Care  Promotion: independence encouraged  Activity Management: activity adjusted per tolerance  Taken 2/13/2025 1947  Activity Management: activity adjusted per tolerance  Goal: Improved Oral Intake  Outcome: Progressing  Goal: Effective Oxygenation and Ventilation  Outcome: Progressing  Intervention: Promote Airway Secretion Clearance  Recent Flowsheet Documentation  Taken 2/14/2025 0400 by Yasmine Zhao RN  Activity Management: activity adjusted per tolerance  2/14/2025 0351 by Yasmine Zhao RN  Flowsheets  Taken 2/14/2025 0351  Cough And Deep Breathing: done independently per patient  Activity Management: activity adjusted per tolerance  Taken 2/13/2025 2000  Cough And Deep Breathing: done independently per patient  Activity Management: activity adjusted per tolerance  Taken 2/13/2025 1947  Activity Management: activity adjusted per tolerance  Intervention: Optimize Oxygenation and Ventilation  Recent Flowsheet Documentation  Taken 2/14/2025 0400 by Yasmine Zhao RN  Head of Bed (HOB) Positioning: HOB at 30 degrees  2/14/2025 0351 by Yasmine Zhao RN  Flowsheets  Taken 2/14/2025 0351  Airway/Ventilation Management: airway patency maintained  Head of Bed (HOB) Positioning: HOB at 30 degrees  Taken 2/13/2025 2300  Head of Bed (HOB) Positioning: HOB at 30 degrees  Taken 2/13/2025 2200  Head of Bed (HOB) Positioning: HOB at 30-45 degrees  Taken 2/13/2025 2000  Head of Bed (HOB) Positioning: HOB at 30-45 degrees  Taken 2/13/2025 1947  Head of Bed (HOB) Positioning: HOB at 30-45 degrees  Goal: Effective Breathing Pattern During Sleep  Outcome: Progressing  Intervention: Monitor and Manage Obstructive Sleep Apnea  Flowsheets  Taken 2/14/2025 0351  Medication Review/Management: medications reviewed  Taken 2/13/2025 2000  Medication Review/Management: medications reviewed

## 2025-02-14 NOTE — PROGRESS NOTES
Home Infusion    Jose Enrique is discharging today and going home on continuous dobutamine therapy.   He requires a hook up of home medication and pump prior to discharge.    Met with him and his parents at bedside once supplies arrived.  Provided hook up of dobutamine after reviewing pump settings and verifying with orders while patient observed.   Instructed him to never flush purple lumen .  Patient has been on service with us before for IV dobutamine. He demonstrated how to hook up the pump and medication.     Jose Enrique verbalized understanding of information given.      His home dobutamine infusion is running and he is ready for discharge from [facility] perspective.      Jaci Savage RN, BSN, B.S., [facility]  [facility], [address] [address]  [email or web address]

## 2025-02-14 NOTE — PLAN OF CARE
DISCHARGE                         2/14/2025  4:24 PM  ----------------------------------------------------------------------------  Discharged to: Home  Via: private transportation   Accompanied by: Family, with his mother and father   Discharge Instructions: diet, activity, medications, follow up appointments, when to call the MD, aftercare instructions.  Prescriptions: To be filled by [place] out patient pharmacy; medication list reviewed & sent with pt  Follow Up Appointments: arranged; information given  Belongings: All sent with pt  IV: d/c'd  Telemetry: d/c'd  Pt and his family  exhibits understanding of above discharge instructions; all questions answered.    Discharge Paperwork: Signed, copied, and sent home with patient. Home infusion nurse came at bedside and switched pt to home pump.

## 2025-02-15 ENCOUNTER — PATIENT OUTREACH (OUTPATIENT)
Dept: CARE COORDINATION | Facility: CLINIC | Age: 40
End: 2025-02-15
Payer: COMMERCIAL

## 2025-02-15 PROCEDURE — S9348 HIT SYMPATHOMIM DIEM: HCPCS

## 2025-02-15 NOTE — PROGRESS NOTES
[facility]: Transitions of Care Outreach  Chief Complaint   Patient presents with    Clinic Care Coordination - Post Hospital       Most Recent Admission Date: 2/3/2025   Most Recent Admission Diagnosis: Heart failure (H) - I50.9     Most Recent Discharge Date: 2/14/2025   Most Recent Discharge Diagnosis: Heart failure (H) - I50.9  PICC (peripherally inserted central catheter) in place - Z45.2  Chronic heart failure with reduced ejection fraction and diastolic dysfunction (H) - I50.42  Stage 3 chronic kidney disease, unspecified whether stage 3a or 3b CKD (H) - N18.30  Cardiogenic shock (H) - R57.0  Toxic cardiomyopathy - I42.7  Other insomnia - G47.09     Transitions of Care Assessment    Discharge Assessment  How are you doing now that you are home?: I am doing okay. I weighed myself this morning and I was at 171.4. Pt said he asked one of the providers if they could send him home with sleep medication. Pt said he has taken this in the past but it was not on his medication list. Pt plans on reaching out to pharmacy or provider to get this filled.  How are your symptoms? (Red Flag symptoms escalate to triage hotline per guidelines): Improved  Do you know how to contact your clinic care team if you have future questions or changes to your health status? : Yes  Does the patient have their discharge instructions? : Yes  Does the patient have questions regarding their discharge instructions? : No  Were you started on any new medications or were there changes to any of your previous medications? : Yes  Does the patient have all of their medications?: Yes (Pt is on his way to  his medications.)  Do you have questions regarding any of your medications? : No                  Follow up Plan     Discharge Follow-Up  Discharge follow up appointment scheduled in alignment with recommended follow up timeframe or Transitions of Risk Category? (Low = within 30 days; Moderate= within 14 days; High=  within 7 days): Yes  Discharge Follow Up Appointment Date: 02/26/25  Discharge Follow Up Appointment Scheduled with?: Specialty Care Provider    Future Appointments   Date Time Provider Department Center   2/17/2025  8:30 AM Krystyna Fajardo OTR [facility]   2/26/2025  1:00 PM  LAB [facility]   2/26/2025  1:45 PM Heather Dove MD [facility]       Outpatient Plan as outlined on AVS reviewed with patient.    For any urgent concerns, please contact our 24 hour nurse triage line: [phone] ([phone])       BROCK Walker  [phone]  [facility]

## 2025-02-16 PROCEDURE — S9348 HIT SYMPATHOMIM DIEM: HCPCS

## 2025-02-17 ENCOUNTER — HOME INFUSION BILLING (OUTPATIENT)
Dept: HOME HEALTH SERVICES | Facility: HOME HEALTH | Age: 40
End: 2025-02-17
Payer: COMMERCIAL

## 2025-02-17 ENCOUNTER — TELEPHONE (OUTPATIENT)
Dept: CARDIOLOGY | Facility: CLINIC | Age: 40
End: 2025-02-17
Payer: COMMERCIAL

## 2025-02-17 ENCOUNTER — CARE COORDINATION (OUTPATIENT)
Dept: CARDIOLOGY | Facility: CLINIC | Age: 40
End: 2025-02-17
Payer: COMMERCIAL

## 2025-02-17 PROCEDURE — A9270 NON-COVERED ITEM OR SERVICE: HCPCS

## 2025-02-17 PROCEDURE — E1399 DURABLE MEDICAL EQUIPMENT MI: HCPCS

## 2025-02-17 PROCEDURE — S9348 HIT SYMPATHOMIM DIEM: HCPCS

## 2025-02-17 NOTE — TELEPHONE ENCOUNTER
[facility] Call Center    Phone Message    May a detailed message be left on voicemail: yes     Reason for Call: Other: Calling regarding lab results, also would like a refill on the [facility].   [id number] - [place], [place] - [address]    Action Taken:  Cardiology     Travel Screening: Not Applicable     Date of Service:

## 2025-02-17 NOTE — PLAN OF CARE
Occupational Therapy Discharge Summary    Reason for therapy discharge:    Discharged to home with outpatient therapy.    Progress towards therapy goal(s). See goals on Care Plan in [facility] electronic health record for goal details.  Goals partially met.  Barriers to achieving goals:   discharge from facility.    Therapy recommendation(s):    Continued therapy is recommended.  Rationale/Recommendations:  recommend continued OP cardiac rehab to progress functional endurance with appropriate cardiovascular response during ADL/IADL routines.

## 2025-02-17 NOTE — PROGRESS NOTES
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 Ultimately discharged on dobutamine gtt at 5mcg/kg/min with [facility] after agreement pending no missed appointments given history of self-discontinuation. Will work outpatient with heart failure treatment, once he becomes candidate for advanced therapies can readdress.      Pt with hx of advanced HFrEF/NICM. Was previously on outpatient dobutamine, self-discontinued in 2023. Re-engaged with outpatient cardiologist Dr. Dove 1/17, also self-enrolled in chem dep treatment in past year. At recent office visit/[facility] was concerning for decompensated CHF and possible cardiogenic shock, pt attempted to manage outpatient but felt worse and ultimately presented for admission. Interested in advanced therapies but not candidate yet due to being lost to follow up. Will discharge on home infusion, as he self-discontinued previously, was accepted by [facility] on conditional terms, including no missed appointments and understands that if he does not follow through he has limited options with end stage HF.     Post Discharge Assessment:  10:59 AM  called Jose Enrique to check in and remind him to get labs. Left message with this. Asked for call back.   2:36 PM Jose Enrique returned call. He tells me he is on his way to get his labs done now. He will also  the rest of his rx which he has been unable to get yet as his pharmacy was closed on Saturday and Sunday. He states he has been taking entresto, torsemide potassium and jardiance. He requests the ambien prescription be sent through to his local pharmacy as he didn't get it at the discharge pharmacy.  (Called discharge pharmacy who confirms Jose Enrique picked up Entresto and Torsemide at discharge but he did NOT  4 tablets of ambien).     Jose Enrique states he is doing pretty good. He states it is going well with the dobutamine, he is changing his bags himself and feels it is going well. He tells me he has dressing change scheduled for Friday at 1pm at [facility].     He tells me his weight today is 172lb, yesterday morning was 170lb.     Medication Review:  I have reviewed all Jose Enrique's medications per the discharge summary with particular attention to any medication changes, including discontinued and new medications.     Follow-Up Plan:   Labs today 02/17/25 at local clinic. Follow up at [facility] on 2/26. Confirmed that with Jose Enrique at this time.   Future Appointments   Date Time Provider Department [place]   2/26/2025  1:00 PM  LAB [facility]   2/26/2025  1:45 PM Heather Dove MD [facility]      I reviewed the above appointments with Jose Enrique and am assisting in scheduling additional appointments needed per the discharge summary above.     Subspecialty Assessments:         I reviewed the Cardiology clinic's phone number ([phone] Option #1 and after hours on-call number [phone] #4 and ask for the On-Call Cardiologist) and have advised Jose Enrique to contact us with changes or worsening of symptoms, additional questions about medications and appt scheduling needs.   Jose Enrique verbalizes understanding and agrees with  plan of care.

## 2025-02-18 PROCEDURE — S9348 HIT SYMPATHOMIM DIEM: HCPCS

## 2025-02-18 NOTE — TELEPHONE ENCOUNTER
Rx for Ambien called to local pharmacy.  Called Jose Enrique. Left message letting him know above and letting him know labs are stable. Will have provider review and call him back with any additional changes.

## 2025-02-19 ENCOUNTER — CARE COORDINATION (OUTPATIENT)
Dept: CARDIOLOGY | Facility: CLINIC | Age: 40
End: 2025-02-19
Payer: COMMERCIAL

## 2025-02-19 PROCEDURE — S9348 HIT SYMPATHOMIM DIEM: HCPCS

## 2025-02-19 NOTE — PROGRESS NOTES
Called Jose Enrique. He hasn't been able to  the ambien yet as he states he was told it is too soon - he can pick it up tomorrow.  He states he had a bad night of sleep last night. REports he is just restless, thinks in part due to him not having a good schedule.     He states he was having some 'struggling to breathe' overnight when he was so restless. He states it did get better when he was upright.  Weight is 170lb, no swelling, no bloating.    Reviewed with dr dueñas - no changes for now as no swelling and weight stable.   Will plan for RN call in 2 days and in clinic next week as already scheduled.

## 2025-02-20 ENCOUNTER — HOME INFUSION BILLING (OUTPATIENT)
Dept: HOME HEALTH SERVICES | Facility: HOME HEALTH | Age: 40
End: 2025-02-20
Payer: COMMERCIAL

## 2025-02-20 PROCEDURE — S9348 HIT SYMPATHOMIM DIEM: HCPCS

## 2025-02-21 PROCEDURE — S9348 HIT SYMPATHOMIM DIEM: HCPCS

## 2025-02-22 PROCEDURE — S9348 HIT SYMPATHOMIM DIEM: HCPCS

## 2025-02-23 PROCEDURE — S9348 HIT SYMPATHOMIM DIEM: HCPCS

## 2025-02-24 ENCOUNTER — HOME INFUSION BILLING (OUTPATIENT)
Dept: HOME HEALTH SERVICES | Facility: HOME HEALTH | Age: 40
End: 2025-02-24
Payer: COMMERCIAL

## 2025-02-24 PROCEDURE — A9270 NON-COVERED ITEM OR SERVICE: HCPCS

## 2025-02-24 PROCEDURE — S9348 HIT SYMPATHOMIM DIEM: HCPCS

## 2025-02-24 PROCEDURE — E1399 DURABLE MEDICAL EQUIPMENT MI: HCPCS

## 2025-02-25 PROCEDURE — S9348 HIT SYMPATHOMIM DIEM: HCPCS
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
## 2025-02-26 ENCOUNTER — CARE COORDINATION (OUTPATIENT)
Dept: CARDIOLOGY | Facility: CLINIC | Age: 40
End: 2025-02-26

## 2025-02-26 ENCOUNTER — LAB (OUTPATIENT)
Dept: LAB | Facility: CLINIC | Age: 40
End: 2025-02-26
Payer: COMMERCIAL

## 2025-02-26 ENCOUNTER — REFERRAL (OUTPATIENT)
Dept: TRANSPLANT | Facility: CLINIC | Age: 40
End: 2025-02-26

## 2025-02-26 ENCOUNTER — INFUSION THERAPY VISIT (OUTPATIENT)
Dept: INFUSION THERAPY | Facility: CLINIC | Age: 40
End: 2025-02-26
Attending: STUDENT IN AN ORGANIZED HEALTH CARE EDUCATION/TRAINING PROGRAM
Payer: COMMERCIAL

## 2025-02-26 ENCOUNTER — OFFICE VISIT (OUTPATIENT)
Dept: CARDIOLOGY | Facility: CLINIC | Age: 40
End: 2025-02-26
Attending: STUDENT IN AN ORGANIZED HEALTH CARE EDUCATION/TRAINING PROGRAM
Payer: COMMERCIAL

## 2025-02-26 VITALS
OXYGEN SATURATION: 97 % | SYSTOLIC BLOOD PRESSURE: 96 MMHG | DIASTOLIC BLOOD PRESSURE: 68 MMHG | BODY MASS INDEX: 24.84 KG/M2 | WEIGHT: 178 LBS | HEART RATE: 87 BPM

## 2025-02-26 DIAGNOSIS — N18.30 STAGE 3 CHRONIC KIDNEY DISEASE, UNSPECIFIED WHETHER STAGE 3A OR 3B CKD (H): ICD-10-CM

## 2025-02-26 DIAGNOSIS — I10 BENIGN ESSENTIAL HYPERTENSION: ICD-10-CM

## 2025-02-26 DIAGNOSIS — I50.84 END STAGE HEART FAILURE (H): Primary | ICD-10-CM

## 2025-02-26 DIAGNOSIS — I42.8 NONISCHEMIC CARDIOMYOPATHY (H): ICD-10-CM

## 2025-02-26 DIAGNOSIS — Z79.899 RECEIVING INOTROPIC MEDICATION: ICD-10-CM

## 2025-02-26 DIAGNOSIS — I50.23 ACUTE ON CHRONIC SYSTOLIC HEART FAILURE (H): ICD-10-CM

## 2025-02-26 DIAGNOSIS — I50.42 CHRONIC HEART FAILURE WITH REDUCED EJECTION FRACTION AND DIASTOLIC DYSFUNCTION (H): ICD-10-CM

## 2025-02-26 DIAGNOSIS — I50.42 CHRONIC HEART FAILURE WITH REDUCED EJECTION FRACTION AND DIASTOLIC DYSFUNCTION (H): Primary | ICD-10-CM

## 2025-02-26 DIAGNOSIS — F19.10 POLYSUBSTANCE ABUSE (H): ICD-10-CM

## 2025-02-26 DIAGNOSIS — R57.0 CARDIOGENIC SHOCK (H): ICD-10-CM

## 2025-02-26 LAB
ALBUMIN SERPL BCG-MCNC: 4.5 G/DL (ref 3.5–5.2)
ALP SERPL-CCNC: 115 U/L (ref 40–150)
ALT SERPL W P-5'-P-CCNC: 41 U/L (ref 0–70)
AMPHETAMINES UR QL SCN: NORMAL
ANION GAP SERPL CALCULATED.3IONS-SCNC: 14 MMOL/L (ref 7–15)
AST SERPL W P-5'-P-CCNC: 38 U/L (ref 0–45)
BARBITURATES UR QL SCN: NORMAL
BENZODIAZ UR QL SCN: NORMAL
BILIRUB SERPL-MCNC: 0.9 MG/DL
BUN SERPL-MCNC: 45.5 MG/DL (ref 6–20)
BZE UR QL SCN: NORMAL
CALCIUM SERPL-MCNC: 10 MG/DL (ref 8.8–10.4)
CANNABINOIDS UR QL SCN: NORMAL
CHLORIDE SERPL-SCNC: 97 MMOL/L (ref 98–107)
CREAT SERPL-MCNC: 1.38 MG/DL (ref 0.67–1.17)
DIGOXIN SERPL-MCNC: <0.4 NG/ML (ref 0.6–1.2)
EGFRCR SERPLBLD CKD-EPI 2021: 67 ML/MIN/1.73M2
FENTANYL UR QL: NORMAL
GLUCOSE SERPL-MCNC: 74 MG/DL (ref 70–99)
HCO3 SERPL-SCNC: 25 MMOL/L (ref 22–29)
NT-PROBNP SERPL-MCNC: 2286 PG/ML (ref 0–450)
OPIATES UR QL SCN: NORMAL
PCP QUAL URINE (ROCHE): NORMAL
POTASSIUM SERPL-SCNC: 4.1 MMOL/L (ref 3.4–5.3)
PROT SERPL-MCNC: 8.5 G/DL (ref 6.4–8.3)
SODIUM SERPL-SCNC: 136 MMOL/L (ref 135–145)

## 2025-02-26 PROCEDURE — 83880 ASSAY OF NATRIURETIC PEPTIDE: CPT | Performed by: PATHOLOGY

## 2025-02-26 PROCEDURE — G0463 HOSPITAL OUTPT CLINIC VISIT: HCPCS | Performed by: STUDENT IN AN ORGANIZED HEALTH CARE EDUCATION/TRAINING PROGRAM

## 2025-02-26 PROCEDURE — 36415 COLL VENOUS BLD VENIPUNCTURE: CPT | Performed by: PATHOLOGY

## 2025-02-26 PROCEDURE — 80307 DRUG TEST PRSMV CHEM ANLYZR: CPT | Performed by: STUDENT IN AN ORGANIZED HEALTH CARE EDUCATION/TRAINING PROGRAM

## 2025-02-26 PROCEDURE — 99000 SPECIMEN HANDLING OFFICE-LAB: CPT | Performed by: PATHOLOGY

## 2025-02-26 PROCEDURE — 80162 ASSAY OF DIGOXIN TOTAL: CPT | Performed by: STUDENT IN AN ORGANIZED HEALTH CARE EDUCATION/TRAINING PROGRAM

## 2025-02-26 PROCEDURE — S9348 HIT SYMPATHOMIM DIEM: HCPCS

## 2025-02-26 PROCEDURE — 80053 COMPREHEN METABOLIC PANEL: CPT | Performed by: PATHOLOGY

## 2025-02-26 RX ORDER — HEPARIN SODIUM (PORCINE) LOCK FLUSH IV SOLN 100 UNIT/ML 100 UNIT/ML
5 SOLUTION INTRAVENOUS
OUTPATIENT
Start: 2025-02-26

## 2025-02-26 RX ORDER — HEPARIN SODIUM (PORCINE) LOCK FLUSH IV SOLN 100 UNIT/ML 100 UNIT/ML
5 SOLUTION INTRAVENOUS
OUTPATIENT
Start: 2025-03-05

## 2025-02-26 RX ORDER — TORSEMIDE 20 MG/1
TABLET ORAL
Qty: 450 TABLET | Refills: 1 | Status: SHIPPED | OUTPATIENT
Start: 2025-02-26

## 2025-02-26 RX ORDER — SPIRONOLACTONE 25 MG/1
25 TABLET ORAL DAILY
Qty: 90 TABLET | Refills: 1 | Status: SHIPPED | OUTPATIENT
Start: 2025-02-26

## 2025-02-26 RX ORDER — HEPARIN SODIUM,PORCINE 10 UNIT/ML
5-20 VIAL (ML) INTRAVENOUS DAILY PRN
Status: CANCELLED | OUTPATIENT
Start: 2025-02-26

## 2025-02-26 RX ORDER — SACUBITRIL AND VALSARTAN 49; 51 MG/1; MG/1
1 TABLET, FILM COATED ORAL 2 TIMES DAILY
Qty: 180 TABLET | Refills: 2 | Status: SHIPPED | OUTPATIENT
Start: 2025-02-26

## 2025-02-26 RX ORDER — HEPARIN SODIUM,PORCINE 10 UNIT/ML
5-20 VIAL (ML) INTRAVENOUS DAILY PRN
OUTPATIENT
Start: 2025-03-05

## 2025-02-26 RX ORDER — HEPARIN SODIUM,PORCINE 10 UNIT/ML
5-20 VIAL (ML) INTRAVENOUS DAILY PRN
Status: DISCONTINUED | OUTPATIENT
Start: 2025-02-26 | End: 2025-02-26 | Stop reason: HOSPADM

## 2025-02-26 ASSESSMENT — PAIN SCALES - GENERAL: PAINLEVEL_OUTOF10: NO PAIN (0)

## 2025-02-26 NOTE — NURSING NOTE
Diet: Patient instructed regarding a heart failure healthy diet, including discussion of reduced fat and 2000 mg daily sodium restriction, daily weights, medication purpose and compliance, fluid restrictions and resources for patient and family to access for assistance with heart failure management.       Labs: Patient was given results of the laboratory testing obtained today and patient was instructed about when to return for the next laboratory testing.     Med Reconcile: Reviewed and verified all current medications with the patient. The updated medication list was printed and given to the patient. No changes.     Return Appointment: Patient given instructions regarding scheduling next clinic visit. Follow up in 1 month.   *start full hybrid evaluation for txp/vad.     Patient stated he understood all health information given and agreed to call with further questions or concerns.     La Norman RN

## 2025-02-26 NOTE — LETTER
February 28, 2025    Jose Enrique Engel  [address]  [place] [address]          Dear Jose Enrique,    Thank you for your interest in the Transplant Center at [facility]. We look forward to being a part of your care team and assisting you through the transplant process.    As we discussed, your transplant coordinator is Trista Becker (Heart).  You may call your coordinator at any time with questions or concerns.  Your first call will be within 1 week.  If this needs to change, call [phone].    Please complete the following.    Fill out and return the enclosed forms  Authorization for Electronic Communication  Authorization to Discuss Protected Health Information  Authorization for Release of Protected Health Information    Sign up for:  Viewpointt, access to your electronic medical record (see enclosed pamphlet)  [email or web address], a transplant education website    You can use these tools to learn more about your transplant, communicate with your care team, and track your medical details       My Transplant Place    Sincerely,    [facility]  Solid Organ Transplant Care Programs  [phone], Option 5    cc: Referring Physician PCP

## 2025-02-26 NOTE — PROGRESS NOTES
Date: 2/26/2025    Time of Call: 9:20 AM     Diagnosis:  heart failure, inotrope infusion, PICC line in place     [ VORB ] Ordering provider: Heather Dove MD    Order: Weekly PICC dressing change while on home infusion.      Order received by: La Norman RN      Follow-up/additional notes:   Please help connect with Jose Enrique and schedule for weekly dressing changes at [facility].     *Please note, [facility] provides medication but is not currently providing line cares.
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
## 2025-02-26 NOTE — NURSING NOTE
Chief Complaint   Patient presents with    Follow Up     RETURN HEART FAILURE      Vitals were taken and medications reconciled.    Edward Aldridge, EMT  1:46 PM

## 2025-02-26 NOTE — PROGRESS NOTES
"Infusion Nursing Note:  Jose Enrique Engel presents today for   Chief Complaint   Patient presents with    Procedure     PICC dressing change       Patient seen by provider today: Yes, appts with cardiology prior to this appt.   present during visit today: No    Note:   -Orders from Heather Dove MD completed. Frequency: once, then will be done closer to home ([place]).  -PICC dressing changed. No warmth, drainage, or swelling noted. Area around/under dressing red and scaly. Per patient he has been itching the skin in/around his elbow crease until it is raw and then wiping it with alcohol, and \"flicking\" the area that is under the dressing. See photos added to chart. Current dressing removed, skin cleansed with betadine and IV 3000 dressing used. Extra betadine and dressings sent home with patient in case his father (RN at [facility]) needs to do dressing change prior to plan being place for regular dressing changes. Unsure of cause of symptoms - used alternate products to eliminate chlorhexidine/tegaderm as cause; did not reapply CHG patch at this time.    Intravenous Access:  PICC - Rt arm.    Treatment Conditions:  Not Applicable.    Post Infusion Assessment:  Patient tolerated procedure without incident.     Discharge Plan:   Discharge instructions reviewed with: Patient.  Patient and/or family verbalized understanding of discharge instructions and all questions answered.  [facility] [employer] [facility]    Patient discharged in stable condition accompanied by: self.  Departure Mode: Ambulatory.    Patient will follow up with care team.      Cheryl Manzano RN          "

## 2025-02-26 NOTE — PROGRESS NOTES
"Unable to see notes that Jose Enrique was able to get his PICC dressing changed last Friday as was planned. Per [place] notes,\"Writer called and advised patient to contact [facility] as they are providing care. Patient states they told him he was already set up but nothing in the system. Patient has number to [facility]. He will call to see about setting up appointment to gt his PICC cleaned. \"    Called Jose Enrique, no answer.   Called [facility] who confirms they have not provided nurse visits or dressing changes.  Called [facility] who confirms they have not previously seen patient or have him scheduled for any future dressing change visits.     They would need an order sent through via fax and could then get him set up.  Fax [phone]  "

## 2025-02-26 NOTE — PATIENT INSTRUCTIONS
Cardiology Providers you saw during your visit:  Dr. Dove     Medication changes:  1- No medication changes today.         Follow up:  1- 230pm PICC dressing change downstairs, 2nd floor, check in down there  2 - Follow up with Dr Dove in 1 month    3 - Start full evaluation for transplant/heart pump       Please call if you have:  1. Weight gain of more than 2 pounds in a day or 5 pounds in a week  2. Increased shortness of breath, swelling or bloating  3. Dizziness, lightheadedness   4. Any questions or concerns.      Heart Failure Support Group  Support group is held virtually. Please reach out if you would like to attend and we can get you the information you need to log in.     Follow the American Heart Association Diet and Lifestyle recommendations:  Limit saturated fat, trans fat, sodium, red meat, sweets and sugar-sweetened beverages. If you choose to eat red meat, compare labels and select the leanest cuts available.  Aim for at least 150 minutes of moderate physical activity or 75 minutes of vigorous physical activity - or an equal combination of both - each week.     During business hours: [phone], press option # 1 to schedule an appointment or send a message to your care team     After hours, weekends or holidays: On Call Cardiologist- [phone]   option #4 and ask to speak to the on-call Cardiologist. Inform them you are a CORE/heart failure patient at the [facility].     La Norman RN BSN  Cardiology Nurse Care Coordinator (Heart Failure / C.O.R.E.)

## 2025-02-27 ENCOUNTER — HOME INFUSION BILLING (OUTPATIENT)
Dept: HOME HEALTH SERVICES | Facility: HOME HEALTH | Age: 40
End: 2025-02-27
Payer: COMMERCIAL

## 2025-02-27 ENCOUNTER — CARE COORDINATION (OUTPATIENT)
Dept: CARDIOLOGY | Facility: CLINIC | Age: 40
End: 2025-02-27
Payer: COMMERCIAL

## 2025-02-27 PROCEDURE — S9348 HIT SYMPATHOMIM DIEM: HCPCS

## 2025-02-27 NOTE — PROGRESS NOTES
Called Jose Enrique to let him know that his requested records have been sent in for his disability hearing per his request to fax [phone]. He is grateful to hear this, no questions for now. He will call back if he needs anything else sent through.

## 2025-02-28 ENCOUNTER — TELEPHONE (OUTPATIENT)
Dept: CARDIOLOGY | Facility: CLINIC | Age: 40
End: 2025-02-28
Payer: COMMERCIAL

## 2025-02-28 VITALS — WEIGHT: 178 LBS | BODY MASS INDEX: 25.48 KG/M2 | HEIGHT: 70 IN

## 2025-02-28 PROCEDURE — S9348 HIT SYMPATHOMIM DIEM: HCPCS

## 2025-02-28 NOTE — TELEPHONE ENCOUNTER
[facility] Call Center    Phone Message    May a detailed message be left on voicemail: yes     Reason for Call: Other: Pateint calling for the social security case paperwork, he wanted to give you the correct fax number, since they have not received the paperwork yet, please send to the [facility] Office: [phone]     Action Taken: Other: Cardiology    Travel Screening: Not Applicable     Date of Service:

## 2025-02-28 NOTE — TELEPHONE ENCOUNTER
SOT HEART INTAKE    Referred for: Heart     Lead: Transplant    February 28, 2025    [facility]    Referring Provider: Heather Dove MD   Primary Care Provider: [employer]   Source/Facility:[facility]  Diagnosis: CHF    Insurance: [facility] [id number]      Critical History    Previous transplants: No  Smoking/nicotine use history: Former  Alcohol use history: Never  Drug use history: Never  Cancer history: No  Diabetes: No  Biopsy: No  Oxygen use at rest: 0 with activity: 0  Dialysis: No    BMI: 25.54      Comments: Verbal permission given by patient to access medical records outside of [facility]-  Yes  Patient agrees to Docusign: Yes    Referral intake process completed.  Patient is aware that after financial approval is received, medical records will be requested.   Patient confirmed for a callback from transplant coordinator (within 1 week)      Confirmed coordinator will discuss evaluation process in more detail at the time of their call.   Patient is aware of the need to arrange age appropriate cancer screening, vaccinations, and dental care.  Reminded patient to complete questionnaire, complete medical records release, and review packet prior to evaluation visit .  Assessed patient for special needs (ie--wheelchair, assistance, guardian, and ): None   Patient instructed to call [phone] with questions.

## 2025-03-03 ENCOUNTER — HOME INFUSION (OUTPATIENT)
Dept: HOME HEALTH SERVICES | Facility: HOME HEALTH | Age: 40
End: 2025-03-03
Payer: COMMERCIAL

## 2025-03-03 ENCOUNTER — HOME INFUSION BILLING (OUTPATIENT)
Dept: HOME HEALTH SERVICES | Facility: HOME HEALTH | Age: 40
End: 2025-03-03
Payer: COMMERCIAL

## 2025-03-03 PROCEDURE — A9270 NON-COVERED ITEM OR SERVICE: HCPCS

## 2025-03-03 PROCEDURE — E1399 DURABLE MEDICAL EQUIPMENT MI: HCPCS

## 2025-03-05 ENCOUNTER — HOME INFUSION BILLING (OUTPATIENT)
Dept: HOME HEALTH SERVICES | Facility: HOME HEALTH | Age: 40
End: 2025-03-05
Payer: COMMERCIAL

## 2025-03-05 PROCEDURE — S9348 HIT SYMPATHOMIM DIEM: HCPCS

## 2025-03-06 PROCEDURE — S9348 HIT SYMPATHOMIM DIEM: HCPCS

## 2025-03-07 PROCEDURE — S9348 HIT SYMPATHOMIM DIEM: HCPCS

## 2025-03-08 PROCEDURE — S9348 HIT SYMPATHOMIM DIEM: HCPCS

## 2025-03-09 PROCEDURE — S9348 HIT SYMPATHOMIM DIEM: HCPCS

## 2025-03-10 ENCOUNTER — HOME INFUSION BILLING (OUTPATIENT)
Dept: HOME HEALTH SERVICES | Facility: HOME HEALTH | Age: 40
End: 2025-03-10
Payer: COMMERCIAL

## 2025-03-10 PROCEDURE — E1399 DURABLE MEDICAL EQUIPMENT MI: HCPCS

## 2025-03-10 PROCEDURE — S9348 HIT SYMPATHOMIM DIEM: HCPCS

## 2025-03-10 PROCEDURE — A9270 NON-COVERED ITEM OR SERVICE: HCPCS

## 2025-03-11 PROCEDURE — S9348 HIT SYMPATHOMIM DIEM: HCPCS

## 2025-03-12 ENCOUNTER — HOME INFUSION BILLING (OUTPATIENT)
Dept: HOME HEALTH SERVICES | Facility: HOME HEALTH | Age: 40
End: 2025-03-12
Payer: COMMERCIAL

## 2025-03-12 ENCOUNTER — TELEPHONE (OUTPATIENT)
Dept: CARDIOLOGY | Facility: CLINIC | Age: 40
End: 2025-03-12
Payer: COMMERCIAL

## 2025-03-12 PROCEDURE — S9348 HIT SYMPATHOMIM DIEM: HCPCS

## 2025-03-12 NOTE — TELEPHONE ENCOUNTER
REturned call to Jose Enrique. Reviewed that Dr Dove is aware of his hospitalization and has been in contact with his local team. Planning for transfer to [facility] when bed becomes available. Jose Enrique stated understanding. He had multiple complaints (hospital food, lack of trust, etc) but stated understanding that we will continue to follow along and with continued plan for transfer when room is available at our hospital.

## 2025-03-12 NOTE — TELEPHONE ENCOUNTER
[facility] Call Center    Phone Message    May a detailed message be left on voicemail: yes     Reason for Call: Other: Jose Enrique called after being seen at [facility] and would like to speak with his care team about some symptoms he has and his care stating he doesn't feel like his team at [facility] knows what is going on and wants to speak with someone who will use words he can understand. Please reach out to Jose Enrique to discuss his concerns. Thank you!     Action Taken: Other: Cardiology    Travel Screening: Not Applicable    Thank you!  Specialty Access Center      Date of Service:

## 2025-03-13 ENCOUNTER — HOSPITAL ENCOUNTER (INPATIENT)
Facility: CLINIC | Age: 40
End: 2025-03-13
Attending: INTERNAL MEDICINE | Admitting: INTERNAL MEDICINE
Payer: COMMERCIAL

## 2025-03-13 DIAGNOSIS — R57.0 CARDIOGENIC SHOCK (H): ICD-10-CM

## 2025-03-13 DIAGNOSIS — R78.81 BACTEREMIA: Primary | ICD-10-CM

## 2025-03-13 DIAGNOSIS — I50.42 CHRONIC HEART FAILURE WITH REDUCED EJECTION FRACTION AND DIASTOLIC DYSFUNCTION (H): ICD-10-CM

## 2025-03-13 DIAGNOSIS — I10 BENIGN ESSENTIAL HYPERTENSION: ICD-10-CM

## 2025-03-13 PROBLEM — R06.00 ACUTE DYSPNEA: Status: ACTIVE | Noted: 2025-03-13

## 2025-03-13 LAB
ERYTHROCYTE [DISTWIDTH] IN BLOOD BY AUTOMATED COUNT: 14.3 % (ref 10–15)
HCT VFR BLD AUTO: 40.2 % (ref 40–53)
HGB BLD-MCNC: 13.7 G/DL (ref 13.3–17.7)
MCH RBC QN AUTO: 30 PG (ref 26.5–33)
MCHC RBC AUTO-ENTMCNC: 34.1 G/DL (ref 31.5–36.5)
MCV RBC AUTO: 88 FL (ref 78–100)
PLATELET # BLD AUTO: 98 10E3/UL (ref 150–450)
RBC # BLD AUTO: 4.57 10E6/UL (ref 4.4–5.9)
WBC # BLD AUTO: 10.8 10E3/UL (ref 4–11)

## 2025-03-13 PROCEDURE — 80053 COMPREHEN METABOLIC PANEL: CPT

## 2025-03-13 PROCEDURE — 250N000013 HC RX MED GY IP 250 OP 250 PS 637

## 2025-03-13 PROCEDURE — 99223 1ST HOSP IP/OBS HIGH 75: CPT | Mod: 25 | Performed by: INTERNAL MEDICINE

## 2025-03-13 PROCEDURE — 250N000011 HC RX IP 250 OP 636

## 2025-03-13 PROCEDURE — 120N000005 HC R&B MS OVERFLOW UMMC

## 2025-03-13 PROCEDURE — 36415 COLL VENOUS BLD VENIPUNCTURE: CPT

## 2025-03-13 PROCEDURE — 85027 COMPLETE CBC AUTOMATED: CPT

## 2025-03-13 PROCEDURE — S9348 HIT SYMPATHOMIM DIEM: HCPCS

## 2025-03-13 RX ORDER — FERROUS SULFATE 325(65) MG
325 TABLET ORAL
Status: DISCONTINUED | OUTPATIENT
Start: 2025-03-14 | End: 2025-03-25 | Stop reason: HOSPADM

## 2025-03-13 RX ORDER — VANCOMYCIN HYDROCHLORIDE
1500 EVERY 12 HOURS
Status: DISCONTINUED | OUTPATIENT
Start: 2025-03-14 | End: 2025-03-14

## 2025-03-13 RX ORDER — LIDOCAINE 40 MG/G
CREAM TOPICAL
Status: DISCONTINUED | OUTPATIENT
Start: 2025-03-13 | End: 2025-03-17

## 2025-03-13 RX ORDER — DIGOXIN 125 MCG
125 TABLET ORAL DAILY
Status: DISCONTINUED | OUTPATIENT
Start: 2025-03-13 | End: 2025-03-13

## 2025-03-13 RX ORDER — ALBUTEROL SULFATE 90 UG/1
2 INHALANT RESPIRATORY (INHALATION) EVERY 6 HOURS PRN
Status: DISCONTINUED | OUTPATIENT
Start: 2025-03-13 | End: 2025-03-25 | Stop reason: HOSPADM

## 2025-03-13 RX ORDER — DIGOXIN 125 MCG
125 TABLET ORAL EVERY EVENING
Status: DISCONTINUED | OUTPATIENT
Start: 2025-03-13 | End: 2025-03-14

## 2025-03-13 RX ORDER — TORSEMIDE 20 MG/1
40 TABLET ORAL EVERY EVENING
Status: DISCONTINUED | OUTPATIENT
Start: 2025-03-13 | End: 2025-03-13

## 2025-03-13 RX ORDER — SPIRONOLACTONE 25 MG/1
25 TABLET ORAL DAILY
Status: DISCONTINUED | OUTPATIENT
Start: 2025-03-14 | End: 2025-03-25

## 2025-03-13 RX ORDER — DOBUTAMINE HYDROCHLORIDE 200 MG/100ML
5 INJECTION INTRAVENOUS CONTINUOUS
Status: DISCONTINUED | OUTPATIENT
Start: 2025-03-13 | End: 2025-03-13

## 2025-03-13 RX ORDER — TORSEMIDE 20 MG/1
60 TABLET ORAL EVERY MORNING
Status: DISCONTINUED | OUTPATIENT
Start: 2025-03-14 | End: 2025-03-13

## 2025-03-13 RX ORDER — MULTIVIT WITH MINERALS/LUTEIN
250 TABLET ORAL DAILY
Status: DISCONTINUED | OUTPATIENT
Start: 2025-03-14 | End: 2025-03-25 | Stop reason: HOSPADM

## 2025-03-13 RX ORDER — DOBUTAMINE HYDROCHLORIDE 200 MG/100ML
2.5 INJECTION INTRAVENOUS CONTINUOUS
Status: DISCONTINUED | OUTPATIENT
Start: 2025-03-13 | End: 2025-03-24

## 2025-03-13 RX ORDER — AMOXICILLIN 250 MG
2 CAPSULE ORAL 2 TIMES DAILY PRN
Status: DISCONTINUED | OUTPATIENT
Start: 2025-03-13 | End: 2025-03-25 | Stop reason: HOSPADM

## 2025-03-13 RX ORDER — AMOXICILLIN 250 MG
1 CAPSULE ORAL 2 TIMES DAILY PRN
Status: DISCONTINUED | OUTPATIENT
Start: 2025-03-13 | End: 2025-03-25 | Stop reason: HOSPADM

## 2025-03-13 RX ORDER — CALCIUM CARBONATE 500 MG/1
1000 TABLET, CHEWABLE ORAL 4 TIMES DAILY PRN
Status: DISCONTINUED | OUTPATIENT
Start: 2025-03-13 | End: 2025-03-25 | Stop reason: HOSPADM

## 2025-03-13 RX ORDER — SPIRONOLACTONE 25 MG/1
25 TABLET ORAL DAILY
Status: DISCONTINUED | OUTPATIENT
Start: 2025-03-13 | End: 2025-03-13

## 2025-03-13 RX ORDER — HEPARIN SODIUM 10000 [USP'U]/100ML
0-5000 INJECTION, SOLUTION INTRAVENOUS CONTINUOUS
Status: DISCONTINUED | OUTPATIENT
Start: 2025-03-13 | End: 2025-03-19

## 2025-03-13 RX ADMIN — HEPARIN SODIUM 1450 UNITS/HR: 10000 INJECTION, SOLUTION INTRAVENOUS at 20:14

## 2025-03-13 RX ADMIN — Medication 2.5 MG: at 22:01

## 2025-03-13 RX ADMIN — DOBUTAMINE IN DEXTROSE 5 MCG/KG/MIN: 200 INJECTION, SOLUTION INTRAVENOUS at 20:29

## 2025-03-13 ASSESSMENT — ACTIVITIES OF DAILY LIVING (ADL)
ADLS_ACUITY_SCORE: 33
ADLS_ACUITY_SCORE: 32
ADLS_ACUITY_SCORE: 33
ADLS_ACUITY_SCORE: 56

## 2025-03-13 NOTE — LETTER
Transition Communication Hand-off for Care Transitions to Next Level of Care Provider    Name: Jose Enrique Engel  : 1985  MRN #: [id number]  Primary Care Provider: KUSH NO     Primary Clinic: [address] [facility] [address]     Reason for Hospitalization:  Acute dyspnea [R06.00]  Admit Date/Time: 3/13/2025  7:35 PM  Discharge Date: 3/25/25  Payor Source: Payor: [employer] / Plan: [employer] PMAP / Product Type: HMO /     Reason for Communication Hand-off Referral: Other care transition    Discharge Plan: home with outpatient infusion       Concern for non-adherence with plan of care:   Y/N no  Discharge Needs Assessment:  Needs      Flowsheet Row Most Recent Value   Equipment Currently Used at Home grab bar, tub/shower, shower chair, grab bar, toilet            Already enrolled in Tele-monitoring program and name of program:    Follow-up specialty is recommended: Yes    Follow-up plan:    Future Appointments   Date Time Provider Department Center   3/26/2025  7:00 PM Mallorie Molina OTR [facility]   2025 12:00 PM Popeye Dudley MD [facility]   2025  1:00 PM  LAB [facility]   2025  1:45 PM Heather Dove MD [facility]       Any outstanding tests or procedures:        Referrals       Future Labs/Procedures    OPAT enrollment and ID Clinic Referral     Scheduling Instructions:    ID clinic appointment with Dr. Dudley on 2025 @12:00 PM    Comments:    You are being prescribed a strong antibiotic treatment for an infection. This treatment requires close monitoring, and you have been recommended to follow up with a specialist in the Infectious Diseases Clinic within 2 weeks of your hospital discharge.   Our team of Infectious Diseases specialists looks forward to seeing you in clinic. A representative will call you within 3 business days to help schedule your appointment. If you do not receive a call to schedule, or if you have any questions about or problems  with your clinical care, please contact us by phone at [phone].                Goyal Recommendations:      Donna Lopez RN    AVS/Discharge Summary is the source of truth; this is a helpful guide for improved communication of patient story

## 2025-03-14 ENCOUNTER — APPOINTMENT (OUTPATIENT)
Dept: CARDIOLOGY | Facility: CLINIC | Age: 40
End: 2025-03-14
Attending: INTERNAL MEDICINE
Payer: COMMERCIAL

## 2025-03-14 VITALS
SYSTOLIC BLOOD PRESSURE: 97 MMHG | WEIGHT: 180.9 LBS | TEMPERATURE: 98.1 F | DIASTOLIC BLOOD PRESSURE: 67 MMHG | BODY MASS INDEX: 25.96 KG/M2 | RESPIRATION RATE: 22 BRPM | OXYGEN SATURATION: 98 % | HEART RATE: 97 BPM

## 2025-03-14 LAB
ALBUMIN SERPL BCG-MCNC: 3.2 G/DL (ref 3.5–5.2)
ALBUMIN SERPL BCG-MCNC: 3.2 G/DL (ref 3.5–5.2)
ALBUMIN SERPL BCG-MCNC: 3.6 G/DL (ref 3.5–5.2)
ALBUMIN SERPL BCG-MCNC: 3.6 G/DL (ref 3.5–5.2)
ALP SERPL-CCNC: 188 U/L (ref 40–150)
ALP SERPL-CCNC: 193 U/L (ref 40–150)
ALP SERPL-CCNC: 206 U/L (ref 40–150)
ALP SERPL-CCNC: 206 U/L (ref 40–150)
ALT SERPL W P-5'-P-CCNC: 111 U/L (ref 0–70)
ALT SERPL W P-5'-P-CCNC: 114 U/L (ref 0–70)
ALT SERPL W P-5'-P-CCNC: 128 U/L (ref 0–70)
ALT SERPL W P-5'-P-CCNC: 128 U/L (ref 0–70)
AMPHETAMINES UR QL SCN: NORMAL
ANION GAP SERPL CALCULATED.3IONS-SCNC: 12 MMOL/L (ref 7–15)
ANION GAP SERPL CALCULATED.3IONS-SCNC: 12 MMOL/L (ref 7–15)
ANION GAP SERPL CALCULATED.3IONS-SCNC: 14 MMOL/L (ref 7–15)
ANION GAP SERPL CALCULATED.3IONS-SCNC: 14 MMOL/L (ref 7–15)
AST SERPL W P-5'-P-CCNC: 101 U/L (ref 0–45)
AST SERPL W P-5'-P-CCNC: 108 U/L (ref 0–45)
AST SERPL W P-5'-P-CCNC: 126 U/L (ref 0–45)
AST SERPL W P-5'-P-CCNC: 126 U/L (ref 0–45)
BARBITURATES UR QL SCN: NORMAL
BASE EXCESS BLDV CALC-SCNC: 0.5 MMOL/L (ref -3–3)
BASOPHILS # BLD AUTO: 0.1 10E3/UL (ref 0–0.2)
BASOPHILS NFR BLD AUTO: 1 %
BENZODIAZ UR QL SCN: NORMAL
BILIRUB SERPL-MCNC: 2.2 MG/DL
BILIRUB SERPL-MCNC: 2.2 MG/DL
BILIRUB SERPL-MCNC: 2.6 MG/DL
BILIRUB SERPL-MCNC: 2.6 MG/DL
BUN SERPL-MCNC: 34.5 MG/DL (ref 6–20)
BUN SERPL-MCNC: 36.3 MG/DL (ref 6–20)
BUN SERPL-MCNC: 38.7 MG/DL (ref 6–20)
BUN SERPL-MCNC: 38.7 MG/DL (ref 6–20)
BZE UR QL SCN: NORMAL
CALCIUM SERPL-MCNC: 8.4 MG/DL (ref 8.8–10.4)
CALCIUM SERPL-MCNC: 8.6 MG/DL (ref 8.8–10.4)
CALCIUM SERPL-MCNC: 8.9 MG/DL (ref 8.8–10.4)
CALCIUM SERPL-MCNC: ABNORMAL MG/DL
CANNABINOIDS UR QL SCN: NORMAL
CANNABINOIDS UR QL SCN: NORMAL
CHLORIDE SERPL-SCNC: 89 MMOL/L (ref 98–107)
CHLORIDE SERPL-SCNC: 89 MMOL/L (ref 98–107)
CHLORIDE SERPL-SCNC: 91 MMOL/L (ref 98–107)
CHLORIDE SERPL-SCNC: 91 MMOL/L (ref 98–107)
CREAT SERPL-MCNC: 1.14 MG/DL (ref 0.67–1.17)
CREAT SERPL-MCNC: 1.14 MG/DL (ref 0.67–1.17)
CREAT SERPL-MCNC: 1.18 MG/DL (ref 0.67–1.17)
CREAT SERPL-MCNC: 1.18 MG/DL (ref 0.67–1.17)
CREAT UR-MCNC: 76 MG/DL
EGFRCR SERPLBLD CKD-EPI 2021: 80 ML/MIN/1.73M2
EGFRCR SERPLBLD CKD-EPI 2021: 80 ML/MIN/1.73M2
EGFRCR SERPLBLD CKD-EPI 2021: 84 ML/MIN/1.73M2
EGFRCR SERPLBLD CKD-EPI 2021: 84 ML/MIN/1.73M2
EOSINOPHIL # BLD AUTO: 0.3 10E3/UL (ref 0–0.7)
EOSINOPHIL NFR BLD AUTO: 2 %
ERYTHROCYTE [DISTWIDTH] IN BLOOD BY AUTOMATED COUNT: 14.1 % (ref 10–15)
FENTANYL UR QL: NORMAL
GIANT PLATELETS BLD QL SMEAR: SLIGHT
GLUCOSE SERPL-MCNC: 100 MG/DL (ref 70–99)
GLUCOSE SERPL-MCNC: 112 MG/DL (ref 70–99)
GLUCOSE SERPL-MCNC: 112 MG/DL (ref 70–99)
GLUCOSE SERPL-MCNC: 99 MG/DL (ref 70–99)
HCO3 BLDV-SCNC: 23 MMOL/L (ref 21–28)
HCO3 SERPL-SCNC: 19 MMOL/L (ref 22–29)
HCO3 SERPL-SCNC: 20 MMOL/L (ref 22–29)
HCO3 SERPL-SCNC: 21 MMOL/L (ref 22–29)
HCO3 SERPL-SCNC: 21 MMOL/L (ref 22–29)
HCT VFR BLD AUTO: 36.7 % (ref 40–53)
HGB BLD-MCNC: 12.8 G/DL (ref 13.3–17.7)
IMM GRANULOCYTES # BLD: 0.2 10E3/UL
IMM GRANULOCYTES NFR BLD: 2 %
INR PPP: 1.14 (ref 0.85–1.15)
LACTATE SERPL-SCNC: 2 MMOL/L (ref 0.7–2)
LVEF ECHO: NORMAL
LYMPHOCYTES # BLD AUTO: 2.2 10E3/UL (ref 0.8–5.3)
LYMPHOCYTES NFR BLD AUTO: 21 %
MAGNESIUM SERPL-MCNC: 2.1 MG/DL (ref 1.7–2.3)
MCH RBC QN AUTO: 30.4 PG (ref 26.5–33)
MCHC RBC AUTO-ENTMCNC: 34.9 G/DL (ref 31.5–36.5)
MCV RBC AUTO: 87 FL (ref 78–100)
MONOCYTES # BLD AUTO: 1 10E3/UL (ref 0–1.3)
MONOCYTES NFR BLD AUTO: 10 %
MRSA DNA SPEC QL NAA+PROBE: POSITIVE
NEUTROPHILS # BLD AUTO: 6.9 10E3/UL (ref 1.6–8.3)
NEUTROPHILS NFR BLD AUTO: 65 %
NRBC # BLD AUTO: 0 10E3/UL
NRBC BLD AUTO-RTO: 0 /100
O2/TOTAL GAS SETTING VFR VENT: 22 %
OPIATES UR QL SCN: NORMAL
OSMOLALITY SERPL: 270 MMOL/KG (ref 275–295)
OSMOLALITY UR: 442 MMOL/KG (ref 100–1200)
OXYHGB MFR BLDV: 90 % (ref 70–75)
PCO2 BLDV: 32 MM HG (ref 40–50)
PCP QUAL URINE (ROCHE): NORMAL
PH BLDV: 7.47 [PH] (ref 7.32–7.43)
PLAT MORPH BLD: ABNORMAL
PLATELET # BLD AUTO: 102 10E3/UL (ref 150–450)
PO2 BLDV: 65 MM HG (ref 25–47)
POLYCHROMASIA BLD QL SMEAR: SLIGHT
POTASSIUM SERPL-SCNC: 4.6 MMOL/L (ref 3.4–5.3)
PROT SERPL-MCNC: 6.1 G/DL (ref 6.4–8.3)
PROT SERPL-MCNC: 6.2 G/DL (ref 6.4–8.3)
PROT SERPL-MCNC: 6.8 G/DL (ref 6.4–8.3)
PROT SERPL-MCNC: 6.8 G/DL (ref 6.4–8.3)
RBC # BLD AUTO: 4.21 10E6/UL (ref 4.4–5.9)
RBC MORPH BLD: ABNORMAL
SA TARGET DNA: POSITIVE
SAO2 % BLDV: 91.8 % (ref 70–75)
SODIUM SERPL-SCNC: 122 MMOL/L (ref 135–145)
SODIUM SERPL-SCNC: 123 MMOL/L (ref 135–145)
SODIUM SERPL-SCNC: 124 MMOL/L (ref 135–145)
SODIUM SERPL-SCNC: 124 MMOL/L (ref 135–145)
SODIUM UR-SCNC: <20 MMOL/L
TARGETS BLD QL SMEAR: SLIGHT
UFH PPP CHRO-ACNC: 0.24 IU/ML
UFH PPP CHRO-ACNC: 0.38 IU/ML
UFH PPP CHRO-ACNC: <0.1 IU/ML
VANCOMYCIN SERPL-MCNC: 29.7 UG/ML
VARIANT LYMPHS BLD QL SMEAR: PRESENT
WBC # BLD AUTO: 10.7 10E3/UL (ref 4–11)

## 2025-03-14 PROCEDURE — 250N000011 HC RX IP 250 OP 636: Performed by: INTERNAL MEDICINE

## 2025-03-14 PROCEDURE — 80202 ASSAY OF VANCOMYCIN: CPT | Performed by: INTERNAL MEDICINE

## 2025-03-14 PROCEDURE — E0781 EXTERNAL AMBULATORY INFUS PU: HCPCS | Mod: RR

## 2025-03-14 PROCEDURE — 87149 DNA/RNA DIRECT PROBE: CPT

## 2025-03-14 PROCEDURE — 82805 BLOOD GASES W/O2 SATURATION: CPT

## 2025-03-14 PROCEDURE — 76376 3D RENDER W/INTRP POSTPROCES: CPT | Mod: 26 | Performed by: INTERNAL MEDICINE

## 2025-03-14 PROCEDURE — 80353 DRUG SCREENING COCAINE: CPT | Performed by: STUDENT IN AN ORGANIZED HEALTH CARE EDUCATION/TRAINING PROGRAM

## 2025-03-14 PROCEDURE — 99233 SBSQ HOSP IP/OBS HIGH 50: CPT | Mod: 25 | Performed by: INTERNAL MEDICINE

## 2025-03-14 PROCEDURE — 36415 COLL VENOUS BLD VENIPUNCTURE: CPT | Performed by: STUDENT IN AN ORGANIZED HEALTH CARE EDUCATION/TRAINING PROGRAM

## 2025-03-14 PROCEDURE — 85520 HEPARIN ASSAY: CPT | Performed by: STUDENT IN AN ORGANIZED HEALTH CARE EDUCATION/TRAINING PROGRAM

## 2025-03-14 PROCEDURE — 93320 DOPPLER ECHO COMPLETE: CPT | Mod: 26 | Performed by: INTERNAL MEDICINE

## 2025-03-14 PROCEDURE — S9348 HIT SYMPATHOMIM DIEM: HCPCS

## 2025-03-14 PROCEDURE — 83735 ASSAY OF MAGNESIUM: CPT

## 2025-03-14 PROCEDURE — 250N000011 HC RX IP 250 OP 636

## 2025-03-14 PROCEDURE — 93325 DOPPLER ECHO COLOR FLOW MAPG: CPT | Mod: 26 | Performed by: INTERNAL MEDICINE

## 2025-03-14 PROCEDURE — 99152 MOD SED SAME PHYS/QHP 5/>YRS: CPT | Performed by: INTERNAL MEDICINE

## 2025-03-14 PROCEDURE — 250N000013 HC RX MED GY IP 250 OP 250 PS 637

## 2025-03-14 PROCEDURE — 80053 COMPREHEN METABOLIC PANEL: CPT

## 2025-03-14 PROCEDURE — 99255 IP/OBS CONSLTJ NEW/EST HI 80: CPT | Mod: GC | Performed by: INTERNAL MEDICINE

## 2025-03-14 PROCEDURE — 84155 ASSAY OF PROTEIN SERUM: CPT

## 2025-03-14 PROCEDURE — 76376 3D RENDER W/INTRP POSTPROCES: CPT

## 2025-03-14 PROCEDURE — 999N000007 HC SITE CHECK

## 2025-03-14 PROCEDURE — 85025 COMPLETE CBC W/AUTO DIFF WBC: CPT

## 2025-03-14 PROCEDURE — 82040 ASSAY OF SERUM ALBUMIN: CPT

## 2025-03-14 PROCEDURE — 83930 ASSAY OF BLOOD OSMOLALITY: CPT

## 2025-03-14 PROCEDURE — 87641 MR-STAPH DNA AMP PROBE: CPT

## 2025-03-14 PROCEDURE — 999N000248 HC STATISTIC IV INSERT WITH US BY RN

## 2025-03-14 PROCEDURE — 87077 CULTURE AEROBIC IDENTIFY: CPT

## 2025-03-14 PROCEDURE — 83935 ASSAY OF URINE OSMOLALITY: CPT

## 2025-03-14 PROCEDURE — 250N000009 HC RX 250: Performed by: INTERNAL MEDICINE

## 2025-03-14 PROCEDURE — 84300 ASSAY OF URINE SODIUM: CPT

## 2025-03-14 PROCEDURE — 83605 ASSAY OF LACTIC ACID: CPT | Performed by: INTERNAL MEDICINE

## 2025-03-14 PROCEDURE — 93312 ECHO TRANSESOPHAGEAL: CPT | Mod: 26 | Performed by: INTERNAL MEDICINE

## 2025-03-14 PROCEDURE — 80360 METHYLPHENIDATE: CPT | Performed by: STUDENT IN AN ORGANIZED HEALTH CARE EDUCATION/TRAINING PROGRAM

## 2025-03-14 PROCEDURE — 85520 HEPARIN ASSAY: CPT | Performed by: INTERNAL MEDICINE

## 2025-03-14 PROCEDURE — 87640 STAPH A DNA AMP PROBE: CPT

## 2025-03-14 PROCEDURE — 36415 COLL VENOUS BLD VENIPUNCTURE: CPT | Performed by: INTERNAL MEDICINE

## 2025-03-14 PROCEDURE — 87040 BLOOD CULTURE FOR BACTERIA: CPT

## 2025-03-14 PROCEDURE — 80307 DRUG TEST PRSMV CHEM ANLYZR: CPT

## 2025-03-14 PROCEDURE — 36415 COLL VENOUS BLD VENIPUNCTURE: CPT

## 2025-03-14 PROCEDURE — 93325 DOPPLER ECHO COLOR FLOW MAPG: CPT

## 2025-03-14 PROCEDURE — 80307 DRUG TEST PRSMV CHEM ANLYZR: CPT | Performed by: STUDENT IN AN ORGANIZED HEALTH CARE EDUCATION/TRAINING PROGRAM

## 2025-03-14 PROCEDURE — 120N000005 HC R&B MS OVERFLOW UMMC

## 2025-03-14 PROCEDURE — 85610 PROTHROMBIN TIME: CPT

## 2025-03-14 RX ORDER — VANCOMYCIN HYDROCHLORIDE 1 G/200ML
1000 INJECTION, SOLUTION INTRAVENOUS EVERY 12 HOURS
Status: DISCONTINUED | OUTPATIENT
Start: 2025-03-16 | End: 2025-03-15

## 2025-03-14 RX ORDER — FLUMAZENIL 0.1 MG/ML
0.2 INJECTION, SOLUTION INTRAVENOUS
Status: ACTIVE | OUTPATIENT
Start: 2025-03-14 | End: 2025-03-16

## 2025-03-14 RX ORDER — ONDANSETRON 4 MG/1
4 TABLET, ORALLY DISINTEGRATING ORAL EVERY 6 HOURS PRN
Status: DISCONTINUED | OUTPATIENT
Start: 2025-03-14 | End: 2025-03-25 | Stop reason: HOSPADM

## 2025-03-14 RX ORDER — TORSEMIDE 20 MG/1
40 TABLET ORAL 2 TIMES DAILY
Status: ON HOLD | COMMUNITY
End: 2025-03-25

## 2025-03-14 RX ORDER — NALOXONE HYDROCHLORIDE 0.4 MG/ML
0.4 INJECTION, SOLUTION INTRAMUSCULAR; INTRAVENOUS; SUBCUTANEOUS
Status: DISCONTINUED | OUTPATIENT
Start: 2025-03-14 | End: 2025-03-15

## 2025-03-14 RX ORDER — DIGOXIN 125 MCG
125 TABLET ORAL EVERY MORNING
Status: DISCONTINUED | OUTPATIENT
Start: 2025-03-15 | End: 2025-03-25 | Stop reason: HOSPADM

## 2025-03-14 RX ORDER — POTASSIUM CHLORIDE 1500 MG/1
20 TABLET, EXTENDED RELEASE ORAL 2 TIMES DAILY
COMMUNITY

## 2025-03-14 RX ORDER — FENTANYL CITRATE 50 UG/ML
25 INJECTION, SOLUTION INTRAMUSCULAR; INTRAVENOUS
Status: DISCONTINUED | OUTPATIENT
Start: 2025-03-14 | End: 2025-03-15

## 2025-03-14 RX ORDER — ACYCLOVIR 200 MG/1
9.5 CAPSULE ORAL
Status: DISCONTINUED | OUTPATIENT
Start: 2025-03-14 | End: 2025-03-18

## 2025-03-14 RX ORDER — VANCOMYCIN HYDROCHLORIDE 1 G/200ML
1000 INJECTION, SOLUTION INTRAVENOUS EVERY 12 HOURS
Status: DISCONTINUED | OUTPATIENT
Start: 2025-03-14 | End: 2025-03-15

## 2025-03-14 RX ORDER — NALOXONE HYDROCHLORIDE 0.4 MG/ML
0.2 INJECTION, SOLUTION INTRAMUSCULAR; INTRAVENOUS; SUBCUTANEOUS
Status: DISCONTINUED | OUTPATIENT
Start: 2025-03-14 | End: 2025-03-15

## 2025-03-14 RX ORDER — SODIUM CHLORIDE 9 MG/ML
1000 INJECTION, SOLUTION INTRAVENOUS CONTINUOUS
Status: DISCONTINUED | OUTPATIENT
Start: 2025-03-14 | End: 2025-03-14

## 2025-03-14 RX ORDER — VANCOMYCIN HYDROCHLORIDE 1 G/200ML
1000 INJECTION, SOLUTION INTRAVENOUS EVERY 12 HOURS
Status: DISCONTINUED | OUTPATIENT
Start: 2025-03-14 | End: 2025-03-14

## 2025-03-14 RX ORDER — LIDOCAINE HYDROCHLORIDE 20 MG/ML
15 SOLUTION OROPHARYNGEAL ONCE
Status: COMPLETED | OUTPATIENT
Start: 2025-03-14 | End: 2025-03-14

## 2025-03-14 RX ORDER — FENTANYL CITRATE 50 UG/ML
50 INJECTION, SOLUTION INTRAMUSCULAR; INTRAVENOUS
Status: COMPLETED | OUTPATIENT
Start: 2025-03-14 | End: 2025-03-14

## 2025-03-14 RX ADMIN — MIDAZOLAM 2 MG: 1 INJECTION INTRAMUSCULAR; INTRAVENOUS at 14:07

## 2025-03-14 RX ADMIN — SPIRONOLACTONE 25 MG: 25 TABLET, FILM COATED ORAL at 10:20

## 2025-03-14 RX ADMIN — DIGOXIN 125 MCG: 125 TABLET ORAL at 00:24

## 2025-03-14 RX ADMIN — Medication 5 MG: at 19:59

## 2025-03-14 RX ADMIN — Medication 250 MG: at 10:20

## 2025-03-14 RX ADMIN — FENTANYL CITRATE 50 MCG: 50 INJECTION, SOLUTION INTRAMUSCULAR; INTRAVENOUS at 14:07

## 2025-03-14 RX ADMIN — VANCOMYCIN HYDROCHLORIDE 1000 MG: 1 INJECTION, SOLUTION INTRAVENOUS at 15:59

## 2025-03-14 RX ADMIN — DOBUTAMINE IN DEXTROSE 5 MCG/KG/MIN: 200 INJECTION, SOLUTION INTRAVENOUS at 15:54

## 2025-03-14 RX ADMIN — EMPAGLIFLOZIN 10 MG: 10 TABLET, FILM COATED ORAL at 10:21

## 2025-03-14 RX ADMIN — HEPARIN SODIUM 1850 UNITS/HR: 10000 INJECTION, SOLUTION INTRAVENOUS at 07:53

## 2025-03-14 RX ADMIN — Medication 1500 MG: at 00:16

## 2025-03-14 RX ADMIN — LIDOCAINE HYDROCHLORIDE 30 ML: 20 SOLUTION ORAL at 13:55

## 2025-03-14 RX ADMIN — Medication 600 MG: at 10:20

## 2025-03-14 RX ADMIN — TOPICAL ANESTHETIC 0.5 ML: 200 SPRAY DENTAL; PERIODONTAL at 14:01

## 2025-03-14 RX ADMIN — HEPARIN SODIUM 2000 UNITS/HR: 10000 INJECTION, SOLUTION INTRAVENOUS at 17:56

## 2025-03-14 RX ADMIN — ONDANSETRON 4 MG: 4 TABLET, ORALLY DISINTEGRATING ORAL at 22:07

## 2025-03-14 ASSESSMENT — ACTIVITIES OF DAILY LIVING (ADL)
ADLS_ACUITY_SCORE: 35
ADLS_ACUITY_SCORE: 33
ADLS_ACUITY_SCORE: 35
ADLS_ACUITY_SCORE: 33
ADLS_ACUITY_SCORE: 33
ADLS_ACUITY_SCORE: 35
ADLS_ACUITY_SCORE: 33
ADLS_ACUITY_SCORE: 35

## 2025-03-14 NOTE — PHARMACY-ADMISSION MEDICATION HISTORY
"Pharmacist Admission Medication History    Admission medication history is complete. The information provided in this note is only as accurate as the sources available at the time of the update.    Information Source(s): Patient, Hospital records, and CareEverywhere/SureScripts via in-person    Pertinent Information:   Pharmacy admission medication history was complete by pharmacy at OSH prior to transfer on 3/11/25. Note was reviewed, compared to available dispensing history, and confirmed with the patient at bedside. Pt was very sleepy during interview but was able to confirm pertinent changes. All changes are documented below.    Please note that the pt was prescribed Xarelto 20 mg qPM (last filled 12/27/2024 x30 day supply), but pt stopped taking this voluntarily because \"it makes his blood too thin\". Would recommend re-education as he is currently admitted with high-intensity heparin for an active DVT.    Changes made to PTA medication list:  Added:   Potassium chloride 20 mEq BID  Deleted: None  Changed:   Torsemide dose change: 60 mg qAM + 40 mg qPM >> 40 mg BID  Zolpidem dose change: 2.5 mg q HS >> 5 mg q HS    Allergies reviewed with patient and updates made in EHR: unable to assess    Medication History Completed By: Pau Gomez, PharmD PGY1 resident 3/14/2025 9:23 AM    PTA Med List   Medication Sig Last Dose/Taking    albuterol (PROAIR HFA/PROVENTIL HFA/VENTOLIN HFA) 108 (90 Base) MCG/ACT inhaler Inhale 2 puffs into the lungs every 6 hours as needed for shortness of breath, wheezing or cough. Unknown    calcium carbonate (OS-LENARD) 1500 (600 Ca) MG tablet Take 1 tablet by mouth daily at 2 pm. Unknown    digoxin (LANOXIN) 125 MCG tablet Take 1 tablet (125 mcg) by mouth daily. Unknown    DOBUTamine in 5% Dextrose 2 mg/mL 350 mL via CADD pump One day bag. Infuse 406.5 mcg/min into the vein continuously. Dose based on 5 mcg/kg/min and 81.3 kg. Continuous rate: 12.2 mL/hr. Reservoir volume: 293 mL. Bag " "contains 57 mL of overfill. Do not flush line between bags. Past Week    Emergency Supply Kit, Central, Patient use for emergency only. Contents: 3 sodium chloride 0.9% flushes, 1 dressing kit, 1 microclave ext set 14\", 4 nitrile gloves (med), 6 alcohol prep pads, 1 bacitracin, 1 syringe (10 cc 20 G 1\"). Call [phone] to reorder. Taking As Needed    empagliflozin (JARDIANCE) 10 MG TABS tablet Take 1 tablet (10 mg) by mouth daily. Unknown    ferrous sulfate (FEROSUL) 325 (65 Fe) MG tablet Take 325 mg by mouth daily (with breakfast). Unknown    potassium chloride rand ER (KLOR-CON M20) 20 MEQ CR tablet Take 20 mEq by mouth 2 times daily. Unknown    rivaroxaban ANTICOAGULANT (XARELTO) 20 MG TABS tablet Take 20 mg by mouth daily (with dinner). Unknown    sacubitril-valsartan (ENTRESTO) 49-51 MG per tablet Take 1 tablet by mouth 2 times daily. Unknown    sodium chloride, PF, 0.9% PF flush Inject 10 mLs into catheter as needed for line flush (only as directed by medical professional). Improper flushing may result in a large dose of drug administered and side effects may result. Unknown    spironolactone (ALDACTONE) 25 MG tablet Take 1 tablet (25 mg) by mouth daily. Unknown    torsemide (DEMADEX) 20 MG tablet Take 40 mg by mouth 2 times daily. Unknown    VITAMIN C 250 MG tablet Take 1 tablet by mouth daily. Unknown    zolpidem (AMBIEN) 5 MG tablet Take 0.5 tablets (2.5 mg) by mouth nightly as needed for sleep. (Patient taking differently: Take 5 mg by mouth nightly as needed for sleep.) Unknown       "

## 2025-03-14 NOTE — PLAN OF CARE
Neuro: A&Ox4.   Cardiac: SR. VSS.   Respiratory: Sating 98 on RA. SOB with activity   GI/: Adequate urine output. Urinal in use. BM X 1   Diet/appetite: Tolerating cardiac fat  diet. Eating well.  Activity:  IND/SBA, up to chair and in halls.  Pain: denies  Skin: Old L PIV site - covered with quick clot CDI   LDA's:  L PIV - infusing doubutamine 5mcg/kg/min   L PIV - infusing heparin 2000u/hr  L PIV - SL  R arm - DVT   Plan: Continue with POC. Notify primary team with changes.

## 2025-03-14 NOTE — PLAN OF CARE
Goal Outcome Evaluation:      Plan of Care Reviewed With: patient    Overall Patient Progress: no changeOverall Patient Progress: no change    Outcome Evaluation: Admitted from [facility] this shift. On dobutamine and heparin gtt, vancomycin.

## 2025-03-14 NOTE — PHARMACY-VANCOMYCIN DOSING SERVICE
"Pharmacy Vancomycin Initial Note  Date of Service 2025  Patient's  1985  39 year old, male    Indication: Sepsis    Current estimated CrCl = Estimated Creatinine Clearance: 83.5 mL/min (A) (based on SCr of 1.38 mg/dL (H)).    Creatinine for last 3 days  No results found for requested labs within last 3 days.    Recent Vancomycin Level(s) for last 3 days  No results found for requested labs within last 3 days.      Vancomycin IV Administrations (past 72 hours)        No vancomycin orders with administrations in past 72 hours.                    Nephrotoxins and other renal medications (From now, onward)      Start     Dose/Rate Route Frequency Ordered Stop    25 0800  torsemide (DEMADEX) tablet 60 mg        Note to Pharmacy: PTA Sig:Take 3 tablets (60 mg) by mouth every morning AND 2 tablets (40 mg) every evening.     Placed in \"And\" Linked Group    60 mg Oral EVERY MORNING 25  empagliflozin (JARDIANCE) tablet 10 mg        Note to Pharmacy: PTA Sig:Take 1 tablet (10 mg) by mouth daily.      10 mg Oral DAILY 25  torsemide (DEMADEX) tablet 40 mg        Note to Pharmacy: PTA Sig:Take 3 tablets (60 mg) by mouth every morning AND 2 tablets (40 mg) every evening.     Placed in \"And\" Linked Group    40 mg Oral EVERY EVENING 25              Contrast Orders - past 72 hours (72h ago, onward)      None            Patient received 5 doses of vancomycin 1500mg q18 hour at OSH. The frequency was adjusted to q12 empirically on 3/12 at OSH due to improvement in renal function.         Plan:  Continue vancomycin  1500 mg (18mg/kg) IV q12h.   Vancomycin monitoring method: AUC  Vancomycin therapeutic monitoring goal: 400-600 mg*h/L  Pharmacy will check vancomycin levels as appropriate with morning labs tomorrow.  Serum creatinine levels will be ordered daily for the first week of therapy and at least twice weekly for subsequent weeks.      Jamin" CELIA Tristan

## 2025-03-14 NOTE — PLAN OF CARE
Admission 3/13  Diagnosis: heart failure exacerbation & sepsis  Admitted from: [place]   Via: chopper  Accompanied by: flight staff  Belongings: Placed in closet; valuables sent home with family   Admission paperwork: complete   Teaching: Call don't fall, use of console, meal times, visiting hours, orientation to unit, when to call for the RN (angina/sob/dizzyness, etc.), and the importance of safety.   Access: LPIV x2  Telemetry:Placed on pt   Ht./Wt.: complete   Two nurse head-to-toe skin assessment performed by AL and RE.  Skin issues noted: dry feet, old PICC dressing (changed).  See PCS for assessment and treatment of wounds and surgical sites.

## 2025-03-14 NOTE — PRE-PROCEDURE
GENERAL PRE-PROCEDURE:   Procedure:  Transesophageal echocardiogram with sedation  Date/Time:  3/14/2025 1:54 PM    Risks and benefits: Risks, benefits and alternatives were discussed    Consent given by:  Patient  Patient states understanding of procedure being performed: Yes    Patient's understanding of procedure matches consent: Yes    Procedure consent matches procedure scheduled: Yes    Expected level of sedation:  Moderate  Appropriately NPO:  Yes  ASA Class:  4  Mallampati  :  Grade 2- soft palate, base of uvula, tonsillar pillars, and portion of posterior pharyngeal wall visible  Lungs:  Lungs clear with good breath sounds bilaterally  Heart:  Normal heart sounds and rate  History & Physical reviewed:  History and physical reviewed and no updates needed  Statement of review:  I have reviewed the lab findings, diagnostic data, medications, and the plan for sedation    Arvin Bravo, PGY-6  Cardiovascular Disease Fellow

## 2025-03-14 NOTE — PROGRESS NOTES
[facility]  Cardiology II Service / Advanced Heart Failure  Daily Progress Note    Patient: Jose Enrique Engel      : 1985      MRN: [id number]    Assessment/Plan:   40 yo male with PMHx of HFrEF (15%) 2/2 NICM s/p ICD, CKD3, polysubstance use admitted as transfer from outside hospital on 3/14/25 for severe sepsis secondary to Staphylococcus aureus bacteremia leading to multiorgan dysfunction.  He was transferred to [facility] given advanced heart failure.    Changes today:  - JW without evidence of vegetation  - Repeat RUE US per ID recs to evaluate for septic thrombophlebitis  - Continue vancomycin and continue monitoring daily blood cultures  - Oozing from RUE AC PIV site slowed with application of topical thrombin and quick- clot     Severe sepsis secondary to Staphylococcus bacteremia  -Possible source: PICC line through which he has been getting dobutamine, removed at outside hospital  -ID consulted  -Blood cultures drawn on 3/10, 3/11, 3/12, 3/13 positive for Staphylococcus aureus   - Continue daily blood  cultures  - Repeat RUE US 3/14 for possible septic thrombophlebitis   - JW 3/14 without vegetation  - Vancomycin 3/10-      Chronic systolic heart failure (ejection fraction 10 to 15%), stage D class IIIb  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 Was previously on outpatient dobutamine, self-discontinued in . Re-engaged with outpatient cardiologist Dr. Dove , also self-enrolled in chem dep treatment in past year. At recent office visit/RHC was concerning for decompensated CHF and possible cardiogenic shock. Interested in advanced therapies but not candidate yet due to being lost to follow up.  He was discharged on home dobutamine infusion during last admission.   Last RHC 25: RA 15, PA 44/26, PCWP 21, CI 1.7, PVR 3.4, SVR ~1400  Last TTE 3/13/2025: EF 10%,mild pulm Htn, grade-1 diastolic  dysfunction  - Volume: hypervolemic.  lbs. Was 180 on admission.   - Holding torsemide 60 AM and 40 PM given hypotension  - Inotrope: continue dobutamine @ 5mcg/kg/min               - continue PTA digoxin 125 mcg daily  - Afterload Reduction: hold PTA - Entresto 49/51 BID --increase as BP allows    GDMT:                - Diuretic: hold Torsemide 60/40               - ACE/ARB/ARNI: - hold Entresto 49/51 BID                - MRA spironolactone 25               - BB- none not while on dobutamine               - SGLT2-  empagliflozin 10 mg daily  - Anticoagulation: heparin gtt  - Statin: none  - Antiplatelet: none  - Antiarrhythmic: none  - SCD prophylaxis: ICD in place  - Electrolytes: BID monitoring with diuresis, goals K>4, Mag>2  - Strict I/O monitoring, daily weights  - Dietary restrictions: regular diet (pt request), 2L fluids (pt intermittently following)    Right upper extremity DVT   Clot from brachial vein through the subclavian diagnosed at outside hospital with RUE US 3/10/25. Has been off of Xarelto.   - Heparin infusion (3/10)     Moderate chronic hyponatremia  Partly because of malnutrition  -Continue to monitor     CKD stage II  Appears to be improving.  -Continue to monitor  -Volume resuscitation as needed     History of high anion gap metabolic acidosis   Hypochloremia  At [facility], found to have a lactate of 5.4 at the time of admission, continue to have metabolic acidosis in the setting of low bicarb.  He was previously on bicarb infusion and then has been getting sodium bicarb 1300 mg twice daily.  At the time of admission at [facility], patient's anion gap within normal limits.  -Will continue to monitor     Hx left ventricular mural thrombus  - Holding Xarelto   - Heparin gtt     Thrombocytopenia   -CTM      FEN: Cardiac  DVT Prophylaxis: Heparin  Code Status: Full Code  Disposition: Expected discharge in 7-8 days     Pt to be seen and discussed with the staff attending   Alex Calero MD  Internal Medicine- Pediatrics PGY3  [facility]  Please use on call sytem for paging    ==================================    Subjective:   Interval History: NAEO. Ongoing oozing from prior PIV site. Pt doesn't report change in symptoms, no lightheaded or dizziness, shortness of breath, chills.     Objective:     Vitals:    03/13/25 2000 03/14/25 0409   Weight: 82.1 kg (180 lb 14.4 oz) 82.6 kg (182 lb)     Vital Signs with Ranges  Temp:  [97.7  F (36.5  C)-98.1  F (36.7  C)] 97.9  F (36.6  C)  Pulse:  [] 86  Resp:  [12-24] 12  BP: ()/(64-85) 136/85  Cuff Mean (mmHg):  [] 100  SpO2:  [94 %-100 %] 100 %  I/O last 3 completed shifts:  In: 450 [P.O.:200; IV Piggyback:250]  Out: 1000 [Urine:1000]    Exam:  Constitutional:  Appears comfortable, no distress  HEENT: PERRLA, extraocular motion intact, oral mucosa moist  Cardiovascular: Tachycardic rate, regular rhythm, pulses 2+ bilateral upper extremities, 1+  lower extremity edema, fingers and toes slightly cool, no mottling  Pulmonary: Lungs bilaterally clear to auscultation  GI: Abdomen soft nontender nondistended normal active bowel sounds  MSK: Well-developed  Skin: erythema at previous picc site. No drainage  Neuro: Alert and oriented to person place and time  Psych: Appropriate mood and affect    Medications   Current Facility-Administered Medications   Medication Dose Route Frequency Provider Last Rate Last Admin    DOBUTamine (DOBUTREX) 500 mg in D5W 250 mL infusion (adult std conc)  5 mcg/kg/min (Order-Specific) Intravenous Continuous Bao Mahan MD 11.5 mL/hr at 03/14/25 1554 5 mcg/kg/min at 03/14/25 1554    heparin 25,000 units in 0.45% NaCl 250 mL ANTICOAGULANT infusion  0-5,000 Units/hr Intravenous Continuous Bao Mahan MD 18.5 mL/hr at 03/14/25 1100 1,850 Units/hr at 03/14/25 1100    May take oral meds with a sip of water, the morning of JW procedure.   Does not apply  Continuous PRN Bao Mahan MD         Current Facility-Administered Medications   Medication Dose Route Frequency Provider Last Rate Last Admin    calcium carbonate (OS-LENARD) tablet 600 mg  600 mg Oral Daily Bao Mahan MD   600 mg at 03/14/25 1020    [START ON 3/15/2025] digoxin (LANOXIN) tablet 125 mcg  125 mcg Oral QAM Zaid Mar MD        empagliflozin (JARDIANCE) tablet 10 mg  10 mg Oral Daily Bao Mahan MD   10 mg at 03/14/25 1021    [Held by provider] ferrous sulfate (FEROSUL) tablet 325 mg  325 mg Oral Daily with breakfast Bao Mahan MD        [Held by provider] sacubitril-valsartan (ENTRESTO) 49-51 MG per tablet 1 tablet  1 tablet Oral BID Bao Mahan MD        spironolactone (ALDACTONE) tablet 25 mg  25 mg Oral Daily Bao Mahan MD   25 mg at 03/14/25 1020    [START ON 3/16/2025] vancomycin (VANCOCIN) 1,000 mg in 200 mL dextrose intermittent infusion  1,000 mg Intravenous Q12H Zaid Mar MD        vancomycin (VANCOCIN) 1,000 mg in 200 mL dextrose intermittent infusion  1,000 mg Intravenous Q12H Zaid Mar  mL/hr at 03/14/25 1559 1,000 mg at 03/14/25 1559    vitamin C (ASCORBIC ACID) tablet 250 mg  250 mg Oral Daily Bao Mahan MD   250 mg at 03/14/25 1020       Data   Recent Labs   Lab 03/14/25  0546 03/14/25  0154 03/13/25  2223 03/13/25  2223 03/13/25  2040   WBC 10.7  --   --   --  10.8   HGB 12.8*  --   --   --  13.7   MCV 87  --   --   --  88   *  --   --   --  98*   INR  --  1.14  --   --   --    * 122*  --  124*  --    POTASSIUM 4.6 4.6  --  4.6  --    CHLORIDE 91* 91*  --  89*  --    CO2 20* 19*  --  21*  --    BUN 34.5* 36.3*  --  38.7*  --    CR 1.14 1.14  --  1.18*  --    ANIONGAP 12 12  --  14  --    LENARD 8.6* 8.4*  --  8.9  --    GLC 99 100*  --  112*  --    ALBUMIN 3.2* 3.2*   < > 3.6  --    PROTTOTAL 6.2* 6.1*   < > 6.8  --    BILITOTAL 2.2* 2.2*   < > 2.6*  --    ALKPHOS  188* 193*   < > 206*  --    * 114*   < > 128*  --    * 108*   < > 126*  --     < > = values in this interval not displayed.       Recent Results (from the past 24 hours)   Transesophageal Echocardiogram   Result Value    LVEF  5-10% (severely reduced)    [facility]    [id number]  [id number]  [id number]  [id number]^PRIYANKA^SOLANGE^JEREMY     [facility],[place]  Echocardiography Laboratory  [address] [place] [id number]     Name: WILLIAM JIMÉNEZ  MRN: [id number]  : 1985  Study Date: 2025 01:33 PM  Age: 39 yrs  Gender: Male  [place] [facility]  Reason For Study: Bactremia  Ordering Physician: SOLANGE MATHEW  Referring Physician: SYSTEM, PROVIDER NOT IN  Performed By: MD Arvin Bravo     BSA: 2.0 m2  Height: 70 in  Weight: 182 lb  HR: 75  BP: 123/85 mmHg  Attestation  I was present during JW probe placement by the fellow, Arvin Bravo. I  personally viewed the imaging and agree with the interpretation and report as  documented by the fellow.  ______________________________________________________________________________  Interpretation Summary  Left ventricular function is decreased. The ejection fraction is 5-10%  (severely reduced). Severe diffuse hypokinesis is present.  Global right ventricular function is moderately reduced.  Severe biatrial enlargement is present.  Moderate functional tricuspid insufficiency and mild to moderate functional  mitral insufficiency present. No evidence of valvular vegetations.  No pericardial effusion present.  ______________________________________________________________________________  Procedure  Transesophageal Echocardiogram with two-dimensional, color and spectral  Doppler. 3D image acquisition, reconstruction, and real-time interpretation  was performed. Procedure location Echo Lab. The procedure was performed in the  Echo Lab. Informed consent for Transesophegeal echo obtained. JW Probe #[id number]  was used  during the procedure. Patient was sedated using Fentanyl 50 mcg.  Patient was sedated using Versed 2 mg. The heart rate, respiratory rate,  oxygen saturations, blood pressure, and response to care were monitored  throughout the procedure with the assistance of the nurse. I determined this  patient to be an appropriate candidate for the planned sedation and procedure  and have reassessed the patient immediately prior to sedation and procedure.  Total sedation time: 19 minutes of continuous bedside 1:1 monitoring. The  Transducer was inserted without difficulty . The patient tolerated the  procedure well. Complications None. ECG shows normal sinus rhythm. Good  quality two-dimensional was performed and interpreted. Good quality color and  spectral Doppler were performed and interpreted.     Left Ventricle  Mild left ventricular dilation is present. Left ventricular function is  decreased. The ejection fraction is 5-10% (severely reduced). Severe diffuse  hypokinesis is present. A false tendon is noted. No obvious thormbus seen in  the left ventricle.     Right Ventricle  Global right ventricular function is moderately reduced.     Atria  The left atrial appendage is normal. It is free of spontaneous echo contrast  and thrombus. Severe biatrial enlargement is present. The atrial septum is  intact as assessed by color Doppler .     Mitral Valve  No evidence of MV endocarditis or mass. Mild to moderate mitral insufficiency  is present. MR secondary to a restricted posterior leaflet and annular  dilatation.     Aortic Valve  Aortic valve is normal in structure and function. The aortic valve is  tricuspid. No evidence of AoV endocarditis or mass. No aortic regurgitation is  present.     Tricuspid Valve  No evidence of TV endocarditis or mass. Moderate tricuspid insufficiency is  present. The right ventricular systolic pressure is approximated at 14.4 mmHg  plus the right atrial pressure.     Pulmonic Valve  No evidence of  PV endocarditis or mass. Mild pulmonic insufficiency is  present.     Vessels  The aorta root is normal.     Pericardium  No pericardial effusion is present.     Compared to Previous Study  This study was compared with the study from 25 . The severity of MR and TR  appears improved.     ______________________________________________________________________________  Doppler Measurements & Calculations  TR max zayda: 189.5 cm/sec  TR max P.4 mmHg     ______________________________________________________________________________  Report approved by: Lisseth Sumner MD on 2025 03:48 PM

## 2025-03-14 NOTE — H&P
Cardiology 2    History and Physical    Date of Admission:  3/13/2025  Date of Service (when I saw the patient): 03/13/25    Assessment & Plan   40 yo male with PMHx of HFrEF (15%) 2/2 NICM s/p ICD, CKD3, polysubstance use admitted 2/3/25 for ambulatory cardiogenic shock. Ultimately discharged on dobutamine gtt at 5mcg/kg/min with [facility]           Jose Enrique Engel is a 39 year old male who presents with ***. The patient has a pmhx of ***.  #Sepsis with multiple organ dysfunction caused by infection of RUE PICC line which he's uses at home for dobutamine infusions - POA, patient has acute renal injury creatinine 2.39,  mildly elevated AST 63, bilirubin 3, lactic acid 5.4 WBC 16.5, thrombocytopenia platelet 90  - PICC line right upper extremity was infected with purulent drainage on removal in emergency department  - Blood cultures drawn 3/10/2025 positive for Staphylococcus aureus, repeat cultures 3/11 and 3/12 remain positive, culture 3/13 pending  - Infectious Disease Dr. Jon agreed with vancomycin started 3/10/2025-current. She also recommends a JW given persistently positive blood cultures     - TTE 3/13/2025:  1.  Severe LV systolic dysfunction, LVEF-10%, with global hypokinesis.  2.  Grade-1 diastolic dysfunction.  3.  Right ventricular systolic function mild-moderately reduced.  4.  Severe biatrial enlargement.  5.  Mild mitral and mild-moderate tricuspid regurgitation.  6.  Visually, the mitral valve appears at least mildly stenotic on short axis imaging (shot-25).  A mean gradient was not obtained.  7.  Mild pulmonary hypertension, estimated PASP-43 mmHg.  8.  Spontaneous contrast observed in the left ventricle.  Therapeutic anticoagulation advised for stroke prophylaxis.  A left ventricular thrombus is not observed at this time.  9.  No definite echocardiographic evidence of vegetations identified.  However, on apical four-chamber imaging, an unusual shadow is observed in close  proximity to the tricuspid valve (shots 72-74).  10. If clinical concern for infective endocarditis remains, recommend transesophageal echocardiogram for further evaluation.     #Moderate hyponatremia sodium 122-123 during this admission, patient has not been eating or drinking well likely contributing to presentation     #Acute renal injury on CKD stage II. creatinine 2.39, EGFR 35-POA, secondary to acute infection and volume depletion, resolved with gentle  volume resuscitation      # High anion gap metabolic acidosis secondary to infection with lactic acidosis 5.4-POA, lactic acidosis has resolved but patient continues to have metabolic acidosis caused by low bicarb  - Sodium bicarbonate 1300 mg BID started on admission     #Acute exacerbation of HFrEF EF 10%-POA, secondary to not taking cardiac medications for at least 3 days possibly longer as patient is not sure.  At bedtime troponin 113, BNP 13,923  - Patient is on dobutamine drip 5 mcg/kg/min at home  - Patient reports that he has not taken his medications since 3/8/25  - PICC line for dobutamine infusion was removed due to infection, currently getting dobutamine through peripheral line. Recommend using peripheral access until bacteremia has clear if possible.   - Cardiology consulted for assistance in managing underlying heart failure, recommended holding most home medications due to borderline hypotension throughout hospitalization period     #Right upper extremity DVT with clot from brachial vein through the subclavian-POA secondary to not taking his home anticoagulation and infection  - Started on heparin drip 3/10/2025-current     # Hx left ventricular mural thrombus-POA  - Holding Xarelto at this time given thrombocytopenia     #Neutrophilic leukocytosis with thrombocytopenia secondary to sepsis with organ dysfunction-POA, resolved  - Continue vancomycin     # History of polysubstance abuse-POA  - UDS positive for marijuana and amphetamine (takes  Adderall at home for ADHD)     #Hx Nonadherence to medication     #Hx insomnia     #Hx ICD due to HFrEF     #Hx ADHD     #Hx iron deficiency anemia            38 yo male with PMHx of HFrEF (15%) 2/2 NICM s/p ICD, cardiogenic shock previously on dobutamine, CKD3, polysubstance use admitted 2/3/25 for ambulatory cardiogenic shock. Ultimately discharged on dobutamine gtt at 5mcg/kg/min with [facility] after agreement pending no missed appointments given history of self-discontinuation. Will work outpatient with heart failure treatment, once he becomes candidate for advanced therapies can readdress.      # Ambulatory cardiogenic shock  # Acute on chronic systolic heart failure (EF 15%), stage D, class IIIb  # Non-ischemic cardiomyopathy  Pt with hx of advanced HFrEF/NICM. Was previously on outpatient dobutamine, self-discontinued in 2023. Re-engaged with outpatient cardiologist Dr. Dove 1/17, also self-enrolled in chem dep treatment in past year. At recent office visit/RHC was concerning for decompensated CHF and possible cardiogenic shock, pt attempted to manage outpatient but felt worse and ultimately presented for admission. Interested in advanced therapies but not candidate yet due to being lost to follow up. Will discharge on home infusion, as he self-discontinued previously, was accepted by [facility] on conditional terms, including no missed appointments and understands that if he does not follow through he has limited options with end stage HF.   Last RHC 1/29/25: RA 15, PA 44/26, PCWP 21, CI 1.7, PVR 3.4, SVR ~1400  Last TTE 02/2025: EF 5-10%, severely reduced RV Function, mod-severe MR, severe TR  - Volume:Euvolemic.  lbs. Was 184 on admission.   - PO torsemide 60 AM and 40 PM as not likely to sick to fluid restriction as OP  - Inotrope: continue dobutamine @ 5mcg/kg/min               - continue PTA digoxin 125 mcg daily  - Afterload Reduction: Increase PTA - Entresto 49/51  BID --increase as BP allows as OP            GDMT:                - Diuretic: Torsemide 60/40               - ACE/ARB/ARNI: - Entresto 49/51 BID                - MRA spironolactone 25               - BB- none not while on dobutamine               - SGLT2-  empagliflozin 10 mg daily  - Anticoagulation: discontinued PTA rivaroxaban given resolution of LV thrombus   - Statin: none  - Antiplatelet: none  - Antiarrhythmic: none  - SCD prophylaxis: ICD in place  - Electrolytes: BID monitoring with diuresis, goals K>4, Mag>2  - Strict I/O monitoring, daily weights  - Dietary restrictions: regular diet (pt request), 2L fluids (pt intermittently following)     # Congestive hepatopathy, resolved  On admission AST/ALT in 200s. Bilirubin 4.9. INR 3.5. Pattern and clinical picture most c/w congestive hepatopathy. INR/bili normalized, AST/ALT much improved since admission.     # Hx of LV apical thrombus  Seen on echo in 07/2023 - large (~1.8 cm x 1.8 cm x 1.1 cm), protruding, and predominantly sessile. Not present on TTE this admission.  - Stop PTA rivaroxaban     # Hx of polysubstance use   Currently reporting only MJ use. Reports sobriety from EtOH and meth (last use 06/2024). Follows with local chem dep team. PETH negative, UDS only positive for THC.  - continue to follow up OP     # CKD3  Baseline Cr 1.3-1.6. Cr 1.5 on admission, 0.9 today.  - CTM     # Subclinical hypothyroidism  TSH 8 on admission. Started on synthroid at OSH due to TSH of 9. Technically should treat subclinical hypothyroidism if TSH >10. Synthroid not on his PTA meds list.   - Managed by PCP     #Insomnia  - 7 days of ambien, needs PCP to refill     FEN: ***  DVT Prophylaxis: {DVT PROPHYLAXIS:178860}  Code Status: {CODE STATUS      :421730}  Disposition: Expected discharge in *** days once ***.    Pt seen and discussed with the staff attending  ***, who agrees with the assessment and plan.    @sigs@    Primary Care Physician   ***KUSH ON    Chief  "Complaint   ***    {   History obtained from                     :3255372::\"History is obtained from the patient\"}    History of Present Illness   Jose Enrique Engel is a 39 year old male who presents with ***    Past Medical History    {Document past medical history:661730}    Past Surgical History   {Document past surgical history:468119}    Prior to Admission Medications   Prior to Admission Medications   Prescriptions Last Dose Informant Patient Reported? Taking?   DOBUTamine in 5% Dextrose 2 mg/mL 350 mL via CADD pump   No No   Sig: One day bag. Infuse 406.5 mcg/min into the vein continuously. Dose based on 5 mcg/kg/min and 81.3 kg. Continuous rate: 12.2 mL/hr. Reservoir volume: 293 mL. Bag contains 57 mL of overfill. Do not flush line between bags.   Emergency Supply Kit, Central,   No No   Sig: Patient use for emergency only. Contents: 3 sodium chloride 0.9% flushes, 1 dressing kit, 1 microclave ext set 14\", 4 nitrile gloves (med), 6 alcohol prep pads, 1 bacitracin, 1 syringe (10 cc 20 G 1\"). Call [phone] to reorder.   VITAMIN C 250 MG tablet  Self Yes No   Sig: Take 1 tablet by mouth daily.   albuterol (PROAIR HFA/PROVENTIL HFA/VENTOLIN HFA) 108 (90 Base) MCG/ACT inhaler   Yes No   Sig: Inhale 2 puffs into the lungs every 6 hours as needed for shortness of breath, wheezing or cough.   calcium carbonate (OS-LENARD) 1500 (600 Ca) MG tablet  Self Yes No   Sig: Take 1 tablet by mouth daily at 2 pm.   digoxin (LANOXIN) 125 MCG tablet   No No   Sig: Take 1 tablet (125 mcg) by mouth daily.   empagliflozin (JARDIANCE) 10 MG TABS tablet   No No   Sig: Take 1 tablet (10 mg) by mouth daily.   ferrous sulfate (FEROSUL) 325 (65 Fe) MG tablet  Self Yes No   Sig: Take 325 mg by mouth daily (with breakfast).   sacubitril-valsartan (ENTRESTO) 49-51 MG per tablet   No No   Sig: Take 1 tablet by mouth 2 times daily.   sodium chloride, PF, 0.9% PF flush   No No   Sig: Inject 10 mLs into catheter as needed for line flush " (only as directed by medical professional). Improper flushing may result in a large dose of drug administered and side effects may result.   spironolactone (ALDACTONE) 25 MG tablet   No No   Sig: Take 1 tablet (25 mg) by mouth daily.   torsemide (DEMADEX) 20 MG tablet   No No   Sig: Take 3 tablets (60 mg) by mouth every morning AND 2 tablets (40 mg) every evening.   zolpidem (AMBIEN) 5 MG tablet   No No   Sig: Take 0.5 tablets (2.5 mg) by mouth nightly as needed for sleep.      Facility-Administered Medications: None     Allergies   Allergies   Allergen Reactions    Isosorbide Anaphylaxis    Benadryl [Diphenhydramine]      anxiety    Other Environmental Allergy Itching     Allergic to pollen, s/s itchy eyes and sneeze       Social History   {Did you review the Social History?:381095}    Family History   {Did you review the Family History?:578966}    Review of Systems   {ROS OPTIONS FOR H&P AND CONSULT COLLAPSIBLE NOTES:733673}    Physical Exam   Temp: 97.7  F (36.5  C) Temp src: Oral BP: 116/64 Pulse: 100   Resp: 22 SpO2: 100 % O2 Device: None (Room air)    Vital Signs with Ranges  Temp:  [97.7  F (36.5  C)] 97.7  F (36.5  C)  Pulse:  [100-103] 100  Resp:  [22] 22  BP: (110-116)/(64-70) 116/64  SpO2:  [100 %] 100 %  180 lbs 14.4 oz    GEN:  Alert, interactive, appears comfortable, NAD.  HEENT:  Normocephalic/atraumatic, no scleral icterus, no nasal discharge, OP clear with MMM.  CV:  Regular rate and rhythm, no murmur or JVD.   LUNGS:  Clear to auscultation bilaterally, no wheezes or crackles.   ABD:  Active bowel sounds, soft, non-tender/non-distended.  No rebound/guarding/rigidity.  EXT:  No edema or cyanosis.  Hands/feet warm to touch with good signs of peripheral perfusion.   SKIN:  Dry to touch, no exanthems noted in the visualized areas.  NEURO:  Alert and oriented, no new focal deficits appreciated.  PSCYH: Normal mood and affect    Data   Data reviewed today:  I personally reviewed:    EKG: ***    Recent  Labs   Lab 03/13/25 2040   WBC 10.8   HGB 13.7   MCV 88   PLT 98*       Recent Results (from the past 24 hours)   ECHO Congenital Transthoracic (TTE)    Narrative    1.  Severe LV systolic dysfunction, LVEF-10%, with global hypokinesis.  2.  Grade-1 diastolic dysfunction.  3.  Right ventricular systolic function mild-moderately reduced.  4.  Severe biatrial enlargement.  5.  Mild mitral and mild-moderate tricuspid regurgitation.  6.  Visually, the mitral valve appears at least mildly stenotic on short   axis imaging (shot-25).  A mean gradient was not obtained.  7.  Mild pulmonary hypertension, estimated PASP-43 mmHg.  8.  Spontaneous contrast observed in the left ventricle.  Therapeutic   anticoagulation advised for stroke prophylaxis.  A left ventricular   thrombus is not observed at this time.  9.  No definite echocardiographic evidence of vegetations identified.    However, on apical four-chamber imaging, an unusual shadow is observed in   close proximity to the tricuspid valve (shots 72-74).  10. If clinical concern for infective endocarditis remains, recommend   transesophageal echocardiogram for further evaluation.    Left Ventricle  The left ventricle is severely dilated. Wall thickness is normal. Severely reduced left ventricular systolic function. Global hypokinesis of the left ventricular wall segments. Grade I diastolic dysfunction.    Right Ventricle  The right ventricle appears normal in size. Systolic function is reduced. Wall thickness is normal. The RVSP measures 43 mmHg.    Left Atrium  The left atrium is severely dilated.    Right Atrium  The right atrium is dilated by visual assessment.    IVC/SVC  Dilated IVC with minimal respirophasic changes.    Mitral Valve  Mitral valve structure is normal. There is mild regurgitation. There is no evidence of mitral valve stenosis. No vegetation present on the mitral valve.    Tricuspid Valve  Tricuspid valve structure is normal. There is mild  regurgitation. No vegetation present on the tricuspid valve.    Aortic Valve  The aortic valve is tricuspid. Normal valve mobility. There is no regurgitation or stenosis. No vegetation seen on the aortic valve.    Pulmonic Valve  Pulmonic valve structure is normal. There is trace regurgitation. There is no evidence of pulmonic valve stenosis. No vegetation present on the pulmonic valve.    Pericardium  There is no pericardial effusion.    Pulmonary Artery  The pulmonary artery is normal. The estimated pulmonary artery systolic pressure is 43 mmHg.    Aorta  The aorta appears normal in size.    Interatrial Septum  No evidence of an atrial shunt by color Doppler.    Study Details  A complete echo was performed with complete 2D, color flow Doppler, complete spectral Doppler and M-mode. The sonographer requested the use of Definity- imaging enhancing agent per protocol. The patient denies contraindications and gives verbal consent for imaging enhancing agent injection. The study was performed in the patient's room. Cardiac Sonographer: Popeye Sanford    Comparison Statements  Transthoracic Echocardiogram on 11/29/2024.

## 2025-03-14 NOTE — CONSULTS
GENERAL ID SERVICE CONSULTATION     Patient:  Jose Enrique Engel   Date of birth 1985, Medical record number [id number]  Date of Visit:  03/14/2025  Date of Admission: 3/13/2025  Consult Requester:Zaid Mar MD          Assessment and Recommendations:   ASSESSMENT:  Severe sepsis secondary to MRSA  Blood cultures positive at OSH from 3/10-transfer on 3/13  Right upper extremity DVT with concern for septic thrombophlebitis   Acute decompensated heart failure  CKD stage II    DISCUSSION:   Jose Enrique Engel is a 38 yo male with PMHx of HFrEF (10%) 2/2 NICM s/p ICD, CKD3, substance use disorder transferred from [place] 3/13 for further cardiac management after 3 days of persistent MRSA bacteremia and multiorgan dysfunction. The bacteremia is likely from PICC line (placed at previous hospitalization 2/3, possibly dislodged and replaced with potential for introducing infection) given the purulent discharge noted and cellulitis. There is also concern for septic thrombophlebitis based on DVT at insertion site noted on US and continued bacteremia. DVT could also be from stopping Xarelto in December, but suspicion for infection is warranted at this time given clinical picture. Persistent positive blood cultures could also be from endocarditis which is being assessed by JW. In either case, an extended course of IV antibiotics is warranted (at least 4 weeks) to ensure clearance of infection.     RECOMMENDATION:  Daily blood cultures until negative for 72 hours  Agree with JW to assess for endocarditis  Repeat DVT US of RUE to assess for abscess formation  Continue IV vancomycin    Thank you for this consult. ID will continue to follow.     Patient was discussed with Dr. Dudley.     Renea Alcantara MD  PGY-1 Medicine-Pediatrics  ________________________________________________________________    Consult Question:.  Admission Diagnosis: Acute dyspnea [R06.00]         History of Present Illness:   Jose Enrique  Toro is a 38 yo male with PMHx of HFrEF (10%) 2/2 NICM s/p ICD, CKD3, substance use disorder previously admitted 2/3/25 for ambulatory cardiogenic shock who was discharged on dobutamine gtt at 5mcg/kg/min with [facility]. He presented to [facility] 3/10 with severe sepsis secondary to MRSA bacteremia leading to multiorgan dysfunction. PICC line likely source for bacteremia especially with purulent drainage and cellulitis noted at PICC insertion site. PICC had fallen out prior to presentation to ED but IV was replaced by father at home. Blood cultures at [facility] have been positive for MRSA since 3/10. He was initially on vancomycin and cefepime until MRSA positive, then was narrowed to vancomycin. He was noted to have a DVT at insertion site of PICC line so there was also concern for septic thrombophlebitis.     Interview limited per patient preference. Following information based on chart review and any response from patient.    Patient's symptoms started a couple days prior to presentation at OSH with fevers, chills, arm pain, and then developed SOB. Believed it was because he had been off dobutamine for a few days after PICC was dislodged (then replaced/changed by father at home per chart review). As of today, he is not having any fevers but is having chills. Otherwise, ROS completely negative. He just wants to sleep and is feeling tired.     UA negative for UTI. CXR negative for pneumonia. Found to be in acute heart failure. Elevated lactate and BERNARD noted on chemistry. Lactate upper limits of normal on admission and BERNARD appears to have resolved. Patient is comfortable on room air and has been afebrile since transfer.          Review of Systems:   CONSTITUTIONAL:  No fevers. Positive for chills.  EYES: negative for icterus  ENT:  negative for hearing loss and sore throat  RESPIRATORY:  negative for cough  CARDIOVASCULAR:  negative for chest pain, no current dyspnea  GASTROINTESTINAL:  negative  for nausea, vomiting, diarrhea and constipation  GENITOURINARY:  negative for dysuria  HEME:  No easy bruising  INTEGUMENT:  negative for rash and pruritus  NEURO:  Negative for headache and vision changes         Past Medical History:     Past Medical History:   Diagnosis Date    Congestive heart failure (H)             Past Surgical History:     Past Surgical History:   Procedure Laterality Date    CV RIGHT HEART CATH MEASUREMENTS RECORDED N/A 01/29/2025    Procedure: Heart Cath Right Heart Cath;  Surgeon: Osman Canseco MD;  Location:  HEART CARDIAC CATH LAB    EP SICD INSERT N/A 07/17/2023    Procedure: Subcutaneous Implantable Cardioverter Defibrillator Implant;  Surgeon: Luis Zaragoza MD;  Location:  HEART CARDIAC CATH LAB    PICC DOUBLE LUMEN PLACEMENT Left 07/07/2023    Left basilic vein 48cm total 2cm external.    PICC DOUBLE LUMEN PLACEMENT Right 02/06/2025    Right basilic vein 48cm total 5cm external.            Family History:   Reviewed and non-contributory.   No family history on file.         Social History:     Social History     Tobacco Use    Smoking status: Former     Types: Cigarettes, Cigars, Pipe, Hookah     Start date: 2003    Smokeless tobacco: Never   Substance Use Topics    Alcohol use: Never     History   Sexual Activity    Sexual activity: Not on file            Current Medications:     Current Facility-Administered Medications   Medication Dose Route Frequency Provider Last Rate Last Admin    calcium carbonate (OS-LENARD) tablet 600 mg  600 mg Oral Daily Bao Mahan MD        digoxin (LANOXIN) tablet 125 mcg  125 mcg Oral QPM Bao Mahan MD   125 mcg at 03/14/25 0024    empagliflozin (JARDIANCE) tablet 10 mg  10 mg Oral Daily Bao Mahan MD        [Held by provider] ferrous sulfate (FEROSUL) tablet 325 mg  325 mg Oral Daily with breakfast Boa Mahan MD        [Held by provider] sacubitril-valsartan (ENTRESTO) 49-51 MG per tablet 1 tablet   1 tablet Oral BID Bao Mahan MD        spironolactone (ALDACTONE) tablet 25 mg  25 mg Oral Daily Bao Mahan MD        thrombin (Recombinant) 5000 units vial   Topical Once Jess Calero MD        [START ON 3/16/2025] vancomycin (VANCOCIN) 1,000 mg in 200 mL dextrose intermittent infusion  1,000 mg Intravenous Q12H Zaid Mar MD        vancomycin (VANCOCIN) 1,000 mg in 200 mL dextrose intermittent infusion  1,000 mg Intravenous Q12H Zaid Mar MD        vitamin C (ASCORBIC ACID) tablet 250 mg  250 mg Oral Daily Bao Mahan MD                Allergies:     Allergies   Allergen Reactions    Isosorbide Anaphylaxis    Benadryl [Diphenhydramine]      anxiety    Other Environmental Allergy Itching     Allergic to pollen, s/s itchy eyes and sneeze            Physical Exam:   Vitals were reviewed  Patient Vitals for the past 24 hrs:   BP Temp Temp src Pulse Resp SpO2 Weight   03/14/25 0701 110/82 -- -- 90 -- -- --   03/14/25 0409 94/65 -- -- 95 20 100 % 82.6 kg (182 lb)   03/14/25 0119 103/78 97.8  F (36.6  C) Oral 96 -- 100 % --   03/14/25 0024 97/67 98.1  F (36.7  C) Oral 97 -- 98 % --   03/13/25 2126 116/64 -- -- 100 -- -- --   03/13/25 2000 110/70 97.7  F (36.5  C) Oral 103 22 100 % 82.1 kg (180 lb 14.4 oz)   03/13/25 1900 -- -- Oral -- -- -- --       Physical Examination:  GENERAL:  laying in bed in no acute distress.   HEENT:  Head is normocephalic, atraumatic   EYES:  Eyes have anicteric sclerae without conjunctival injection   ENT:  Oropharynx is moist without exudates or ulcers. Tongue is midline  NECK:  Supple. No cervical lymphadenopathy  LUNGS:  Clear to auscultation bilateral.   CARDIOVASCULAR:  Regular rate and rhythm, pulses 2+ bilateral UE, no mottling appreciated  ABDOMEN:  Normal bowel sounds, soft, nontender.   SKIN:  Line(s) (Right forearm PIV) in place without any surrounding erythema or exudate. RUE wrapped so unable to appreciate area of prior  "PICC, no tenderness to gentle palpation of wrap.   NEUROLOGIC:  Grossly nonfocal. Active x4 extremities         Laboratory Data:     Inflammatory Markers  No lab results found.    Hematology Studies    Recent Labs   Lab Test 03/14/25  0546 03/13/25  2040 02/14/25  0804 02/13/25  0807 02/11/25  0148 02/09/25  0441 02/07/25  0058 02/04/25  0652   WBC 10.7 10.8 5.2 5.3  --  5.6  --  7.2   HGB 12.8* 13.7 16.4 16.7  --  15.9 15.7 17.0   MCV 87 88 93 91  --  92  --  88   * 98* 202 190   < > 189  --  201    < > = values in this interval not displayed.       Metabolic Studies     Recent Labs   Lab Test 03/14/25  0546 03/14/25  0154 03/13/25 2223 02/26/25  1247 02/17/25  1507 02/14/25  0804   * 122* 124* 136  --  134*   POTASSIUM 4.6 4.6 4.6 4.1  --  5.1   CHLORIDE 91* 91* 89* 97* 102 98   CO2 20* 19* 21* 25  --  23   BUN 34.5* 36.3* 38.7* 45.5*  --  35.7*   CR 1.14 1.14 1.18* 1.38*  --  0.82   GFRESTIMATED 84 84 80 67  --  >90       Hepatic Studies    Recent Labs   Lab Test 03/14/25  0546 03/14/25  0154 03/13/25 2223 02/26/25  1247 02/12/25  0209 02/11/25  0548   BILITOTAL 2.2* 2.2* 2.6* 0.9 1.2 1.4*   ALKPHOS 188* 193* 206* 115 110 109   ALBUMIN 3.2* 3.2* 3.6 4.5 3.8 3.4*   * 108* 126* 38 50* 41   * 114* 128* 41 78* 72*       Microbiology:  No results found for: \"CULT\"    Urine Studies  No lab results found.    Vancomycin Levels    Recent Labs   Lab Test 03/14/25  0546   VANCOMYCIN 29.7*       Hepatitis B Testing No lab results found.  Hepatitis C Testing   No results found for: \"HCVAB\", \"HQTG\", \"HCGENO\", \"HCPCR\", \"HQTRNA\", \"HEPRNA\"  Respiratory Virus Testing    No results found for: \"RS\", \"FLUAG\"      Imaging:  UE  3/10/2025 impression:  There is acute non-occlusive deep venous thrombosis present in the right brachiocephalic vein, subclavian, axillary, brachial, and basilic vein(s).     "

## 2025-03-14 NOTE — PROGRESS NOTES
Pt arrived in ECHO department for scheduled JW.   Procedure explained, questions answered and consent signed.   Pt's throat sprayed at 1355, therefore pt will not be able to eat or drink until 2 hours after at 1555. Informed pt of this time and encouraged to start with warm fluids and soft foods.    Pt tolerated procedure well, and was given a total of 50 mcg IV fentanyl and 2 mg IV versed for conscious sedation. Pt denied throat or chest pain after JW complete.   JW probe 66 used for procedure and inserted without difficulty.  Pt denied chest or throat pain after procedure and was monitored until awake and VSS. Escorted back to  via stretcher and hospital transport.   Report given to EFREN Xie.

## 2025-03-14 NOTE — PHARMACY-VANCOMYCIN DOSING SERVICE
Pharmacy Vancomycin Note  Date of Service 2025  Patient's  1985   39 year old, male    Indication: Sepsis  Day of Therapy: started 3/10 at OSH  Current vancomycin regimen:  1500 mg IV q12h  Current vancomycin monitoring method: AUC  Current vancomycin therapeutic monitoring goal: 400-600 mg*h/L    [employer]ight[employer] Prediction of Current Vancomycin Regimen  Regimen: 1500 mg IV every 12 hours.  Start time: 12:16 on 2025  Exposure target: AUC24 (range)400-600 mg/L.hr   AUC24,ss: 743 mg/L.hr  Probability of AUC24 > 400: 100 %  Ctrough,ss: 23.7 mg/L  Probability of Ctrough,ss > 20: 73 %  Probability of nephrotoxicity (Lodise URI ): 25 %      Current estimated CrCl = Estimated Creatinine Clearance: 101.6 mL/min (based on SCr of 1.14 mg/dL).    Creatinine for last 3 days  3/13/2025: 10:23 PM Creatinine 1.18 mg/dL  3/14/2025:  1:54 AM Creatinine 1.14 mg/dL;  5:46 AM Creatinine 1.14 mg/dL    Recent Vancomycin Levels (past 3 days)  3/14/2025:  5:46 AM Vancomycin 29.7 ug/mL    Vancomycin IV Administrations (past 72 hours)                     vancomycin (VANCOCIN) 1,500 mg in 0.9% NaCl 250 mL intermittent infusion (mg) 1,500 mg New Bag 25 0016                    Nephrotoxins and other renal medications (From now, onward)      Start     Dose/Rate Route Frequency Ordered Stop    25 1400  vancomycin (VANCOCIN) 1,000 mg in 200 mL dextrose intermittent infusion         1,000 mg  200 mL/hr over 1 Hours Intravenous EVERY 12 HOURS 25 0815      25 0800  empagliflozin (JARDIANCE) tablet 10 mg        Note to Pharmacy: PTA Sig:Take 1 tablet (10 mg) by mouth daily.      10 mg Oral DAILY 251                 Contrast Orders - past 72 hours (72h ago, onward)      None            Interpretation of levels and current regimen:  Vancomycin level is reflective of AUC greater than 600. Although, the vancomycin level drawn was a random level and is expected to be high, it is still reflective of a  predicted AUC higher than 600. Furthermore, it does not seem that vancomycin levels were drawn at OSH. Will decrease dose today and adjust as appropriate for subsequent doses.    Has serum creatinine changed greater than 50% in last 72 hours: No  Urine output: unable to determine  Renal Function: Improving (slightly)    [employer] Prediction of Planned New Vancomycin Regimen  Regimen: 1000 mg IV every 12 hours.  Exposure target: AUC24 (range)400-600 mg/L.hr   AUC24,ss: 504 mg/L.hr  Probability of AUC24 > 400: 91 %  Ctrough,ss: 15.9 mg/L  Probability of Ctrough,ss > 20: 21 %  Probability of nephrotoxicity (Lodise URI 2009): 11 %    Plan:  Decrease Dose to vancomycin 1000 mg (12.1 mg/kg) IV every 12 hours.  Vancomycin monitoring method: AUC  Vancomycin therapeutic monitoring goal: 400-600 mg*h/L  Pharmacy will check vancomycin levels as appropriate in 1-3 Days.  Serum creatinine levels will be ordered daily for the first week of therapy and at least twice weekly for subsequent weeks.    Pau Gomez, PharmD PGY1 resident  Available on [employer]

## 2025-03-15 ENCOUNTER — APPOINTMENT (OUTPATIENT)
Dept: ULTRASOUND IMAGING | Facility: CLINIC | Age: 40
End: 2025-03-15
Payer: COMMERCIAL

## 2025-03-15 LAB
ALBUMIN SERPL BCG-MCNC: 3.4 G/DL (ref 3.5–5.2)
ALP SERPL-CCNC: 237 U/L (ref 40–150)
ALT SERPL W P-5'-P-CCNC: 106 U/L (ref 0–70)
AST SERPL W P-5'-P-CCNC: 81 U/L (ref 0–45)
BILIRUB DIRECT SERPL-MCNC: 1.18 MG/DL (ref 0–0.3)
BILIRUB SERPL-MCNC: 1.8 MG/DL
BURR CELLS BLD QL SMEAR: SLIGHT
CK SERPL-CCNC: 308 U/L (ref 39–308)
E FAECALIS DNA BLD POS QL NAA+NON-PROBE: NOT DETECTED
E FAECIUM DNA BLD POS QL NAA+NON-PROBE: NOT DETECTED
ELLIPTOCYTES BLD QL SMEAR: SLIGHT
ERYTHROCYTE [DISTWIDTH] IN BLOOD BY AUTOMATED COUNT: 14.1 % (ref 10–15)
GP B STREP DNA BLD POS QL NAA+NON-PROBE: NOT DETECTED
HCT VFR BLD AUTO: 39.1 % (ref 40–53)
HGB BLD-MCNC: 13.3 G/DL (ref 13.3–17.7)
LACTATE SERPL-SCNC: 2.2 MMOL/L (ref 0.7–2)
LACTATE SERPL-SCNC: 2.5 MMOL/L (ref 0.7–2)
LISTERIA SPECIES (DETECTED/NOT DETECTED): NOT DETECTED
MAGNESIUM SERPL-MCNC: 2.3 MG/DL (ref 1.7–2.3)
MCH RBC QN AUTO: 30.9 PG (ref 26.5–33)
MCHC RBC AUTO-ENTMCNC: 34 G/DL (ref 31.5–36.5)
MCV RBC AUTO: 91 FL (ref 78–100)
PLAT MORPH BLD: ABNORMAL
PLATELET # BLD AUTO: 134 10E3/UL (ref 150–450)
POLYCHROMASIA BLD QL SMEAR: SLIGHT
PROT SERPL-MCNC: 6.6 G/DL (ref 6.4–8.3)
RADIOLOGIST FLAGS: ABNORMAL
RBC # BLD AUTO: 4.31 10E6/UL (ref 4.4–5.9)
RBC MORPH BLD: ABNORMAL
S AUREUS DNA BLD POS QL NAA+NON-PROBE: DETECTED
S EPIDERMIDIS DNA BLD POS QL NAA+NON-PRB: NOT DETECTED
S LUGDUNENSIS DNA BLD POS QL NAA+NON-PRB: NOT DETECTED
S PNEUM DNA BLD POS QL NAA+NON-PROBE: NOT DETECTED
S PYO DNA BLD POS QL NAA+NON-PROBE: NOT DETECTED
STREPTOCOCCUS ANGINOSUS GROUP: NOT DETECTED
STREPTOCOCCUS DNA BLD POS NAA+NON-PROBE: NOT DETECTED
UFH PPP CHRO-ACNC: 0.41 IU/ML
UFH PPP CHRO-ACNC: 0.47 IU/ML
VARIANT LYMPHS BLD QL SMEAR: PRESENT
WBC # BLD AUTO: 13.2 10E3/UL (ref 4–11)

## 2025-03-15 PROCEDURE — 82550 ASSAY OF CK (CPK): CPT

## 2025-03-15 PROCEDURE — 82248 BILIRUBIN DIRECT: CPT

## 2025-03-15 PROCEDURE — 93971 EXTREMITY STUDY: CPT | Mod: RT

## 2025-03-15 PROCEDURE — 83735 ASSAY OF MAGNESIUM: CPT

## 2025-03-15 PROCEDURE — 85027 COMPLETE CBC AUTOMATED: CPT

## 2025-03-15 PROCEDURE — 99233 SBSQ HOSP IP/OBS HIGH 50: CPT | Mod: GC | Performed by: INTERNAL MEDICINE

## 2025-03-15 PROCEDURE — 85520 HEPARIN ASSAY: CPT | Performed by: INTERNAL MEDICINE

## 2025-03-15 PROCEDURE — 250N000011 HC RX IP 250 OP 636

## 2025-03-15 PROCEDURE — 250N000011 HC RX IP 250 OP 636: Performed by: INTERNAL MEDICINE

## 2025-03-15 PROCEDURE — 250N000013 HC RX MED GY IP 250 OP 250 PS 637

## 2025-03-15 PROCEDURE — 99418 PROLNG IP/OBS E/M EA 15 MIN: CPT | Performed by: STUDENT IN AN ORGANIZED HEALTH CARE EDUCATION/TRAINING PROGRAM

## 2025-03-15 PROCEDURE — 999N000248 HC STATISTIC IV INSERT WITH US BY RN

## 2025-03-15 PROCEDURE — 87040 BLOOD CULTURE FOR BACTERIA: CPT

## 2025-03-15 PROCEDURE — 36415 COLL VENOUS BLD VENIPUNCTURE: CPT

## 2025-03-15 PROCEDURE — 99233 SBSQ HOSP IP/OBS HIGH 50: CPT | Performed by: INTERNAL MEDICINE

## 2025-03-15 PROCEDURE — 93971 EXTREMITY STUDY: CPT | Mod: 26 | Performed by: RADIOLOGY

## 2025-03-15 PROCEDURE — 250N000013 HC RX MED GY IP 250 OP 250 PS 637: Performed by: STUDENT IN AN ORGANIZED HEALTH CARE EDUCATION/TRAINING PROGRAM

## 2025-03-15 PROCEDURE — 250N000013 HC RX MED GY IP 250 OP 250 PS 637: Performed by: INTERNAL MEDICINE

## 2025-03-15 PROCEDURE — 99223 1ST HOSP IP/OBS HIGH 75: CPT | Performed by: STUDENT IN AN ORGANIZED HEALTH CARE EDUCATION/TRAINING PROGRAM

## 2025-03-15 PROCEDURE — 83605 ASSAY OF LACTIC ACID: CPT

## 2025-03-15 PROCEDURE — G0545 PR INHRENT VISIT TO INPT/OBS W CNFRM/SUSPCT INFCT DIS BY INFCT DIS SPCIALST: HCPCS | Performed by: INTERNAL MEDICINE

## 2025-03-15 PROCEDURE — 258N000003 HC RX IP 258 OP 636

## 2025-03-15 PROCEDURE — 120N000003 HC R&B IMCU UMMC

## 2025-03-15 RX ORDER — FUROSEMIDE 10 MG/ML
80 INJECTION INTRAMUSCULAR; INTRAVENOUS
Status: DISCONTINUED | OUTPATIENT
Start: 2025-03-15 | End: 2025-03-16

## 2025-03-15 RX ORDER — HYDRALAZINE HYDROCHLORIDE 25 MG/1
25 TABLET, FILM COATED ORAL 3 TIMES DAILY
Status: DISCONTINUED | OUTPATIENT
Start: 2025-03-15 | End: 2025-03-20

## 2025-03-15 RX ADMIN — FUROSEMIDE 80 MG: 10 INJECTION, SOLUTION INTRAMUSCULAR; INTRAVENOUS at 15:02

## 2025-03-15 RX ADMIN — EMPAGLIFLOZIN 10 MG: 10 TABLET, FILM COATED ORAL at 10:46

## 2025-03-15 RX ADMIN — HEPARIN SODIUM 2000 UNITS/HR: 10000 INJECTION, SOLUTION INTRAVENOUS at 17:51

## 2025-03-15 RX ADMIN — HEPARIN SODIUM 2000 UNITS/HR: 10000 INJECTION, SOLUTION INTRAVENOUS at 05:51

## 2025-03-15 RX ADMIN — VANCOMYCIN HYDROCHLORIDE 1000 MG: 1 INJECTION, SOLUTION INTRAVENOUS at 03:16

## 2025-03-15 RX ADMIN — CEFTAROLINE FOSAMIL 600 MG: 600 POWDER, FOR SOLUTION INTRAVENOUS at 13:55

## 2025-03-15 RX ADMIN — CEFTAROLINE FOSAMIL 600 MG: 600 POWDER, FOR SOLUTION INTRAVENOUS at 21:59

## 2025-03-15 RX ADMIN — Medication 600 MG: at 10:46

## 2025-03-15 RX ADMIN — DAPTOMYCIN 800 MG: 500 INJECTION, POWDER, LYOPHILIZED, FOR SOLUTION INTRAVENOUS at 13:19

## 2025-03-15 RX ADMIN — DIGOXIN 125 MCG: 0.12 TABLET ORAL at 10:46

## 2025-03-15 RX ADMIN — DOBUTAMINE IN DEXTROSE 5 MCG/KG/MIN: 200 INJECTION, SOLUTION INTRAVENOUS at 13:15

## 2025-03-15 RX ADMIN — Medication 250 MG: at 10:46

## 2025-03-15 RX ADMIN — SPIRONOLACTONE 25 MG: 25 TABLET, FILM COATED ORAL at 10:46

## 2025-03-15 RX ADMIN — Medication 5 MG: at 20:46

## 2025-03-15 RX ADMIN — HYDRALAZINE HYDROCHLORIDE 25 MG: 25 TABLET ORAL at 20:46

## 2025-03-15 RX ADMIN — HYDRALAZINE HYDROCHLORIDE 25 MG: 25 TABLET ORAL at 14:52

## 2025-03-15 NOTE — PLAN OF CARE
Neuro: A&Ox4. Cooperative. Flat affect  Cardiac: 's. Afebrile. Denies chest pain   Respiratory: RA  GI/: Voiding spontaneously. No BM this shift.  Diet/appetite: Tolerating cardiac diet. Reports Nausea- Zofran given x 1  Activity: Up with SBA  Pain: Denies   Skin: No new deficits noted.  Lines: L PIVs  x3- Heparin gtt @ 2000U/hr  Dobutamine gtt @ 5mcg/kg/min  Replacements: Anti Xa due at 1057pm.   Plan: Cont with POC and update Primary Team with changes.     Shift changes: At end of shift, pt reported some tenderness on one of his IV sites. Writer came and assessed, it was the IV that has Dobu gtt running, a little tender to touch and mildly swollen, writer suggested to have the IV removed and have another IV inserted, pt refused and said he does not want another IV and told writer its not too bad at this time and will just monitor. Hand off given to night shift nurse.
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
## 2025-03-15 NOTE — PLAN OF CARE
"NURSING PROGRESS NOTE  Shift Summary  Date: March 15, 2025     Neuro/Musculoskeletal: A&Ox4.   Cardiac: ST, HR 100s. VSS.     Respiratory: Sating in the 90s on RA. Reports SOB.  GI/: Adequate urine output. LBM: 3/13/25.  Diet/Appetite: Tolerating low sat fat Na<2400mg diet.  Activity: SBA.  Pain: Denies.   Skin: No new deficits noted.   LDAs + Drips/IVF: x3 LPIV: L PIV - infusing doubutamine @5mcg/kg/min, L PIV - infusing heparin @2000 units/hr and L PIV - SL.  Protocols/Labs: Anti 10A.    Shift update:  One of pt LPIV-infusing dobutamine gtt was tender to touch and mildly swollen, ordered a new PIV placement and removed the infiltrated line.     Plan: Continue POC and report changes to the team.       Jennifer Motley, RN  [facility] ([facility]): [facility] ICU (Unit 6C)      Problem: Adult Inpatient Plan of Care  Goal: Plan of Care Review  Description: The Plan of Care Review/Shift note should be completed every shift.  The Outcome Evaluation is a brief statement about your assessment that the patient is improving, declining, or no change.  This information will be displayed automatically on your shift  note.  Outcome: Progressing  Flowsheets (Taken 3/15/2025 0224)  Plan of Care Reviewed With: patient  Overall Patient Progress: no change  Goal: Patient-Specific Goal (Individualized)  Description: You can add care plan individualizations to a care plan. Examples of Individualization might be:  \"Parent requests to be called daily at 9am for status\", \"I have a hard time hearing out of my right ear\", or \"Do not touch me to wake me up as it startles  me\".  Outcome: Progressing  Goal: Absence of Hospital-Acquired Illness or Injury  Outcome: Progressing  Intervention: Identify and Manage Fall Risk  Recent Flowsheet Documentation  Taken 3/15/2025 0045 by Jennifer Motley, RN  Safety Promotion/Fall Prevention: activity supervised  Intervention: Prevent Skin " Injury  Recent Flowsheet Documentation  Taken 3/15/2025 0045 by Jennifer Motley RN  Body Position: position changed independently  Intervention: Prevent and Manage VTE (Venous Thromboembolism) Risk  Recent Flowsheet Documentation  Taken 3/15/2025 0045 by Jennifer Motley RN  VTE Prevention/Management: SCDs off (sequential compression devices)  Intervention: Prevent Infection  Recent Flowsheet Documentation  Taken 3/15/2025 0045 by Jennifer Motley RN  Infection Prevention:   environmental surveillance performed   hand hygiene promoted   rest/sleep promoted   single patient room provided  Goal: Optimal Comfort and Wellbeing  Outcome: Progressing  Goal: Readiness for Transition of Care  Outcome: Progressing     Problem: Heart Failure  Goal: Optimal Coping  Outcome: Progressing  Intervention: Support Psychosocial Response  Recent Flowsheet Documentation  Taken 3/15/2025 0045 by Jennifer Motley RN  Family/Support System Care: self-care encouraged  Goal: Optimal Cardiac Output  Outcome: Progressing  Goal: Stable Heart Rate and Rhythm  Outcome: Progressing  Goal: Fluid and Electrolyte Balance  Outcome: Progressing  Goal: Optimal Functional Ability  Outcome: Progressing  Intervention: Optimize Functional Ability  Recent Flowsheet Documentation  Taken 3/15/2025 0045 by Jennifer Motley RN  Self-Care Promotion: independence encouraged  Activity Management:   activity adjusted per tolerance   activity encouraged  Goal: Improved Oral Intake  Outcome: Progressing  Goal: Effective Oxygenation and Ventilation  Outcome: Progressing  Intervention: Promote Airway Secretion Clearance  Recent Flowsheet Documentation  Taken 3/15/2025 0045 by Jennifer Motley RN  Cough And Deep Breathing: done independently per patient  Activity Management:   activity adjusted per tolerance   activity encouraged  Intervention: Optimize Oxygenation and Ventilation  Recent Flowsheet Documentation  Taken 3/15/2025 0045  by Jennifer Motley RN  Head of Bed (HOB) Positioning: HOB at 20-30 degrees  Goal: Effective Breathing Pattern During Sleep  Outcome: Progressing  Intervention: Monitor and Manage Obstructive Sleep Apnea  Recent Flowsheet Documentation  Taken 3/15/2025 0045 by Jennifer Motley RN  Medication Review/Management: medications reviewed     Problem: Skin Injury Risk Increased  Goal: Skin Health and Integrity  Outcome: Progressing  Intervention: Optimize Skin Protection  Recent Flowsheet Documentation  Taken 3/15/2025 0045 by Jennifer Motley RN  Activity Management:   activity adjusted per tolerance   activity encouraged  Head of Bed (HOB) Positioning: HOB at 20-30 degrees     Problem: Pain Acute  Goal: Optimal Pain Control and Function  Outcome: Progressing  Intervention: Prevent or Manage Pain  Recent Flowsheet Documentation  Taken 3/15/2025 0045 by Jennifer Motley RN  Sleep/Rest Enhancement:   awakenings minimized   comfort measures   regular sleep/rest pattern promoted   consistent schedule promoted  Medication Review/Management: medications reviewed   Goal Outcome Evaluation:      Plan of Care Reviewed With: patient    Overall Patient Progress: no change

## 2025-03-15 NOTE — PROGRESS NOTES
Addendum 12:20pm 3/15: In discission via Vocera, I learned that his US continues to show a thrombus. Based on my exam today, his neurovascular exam is intact. Given what I suspect will be reluctance to resect the presumably infected thrombus, I propose that we change therapy as below as see if he clears in the next couple of days on medical therapy alone. If his RUE exam changes, though, more urgent consideration of septic thrombus resection may be needed.     GENERAL ID SERVICE CONSULTATION     Patient:  Jose Enrique Engel   Date of birth 1985, Medical record number [id number]  Date of Visit:  03/15/2025  Date of Admission: 3/13/2025  Consult Requester:Zaid Mar MD          Assessment and Recommendations:   ASSESSMENT:  Severe sepsis secondary to MRSA  Blood cultures positive at OSH from 3/10-transfer on 3/13, remain positive on 3/14, and growth is within 24 hrs of collection, suggesting high-grade bacteremia. JW did not suggest a valvular lesion, but the picture is certainly concerning for a non-valvular endovascular infection  Right upper extremity DVT with concern for septic thrombophlebitis   Acute decompensated heart failure  CKD stage II    DISCUSSION:   Jose Enirque Engel is a 40 yo male with PMHx of HFrEF (10%) 2/2 NICM s/p ICD, CKD3, substance use disorder transferred from [place] 3/13 for further cardiac management after 3 days of persistent MRSA bacteremia and multiorgan dysfunction. The bacteremia is likely from PICC line (placed at previous hospitalization 2/3, possibly dislodged and replaced with potential for introducing infection) given the purulent discharge noted and cellulitis. There is also concern for septic thrombophlebitis based on DVT at insertion site noted on US and continued bacteremia. DVT could also be from stopping Xarelto in December, but suspicion for infection is warranted at this time given clinical picture.     This picture, of persistent blood culture positivity with  S aureus despite what seems to be adequate therapy and without a clear resectable focus, suggests persistent MRSA bacteremia (link is to publication by Roge et al from 2022 proposing a definition for this entity - notably no true consensus exists). I would propose stopping Iv vanco and starting IV ceftaroline and daptomycin, as it is possible these agents together offer synergy.    RECOMMENDATION:  Daily blood cultures until negative for 72 hours  Stop IV vanco, start dpatomycin 10mg/kg and ceftaroline 600mg q8h  Await results of US US - if there is a resectable thrombus, would consider resection to achieve source control    Medical Decision Making       50 MINUTES SPENT BY ME on the date of service doing chart review, history, exam, documentation & further activities per the note.  This visit qualifies for the  add-on code for complexity.    ________________________________________________________________    Interval 24hr events:  Having some pain in L arm, sites of phlebotomy. No chest pain. Wondering about torsemide and potassium.         History of Present Illness:   Jose Enrique Engel is a 40 yo male with PMHx of HFrEF (10%) 2/2 NICM s/p ICD, CKD3, substance use disorder previously admitted 2/3/25 for ambulatory cardiogenic shock who was discharged on dobutamine gtt at 5mcg/kg/min with [facility]. He [facility] to [facility] 3/10 with severe sepsis secondary to MRSA bacteremia leading to multiorgan dysfunction. PICC line likely source for bacteremia especially with purulent drainage and cellulitis noted at PICC insertion site. PICC had fallen out prior to presentation to ED but IV was replaced by father at home. Blood cultures at [facility] have been positive for MRSA since 3/10. He was initially on vancomycin and cefepime until MRSA positive, then was narrowed to vancomycin. He was noted to have a DVT at insertion site of PICC line so there was also concern for septic thrombophlebitis.      Interview limited per patient preference. Following information based on chart review and any response from patient.    Patient's symptoms started a couple days prior to presentation at OSH with fevers, chills, arm pain, and then developed SOB. Believed it was because he had been off dobutamine for a few days after PICC was dislodged (then replaced/changed by father at home per chart review). As of today, he is not having any fevers but is having chills. Otherwise, ROS completely negative. He just wants to sleep and is feeling tired.     UA negative for UTI. CXR negative for pneumonia. Found to be in acute heart failure. Elevated lactate and BERNARD noted on chemistry. Lactate upper limits of normal on admission and BERNARD appears to have resolved. Patient is comfortable on room air and has been afebrile since transfer.          Physical Exam:   Vitals were reviewed  Patient Vitals for the past 24 hrs:   BP Temp Temp src Pulse Resp SpO2 Weight   03/15/25 0735 129/88 -- -- 100 -- -- --   03/15/25 0730 -- 97.6  F (36.4  C) Oral -- 18 95 % --   03/15/25 0315 95/68 97.5  F (36.4  C) Oral 102 16 -- --   03/15/25 0306 -- -- -- -- -- -- 82.6 kg (182 lb 1.6 oz)   03/14/25 1912 112/78 98  F (36.7  C) Oral 109 16 97 % --   03/14/25 1539 -- -- -- -- -- 100 % --   03/14/25 1530 136/85 -- -- 86 12 96 % --   03/14/25 1515 114/77 -- -- 85 18 -- --   03/14/25 1500 117/73 -- -- 81 14 99 % --   03/14/25 1445 114/70 -- -- 84 24 98 % --   03/14/25 1430 117/68 -- -- 89 20 97 % --   03/14/25 1425 102/72 -- -- 94 20 95 % --   03/14/25 1420 104/74 -- -- 96 20 98 % --   03/14/25 1415 107/77 -- -- 94 19 94 % --   03/14/25 1410 104/82 -- -- 93 20 96 % --   03/14/25 1409 109/72 -- -- 91 18 97 % --   03/14/25 1345 123/85 -- -- 85 14 95 % --       Physical Examination:  GENERAL:  laying in bed in no acute distress.   HEENT:  Head is normocephalic, atraumatic   EYES:  Eyes have anicteric sclerae without conjunctival injection   LUNGS:  Clear to  "auscultation bilateral.   CARDIOVASCULAR:  Regular rate   SKIN:  L forarm with some erythema, no apparent fluctuance. R arm PICC site wrapped. Neurovascular exam intact.  NEUROLOGIC:  Grossly nonfocal. Active x4 extremities         Laboratory Data:     Inflammatory Markers  No lab results found.    Hematology Studies    Recent Labs   Lab Test 03/15/25  0549 03/14/25  0546 03/13/25  2040 02/14/25  0804 02/13/25  0807 02/11/25  0148 02/09/25  0441   WBC 13.2* 10.7 10.8 5.2 5.3  --  5.6   HGB 13.3 12.8* 13.7 16.4 16.7  --  15.9   MCV 91 87 88 93 91  --  92   * 102* 98* 202 190   < > 189    < > = values in this interval not displayed.       Metabolic Studies     Recent Labs   Lab Test 03/15/25  0549 03/14/25  0546 03/14/25  0154 03/13/25 2223 02/26/25  1247   * 123* 122* 124* 136   POTASSIUM 4.7 4.6 4.6 4.6 4.1   CHLORIDE 91* 91* 91* 89* 97*   CO2 16* 20* 19* 21* 25   BUN 27.6* 34.5* 36.3* 38.7* 45.5*   CR 1.20* 1.14 1.14 1.18* 1.38*   GFRESTIMATED 79 84 84 80 67       Hepatic Studies    Recent Labs   Lab Test 03/14/25  0546 03/14/25  0154 03/13/25  2223 02/26/25  1247 02/12/25  0209 02/11/25  0548   BILITOTAL 2.2* 2.2* 2.6* 0.9 1.2 1.4*   ALKPHOS 188* 193* 206* 115 110 109   ALBUMIN 3.2* 3.2* 3.6 4.5 3.8 3.4*   * 108* 126* 38 50* 41   * 114* 128* 41 78* 72*       Microbiology:  No results found for: \"CULT\"    Urine Studies  No lab results found.    Vancomycin Levels    Recent Labs   Lab Test 03/14/25  0546   VANCOMYCIN 29.7*       Hepatitis B Testing No lab results found.  Hepatitis C Testing   No results found for: \"HCVAB\", \"HQTG\", \"HCGENO\", \"HCPCR\", \"HQTRNA\", \"HEPRNA\"  Respiratory Virus Testing    No results found for: \"RS\", \"FLUAG\"      Imaging:  UE  3/10/2025 impression:  There is acute non-occlusive deep venous thrombosis present in the right brachiocephalic vein, subclavian, axillary, brachial, and basilic vein(s).     "

## 2025-03-15 NOTE — PLAN OF CARE
Neuro: A&Ox4.   Cardiac: ST  VSS.   Respiratory: Sating 98 on RA. SOB with activity.   GI/: Adequate urine output. Urinal in used. No bm this shift   Diet/appetite: Tolerating cardiac diet. Eating well.  Activity:  SBA, up to chair and in halls.  Pain: denies  Skin: No new deficits noted.  LDA's:  PIV - dobutamine 5mcg/kg/min  PIV - heparin 2000u  PIV - SL  Plan: Continue with POC. Notify primary team with changes.

## 2025-03-15 NOTE — PROGRESS NOTES
[facility]  Cardiology II Service / Advanced Heart Failure  Daily Progress Note    Patient: Jose Enrique Engel      : 1985      MRN: [id number]    Assessment/Plan:   38 yo male with PMHx of HFrEF (15%) 2/2 NICM s/p ICD, CKD3, polysubstance use admitted as transfer from outside hospital on 3/14/25 for severe sepsis secondary to Staphylococcus aureus bacteremia leading to multiorgan dysfunction.  He was transferred to [facility] given advanced heart failure.    Today:  - Ongoing positive blood cultures; ID recommending switching from vancomycin to daptomycin + cetaroline, and continuing to monitor daily blood cultures and potential sources of infection  - Evidence of volume overload on exam and elevated lactate concerning for worsening cardiac function and heart failure exacerbation. Start IV diuresis and hydralazine for afterload reduction and trend lactate. Continue dobutamine infusion.      Severe sepsis secondary to Staphylococcus bacteremia  -Possible source: PICC line (removed at outside hospital) vs ICD vs septic thrombophlebitis (RUE thrombus)  - JW 3/14 without vegetation  -ID consulted  -Blood cultures drawn on 3/10, 3/11, 3/12, 3/13, 3/14 positive for Staphylococcus aureus   - Continue daily blood  cultures  - Antibiotics:  - Vancomycin 3/10- 3/15  - Daptomycin 3/15  - Ceftaroline 3/15     Chronic systolic heart failure (ejection fraction 10 to 15%), stage D class IIIb  Nonischemic cardiomyopathy 2/2 methamphetamine use  Heart failure exacerbation  Pt with hx of advanced HFrEF/NICM. Was previously on outpatient dobutamine, self-discontinued in . Re-engaged with outpatient cardiologist Dr. Dove .  Interested in advanced therapies but not candidate yet due to being lost to follow up.  He was discharged on home dobutamine infusion during last admission. On 3/15 he developed evidence of heart failure exacerbation with volume overload.   Last RHC  1/29/25: RA 15, PA 44/26, PCWP 21, CI 1.7, PVR 3.4, SVR ~1400  Last TTE 3/13/2025: EF 10%,mild pulm Htn, grade-1 diastolic dysfunction  - Volume: hypervolemic.  lbs. Was 180 on admission.   - Inotrope: dobutamine @ 5mcg/kg/min    - Dobutamine currently running via PIV as do not want to place central access while blood cultures still positive                - PTA digoxin 125 mcg daily  - Afterload Reduction: Hydralazine 25mg q6h (3/15)  GDMT:                - Diuretic: Lasix 80mg IV BID (3/15)               - ACE/ARB/ARNI: - hold PTA Entresto               - MRA spironolactone 25               - BB- none not while on dobutamine               - SGLT2-  empagliflozin 10 mg daily  - Anticoagulation: heparin gtt  - Statin: none  - Antiplatelet: none  - Antiarrhythmic: none  - SCD prophylaxis: ICD in place  - Electrolytes: BID monitoring with diuresis, goals K>4, Mag>2  - Strict I/O monitoring, daily weights  - Dietary restrictions: regular diet (pt request), 2L fluids (pt intermittently following)    Right upper extremity DVT   Clot from brachial vein through the subclavian diagnosed at outside hospital with RUE US 3/10/25. Has been off of Xarelto. Repeat RUE US 3/14 showed persistence of DVT in right innominate, subclavian and axial veins.   - Heparin infusion (3/10)     Hyponatremia  Likely due to heart failure and volume overload.  - Diuresis as above  - Monitor closely    H/o substance use disorders, in remission (methamphetamines, alcohol)  In remission for >1 year. UDS positive for methamphetamines from his adderall prescription, no evidence of active use. Peth negative.  - Addiction medicine following   - Per cardiology, OK to continue Adderall use PRN  - SW helping to connect patient to outpatient treatment options     CKD stage II  - Cr stable, continue to monitor     Hx left ventricular mural thrombus  - Heparin gtt     Thrombocytopenia   -CTM      FEN: Cardiac  DVT Prophylaxis: Heparin  Code Status:  Full Code  Disposition: Pending resolution of bacteremia and possible transplant/ VAD evaluation     Pt to be seen and discussed with the staff attending Dr. Alex Calero MD  Internal Medicine- Pediatrics PGY3  [facility]  Please use on call sytem for paging    ==================================    Subjective:   Interval History: NAEO. Feels terrible today and body aches, fatigue and shortness of breath. Feels like he is filling up with fluid and frustrated with his clinical course.     Objective:     Vitals:    03/13/25 2000 03/14/25 0409 03/15/25 0306   Weight: 82.1 kg (180 lb 14.4 oz) 82.6 kg (182 lb) 82.6 kg (182 lb 1.6 oz)     Vital Signs with Ranges  Temp:  [97.5  F (36.4  C)-98  F (36.7  C)] 97.5  F (36.4  C)  Pulse:  [] 102  Resp:  [12-24] 16  BP: ()/(68-85) 95/68  Cuff Mean (mmHg):  [] 100  SpO2:  [94 %-100 %] 97 %  I/O last 3 completed shifts:  In: 262.5 [P.O.:105; I.V.:157.5]  Out: 1450 [Urine:1450]    Exam:  Constitutional:  Appears comfortable, no distress  HEENT: PERRLA, extraocular motion intact, oral mucosa moist  Cardiovascular: Tachycardic rate, regular rhythm, pulses 2+ bilateral upper extremities, 1+  lower extremity edema, fingers and toes slightly cool, JVD +8cm  Pulmonary: Lungs bilaterally clear to auscultation  GI: Abdomen soft nontender nondistended normal active bowel sounds  MSK: Well-developed  Skin: erythema at previous picc site. No drainage  Neuro: Alert and oriented to person place and time  Psych: Appropriate mood and affect    Medications   Current Facility-Administered Medications   Medication Dose Route Frequency Provider Last Rate Last Admin    DOBUTamine (DOBUTREX) 500 mg in D5W 250 mL infusion (adult std conc)  5 mcg/kg/min (Order-Specific) Intravenous Continuous Bao Mahan MD 11.5 mL/hr at 03/15/25 0400 5 mcg/kg/min at 03/15/25 0400    heparin 25,000 units in 0.45% NaCl 250 mL ANTICOAGULANT infusion  0-5,000 Units/hr  Intravenous Continuous Bao Mahan MD 20 mL/hr at 03/15/25 0551 2,000 Units/hr at 03/15/25 0551    May take oral meds with a sip of water, the morning of JW procedure.   Does not apply Continuous PRN Bao Mahan MD         Current Facility-Administered Medications   Medication Dose Route Frequency Provider Last Rate Last Admin    calcium carbonate (OS-LENARD) tablet 600 mg  600 mg Oral Daily Bao Mahan MD   600 mg at 03/14/25 1020    digoxin (LANOXIN) tablet 125 mcg  125 mcg Oral QAM Zaid Mar MD        empagliflozin (JARDIANCE) tablet 10 mg  10 mg Oral Daily Bao Mahan MD   10 mg at 03/14/25 1021    [Held by provider] ferrous sulfate (FEROSUL) tablet 325 mg  325 mg Oral Daily with breakfast Bao Mahan MD        [Held by provider] sacubitril-valsartan (ENTRESTO) 49-51 MG per tablet 1 tablet  1 tablet Oral BID Bao Mahan MD        spironolactone (ALDACTONE) tablet 25 mg  25 mg Oral Daily Bao Mahan MD   25 mg at 03/14/25 1020    [START ON 3/16/2025] vancomycin (VANCOCIN) 1,000 mg in 200 mL dextrose intermittent infusion  1,000 mg Intravenous Q12H Zaid Mar MD        vancomycin (VANCOCIN) 1,000 mg in 200 mL dextrose intermittent infusion  1,000 mg Intravenous Q12H Zaid Mar  mL/hr at 03/15/25 0316 1,000 mg at 03/15/25 0316    vitamin C (ASCORBIC ACID) tablet 250 mg  250 mg Oral Daily Bao Mahan MD   250 mg at 03/14/25 1020       Data   Recent Labs   Lab 03/15/25  0549 03/14/25  0546 03/14/25  0154 03/13/25  2223 03/13/25  2223 03/13/25  2040   WBC 13.2* 10.7  --   --   --  10.8   HGB 13.3 12.8*  --   --   --  13.7   MCV 91 87  --   --   --  88   * 102*  --   --   --  98*   INR  --   --  1.14  --   --   --    NA  --  123* 122*  --  124*  --    POTASSIUM  --  4.6 4.6  --  4.6  --    CHLORIDE  --  91* 91*  --  89*  --    CO2  --  20* 19*  --  21*  --    BUN  --  34.5* 36.3*  --  38.7*  --     CR  --  1.14 1.14  --  1.18*  --    ANIONGAP  --  12 12  --  14  --    LENARD  --  8.6* 8.4*  --  8.9  --    GLC  --  99 100*  --  112*  --    ALBUMIN  --  3.2* 3.2*   < > 3.6  --    PROTTOTAL  --  6.2* 6.1*   < > 6.8  --    BILITOTAL  --  2.2* 2.2*   < > 2.6*  --    ALKPHOS  --  188* 193*   < > 206*  --    ALT  --  111* 114*   < > 128*  --    AST  --  101* 108*   < > 126*  --     < > = values in this interval not displayed.       Recent Results (from the past 24 hours)   Transesophageal Echocardiogram   Result Value    LVEF  5-10% (severely reduced)    [facility]    [id number]  [id number]  [id number]  [id number]^PRIYANKA^SOLANGE^JEREMY     [facility],[place]  Echocardiography Laboratory  [address] [place] [id number]     Name: WILLIAM JIMÉNEZ  MRN: [id number]  : 1985  Study Date: 2025 01:33 PM  Age: 39 yrs  Gender: Male  Patient Location: [facility]  Reason For Study: Bactremia  Ordering Physician: SOLANGE MATHEW  Referring Physician: SYSTEM, PROVIDER NOT IN  Performed By: MD Arvin Bravo     BSA: 2.0 m2  Height: 70 in  Weight: 182 lb  HR: 75  BP: 123/85 mmHg  Attestation  I was present during JW probe placement by the fellow, Arvin Bravo. I  personally viewed the imaging and agree with the interpretation and report as  documented by the fellow.  ______________________________________________________________________________  Interpretation Summary  Left ventricular function is decreased. The ejection fraction is 5-10%  (severely reduced). Severe diffuse hypokinesis is present.  Global right ventricular function is moderately reduced.  Severe biatrial enlargement is present.  Moderate functional tricuspid insufficiency and mild to moderate functional  mitral insufficiency present. No evidence of valvular vegetations.  No pericardial effusion present.  ______________________________________________________________________________  Procedure  Transesophageal  Echocardiogram with two-dimensional, color and spectral  Doppler. 3D image acquisition, reconstruction, and real-time interpretation  was performed. Procedure location Echo Lab. The procedure was performed in the  Echo Lab. Informed consent for Transesophegeal echo obtained. JW Probe #[id number]  was used during the procedure. Patient was sedated using Fentanyl 50 mcg.  Patient was sedated using Versed 2 mg. The heart rate, respiratory rate,  oxygen saturations, blood pressure, and response to care were monitored  throughout the procedure with the assistance of the nurse. I determined this  patient to be an appropriate candidate for the planned sedation and procedure  and have reassessed the patient immediately prior to sedation and procedure.  Total sedation time: 19 minutes of continuous bedside 1:1 monitoring. The  Transducer was inserted without difficulty . The patient tolerated the  procedure well. Complications None. ECG shows normal sinus rhythm. Good  quality two-dimensional was performed and interpreted. Good quality color and  spectral Doppler were performed and interpreted.     Left Ventricle  Mild left ventricular dilation is present. Left ventricular function is  decreased. The ejection fraction is 5-10% (severely reduced). Severe diffuse  hypokinesis is present. A false tendon is noted. No obvious thormbus seen in  the left ventricle.     Right Ventricle  Global right ventricular function is moderately reduced.     Atria  The left atrial appendage is normal. It is free of spontaneous echo contrast  and thrombus. Severe biatrial enlargement is present. The atrial septum is  intact as assessed by color Doppler .     Mitral Valve  No evidence of MV endocarditis or mass. Mild to moderate mitral insufficiency  is present. MR secondary to a restricted posterior leaflet and annular  dilatation.     Aortic Valve  Aortic valve is normal in structure and function. The aortic valve is  tricuspid. No evidence of AoV  endocarditis or mass. No aortic regurgitation is  present.     Tricuspid Valve  No evidence of TV endocarditis or mass. Moderate tricuspid insufficiency is  present. The right ventricular systolic pressure is approximated at 14.4 mmHg  plus the right atrial pressure.     Pulmonic Valve  No evidence of PV endocarditis or mass. Mild pulmonic insufficiency is  present.     Vessels  The aorta root is normal.     Pericardium  No pericardial effusion is present.     Compared to Previous Study  This study was compared with the study from 25 . The severity of MR and TR  appears improved.     ______________________________________________________________________________  Doppler Measurements & Calculations  TR max zayda: 189.5 cm/sec  TR max P.4 mmHg     ______________________________________________________________________________  Report approved by: Lisseth Sumner MD on 2025 03:48 PM

## 2025-03-16 ENCOUNTER — HEALTH MAINTENANCE LETTER (OUTPATIENT)
Age: 40
End: 2025-03-16

## 2025-03-16 LAB
ALBUMIN SERPL BCG-MCNC: 3.3 G/DL (ref 3.5–5.2)
ALP SERPL-CCNC: 237 U/L (ref 40–150)
ALT SERPL W P-5'-P-CCNC: 89 U/L (ref 0–70)
ANION GAP SERPL CALCULATED.3IONS-SCNC: 13 MMOL/L (ref 7–15)
ANION GAP SERPL CALCULATED.3IONS-SCNC: 16 MMOL/L (ref 7–15)
AST SERPL W P-5'-P-CCNC: 59 U/L (ref 0–45)
BACTERIA SPEC CULT: ABNORMAL
BILIRUB SERPL-MCNC: 1.7 MG/DL
BUN SERPL-MCNC: 27.4 MG/DL (ref 6–20)
BUN SERPL-MCNC: 27.6 MG/DL (ref 6–20)
CALCIUM SERPL-MCNC: 8.8 MG/DL (ref 8.8–10.4)
CALCIUM SERPL-MCNC: 8.8 MG/DL (ref 8.8–10.4)
CHLORIDE SERPL-SCNC: 91 MMOL/L (ref 98–107)
CHLORIDE SERPL-SCNC: 93 MMOL/L (ref 98–107)
CREAT SERPL-MCNC: 1.2 MG/DL (ref 0.67–1.17)
CREAT SERPL-MCNC: 1.35 MG/DL (ref 0.67–1.17)
EGFRCR SERPLBLD CKD-EPI 2021: 68 ML/MIN/1.73M2
EGFRCR SERPLBLD CKD-EPI 2021: 79 ML/MIN/1.73M2
ERYTHROCYTE [DISTWIDTH] IN BLOOD BY AUTOMATED COUNT: 14.8 % (ref 10–15)
GLUCOSE SERPL-MCNC: 111 MG/DL (ref 70–99)
GLUCOSE SERPL-MCNC: 120 MG/DL (ref 70–99)
HCO3 SERPL-SCNC: 16 MMOL/L (ref 22–29)
HCO3 SERPL-SCNC: 21 MMOL/L (ref 22–29)
HCT VFR BLD AUTO: 38.8 % (ref 40–53)
HGB BLD-MCNC: 12.8 G/DL (ref 13.3–17.7)
LACTATE SERPL-SCNC: 1.8 MMOL/L (ref 0.7–2)
LACTATE SERPL-SCNC: 2.3 MMOL/L (ref 0.7–2)
LACTATE SERPL-SCNC: 2.6 MMOL/L (ref 0.7–2)
MAGNESIUM SERPL-MCNC: 2 MG/DL (ref 1.7–2.3)
MCH RBC QN AUTO: 30 PG (ref 26.5–33)
MCHC RBC AUTO-ENTMCNC: 33 G/DL (ref 31.5–36.5)
MCV RBC AUTO: 91 FL (ref 78–100)
PLATELET # BLD AUTO: 182 10E3/UL (ref 150–450)
POTASSIUM SERPL-SCNC: 4 MMOL/L (ref 3.4–5.3)
POTASSIUM SERPL-SCNC: 4.7 MMOL/L (ref 3.4–5.3)
PROT SERPL-MCNC: 6.7 G/DL (ref 6.4–8.3)
RBC # BLD AUTO: 4.27 10E6/UL (ref 4.4–5.9)
SODIUM SERPL-SCNC: 123 MMOL/L (ref 135–145)
SODIUM SERPL-SCNC: 127 MMOL/L (ref 135–145)
UFH PPP CHRO-ACNC: 0.34 IU/ML
WBC # BLD AUTO: 11.6 10E3/UL (ref 4–11)

## 2025-03-16 PROCEDURE — 87040 BLOOD CULTURE FOR BACTERIA: CPT

## 2025-03-16 PROCEDURE — 99232 SBSQ HOSP IP/OBS MODERATE 35: CPT | Mod: GC | Performed by: STUDENT IN AN ORGANIZED HEALTH CARE EDUCATION/TRAINING PROGRAM

## 2025-03-16 PROCEDURE — 250N000013 HC RX MED GY IP 250 OP 250 PS 637: Performed by: INTERNAL MEDICINE

## 2025-03-16 PROCEDURE — 83605 ASSAY OF LACTIC ACID: CPT

## 2025-03-16 PROCEDURE — 250N000013 HC RX MED GY IP 250 OP 250 PS 637

## 2025-03-16 PROCEDURE — 258N000003 HC RX IP 258 OP 636

## 2025-03-16 PROCEDURE — 80053 COMPREHEN METABOLIC PANEL: CPT

## 2025-03-16 PROCEDURE — 120N000003 HC R&B IMCU UMMC

## 2025-03-16 PROCEDURE — 250N000011 HC RX IP 250 OP 636

## 2025-03-16 PROCEDURE — 85520 HEPARIN ASSAY: CPT | Performed by: INTERNAL MEDICINE

## 2025-03-16 PROCEDURE — 85027 COMPLETE CBC AUTOMATED: CPT

## 2025-03-16 PROCEDURE — G0545 PR INHRENT VISIT TO INPT/OBS W CNFRM/SUSPCT INFCT DIS BY INFCT DIS SPCIALST: HCPCS | Performed by: INTERNAL MEDICINE

## 2025-03-16 PROCEDURE — 99233 SBSQ HOSP IP/OBS HIGH 50: CPT | Performed by: INTERNAL MEDICINE

## 2025-03-16 PROCEDURE — 36415 COLL VENOUS BLD VENIPUNCTURE: CPT

## 2025-03-16 PROCEDURE — 250N000013 HC RX MED GY IP 250 OP 250 PS 637: Performed by: STUDENT IN AN ORGANIZED HEALTH CARE EDUCATION/TRAINING PROGRAM

## 2025-03-16 PROCEDURE — 250N000011 HC RX IP 250 OP 636: Mod: JZ

## 2025-03-16 PROCEDURE — 82040 ASSAY OF SERUM ALBUMIN: CPT

## 2025-03-16 PROCEDURE — 84155 ASSAY OF PROTEIN SERUM: CPT

## 2025-03-16 PROCEDURE — 999N000127 HC STATISTIC PERIPHERAL IV START W US GUIDANCE

## 2025-03-16 PROCEDURE — 83735 ASSAY OF MAGNESIUM: CPT

## 2025-03-16 PROCEDURE — 99232 SBSQ HOSP IP/OBS MODERATE 35: CPT | Performed by: STUDENT IN AN ORGANIZED HEALTH CARE EDUCATION/TRAINING PROGRAM

## 2025-03-16 RX ORDER — POTASSIUM CHLORIDE 750 MG/1
40 TABLET, EXTENDED RELEASE ORAL ONCE
Status: COMPLETED | OUTPATIENT
Start: 2025-03-16 | End: 2025-03-16

## 2025-03-16 RX ORDER — FUROSEMIDE 10 MG/ML
80 INJECTION INTRAMUSCULAR; INTRAVENOUS DAILY
Status: DISCONTINUED | OUTPATIENT
Start: 2025-03-16 | End: 2025-03-20

## 2025-03-16 RX ADMIN — HYDRALAZINE HYDROCHLORIDE 25 MG: 25 TABLET ORAL at 10:14

## 2025-03-16 RX ADMIN — Medication 600 MG: at 10:14

## 2025-03-16 RX ADMIN — SPIRONOLACTONE 25 MG: 25 TABLET, FILM COATED ORAL at 10:15

## 2025-03-16 RX ADMIN — Medication 250 MG: at 10:14

## 2025-03-16 RX ADMIN — CEFTAROLINE FOSAMIL 600 MG: 600 POWDER, FOR SOLUTION INTRAVENOUS at 05:28

## 2025-03-16 RX ADMIN — DOBUTAMINE IN DEXTROSE 5 MCG/KG/MIN: 200 INJECTION, SOLUTION INTRAVENOUS at 10:12

## 2025-03-16 RX ADMIN — DAPTOMYCIN 800 MG: 500 INJECTION, POWDER, LYOPHILIZED, FOR SOLUTION INTRAVENOUS at 14:22

## 2025-03-16 RX ADMIN — CEFTAROLINE FOSAMIL 600 MG: 600 POWDER, FOR SOLUTION INTRAVENOUS at 20:49

## 2025-03-16 RX ADMIN — DIGOXIN 125 MCG: 0.12 TABLET ORAL at 10:14

## 2025-03-16 RX ADMIN — EMPAGLIFLOZIN 10 MG: 10 TABLET, FILM COATED ORAL at 10:14

## 2025-03-16 RX ADMIN — CALCIUM CARBONATE (ANTACID) CHEW TAB 500 MG 1000 MG: 500 CHEW TAB at 21:24

## 2025-03-16 RX ADMIN — POTASSIUM CHLORIDE 40 MEQ: 750 TABLET, EXTENDED RELEASE ORAL at 20:43

## 2025-03-16 RX ADMIN — HYDRALAZINE HYDROCHLORIDE 25 MG: 25 TABLET ORAL at 14:29

## 2025-03-16 RX ADMIN — HEPARIN SODIUM 2000 UNITS/HR: 10000 INJECTION, SOLUTION INTRAVENOUS at 17:43

## 2025-03-16 RX ADMIN — Medication 5 MG: at 20:46

## 2025-03-16 RX ADMIN — HYDRALAZINE HYDROCHLORIDE 25 MG: 25 TABLET ORAL at 20:44

## 2025-03-16 RX ADMIN — FUROSEMIDE 80 MG: 10 INJECTION, SOLUTION INTRAMUSCULAR; INTRAVENOUS at 10:14

## 2025-03-16 RX ADMIN — HEPARIN SODIUM 2000 UNITS/HR: 10000 INJECTION, SOLUTION INTRAVENOUS at 06:21

## 2025-03-16 RX ADMIN — CEFTAROLINE FOSAMIL 600 MG: 600 POWDER, FOR SOLUTION INTRAVENOUS at 15:00

## 2025-03-16 ASSESSMENT — ACTIVITIES OF DAILY LIVING (ADL)
ADLS_ACUITY_SCORE: 37
ADLS_ACUITY_SCORE: 35
ADLS_ACUITY_SCORE: 37
ADLS_ACUITY_SCORE: 35
ADLS_ACUITY_SCORE: 37
ADLS_ACUITY_SCORE: 37
ADLS_ACUITY_SCORE: 35
ADLS_ACUITY_SCORE: 37
ADLS_ACUITY_SCORE: 35
ADLS_ACUITY_SCORE: 35
ADLS_ACUITY_SCORE: 37
ADLS_ACUITY_SCORE: 35
ADLS_ACUITY_SCORE: 37
ADLS_ACUITY_SCORE: 37
ADLS_ACUITY_SCORE: 35
ADLS_ACUITY_SCORE: 37
ADLS_ACUITY_SCORE: 35
ADLS_ACUITY_SCORE: 35
ADLS_ACUITY_SCORE: 37
ADLS_ACUITY_SCORE: 35
ADLS_ACUITY_SCORE: 37

## 2025-03-16 NOTE — PROGRESS NOTES
[facility]     Addiction Progress Note - Addiction Service        Date of Admission:  3/13/2025  Assessment & Plan       38 yo male with PMHx of HFrEF (15%) 2/2 NICM s/p ICD, CKD3, polysubstance use admitted as transfer from outside hospital on 3/14/25 for severe sepsis secondary to Staphylococcus aureus bacteremia leading to multiorgan dysfunction.  He was transferred to [facility] given advanced heart failure.      ADHD  Methamphetamine Use Disorder, in remission   Patient has remained off of methamphetamine for over a year now however has been using his prescription adderall sparingly. His UDS does show amphetamine but NOT methamphetamine which supports no use of illicit substances. He notes that he was prescribed this so did not think there would be any issue taking it. He does not take it daily and only when he needs to get work done or concentrate which is why he still has adderall without a recent prescription. He says he does not get adderall from any other source. We talked about wellbutrin which he had in the past. He feels like that helped a little bit with energy but not concentration which is what he really needs.  - no sign of meth use on UDS  - cards team ok with PRN use of adderall   - Addiction medicine will continue to follow      Alcohol Use Disorder, in remission   Acute on chronic systolic heart failure/HFrEF  Has been doing well with no alcohol use since last June. No concerns for alcohol use at this time supported by [facility]     Marijuana use   Uses very occasionally. No sign of use disorder and is ok not using but would also be open to medical marijuana if approved.     Peer Support:   -Our peer  will meet the patient if agreeable and still hospitalized on Thursday, to provide additional outpatient resources  -To contact Tiara Peer  from  [facility] ([facility]): call or text: [phone]     Addiction Social Worker:   Our  "addiction social worker Zenon Romero can be contacted if needed, on her pager [phone] or texted/called at [phone]  --Patient is interested in ONLY outpatient addiction treatment after discharge.  Our addiction social worker will give the patient appropriate resources for outpatient evaluation.     Linkage to Care  Patient follows with a local chem dep team and providers. Will see if he needs any other resources.      The patient's care was discussed with the Patient.    Time Spent on this Encounter   I spent 35 minutes on the unit/floor managing the care of Jose Enrique Engel. Over 50% of my time was spent on the following:   - Counseling the patient and/or family regarding: risks and benefits of treatment options  - Coordination of care with the: coordination of care not performed today      Manny Carrasco DO on 3/16/2025 at 11:53 AM   Addiction Service   [facility]     Contact information available via [facility] Paging/Directory under \"addiction medicine\"        ______________________________________________________________________    Interval History     Feeling tired today. Having difficulty urinating and feels bloated and constipated. No cravings.    ROS:  CV, Pulm, GI and  assessed. Pertinent positives as above, otherwise negative.     Data reviewed today: I reviewed all medications, new labs and imaging results over the last 24 hours.     Physical Exam   Temp: 97.3  F (36.3  C) Temp src: Oral BP: 115/84 Pulse: 100   Resp: 17 SpO2: 100 % O2 Device: None (Room air)      General Appearance:  In bed, tired appearing   Respiratory: breathing comfortably on room air   Cardiovascular: tachycardic   GI: non distended   Skin: no rashes or lesions   Other:  Non focal neuro exam      Due to regulation of Title 42 of the Code of Federal Regulations (CFR) Part 2: Confidentiality laws apply to this note and the information wherein.  Thus, this note cannot be copy and " pasted into any other health care staff's note nor can it be included in general medical records sent to ANY outside agency without the patient's written consent.

## 2025-03-16 NOTE — PROGRESS NOTES
Received order for PIV insertion, able to insert one PIV. Patient has a right arm restriction due to DVT. He is also requiring 3 PIVs for Dobutamine, Heparin and Antibiotics which are all in the left arm. Patient has already infiltrated 2 PIVs. Please consider a better access for this patient.

## 2025-03-16 NOTE — PLAN OF CARE
As per dr Rashad Benavidez md, present in pacu bedside  As per dr Rashad Benavidez md, patient ok to go to [facility] to go home today  No new orders  Will continue to monitor  Neuro: A&Ox4. Cooperative.   Cardiac: SR/ST 90- 100's. Afebrile. Denies chest pain           Respiratory: RA, SOB with activity, OWEN.   GI/: Voiding spontaneously. No BM this shift.  Diet/appetite: Tolerating cardiac diet.   Activity: Up with SBA/ Ind  Pain: Denies   Skin: No new deficits noted.  Lines: L PIVs x 3  Heparin gtt @ 2000U/hr. Therapeutic. Next check the next day.   Dobutamine gtt @ 5mcg/kg/min  Intermittent Abx   Plan: Cont with POC and update Primary Team with changes.     Shift events: Dobu gtt IV site appears to be infiltrated, tender and warm to touch. Ordered new IV access.   P[janiyader notified. IV removed and replaced with a new one.

## 2025-03-16 NOTE — PLAN OF CARE
Neuro: A&Ox4.   Cardiac: ST. VSS.   Respiratory: Sating 97 on RA. SOB with activity.   GI/: Adequate urine output. No bm this shift   Diet/appetite: Tolerating cardiac diet. Eating well. Pt ordered two meals from [employer].   Activity:  SBA up to chair and bathroom  Pain: denies  Skin: No new deficits noted.  LDA's:  PIV - dobutamine 5mcg/min  PIV - heparin 2000u - recheck tomorrow AM  PIV - ABX     Plan: Continue with POC. Notify primary team with changes.      Time of notification: 11:13 AM  Provider notified: [place]  Patient status: pt had 8 beats of VTach  Orders received:  [place]

## 2025-03-16 NOTE — PROGRESS NOTES
GENERAL ID SERVICE CONSULTATION - [place] team     Patient:  Jose Enrique Engel   Date of birth 1985, Medical record number [id number]  Date of Visit:  03/16/2025  Date of Admission: 3/13/2025  Consult Requester:Zaid Mar MD          Assessment and Recommendations:   ASSESSMENT:  Severe sepsis secondary to MRSA  Blood cultures positive at OSH from 3/10-transfer on 3/13, remain positive on 3/14, and growth is within 24 hrs of collection, suggesting high-grade bacteremia. 3/15 are no growth for 1 day. JW did not suggest a valvular lesion, but the picture is certainly concerning for a non-valvular endovascular infection  Right upper extremity DVT with concern for septic thrombophlebitis   Acute decompensated heart failure  CKD stage II    DISCUSSION:   Jose Enrique Engel is a 40 yo male with PMHx of HFrEF (10%) 2/2 NICM s/p ICD, CKD3, substance use disorder transferred from [place] 3/13 for further cardiac management after 3 days of persistent MRSA bacteremia and multiorgan dysfunction. The bacteremia is likely from PICC line (placed at previous hospitalization 2/3, possibly dislodged and replaced with potential for introducing infection) given the purulent discharge noted and cellulitis. There is also concern for septic thrombophlebitis based on DVT at insertion site noted on US and continued bacteremia. DVT could also be from stopping Xarelto in December, but suspicion for infection is warranted at this time given clinical picture.     This picture, of persistent blood culture positivity with S aureus despite what seems to be adequate therapy and without a clear resectable focus, suggests persistent MRSA bacteremia (link is to publication by Roge et al from 2022 proposing a definition for this entity - notably no true consensus exists). I would propose stopping Iv vanco and starting IV ceftaroline and daptomycin, as it is possible these agents together offer synergy.    RECOMMENDATION:  Daily blood  cultures until negative for 72 hours. So far, cx from 3/15, 3/16 are no growth. Would collect an additional set regardless on 3/17, and subsequent cultures can be based on the evolving clinical picture  Would prefer to refrain from central venous access placement until his blood cultures are 72 hrs negative, but acknowledge that he is on multiple infusions, so if his current PIV situation cannot meet the demands of infusions, could place a temporary central line as a bridge to a future line placement  Continue daptomycin 10mg/kg and ceftaroline 600mg q8h. (Note that these were started on 3/15 mid-day, so after 3/15 blood cultures collected at 0600. If 3/15 cultures clear, it will have been on Iv vanco alone.)  Continue to follow blood cultures, clinical exam (site of RUE septic thrombus and neurovascular exam) to assess the potential utility or surgery for source control    Medical Decision Making       50 MINUTES SPENT BY ME on the date of service doing chart review, history, exam, documentation & further activities per the note.  This visit qualifies for the  add-on code for complexity.    ________________________________________________________________    Interval 24hr events:  L forearm is a bit better. R arm pain same to improved. Breathing better and he had brisk diuresis.         History of Present Illness:   Jose Enrique Engel is a 38 yo male with PMHx of HFrEF (10%) 2/2 NICM s/p ICD, CKD3, substance use disorder previously admitted 2/3/25 for ambulatory cardiogenic shock who was discharged on dobutamine gtt at 5mcg/kg/min with [facility]. He presented to [facility] 3/10 with severe sepsis secondary to MRSA bacteremia leading to multiorgan dysfunction. PICC line likely source for bacteremia especially with purulent drainage and cellulitis noted at PICC insertion site. PICC had fallen out prior to presentation to ED but IV was replaced by father at home. Blood cultures at [facility] have been  positive for MRSA since 3/10. He was initially on vancomycin and cefepime until MRSA positive, then was narrowed to vancomycin. He was noted to have a DVT at insertion site of PICC line so there was also concern for septic thrombophlebitis.     Interview limited per patient preference. Following information based on chart review and any response from patient.    Patient's symptoms started a couple days prior to presentation at OSH with fevers, chills, arm pain, and then developed SOB. Believed it was because he had been off dobutamine for a few days after PICC was dislodged (then replaced/changed by father at home per chart review). As of today, he is not having any fevers but is having chills. Otherwise, ROS completely negative. He just wants to sleep and is feeling tired.     UA negative for UTI. CXR negative for pneumonia. Found to be in acute heart failure. Elevated lactate and BERNARD noted on chemistry. Lactate upper limits of normal on admission and BERNARD appears to have resolved. Patient is comfortable on room air and has been afebrile since transfer.          Physical Exam:   Vitals were reviewed  Patient Vitals for the past 24 hrs:   BP Temp Temp src Pulse Resp SpO2   03/16/25 1010 124/86 97.3  F (36.3  C) Oral 97 18 98 %   03/16/25 0440 (!) 132/98 97.8  F (36.6  C) Oral 101 17 96 %   03/16/25 0435 -- -- -- 98 -- --   03/16/25 0430 -- -- -- 93 -- --   03/16/25 0425 -- -- -- 93 -- --   03/16/25 0420 -- -- -- 98 -- --   03/16/25 0415 -- -- -- 92 -- --   03/16/25 0410 -- -- -- 89 -- --   03/16/25 0405 -- -- -- 90 -- --   03/16/25 0400 -- -- -- 94 -- --   03/15/25 1900 107/75 97.8  F (36.6  C) Oral -- 18 97 %   03/15/25 1600 -- -- -- 101 -- --   03/15/25 1320 108/72 -- -- 102 20 --       Physical Examination:  GENERAL:  laying in bed in no acute distress.   HEENT:  Head is normocephalic, atraumatic   EYES:  Eyes have anicteric sclerae without conjunctival injection   LUNGS:  Clear to auscultation bilateral.  "  CARDIOVASCULAR:  Regular rate   SKIN:  L forarm with some erythema, no apparent fluctuance. R arm PICC site wrapped. Neurovascular exam intact.  NEUROLOGIC:  Grossly nonfocal. Active x4 extremities         Laboratory Data:     Inflammatory Markers  No lab results found.    Hematology Studies    Recent Labs   Lab Test 03/16/25  0520 03/15/25  0549 03/14/25  0546 03/13/25  2040 02/14/25  0804 02/13/25  0807   WBC 11.6* 13.2* 10.7 10.8 5.2 5.3   HGB 12.8* 13.3 12.8* 13.7 16.4 16.7   MCV 91 91 87 88 93 91    134* 102* 98* 202 190       Metabolic Studies     Recent Labs   Lab Test 03/16/25  0520 03/15/25  0549 03/14/25  0546 03/14/25  0154 03/13/25  2223   * 123* 123* 122* 124*   POTASSIUM 4.0 4.7 4.6 4.6 4.6   CHLORIDE 93* 91* 91* 91* 89*   CO2 21* 16* 20* 19* 21*   BUN 27.4* 27.6* 34.5* 36.3* 38.7*   CR 1.35* 1.20* 1.14 1.14 1.18*   GFRESTIMATED 68 79 84 84 80       Hepatic Studies    Recent Labs   Lab Test 03/16/25  0520 03/15/25  0549 03/14/25  0546 03/14/25  0154 03/13/25  2223 02/26/25  1247   BILITOTAL 1.7* 1.8* 2.2* 2.2* 2.6* 0.9   ALKPHOS 237* 237* 188* 193* 206* 115   ALBUMIN 3.3* 3.4* 3.2* 3.2* 3.6 4.5   AST 59* 81* 101* 108* 126* 38   ALT 89* 106* 111* 114* 128* 41       Microbiology:  No results found for: \"CULT\"    Urine Studies  No lab results found.    Vancomycin Levels    Recent Labs   Lab Test 03/14/25  0546   VANCOMYCIN 29.7*       Hepatitis B Testing No lab results found.  Hepatitis C Testing   No results found for: \"HCVAB\", \"HQTG\", \"HCGENO\", \"HCPCR\", \"HQTRNA\", \"HEPRNA\"  Respiratory Virus Testing    No results found for: \"RS\", \"FLUAG\"      Imaging:  UE  3/10/2025 impression:  There is acute non-occlusive deep venous thrombosis present in the right brachiocephalic vein, subclavian, axillary, brachial, and basilic vein(s).     "

## 2025-03-16 NOTE — PROGRESS NOTES
[facility]  Cardiology II Service / Advanced Heart Failure  Daily Progress Note    Patient: Jose Enrique Engel      : 1985      MRN: [id number]    Assessment/Plan:   40 yo male with PMHx of HFrEF (15%) 2/2 NICM s/p ICD, CKD3, polysubstance use admitted as transfer from outside hospital on 3/14/25 for severe sepsis secondary to Staphylococcus aureus bacteremia leading to multiorgan dysfunction.  He was transferred to [facility] given advanced heart failure.    Today:  - Blood cultures from 3/15 NGTD, continue to monitor  - Continue abx per ID recommendations, hold off on central line placement   - Remains slightly volume overloaded, continue Lasix 80mg IV daily     Severe sepsis secondary to Staphylococcus bacteremia  -Possible source: PICC line (removed at outside hospital) vs septic thrombophlebitis (RUE thrombus). ICD is subcutaneous and does not have intracardiac or venous leads.   - JW 3/14 without vegetation  - ID consulted  - Blood cultures drawn on 3/10, 3/11, 3/12, 3/13, 3/14 positive for Staphylococcus aureus   - 3/15, 3/16 Blood culture NGTD   - Continue daily blood cultures  - Antibiotics:  - Vancomycin 3/10- 3/15  - Daptomycin 3/15-  - Ceftaroline 3/15-     Chronic systolic heart failure (ejection fraction 10 to 15%), stage D class IIIb  Nonischemic cardiomyopathy 2/2 methamphetamine use  Heart failure exacerbation  Pt with hx of advanced HFrEF/NICM. Was previously on outpatient dobutamine, self-discontinued in . Re-engaged with outpatient cardiologist Dr. Dove 2025.  Interested in advanced therapies but not candidate yet due to being lost to follow up, substance use, recurrent infections and difficulty managing PICC line.  He was discharged on home dobutamine infusion during last admission. Ongoing discussions re candidacy for transplant/ VAD. Home infusion service will not accept him back so he will not be able to discharge on inotrope.  Last  RHC 1/29/25: RA 15, PA 44/26, PCWP 21, CI 1.7, PVR 3.4, SVR ~1400  Last TTE 3/13/2025: EF 10%,mild pulm Htn, grade-1 diastolic dysfunction  - Volume: hypervolemic.  lbs. Was 180 on admission.   - Inotrope: dobutamine @ 5mcg/kg/min    - Dobutamine currently running via PIV as do not want to place central access while blood cultures still positive     - Extravasation order set placed for possible infiltration 3/15               - PTA digoxin 125 mcg daily  - Afterload Reduction: Hydralazine 25mg q6h (3/15)  GDMT:                - Diuretic: Lasix 80mg IV BID 3/15, daily 3/16               - ACE/ARB/ARNI: - hold PTA Entresto               - MRA spironolactone 25               - BB- none not while on dobutamine               - SGLT2-  empagliflozin 10 mg daily  - Anticoagulation: heparin gtt  - Statin: none  - Antiplatelet: none  - Antiarrhythmic: none  - SCD prophylaxis: subcuteanous ICD in place  - Electrolytes: BID monitoring with diuresis, goals K>4, Mag>2  - Strict I/O monitoring, daily weights  - Dietary restrictions: regular diet (pt request), 2L fluids (pt intermittently following)    Right upper extremity DVT   Clot from brachial vein through the subclavian diagnosed at outside hospital with RUE US 3/10/25. Has been off of Xarelto. Repeat RUE US 3/14 showed persistence of DVT in right innominate, subclavian and axial veins.   - Heparin infusion (3/10)     Hyponatremia  Likely due to heart failure and volume overload.  - Diuresis as above  - Monitor closely    H/o substance use disorders, in remission (methamphetamines, alcohol)  In remission for >1 year. UDS positive for methamphetamines from his adderall prescription, no evidence of active use. Peth negative.  - Addiction medicine following   - SW helping to connect patient to outpatient treatment options     CKD stage II  - Cr stable, continue to monitor     Hx left ventricular mural thrombus  - Heparin gtt     Thrombocytopenia   -CTM      FEN:  Cardiac  DVT Prophylaxis: Heparin  Code Status: Full Code  Disposition: Pending resolution of bacteremia and possible transplant/ VAD evaluation     Pt to be seen and discussed with the staff attending Dr. Dove.    Jess Calero MD  Internal Medicine- Pediatrics PGY3  [facility]  Please use on call sytem for paging    ==================================    Subjective:   Interval History: NAEO. Feels slightly less short of breath and achy compared to yesterday, but still feels chilled and tired.    Objective:     Vitals:    03/13/25 2000 03/14/25 0409 03/15/25 0306   Weight: 82.1 kg (180 lb 14.4 oz) 82.6 kg (182 lb) 82.6 kg (182 lb 1.6 oz)     Vital Signs with Ranges  Temp:  [97.3  F (36.3  C)-97.8  F (36.6  C)] 97.3  F (36.3  C)  Pulse:  [] 100  Resp:  [17-18] 17  BP: (107-132)/(75-98) 115/84  SpO2:  [96 %-100 %] 100 %  I/O last 3 completed shifts:  In: 2821.98 [P.O.:1972; I.V.:849.98]  Out: 5850 [Urine:5850]    Exam:  Constitutional:  Appears tired, no distress  HEENT: PERRLA, extraocular motion intact, oral mucosa moist  Cardiovascular: Tachycardic rate, regular rhythm, pulses 2+ bilateral upper extremities, trace  lower extremity edema, fingers and toes slightly cool, JVD +8cm  Pulmonary: Lungs bilaterally clear to auscultation  GI: Abdomen soft nontender nondistended normal active bowel sounds  MSK: Well-developed  Skin: erythema at previous picc site. No drainage  Neuro: Alert and oriented to person place and time  Psych: Appropriate mood and affect    Medications   Current Facility-Administered Medications   Medication Dose Route Frequency Provider Last Rate Last Admin    DOBUTamine (DOBUTREX) 500 mg in D5W 250 mL infusion (adult std conc)  5 mcg/kg/min (Order-Specific) Intravenous Continuous Bao Mahan MD 11.5 mL/hr at 03/16/25 1012 5 mcg/kg/min at 03/16/25 1012    heparin 25,000 units in 0.45% NaCl 250 mL ANTICOAGULANT infusion  0-5,000 Units/hr Intravenous Continuous  Bao Mahan MD 20 mL/hr at 03/16/25 1027 2,000 Units/hr at 03/16/25 1027    May take oral meds with a sip of water, the morning of JW procedure.   Does not apply Continuous PRN Bao Mahan MD         Current Facility-Administered Medications   Medication Dose Route Frequency Provider Last Rate Last Admin    calcium carbonate (OS-LENARD) tablet 600 mg  600 mg Oral Daily Bao Mahan MD   600 mg at 03/16/25 1014    ceftaroline fosamil (TEFLARO) 600 mg in sodium chloride 0.9 % 250 mL intermittent infusion  600 mg Intravenous Q8H Jess Calero MD   600 mg at 03/16/25 0528    DAPTOmycin (CUBICIN) 800 mg in sodium chloride 0.9 % 100 mL intermittent infusion  10 mg/kg Intravenous Q24H Jess Calero MD   800 mg at 03/15/25 1319    digoxin (LANOXIN) tablet 125 mcg  125 mcg Oral QAM Zaid Mar MD   125 mcg at 03/16/25 1014    empagliflozin (JARDIANCE) tablet 10 mg  10 mg Oral Daily Bao Mahan MD   10 mg at 03/16/25 1014    [Held by provider] ferrous sulfate (FEROSUL) tablet 325 mg  325 mg Oral Daily with breakfast Bao Mahan MD        furosemide (LASIX) injection 80 mg  80 mg Intravenous Daily Jess Calero MD   80 mg at 03/16/25 1014    hydrALAZINE (APRESOLINE) tablet 25 mg  25 mg Oral TID Bill Enriquez MD   25 mg at 03/16/25 1014    [Held by provider] sacubitril-valsartan (ENTRESTO) 49-51 MG per tablet 1 tablet  1 tablet Oral BID Bao Mahan MD        spironolactone (ALDACTONE) tablet 25 mg  25 mg Oral Daily Bao Mahan MD   25 mg at 03/16/25 1015    vitamin C (ASCORBIC ACID) tablet 250 mg  250 mg Oral Daily Bao Mahan MD   250 mg at 03/16/25 1014       Data   Recent Labs   Lab 03/16/25  0520 03/15/25  0549 03/14/25  0546 03/14/25  0154   WBC 11.6* 13.2* 10.7  --    HGB 12.8* 13.3 12.8*  --    MCV 91 91 87  --     134* 102*  --    INR  --   --   --  1.14   * 123* 123* 122*   POTASSIUM 4.0 4.7 4.6 4.6    CHLORIDE 93* 91* 91* 91*   CO2 21* 16* 20* 19*   BUN 27.4* 27.6* 34.5* 36.3*   CR 1.35* 1.20* 1.14 1.14   ANIONGAP 13 16* 12 12   LENARD 8.8 8.8 8.6* 8.4*   * 111* 99 100*   ALBUMIN 3.3* 3.4* 3.2* 3.2*   PROTTOTAL 6.7 6.6 6.2* 6.1*   BILITOTAL 1.7* 1.8* 2.2* 2.2*   ALKPHOS 237* 237* 188* 193*   ALT 89* 106* 111* 114*   AST 59* 81* 101* 108*       No results found for this or any previous visit (from the past 24 hours).

## 2025-03-17 ENCOUNTER — APPOINTMENT (OUTPATIENT)
Dept: CT IMAGING | Facility: CLINIC | Age: 40
End: 2025-03-17
Payer: COMMERCIAL

## 2025-03-17 ENCOUNTER — APPOINTMENT (OUTPATIENT)
Dept: ULTRASOUND IMAGING | Facility: CLINIC | Age: 40
End: 2025-03-17
Payer: COMMERCIAL

## 2025-03-17 ENCOUNTER — HOME INFUSION (OUTPATIENT)
Dept: HOME HEALTH SERVICES | Facility: HOME HEALTH | Age: 40
End: 2025-03-17
Payer: COMMERCIAL

## 2025-03-17 DIAGNOSIS — I50.42 CHRONIC HEART FAILURE WITH REDUCED EJECTION FRACTION AND DIASTOLIC DYSFUNCTION (H): Primary | ICD-10-CM

## 2025-03-17 LAB
ALBUMIN SERPL BCG-MCNC: 3.4 G/DL (ref 3.5–5.2)
ALBUMIN SERPL BCG-MCNC: 3.4 G/DL (ref 3.5–5.2)
ALBUMIN SERPL BCG-MCNC: 3.6 G/DL (ref 3.5–5.2)
ALBUMIN UR-MCNC: NEGATIVE MG/DL
ALP SERPL-CCNC: 223 U/L (ref 40–150)
ALP SERPL-CCNC: 225 U/L (ref 40–150)
ALP SERPL-CCNC: 226 U/L (ref 40–150)
ALT SERPL W P-5'-P-CCNC: 75 U/L (ref 0–70)
ALT SERPL W P-5'-P-CCNC: 76 U/L (ref 0–70)
ALT SERPL W P-5'-P-CCNC: 79 U/L (ref 0–70)
ANION GAP SERPL CALCULATED.3IONS-SCNC: 12 MMOL/L (ref 7–15)
ANION GAP SERPL CALCULATED.3IONS-SCNC: 13 MMOL/L (ref 7–15)
ANION GAP SERPL CALCULATED.3IONS-SCNC: 14 MMOL/L (ref 7–15)
APPEARANCE UR: CLEAR
AST SERPL W P-5'-P-CCNC: 42 U/L (ref 0–45)
AST SERPL W P-5'-P-CCNC: 43 U/L (ref 0–45)
AST SERPL W P-5'-P-CCNC: 47 U/L (ref 0–45)
BACTERIA BLD CULT: ABNORMAL
BACTERIA BLD CULT: ABNORMAL
BILIRUB SERPL-MCNC: 1.4 MG/DL
BILIRUB SERPL-MCNC: 1.4 MG/DL
BILIRUB SERPL-MCNC: 1.5 MG/DL
BILIRUB UR QL STRIP: NEGATIVE
BUN SERPL-MCNC: 27.2 MG/DL (ref 6–20)
BUN SERPL-MCNC: 29.6 MG/DL (ref 6–20)
BUN SERPL-MCNC: 29.7 MG/DL (ref 6–20)
CALCIUM SERPL-MCNC: 8.7 MG/DL (ref 8.8–10.4)
CALCIUM SERPL-MCNC: 8.8 MG/DL (ref 8.8–10.4)
CALCIUM SERPL-MCNC: 9.2 MG/DL (ref 8.8–10.4)
CHLORIDE SERPL-SCNC: 93 MMOL/L (ref 98–107)
CHLORIDE SERPL-SCNC: 93 MMOL/L (ref 98–107)
CHLORIDE SERPL-SCNC: 95 MMOL/L (ref 98–107)
COLOR UR AUTO: ABNORMAL
CREAT SERPL-MCNC: 1.37 MG/DL (ref 0.67–1.17)
CREAT SERPL-MCNC: 1.41 MG/DL (ref 0.67–1.17)
CREAT SERPL-MCNC: 1.42 MG/DL (ref 0.67–1.17)
EGFRCR SERPLBLD CKD-EPI 2021: 64 ML/MIN/1.73M2
EGFRCR SERPLBLD CKD-EPI 2021: 65 ML/MIN/1.73M2
EGFRCR SERPLBLD CKD-EPI 2021: 67 ML/MIN/1.73M2
ERYTHROCYTE [DISTWIDTH] IN BLOOD BY AUTOMATED COUNT: 14.9 % (ref 10–15)
GLUCOSE SERPL-MCNC: 106 MG/DL (ref 70–99)
GLUCOSE SERPL-MCNC: 109 MG/DL (ref 70–99)
GLUCOSE SERPL-MCNC: 141 MG/DL (ref 70–99)
GLUCOSE UR STRIP-MCNC: 1000 MG/DL
HCO3 SERPL-SCNC: 20 MMOL/L (ref 22–29)
HCO3 SERPL-SCNC: 21 MMOL/L (ref 22–29)
HCO3 SERPL-SCNC: 23 MMOL/L (ref 22–29)
HCT VFR BLD AUTO: 37 % (ref 40–53)
HGB BLD-MCNC: 12.5 G/DL (ref 13.3–17.7)
HGB UR QL STRIP: NEGATIVE
KETONES UR STRIP-MCNC: NEGATIVE MG/DL
LACTATE SERPL-SCNC: 1.7 MMOL/L (ref 0.7–2)
LEUKOCYTE ESTERASE UR QL STRIP: NEGATIVE
MAGNESIUM SERPL-MCNC: 1.8 MG/DL (ref 1.7–2.3)
MAGNESIUM SERPL-MCNC: 2.1 MG/DL (ref 1.7–2.3)
MCH RBC QN AUTO: 31 PG (ref 26.5–33)
MCHC RBC AUTO-ENTMCNC: 33.8 G/DL (ref 31.5–36.5)
MCV RBC AUTO: 92 FL (ref 78–100)
NITRATE UR QL: NEGATIVE
PH UR STRIP: 6.5 [PH] (ref 5–7)
PLATELET # BLD AUTO: 209 10E3/UL (ref 150–450)
POTASSIUM SERPL-SCNC: 3.8 MMOL/L (ref 3.4–5.3)
POTASSIUM SERPL-SCNC: 4.4 MMOL/L (ref 3.4–5.3)
POTASSIUM SERPL-SCNC: 4.8 MMOL/L (ref 3.4–5.3)
PROT SERPL-MCNC: 6.7 G/DL (ref 6.4–8.3)
PROT SERPL-MCNC: 6.7 G/DL (ref 6.4–8.3)
PROT SERPL-MCNC: 7.1 G/DL (ref 6.4–8.3)
RBC # BLD AUTO: 4.03 10E6/UL (ref 4.4–5.9)
RBC URINE: 0 /HPF
SODIUM SERPL-SCNC: 125 MMOL/L (ref 135–145)
SODIUM SERPL-SCNC: 129 MMOL/L (ref 135–145)
SODIUM SERPL-SCNC: 130 MMOL/L (ref 135–145)
SP GR UR STRIP: 1.01 (ref 1–1.03)
UFH PPP CHRO-ACNC: 0.39 IU/ML
UROBILINOGEN UR STRIP-MCNC: NORMAL MG/DL
WBC # BLD AUTO: 11.6 10E3/UL (ref 4–11)
WBC URINE: 1 /HPF

## 2025-03-17 PROCEDURE — 250N000013 HC RX MED GY IP 250 OP 250 PS 637

## 2025-03-17 PROCEDURE — 71250 CT THORAX DX C-: CPT | Mod: 26 | Performed by: RADIOLOGY

## 2025-03-17 PROCEDURE — 93880 EXTRACRANIAL BILAT STUDY: CPT | Mod: 26 | Performed by: RADIOLOGY

## 2025-03-17 PROCEDURE — 250N000011 HC RX IP 250 OP 636

## 2025-03-17 PROCEDURE — 93880 EXTRACRANIAL BILAT STUDY: CPT

## 2025-03-17 PROCEDURE — 120N000003 HC R&B IMCU UMMC

## 2025-03-17 PROCEDURE — 36415 COLL VENOUS BLD VENIPUNCTURE: CPT

## 2025-03-17 PROCEDURE — 250N000013 HC RX MED GY IP 250 OP 250 PS 637: Performed by: STUDENT IN AN ORGANIZED HEALTH CARE EDUCATION/TRAINING PROGRAM

## 2025-03-17 PROCEDURE — 82040 ASSAY OF SERUM ALBUMIN: CPT

## 2025-03-17 PROCEDURE — 87040 BLOOD CULTURE FOR BACTERIA: CPT

## 2025-03-17 PROCEDURE — 93925 LOWER EXTREMITY STUDY: CPT | Mod: 26 | Performed by: RADIOLOGY

## 2025-03-17 PROCEDURE — 250N000011 HC RX IP 250 OP 636: Mod: JZ

## 2025-03-17 PROCEDURE — 99232 SBSQ HOSP IP/OBS MODERATE 35: CPT | Performed by: STUDENT IN AN ORGANIZED HEALTH CARE EDUCATION/TRAINING PROGRAM

## 2025-03-17 PROCEDURE — 80053 COMPREHEN METABOLIC PANEL: CPT

## 2025-03-17 PROCEDURE — 83735 ASSAY OF MAGNESIUM: CPT

## 2025-03-17 PROCEDURE — 80307 DRUG TEST PRSMV CHEM ANLYZR: CPT

## 2025-03-17 PROCEDURE — 99233 SBSQ HOSP IP/OBS HIGH 50: CPT | Mod: GC | Performed by: INTERNAL MEDICINE

## 2025-03-17 PROCEDURE — 85520 HEPARIN ASSAY: CPT | Performed by: INTERNAL MEDICINE

## 2025-03-17 PROCEDURE — 99254 IP/OBS CNSLTJ NEW/EST MOD 60: CPT | Mod: FS | Performed by: PHYSICIAN ASSISTANT

## 2025-03-17 PROCEDURE — 70486 CT MAXILLOFACIAL W/O DYE: CPT | Mod: 26 | Performed by: RADIOLOGY

## 2025-03-17 PROCEDURE — 250N000013 HC RX MED GY IP 250 OP 250 PS 637: Performed by: INTERNAL MEDICINE

## 2025-03-17 PROCEDURE — 83605 ASSAY OF LACTIC ACID: CPT

## 2025-03-17 PROCEDURE — 85027 COMPLETE CBC AUTOMATED: CPT

## 2025-03-17 PROCEDURE — 74176 CT ABD & PELVIS W/O CONTRAST: CPT | Mod: 26 | Performed by: RADIOLOGY

## 2025-03-17 PROCEDURE — 99232 SBSQ HOSP IP/OBS MODERATE 35: CPT | Mod: 25 | Performed by: STUDENT IN AN ORGANIZED HEALTH CARE EDUCATION/TRAINING PROGRAM

## 2025-03-17 PROCEDURE — G0545 PR INHRENT VISIT TO INPT/OBS W CNFRM/SUSPCT INFCT DIS BY INFCT DIS SPCIALST: HCPCS | Performed by: INTERNAL MEDICINE

## 2025-03-17 PROCEDURE — 71250 CT THORAX DX C-: CPT

## 2025-03-17 PROCEDURE — 258N000003 HC RX IP 258 OP 636

## 2025-03-17 PROCEDURE — 93925 LOWER EXTREMITY STUDY: CPT

## 2025-03-17 PROCEDURE — 81001 URINALYSIS AUTO W/SCOPE: CPT

## 2025-03-17 PROCEDURE — 70486 CT MAXILLOFACIAL W/O DYE: CPT

## 2025-03-17 PROCEDURE — 70450 CT HEAD/BRAIN W/O DYE: CPT | Mod: 26 | Performed by: RADIOLOGY

## 2025-03-17 PROCEDURE — 84155 ASSAY OF PROTEIN SERUM: CPT

## 2025-03-17 PROCEDURE — 70450 CT HEAD/BRAIN W/O DYE: CPT

## 2025-03-17 RX ORDER — LIDOCAINE 40 MG/G
CREAM TOPICAL
Status: ACTIVE | OUTPATIENT
Start: 2025-03-17 | End: 2025-03-20

## 2025-03-17 RX ADMIN — CEFTAROLINE FOSAMIL 600 MG: 600 POWDER, FOR SOLUTION INTRAVENOUS at 20:47

## 2025-03-17 RX ADMIN — EMPAGLIFLOZIN 10 MG: 10 TABLET, FILM COATED ORAL at 09:14

## 2025-03-17 RX ADMIN — Medication 600 MG: at 09:14

## 2025-03-17 RX ADMIN — HYDRALAZINE HYDROCHLORIDE 25 MG: 25 TABLET ORAL at 20:35

## 2025-03-17 RX ADMIN — SPIRONOLACTONE 25 MG: 25 TABLET, FILM COATED ORAL at 09:14

## 2025-03-17 RX ADMIN — HYDRALAZINE HYDROCHLORIDE 25 MG: 25 TABLET ORAL at 15:25

## 2025-03-17 RX ADMIN — DOBUTAMINE IN DEXTROSE 5 MCG/KG/MIN: 200 INJECTION, SOLUTION INTRAVENOUS at 09:09

## 2025-03-17 RX ADMIN — DAPTOMYCIN 800 MG: 500 INJECTION, POWDER, LYOPHILIZED, FOR SOLUTION INTRAVENOUS at 12:52

## 2025-03-17 RX ADMIN — SACUBITRIL AND VALSARTAN 1 TABLET: 49; 51 TABLET, FILM COATED ORAL at 20:35

## 2025-03-17 RX ADMIN — HYDRALAZINE HYDROCHLORIDE 25 MG: 25 TABLET ORAL at 09:14

## 2025-03-17 RX ADMIN — HEPARIN SODIUM 2000 UNITS/HR: 10000 INJECTION, SOLUTION INTRAVENOUS at 06:45

## 2025-03-17 RX ADMIN — Medication 250 MG: at 09:14

## 2025-03-17 RX ADMIN — DIGOXIN 125 MCG: 0.12 TABLET ORAL at 09:14

## 2025-03-17 RX ADMIN — CEFTAROLINE FOSAMIL 600 MG: 600 POWDER, FOR SOLUTION INTRAVENOUS at 04:00

## 2025-03-17 RX ADMIN — CEFTAROLINE FOSAMIL 600 MG: 600 POWDER, FOR SOLUTION INTRAVENOUS at 15:25

## 2025-03-17 RX ADMIN — Medication 5 MG: at 22:30

## 2025-03-17 RX ADMIN — FUROSEMIDE 80 MG: 10 INJECTION, SOLUTION INTRAMUSCULAR; INTRAVENOUS at 09:14

## 2025-03-17 RX ADMIN — HEPARIN SODIUM 2000 UNITS/HR: 10000 INJECTION, SOLUTION INTRAVENOUS at 20:43

## 2025-03-17 RX ADMIN — SACUBITRIL AND VALSARTAN 1 TABLET: 49; 51 TABLET, FILM COATED ORAL at 12:53

## 2025-03-17 ASSESSMENT — ACTIVITIES OF DAILY LIVING (ADL)
ADLS_ACUITY_SCORE: 37
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
## 2025-03-17 NOTE — PROGRESS NOTES
Shift: 1900 - 2330    VS: Temp: 97.8  F (36.6  C) Temp src: Oral BP: 128/85 Pulse: 85   Resp: 18 SpO2: 100 % O2 Device: None (Room air)      Pain: Left arm at PIV sites, red, edematous, warm. Primary team is aware. New PIV inserted to upper left arm, infusing dobutamine. Heparin infusing into second IV. 3rd PIV is Saline locked.   Neuro: A&Ox4.   Cardiac:   Sinus tach. VSS. Heparin infusing, re check heparin 10a in the AM.   Respiratory: RA, Denies SOB.   GI/Diet/Appetite: Regular diet with good appetite. LBM 3/13,   :  Voiding.   LDA's: PIV x3 to LUE.   Skin: LUE red, edematous, warm and painful to touch. Team is aware. Continue to monitor closely.   Activity: SBA.   Tests/Procedures:   Pertinent Labs/Lab Collection:      LIMB ALERT - RUE DVT    Plan: BC with no growth for 48hrs then Place PICC, will discharge home with dobutamine gtt.

## 2025-03-17 NOTE — PROGRESS NOTES
I have personally seen and examined the patient on 2025. I saw the patient with the resident (or fellow) and agree with the findings and the plan of care as documented in the resident's (or fellow) note. I have personally reviewed vital signs, medications, laboratory results, available imaging and hemodynamic data.     I spent 49 minutes in care of the patient today including obtaining history, personally reviewing recent testing and/or lab results, today's examination, discussion of testing results and care recommendations and counseling with patient.    Heather Dove MD   of Medicine, [facility]   Advanced Heart Failure and Transplant Cardiology      [facility]  Cardiology II Service / Advanced Heart Failure  Daily Progress Note    Patient: Jose Enrique Engel      : 1985      MRN: [id number]    Assessment/Plan:   38 yo male with PMHx of HFrEF (15%) 2/2 NICM s/p ICD, CKD3, polysubstance use admitted as transfer from outside hospital on 3/14/25 for severe sepsis secondary to Staphylococcus aureus bacteremia leading to multiorgan dysfunction.  He was transferred to [facility] given advanced heart failure.    Today:  - Blood cultures from 3/15, 3/16 NGTD  - Continue current antibiotics, per ID  - Hold off on PICC line placement until 72 hours of negative cultures  - If he loses PIV access overnight, will need to transfer to ICU for central line placement for dobutamine infusion  - Remains volume overloaded, continue Lasix 80mg IV daily  - Start VAD eval, orders placed     Severe sepsis secondary to Staphylococcus bacteremia  -Possible source: PICC line (removed at outside hospital) vs septic thrombophlebitis (RUE thrombus). ICD is subcutaneous and does not have intracardiac or venous leads.   - JW 3/14 without vegetation  - ID consulted  - Blood cultures drawn on 3/10, 3/11, 3/12, 3/13, 3/14 positive for Staphylococcus  aureus   - 3/15, 3/16 Blood culture NGTD  - Antibiotics:  - Vancomycin 3/10- 3/15  - Daptomycin 3/15-  - Ceftaroline 3/15-  - Hold off on PICC line placement until 72 hours of negative cultures  - If he loses PIV access overnight, will need to transfer to ICU for central line placement for dobutamine infusion     Cardiogenic shock  Acute on chronic systolic heart failure (ejection fraction 10 to 15%), stage D class IIIb  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 Was previously on outpatient dobutamine, self-discontinued in 2023. Re-engaged with outpatient cardiologist Dr. Dove Jan 2025.  Interested in advanced therapies but wasn't a candidate at that time due to being lost to follow up, substance use, recurrent infections and difficulty managing PICC line.  He was discharged on home dobutamine infusion during last admission. Home infusion service will not accept him back so he will not be able to discharge on inotrope. Will start VAD eval this admission.   Last RHC 1/29/25: RA 15, PA 44/26, PCWP 21, CI 1.7, PVR 3.4, SVR ~1400  Last TTE 3/13/2025: EF 10%,mild pulm Htn, grade-1 diastolic dysfunction  - Volume: hypervolemic.  lbs. Was 180 on admission.   - Inotrope: dobutamine @ 5mcg/kg/min               - PTA digoxin 125 mcg daily  - Afterload Reduction: Hydralazine 25mg q6h (3/15)  GDMT:                - Diuretic: Lasix 80mg IV BID 3/15, daily 3/16, 3/17               - ACE/ARB/ARNI: - PTA Entresto (resumed 3/17)               - MRA spironolactone 25               - BB- none not while on dobutamine               - SGLT2-  empagliflozin 10 mg daily  - Anticoagulation: heparin gtt  - Statin: none  - Antiplatelet: none  - Antiarrhythmic: none  - SCD prophylaxis: subcuteanous ICD in place  - Electrolytes: BID monitoring with diuresis, goals K>4, Mag>2  - Strict I/O monitoring, daily weights  - Dietary restrictions: regular diet (pt request), 2L fluids (pt  intermittently following)  - VAD eval initiated on 3/17    Right upper extremity DVT   Clot from brachial vein through the subclavian diagnosed at outside hospital with RUE US 3/10/25. Has been off of Xarelto. Repeat RUE US 3/14 showed persistence of DVT in right innominate, subclavian and axial veins.   - Heparin infusion (3/10)     Hyponatremia  Likely due to heart failure and volume overload.  - Diuresis as above  - Monitor closely    H/o substance use disorders, in remission (methamphetamines, alcohol)  In remission for >1 year. UDS positive for methamphetamines from his adderall prescription, no evidence of active use. Peth negative.  - Addiction medicine following   - SW helping to connect patient to outpatient treatment options     CKD stage II  - Cr stable, continue to monitor     Hx left ventricular mural thrombus  - Heparin gtt     Thrombocytopenia   -CTM      FEN: Cardiac  DVT Prophylaxis: Heparin  Code Status: Full Code  Disposition: Pending resolution of bacteremia and possible transplant/ VAD evaluation     Pt to be seen and discussed with the staff attending Dr. Dove.    Jess Calero MD  Internal Medicine- Pediatrics PGY3  [facility]  Please use on call sytem for paging    ==================================    Subjective:   Interval History: NAEO. Continues to feel slightly short of breath with laying flat.     Objective:     Vitals:    03/13/25 2000 03/14/25 0409 03/15/25 0306   Weight: 82.1 kg (180 lb 14.4 oz) 82.6 kg (182 lb) 82.6 kg (182 lb 1.6 oz)     Vital Signs with Ranges  Temp:  [97.8  F (36.6  C)-98.7  F (37.1  C)] 98  F (36.7  C)  Pulse:  [] 89  Resp:  [16-20] 18  BP: (108-130)/(71-85) 108/71  SpO2:  [98 %-100 %] 100 %  I/O last 3 completed shifts:  In: 3472.12 [P.O.:2240; I.V.:932.12; IV Piggyback:300]  Out: 4350 [Urine:4350]    Exam:  Constitutional:  Appears tired, no distress  HEENT: PERRLA, extraocular motion intact, oral mucosa moist  Cardiovascular:  Tachycardic rate, regular rhythm, pulses 2+ bilateral upper extremities, trace  lower extremity edema, fingers and toes slightly cool, JVD +8cm  Pulmonary: Lungs bilaterally clear to auscultation  GI: Abdomen soft nontender nondistended normal active bowel sounds  MSK: Well-developed  Skin: erythema at previous picc site. No drainage  Neuro: Alert and oriented to person place and time  Psych: Appropriate mood and affect    Medications   Current Facility-Administered Medications   Medication Dose Route Frequency Provider Last Rate Last Admin    DOBUTamine (DOBUTREX) 500 mg in D5W 250 mL infusion (adult std conc)  5 mcg/kg/min (Order-Specific) Intravenous Continuous Bao Mahan MD 11.5 mL/hr at 03/17/25 1200 5 mcg/kg/min at 03/17/25 1200    heparin 25,000 units in 0.45% NaCl 250 mL ANTICOAGULANT infusion  0-5,000 Units/hr Intravenous Continuous Bao Mahan MD 20 mL/hr at 03/17/25 1200 2,000 Units/hr at 03/17/25 1200    May take oral meds with a sip of water, the morning of JW procedure.   Does not apply Continuous PRN Bao Mahan MD         Current Facility-Administered Medications   Medication Dose Route Frequency Provider Last Rate Last Admin    calcium carbonate (OS-LENARD) tablet 600 mg  600 mg Oral Daily Bao Mahan MD   600 mg at 03/17/25 0914    ceftaroline fosamil (TEFLARO) 600 mg in sodium chloride 0.9 % 250 mL intermittent infusion  600 mg Intravenous Q8H Jess Calero MD   600 mg at 03/17/25 1525    DAPTOmycin (CUBICIN) 800 mg in sodium chloride 0.9 % 100 mL intermittent infusion  10 mg/kg Intravenous Q24H Jess Calero MD   800 mg at 03/17/25 1252    digoxin (LANOXIN) tablet 125 mcg  125 mcg Oral QAM Zaid Mar MD   125 mcg at 03/17/25 0914    empagliflozin (JARDIANCE) tablet 10 mg  10 mg Oral Daily Bao Mahan MD   10 mg at 03/17/25 0914    [Held by provider] ferrous sulfate (FEROSUL) tablet 325 mg  325 mg Oral Daily with breakfast  Bao Mahan MD        furosemide (LASIX) injection 80 mg  80 mg Intravenous Daily Jess Calero MD   80 mg at 03/17/25 0914    hydrALAZINE (APRESOLINE) tablet 25 mg  25 mg Oral TID Bill Enriquez MD   25 mg at 03/17/25 1525    sacubitril-valsartan (ENTRESTO) 49-51 MG per tablet 1 tablet  1 tablet Oral BID Jess Calero MD   1 tablet at 03/17/25 1253    spironolactone (ALDACTONE) tablet 25 mg  25 mg Oral Daily Bao Mahan MD   25 mg at 03/17/25 0914    vitamin C (ASCORBIC ACID) tablet 250 mg  250 mg Oral Daily Bao Mahan MD   250 mg at 03/17/25 0914       Data   Recent Labs   Lab 03/17/25  0539 03/16/25  1712 03/16/25  0520 03/15/25  0549 03/14/25  0546 03/14/25  0154   WBC 11.6*  --  11.6* 13.2*   < >  --    HGB 12.5*  --  12.8* 13.3   < >  --    MCV 92  --  91 91   < >  --      --  182 134*   < >  --    INR  --   --   --   --   --  1.14   * 129* 127* 123*   < > 122*   POTASSIUM 4.8 3.8 4.0 4.7   < > 4.6   CHLORIDE 93* 93* 93* 91*   < > 91*   CO2 20* 23 21* 16*   < > 19*   BUN 29.7* 27.2* 27.4* 27.6*   < > 36.3*   CR 1.37* 1.41* 1.35* 1.20*   < > 1.14   ANIONGAP 12 13 13 16*   < > 12   LENARD 8.8 8.7* 8.8 8.8   < > 8.4*   * 141* 120* 111*   < > 100*   ALBUMIN 3.4* 3.4* 3.3* 3.4*   < > 3.2*   PROTTOTAL 6.7 6.7 6.7 6.6   < > 6.1*   BILITOTAL 1.4* 1.4* 1.7* 1.8*   < > 2.2*   ALKPHOS 226* 223* 237* 237*   < > 193*   ALT 76* 79* 89* 106*   < > 114*   AST 43 47* 59* 81*   < > 108*    < > = values in this interval not displayed.

## 2025-03-17 NOTE — PROGRESS NOTES
Addiction Team Social Work Note    Date of  Intervention: 03/17/25  Collaborated with:  Dr. Carrasco, Addiction Attending; patient; pt's SO at bedside    Referred by: Dr. Jess Calero  Reason for consult: Evaluate current substance use and supports, considering starting heart transplant/VAD evaluation  Patient information: Pt is a 39 year old with PMHx of HFrEF (15%) 2/2 NICM s/p ICD, CKD3, polysubstance use admitted as transfer from outside hospital on 3/14/15 for severe sepsis secondary to Staphylococcus aureus bacteremia leading to multiorgan dysfunction.  Txfr to [facility] for further care given advanced heart failure.      Intervention:  Chart reviewed.  Discussed pt's care and plan in Addiction team rounds.    Writer and Dr. Carrasco met with pt and his SO for a supportive visit.  Introduced self and role.   Pt indicates his housing is stable.  No concerns for finances during this visit.  Dad is supportive and involved and lives close to patient.  Dad is a Nurse.  Discussed OP Psychiatry follow up.  Pt has a provider in mind that is here in the [facility].  He has seen her before.  He will look into this clinic further.     Pt processing the conversation he had yesterday with Dr. Dove.  He states his understanding is that he cannot be on Adderall moving forward.    Assessment:   Initial visit; rapport building.    Plan:      Follow Up:  Writer will continue to follow.    Due to regulation of Title 42 of the Code of Federal Regulations (CFR) Part 2: Confidentiality laws apply to this note and the information wherein.  Thus, this note cannot be copy and pasted into any other health care staff's note nor can it be included in general medical records sent to ANY outside agency without the patient's written consent.    Zenon Romero, DULCE  She/her/hers  Social Work Services  Inpatient Addiction Medicine Team  [phone] work cell phone  [phone] pager  8:00am-4:30pm M/T/Th/F; Off Wednesday's

## 2025-03-17 NOTE — PROGRESS NOTES
GENERAL ID SERVICE CONSULTATION - [place] team     Patient:  Jose Enrique Engel   Date of birth 1985, Medical record number [id number]  Date of Visit:  03/17/2025  Date of Admission: 3/13/2025  Consult Requester:Zaid Mar MD          Assessment and Recommendations:   ASSESSMENT:  Severe sepsis secondary to MRSA  Blood cultures positive at OSH from 3/10-transfer on 3/13, remain positive on 3/14, and growth is within 24 hrs of collection, suggesting high-grade bacteremia.   3/15 and 3/16 are no growth to date.   JW did not suggest a valvular lesion, but the picture is certainly concerning for a non-valvular endovascular infection  Right upper extremity DVT with concern for septic thrombophlebitis   Acute decompensated heart failure  CKD stage II    DISCUSSION:   Jose Enrique Engel is a 40 yo male with PMHx of HFrEF (10%) 2/2 NICM s/p ICD, CKD3, substance use disorder transferred from [place] 3/13 for further cardiac management after 3 days of persistent MRSA bacteremia and multiorgan dysfunction. The bacteremia is likely from PICC line (placed at previous hospitalization 2/3, possibly dislodged and replaced with potential for introducing infection) given the purulent discharge noted and cellulitis. There is also concern for septic thrombophlebitis based on DVT at insertion site noted on US and continued bacteremia. DVT could also be from stopping Xarelto in December, but suspicion for infection is warranted at this time given clinical picture.     This picture, of persistent blood culture positivity with S aureus despite what seems to be adequate therapy and without a clear resectable focus of infection, suggests persistent MRSA bacteremia (link is to publication by Roge et al from 2022 proposing a definition for this entity - notably no true consensus exists). Vancomycin stopped 3/15 (after blood culture drawn) and switched to IV ceftaroline and daptomycin. Will need at least 4 weeks of antibiotic  therapy but can likely switch to oral treatment prior to discharge.    RECOMMENDATIONS:  Will follow blood cultures until negative for 72 hours. So far, cx from 3/15, 3/16 are no growth.   Do not need further blood cultures after 3/17 unless any return positive.   Would prefer to refrain from central venous access placement until his blood cultures are 72 hrs negative, but acknowledge that he is on multiple infusions, so if his current PIV situation cannot meet the demands of infusions, could place a temporary central line as a bridge to a future line placement  Continue daptomycin 10mg/kg and ceftaroline 600mg q8h. (Note that these were started on 3/15 mid-day, so after 3/15 blood cultures collected at 0600. If 3/15 cultures clear, it will have been on IV vanco alone.)  Recommend weekly CK while on daptomycin  Continue to follow blood cultures, clinical exam (site of RUE septic thrombus and neurovascular exam) to assess the potential utility or surgery for source control      Patient seen and discussed with Dr. Dudley.    Renea Alcantara MD   PGY-1 Medicine-Pediatrics  ________________________________________________________________    Interval 24hr events:  L forearm is still a bit sore where PIV was. Right arm healing fine where PICC was and non-tender. No muscle soreness. No fevers or chills.          History of Present Illness:   Jose Enrique Engel is a 40 yo male with PMHx of HFrEF (10%) 2/2 NICM s/p ICD, CKD3, substance use disorder previously admitted 2/3/25 for ambulatory cardiogenic shock who was discharged on dobutamine gtt at 5mcg/kg/min with [facility]. He presented to [facility] 3/10 with severe sepsis secondary to MRSA bacteremia leading to multiorgan dysfunction. PICC line likely source for bacteremia especially with purulent drainage and cellulitis noted at PICC insertion site. PICC had fallen out prior to presentation to ED but IV was replaced by father at home. Blood cultures at  [facility] have been positive for MRSA since 3/10. He was initially on vancomycin and cefepime until MRSA positive, then was narrowed to vancomycin. He was noted to have a DVT at insertion site of PICC line so there was also concern for septic thrombophlebitis.     Interview limited per patient preference. Following information based on chart review and any response from patient.    Patient's symptoms started a couple days prior to presentation at OSH with fevers, chills, arm pain, and then developed SOB. Believed it was because he had been off dobutamine for a few days after PICC was dislodged (then replaced/changed by father at home per chart review). On admission, he was not having any fevers but was having chills. Otherwise, ROS completely negative. He just wants to sleep and is feeling tired.     UA negative for UTI. CXR negative for pneumonia. Found to be in acute heart failure. Elevated lactate and BERNARD noted on chemistry. Lactate upper limits of normal on admission and BERNARD appears to have resolved. Patient is comfortable on room air and has been afebrile since transfer.          Physical Exam:   Vitals were reviewed  Patient Vitals for the past 24 hrs:   BP Temp Temp src Pulse Resp SpO2   03/17/25 1310 130/83 98.1  F (36.7  C) Axillary 95 20 100 %   03/17/25 0800 128/85 98.4  F (36.9  C) Axillary 101 20 98 %   03/17/25 0359 118/84 98.7  F (37.1  C) Axillary 103 18 98 %   03/17/25 0021 130/84 98.4  F (36.9  C) Oral 102 16 99 %   03/16/25 1913 128/85 97.8  F (36.6  C) Oral 85 18 100 %       Physical Examination:  GENERAL:  sitting in bed in no acute distress.   HEENT:  Head is normocephalic, atraumatic   EYES:  Eyes have anicteric sclerae without conjunctival injection   LUNGS:  Clear to auscultation bilateral.   CARDIOVASCULAR:  Regular rate and rhythm  SKIN:  L forarm with some erythema, no apparent fluctuance. R arm PICC site wrapped, non-tender to palpation and without fluctuance. Neurovascular exam  "intact.  NEUROLOGIC:  Grossly nonfocal. Active x4 extremities         Laboratory Data:     Inflammatory Markers  No lab results found.    Hematology Studies    Recent Labs   Lab Test 03/17/25  0539 03/16/25  0520 03/15/25  0549 03/14/25  0546 03/13/25  2040 02/14/25  0804   WBC 11.6* 11.6* 13.2* 10.7 10.8 5.2   HGB 12.5* 12.8* 13.3 12.8* 13.7 16.4   MCV 92 91 91 87 88 93    182 134* 102* 98* 202       Metabolic Studies     Recent Labs   Lab Test 03/17/25  0539 03/16/25  1712 03/16/25  0520 03/15/25  0549 03/14/25  0546   * 129* 127* 123* 123*   POTASSIUM 4.8 3.8 4.0 4.7 4.6   CHLORIDE 93* 93* 93* 91* 91*   CO2 20* 23 21* 16* 20*   BUN 29.7* 27.2* 27.4* 27.6* 34.5*   CR 1.37* 1.41* 1.35* 1.20* 1.14   GFRESTIMATED 67 65 68 79 84       Hepatic Studies    Recent Labs   Lab Test 03/17/25  0539 03/16/25 1712 03/16/25  0520 03/15/25  0549 03/14/25  0546 03/14/25  0154   BILITOTAL 1.4* 1.4* 1.7* 1.8* 2.2* 2.2*   ALKPHOS 226* 223* 237* 237* 188* 193*   ALBUMIN 3.4* 3.4* 3.3* 3.4* 3.2* 3.2*   AST 43 47* 59* 81* 101* 108*   ALT 76* 79* 89* 106* 111* 114*       Microbiology:  No results found for: \"CULT\"    Urine Studies  No lab results found.    Vancomycin Levels    Recent Labs   Lab Test 03/14/25  0546   VANCOMYCIN 29.7*       Hepatitis B Testing No lab results found.  Hepatitis C Testing   No results found for: \"HCVAB\", \"HQTG\", \"HCGENO\", \"HCPCR\", \"HQTRNA\", \"HEPRNA\"  Respiratory Virus Testing    No results found for: \"RS\", \"FLUAG\"      Imaging:  UE  3/10/2025 impression:  There is acute non-occlusive deep venous thrombosis present in the right brachiocephalic vein, subclavian, axillary, brachial, and basilic vein(s).     "

## 2025-03-17 NOTE — PLAN OF CARE
Hours of Care:  1900 - 0700    Running: Heparin @2000 units/hr. Dobutamine @5 mcg/kg/min    Neuro: A/O x 4. Makes needs know.   Respiratory: On room air. Lung sounds diminished in bases. Denies shortness of breath at rest.  Cardiac: VSS. ST 100s  GI/: Last BM 3/16. No report of N/V. Voids appropriately via urinal  Skin: See PCS for assessment and treatment of wounds and surgical incisions.  LDA: L PIV x 3, dobutamine in L upper forearm; extravasated site posted in media   RN Managed: RN managed magnesium, potassium, heparin.   Pain: Tenderness at old extravasated L PIV site  Diet: Low sat fat.    P: Continue to follow POC and report changes to Cards 2.    Goal Outcome Evaluation:      Plan of Care Reviewed With: patient    Overall Patient Progress: no changeOverall Patient Progress: no change    Outcome Evaluation: R UE limb alert, L LE tenderness at old PIV site. Heparin and dobutamine running. No changes overnight

## 2025-03-17 NOTE — PROGRESS NOTES
"Received PICC order. Pt has pending blood culture. Per Infectious Disease Note, would prefer to refrain from central venous access placement until his blood cultures are 72 hrs negative, but acknowledge that he is on multiple infusions, could place a temporary cental line.  Spoke with Ordering Provider Jess Calero MD, she will clarify this with MD. MD was also aware that Pt has DVT in right arm and has pacemaker in lateral left chest.   Received a vocera text from Jess Calero MD, \"Ok I will place the order for left side. We will try and get by with PIVs today and place PICC line tomorrow. If he loses access overnight, then we will transfer to ICU for central line.\"  6C RN Becky made aware.   "

## 2025-03-17 NOTE — PLAN OF CARE
I: Monitored vitals and assessed pt status.   Changes during this shift: VAD evaluation    Running: Heparin drip 2000 units/hr, Dobutamine 5 mcg/kg/min     Vitals: /71 (BP Location: Right leg, Cuff Size: Adult Regular)   Pulse 89   Temp 98  F (36.7  C) (Axillary)   Resp 18   Wt 82.6 kg (182 lb 1.6 oz)   SpO2 100%   BMI 26.13 kg/m      A:   Neuro: Ax4 denies headache, dizziness, lightheadedness,calls appropriately, irritable   Cardiac: SR 70s, afebrile, VSS. Denies chest pain.   Respiratory: sating >95 on room air. denies SOB. LS clear bilaterally.   Diet/appetite: Regular Diet. Good appetite.   GI/:  Large BM this shift. Denies abdominal pain. Voids spontaneously.  Activity: Moves independently  Pain: Tenderness at previous L PIV site  Skin: old dressing on previous PICC line remains, no new deficits  Line:  3 PIV at Left arm all infusing. See Vascular access team notes for PICC plans.   Problem: Heart Failure  Goal: Effective Oxygenation and Ventilation  Outcome: Progressing  Intervention: Promote Airway Secretion Clearance  Recent Flowsheet Documentation  Taken 3/17/2025 1310 by Becky Walker RN  Activity Management:   up in chair   back to bed  Taken 3/17/2025 0800 by Becky Walker RN  Cough And Deep Breathing: done independently per patient  Activity Management:   ambulated to bathroom   back to bed  Intervention: Optimize Oxygenation and Ventilation  Recent Flowsheet Documentation  Taken 3/17/2025 1310 by Becky Walker RN  Head of Bed (HOB) Positioning: HOB at 45 degrees  Taken 3/17/2025 0800 by Becky Walker RN  Head of Bed (HOB) Positioning: HOB at 20-30 degrees   Goal Outcome Evaluation:      Plan of Care Reviewed With: patient    Overall Patient Progress: no changeOverall Patient Progress: no change    Outcome Evaluation: SOB while laying flat continues, PICC placement on hold until blood cultures result

## 2025-03-17 NOTE — PROGRESS NOTES
Patient YOB: 1985    Patient age: 39    Patient gender: M    Patient height: 177.8cm          Predicted values:     FEV1: 4.31    FVC: 5.13    PEF: 9.81    FEV1/FVC: 84%    Patient values:    FEV1: 4.13    FVC: 6.76    PEF: 5.28    FEV1/FVC: 61%          Patient effort:  Good    RT Marli on 3/17/2025 at 6:05 PM

## 2025-03-17 NOTE — PROGRESS NOTES
Neal, Jose Enrique's father and Neal's wife Martine will attend Jose Enrique's Pre-VAD Education session at 10am on Thursday, 3/20. Jose Enrique mentioned his SO may attend as well.

## 2025-03-17 NOTE — PROGRESS NOTES
[facility]     Addiction Progress Note - Addiction Service        Date of Admission:  3/13/2025  Assessment & Plan       40 yo male with PMHx of HFrEF (15%) 2/2 NICM s/p ICD, CKD3, polysubstance use admitted as transfer from outside hospital on 3/14/25 for severe sepsis secondary to Staphylococcus aureus bacteremia leading to multiorgan dysfunction.  He was transferred to [facility] given advanced heart failure.      ADHD  Methamphetamine Use Disorder, in remission   Patient has remained off of methamphetamine for over a year now however has been using his prescription adderall sparingly. His UDS does show amphetamine but NOT methamphetamine which supports no use of illicit substances. He notes that he was prescribed this so did not think there would be any issue taking it. He does not take it daily and only when he needs to get work done or concentrate which is why he still has adderall without a recent prescription (last prescription I can find in records would be 2022.) He says he does not get adderall from any other source. We talked about wellbutrin which he had in the past. He feels like that helped a little bit with energy but not concentration which is what he really needs.   - no sign of meth use on UDS  - Will continue to discuss follow up after discharge with patient, he would like to think about if he wants to see his old psychiatrist  - Addiction medicine will continue to follow      Alcohol Use Disorder, in remission   Acute on chronic systolic heart failure/HFrEF  Has been doing well with no alcohol use since last June. No concerns for alcohol use at this time supported by [facility]     Marijuana use   Uses very occasionally. No sign of use disorder and is ok not using but would also be open to medical marijuana if approved.     Peer Support:   -Our peer  will meet the patient if agreeable and still hospitalized on Thursday, to provide additional outpatient  "resources  -To contact Tiara Peer  from [facility] ([facility]): call or text: [phone]     Addiction Social Worker:   Our addiction social worker Zenon Nick can be contacted if needed, on her pager [phone] or texted/called at [phone]  --Patient is interested in ONLY outpatient addiction treatment after discharge.  Our addiction social worker will give the patient appropriate resources for outpatient evaluation.     Linkage to Care  Patient follows with a local chem dep team and providers. Will see if he needs any other resources.      The patient's care was discussed with the Patient.    Time Spent on this Encounter   I spent 35 minutes on the unit/floor managing the care of Jose Enrique Engel. Over 50% of my time was spent on the following:   - Counseling the patient and/or family regarding: risks and benefits of treatment options  - Coordination of care with the: coordination of care not performed today      Manny Carrasco,  on 3/16/2025 at 11:53 AM   Addiction Service   [facility]     Contact information available via [facility] Paging/Directory under \"addiction medicine\"        ______________________________________________________________________    Interval History     No acute events overnight. Patient still processing conversation with cards team about no longer having home infusion for dobutamine as well as possibly needing a VAD. Talked about following up with his prior psychiatrist and he is going to think about it.     ROS:  CV, Pulm, GI and  assessed. Pertinent positives as above, otherwise negative.     Data reviewed today: I reviewed all medications, new labs and imaging results over the last 24 hours.     Physical Exam   Temp: 98.7  F (37.1  C) Temp src: Axillary BP: 128/85 Pulse: 101   Resp: 20 SpO2: 98 % O2 Device: None (Room air)      General Appearance:  In bed, tired appearing   Respiratory: breathing comfortably on " room air   Cardiovascular: tachycardic   GI: non distended   Skin: no rashes or lesions   Other:  Non focal neuro exam      Due to regulation of Title 42 of the Code of Federal Regulations (CFR) Part 2: Confidentiality laws apply to this note and the information wherein.  Thus, this note cannot be copy and pasted into any other health care staff's note nor can it be included in general medical records sent to ANY outside agency without the patient's written consent.

## 2025-03-18 ENCOUNTER — APPOINTMENT (OUTPATIENT)
Dept: OCCUPATIONAL THERAPY | Facility: CLINIC | Age: 40
End: 2025-03-18
Payer: COMMERCIAL

## 2025-03-18 ENCOUNTER — APPOINTMENT (OUTPATIENT)
Dept: CARDIOLOGY | Facility: CLINIC | Age: 40
End: 2025-03-18
Payer: COMMERCIAL

## 2025-03-18 ENCOUNTER — APPOINTMENT (OUTPATIENT)
Dept: ULTRASOUND IMAGING | Facility: CLINIC | Age: 40
End: 2025-03-18
Payer: COMMERCIAL

## 2025-03-18 LAB
ABO + RH BLD: NORMAL
ALBUMIN SERPL BCG-MCNC: 3.1 G/DL (ref 3.5–5.2)
ALBUMIN SERPL BCG-MCNC: 3.3 G/DL (ref 3.5–5.2)
ALP SERPL-CCNC: 184 U/L (ref 40–150)
ALP SERPL-CCNC: 195 U/L (ref 40–150)
ALT SERPL W P-5'-P-CCNC: 58 U/L (ref 0–70)
ALT SERPL W P-5'-P-CCNC: 61 U/L (ref 0–70)
AMPHETAMINES UR QL SCN: ABNORMAL
ANION GAP SERPL CALCULATED.3IONS-SCNC: 12 MMOL/L (ref 7–15)
ANION GAP SERPL CALCULATED.3IONS-SCNC: 14 MMOL/L (ref 7–15)
APTT PPP: 103 SECONDS (ref 22–38)
AST SERPL W P-5'-P-CCNC: 35 U/L (ref 0–45)
AST SERPL W P-5'-P-CCNC: 37 U/L (ref 0–45)
BARBITURATES UR QL SCN: ABNORMAL
BENZODIAZ UR QL SCN: ABNORMAL
BILIRUB SERPL-MCNC: 1.2 MG/DL
BILIRUB SERPL-MCNC: 1.2 MG/DL
BLD GP AB SCN SERPL QL: NEGATIVE
BUN SERPL-MCNC: 19.7 MG/DL (ref 6–20)
BUN SERPL-MCNC: 22.2 MG/DL (ref 6–20)
BZE UR QL SCN: ABNORMAL
CALCIUM SERPL-MCNC: 8.7 MG/DL (ref 8.8–10.4)
CALCIUM SERPL-MCNC: 8.9 MG/DL (ref 8.8–10.4)
CANNABINOIDS UR QL SCN: ABNORMAL
CHLORIDE SERPL-SCNC: 94 MMOL/L (ref 98–107)
CHLORIDE SERPL-SCNC: 99 MMOL/L (ref 98–107)
CHOLEST SERPL-MCNC: 96 MG/DL
CREAT SERPL-MCNC: 0.77 MG/DL (ref 0.67–1.17)
CREAT SERPL-MCNC: 1.11 MG/DL (ref 0.67–1.17)
CYSTATIN C (ROCHE): 1 MG/L (ref 0.6–1)
DRVVT CONFIRM NORMALIZED RATIO: 1.14
DRVVT SCREEN MIX RATIO: 0.98
DRVVT SCREEN RATIO: 1.18
EGFRCR SERPLBLD CKD-EPI 2021: 87 ML/MIN/1.73M2
EGFRCR SERPLBLD CKD-EPI 2021: >90 ML/MIN/1.73M2
ERYTHROCYTE [DISTWIDTH] IN BLOOD BY AUTOMATED COUNT: 15.2 % (ref 10–15)
FENTANYL UR QL: ABNORMAL
FERRITIN SERPL-MCNC: 488 NG/ML (ref 31–409)
GFR/BSA.PRED SERPLBLD CYS-BASED-ARV: 85 ML/MIN/1.73M2
GLUCOSE SERPL-MCNC: 110 MG/DL (ref 70–99)
GLUCOSE SERPL-MCNC: 170 MG/DL (ref 70–99)
HCO3 SERPL-SCNC: 21 MMOL/L (ref 22–29)
HCO3 SERPL-SCNC: 23 MMOL/L (ref 22–29)
HCT VFR BLD AUTO: 36.9 % (ref 40–53)
HDLC SERPL-MCNC: 34 MG/DL
HEPZYMED PTT RATIO: 1.17
HEPZYMED PTT-LA: 42 SECONDS (ref 31–45)
HEPZYMED THROMBIN TIME: 20.5 SECONDS (ref 15.7–21.7)
HGB BLD-MCNC: 12.3 G/DL (ref 13.3–17.7)
INR PPP: 1.24 (ref 0.85–1.15)
IRON BINDING CAPACITY (ROCHE): 233 UG/DL (ref 240–430)
IRON SATN MFR SERPL: 15 % (ref 15–46)
IRON SERPL-MCNC: 35 UG/DL (ref 61–157)
LA PPP-IMP: NEGATIVE
LACTATE SERPL-SCNC: 2.2 MMOL/L (ref 0.7–2)
LDLC SERPL CALC-MCNC: 49 MG/DL
LUPUS INTERPRETATION: ABNORMAL
LVEF ECHO: NORMAL
MAGNESIUM SERPL-MCNC: 1.9 MG/DL (ref 1.7–2.3)
MCH RBC QN AUTO: 31.1 PG (ref 26.5–33)
MCHC RBC AUTO-ENTMCNC: 33.3 G/DL (ref 31.5–36.5)
MCV RBC AUTO: 93 FL (ref 78–100)
NONHDLC SERPL-MCNC: 62 MG/DL
NT-PROBNP SERPL-MCNC: ABNORMAL PG/ML (ref 0–450)
OPIATES UR QL SCN: ABNORMAL
PATIENT PTT-LA: 149 SECONDS (ref 31–45)
PCP QUAL URINE (ROCHE): ABNORMAL
PHOSPHATE SERPL-MCNC: 2.8 MG/DL (ref 2.5–4.5)
PLATELET # BLD AUTO: 272 10E3/UL (ref 150–450)
POTASSIUM SERPL-SCNC: 4.2 MMOL/L (ref 3.4–5.3)
POTASSIUM SERPL-SCNC: 4.4 MMOL/L (ref 3.4–5.3)
PREALB SERPL-MCNC: 11.8 MG/DL (ref 20–40)
PROT SERPL-MCNC: 6.1 G/DL (ref 6.4–8.3)
PROT SERPL-MCNC: 6.7 G/DL (ref 6.4–8.3)
RBC # BLD AUTO: 3.96 10E6/UL (ref 4.4–5.9)
SODIUM SERPL-SCNC: 131 MMOL/L (ref 135–145)
SODIUM SERPL-SCNC: 132 MMOL/L (ref 135–145)
SPECIMEN EXP DATE BLD: NORMAL
THROMBIN TIME: >60 SECONDS (ref 13–19)
TRANSFERRIN SERPL-MCNC: 210 MG/DL (ref 200–360)
TRIGL SERPL-MCNC: 64 MG/DL
TSH SERPL DL<=0.005 MIU/L-ACNC: 4.74 UIU/ML (ref 0.3–4.2)
UFH PPP CHRO-ACNC: 0.38 IU/ML
WBC # BLD AUTO: 9.4 10E3/UL (ref 4–11)

## 2025-03-18 PROCEDURE — 272N000452 HC KIT SHRLOCK 5FR POWER PICC TRIPLE LUMEN

## 2025-03-18 PROCEDURE — 83880 ASSAY OF NATRIURETIC PEPTIDE: CPT

## 2025-03-18 PROCEDURE — 85027 COMPLETE CBC AUTOMATED: CPT

## 2025-03-18 PROCEDURE — 99232 SBSQ HOSP IP/OBS MODERATE 35: CPT | Mod: 25 | Performed by: STUDENT IN AN ORGANIZED HEALTH CARE EDUCATION/TRAINING PROGRAM

## 2025-03-18 PROCEDURE — 84134 ASSAY OF PREALBUMIN: CPT

## 2025-03-18 PROCEDURE — 250N000011 HC RX IP 250 OP 636

## 2025-03-18 PROCEDURE — 93306 TTE W/DOPPLER COMPLETE: CPT | Mod: 26 | Performed by: STUDENT IN AN ORGANIZED HEALTH CARE EDUCATION/TRAINING PROGRAM

## 2025-03-18 PROCEDURE — 93922 UPR/L XTREMITY ART 2 LEVELS: CPT

## 2025-03-18 PROCEDURE — 250N000009 HC RX 250

## 2025-03-18 PROCEDURE — 250N000013 HC RX MED GY IP 250 OP 250 PS 637

## 2025-03-18 PROCEDURE — 82947 ASSAY GLUCOSE BLOOD QUANT: CPT

## 2025-03-18 PROCEDURE — 82610 CYSTATIN C: CPT

## 2025-03-18 PROCEDURE — 85610 PROTHROMBIN TIME: CPT

## 2025-03-18 PROCEDURE — 80323 ALKALOIDS NOS: CPT

## 2025-03-18 PROCEDURE — 250N000013 HC RX MED GY IP 250 OP 250 PS 637: Performed by: INTERNAL MEDICINE

## 2025-03-18 PROCEDURE — 84155 ASSAY OF PROTEIN SERUM: CPT

## 2025-03-18 PROCEDURE — 97165 OT EVAL LOW COMPLEX 30 MIN: CPT | Mod: GO

## 2025-03-18 PROCEDURE — 255N000002 HC RX 255 OP 636: Performed by: STUDENT IN AN ORGANIZED HEALTH CARE EDUCATION/TRAINING PROGRAM

## 2025-03-18 PROCEDURE — 76700 US EXAM ABDOM COMPLETE: CPT

## 2025-03-18 PROCEDURE — 36415 COLL VENOUS BLD VENIPUNCTURE: CPT

## 2025-03-18 PROCEDURE — 272N000278 HC DEVICE 5FR SECURACATH

## 2025-03-18 PROCEDURE — 84100 ASSAY OF PHOSPHORUS: CPT

## 2025-03-18 PROCEDURE — 83540 ASSAY OF IRON: CPT

## 2025-03-18 PROCEDURE — 999N000208 ECHOCARDIOGRAM COMPLETE

## 2025-03-18 PROCEDURE — 97110 THERAPEUTIC EXERCISES: CPT | Mod: GO

## 2025-03-18 PROCEDURE — 97535 SELF CARE MNGMENT TRAINING: CPT | Mod: GO

## 2025-03-18 PROCEDURE — 85390 FIBRINOLYSINS SCREEN I&R: CPT | Mod: 26 | Performed by: PATHOLOGY

## 2025-03-18 PROCEDURE — 250N000013 HC RX MED GY IP 250 OP 250 PS 637: Performed by: STUDENT IN AN ORGANIZED HEALTH CARE EDUCATION/TRAINING PROGRAM

## 2025-03-18 PROCEDURE — 82728 ASSAY OF FERRITIN: CPT

## 2025-03-18 PROCEDURE — 85520 HEPARIN ASSAY: CPT | Performed by: INTERNAL MEDICINE

## 2025-03-18 PROCEDURE — 80321 ALCOHOLS BIOMARKERS 1OR 2: CPT

## 2025-03-18 PROCEDURE — 84466 ASSAY OF TRANSFERRIN: CPT

## 2025-03-18 PROCEDURE — 82465 ASSAY BLD/SERUM CHOLESTEROL: CPT

## 2025-03-18 PROCEDURE — 99232 SBSQ HOSP IP/OBS MODERATE 35: CPT | Performed by: STUDENT IN AN ORGANIZED HEALTH CARE EDUCATION/TRAINING PROGRAM

## 2025-03-18 PROCEDURE — 76700 US EXAM ABDOM COMPLETE: CPT | Mod: 26 | Performed by: RADIOLOGY

## 2025-03-18 PROCEDURE — 83550 IRON BINDING TEST: CPT

## 2025-03-18 PROCEDURE — 84443 ASSAY THYROID STIM HORMONE: CPT

## 2025-03-18 PROCEDURE — 120N000003 HC R&B IMCU UMMC

## 2025-03-18 PROCEDURE — 83605 ASSAY OF LACTIC ACID: CPT

## 2025-03-18 PROCEDURE — 36569 INSJ PICC 5 YR+ W/O IMAGING: CPT

## 2025-03-18 PROCEDURE — 85730 THROMBOPLASTIN TIME PARTIAL: CPT

## 2025-03-18 PROCEDURE — 85613 RUSSELL VIPER VENOM DILUTED: CPT

## 2025-03-18 PROCEDURE — 258N000003 HC RX IP 258 OP 636

## 2025-03-18 PROCEDURE — 93922 UPR/L XTREMITY ART 2 LEVELS: CPT | Mod: 26 | Performed by: RADIOLOGY

## 2025-03-18 PROCEDURE — 83735 ASSAY OF MAGNESIUM: CPT | Performed by: INTERNAL MEDICINE

## 2025-03-18 PROCEDURE — 86900 BLOOD TYPING SEROLOGIC ABO: CPT

## 2025-03-18 RX ORDER — HEPARIN SODIUM,PORCINE 10 UNIT/ML
5-20 VIAL (ML) INTRAVENOUS
Status: DISCONTINUED | OUTPATIENT
Start: 2025-03-18 | End: 2025-03-25 | Stop reason: HOSPADM

## 2025-03-18 RX ORDER — ACETAMINOPHEN 650 MG/1
650 SUPPOSITORY RECTAL EVERY 4 HOURS PRN
Status: DISCONTINUED | OUTPATIENT
Start: 2025-03-18 | End: 2025-03-21

## 2025-03-18 RX ORDER — HEPARIN SODIUM,PORCINE 10 UNIT/ML
5-20 VIAL (ML) INTRAVENOUS EVERY 24 HOURS
Status: DISCONTINUED | OUTPATIENT
Start: 2025-03-18 | End: 2025-03-25 | Stop reason: HOSPADM

## 2025-03-18 RX ORDER — ACETAMINOPHEN 325 MG/1
650 TABLET ORAL EVERY 4 HOURS PRN
Status: DISCONTINUED | OUTPATIENT
Start: 2025-03-18 | End: 2025-03-21

## 2025-03-18 RX ADMIN — CEFTAROLINE FOSAMIL 600 MG: 600 POWDER, FOR SOLUTION INTRAVENOUS at 04:47

## 2025-03-18 RX ADMIN — SACUBITRIL AND VALSARTAN 1 TABLET: 49; 51 TABLET, FILM COATED ORAL at 20:34

## 2025-03-18 RX ADMIN — SPIRONOLACTONE 25 MG: 25 TABLET, FILM COATED ORAL at 10:19

## 2025-03-18 RX ADMIN — DAPTOMYCIN 800 MG: 500 INJECTION, POWDER, LYOPHILIZED, FOR SOLUTION INTRAVENOUS at 13:50

## 2025-03-18 RX ADMIN — ACETAMINOPHEN 650 MG: 325 TABLET, FILM COATED ORAL at 16:50

## 2025-03-18 RX ADMIN — HYDRALAZINE HYDROCHLORIDE 25 MG: 25 TABLET ORAL at 10:19

## 2025-03-18 RX ADMIN — CEFTAROLINE FOSAMIL 600 MG: 600 POWDER, FOR SOLUTION INTRAVENOUS at 20:34

## 2025-03-18 RX ADMIN — SACUBITRIL AND VALSARTAN 1 TABLET: 49; 51 TABLET, FILM COATED ORAL at 10:19

## 2025-03-18 RX ADMIN — CALCIUM CARBONATE (ANTACID) CHEW TAB 500 MG 1000 MG: 500 CHEW TAB at 20:41

## 2025-03-18 RX ADMIN — HEPARIN SODIUM 2000 UNITS/HR: 10000 INJECTION, SOLUTION INTRAVENOUS at 10:17

## 2025-03-18 RX ADMIN — Medication 250 MG: at 10:19

## 2025-03-18 RX ADMIN — Medication 5 MG: at 20:33

## 2025-03-18 RX ADMIN — HEPARIN, PORCINE (PF) 10 UNIT/ML INTRAVENOUS SYRINGE 5 ML: at 15:14

## 2025-03-18 RX ADMIN — CALCIUM CARBONATE (ANTACID) CHEW TAB 500 MG 1000 MG: 500 CHEW TAB at 01:09

## 2025-03-18 RX ADMIN — HYDRALAZINE HYDROCHLORIDE 25 MG: 25 TABLET ORAL at 13:50

## 2025-03-18 RX ADMIN — DIGOXIN 125 MCG: 0.12 TABLET ORAL at 10:19

## 2025-03-18 RX ADMIN — CALCIUM CARBONATE (ANTACID) CHEW TAB 500 MG 1000 MG: 500 CHEW TAB at 23:42

## 2025-03-18 RX ADMIN — FUROSEMIDE 80 MG: 10 INJECTION, SOLUTION INTRAMUSCULAR; INTRAVENOUS at 10:19

## 2025-03-18 RX ADMIN — LIDOCAINE HYDROCHLORIDE ANHYDROUS 1 ML: 10 INJECTION, SOLUTION INFILTRATION at 12:17

## 2025-03-18 RX ADMIN — CEFTAROLINE FOSAMIL 600 MG: 600 POWDER, FOR SOLUTION INTRAVENOUS at 15:15

## 2025-03-18 RX ADMIN — ACETAMINOPHEN 650 MG: 325 TABLET, FILM COATED ORAL at 23:42

## 2025-03-18 RX ADMIN — EMPAGLIFLOZIN 10 MG: 10 TABLET, FILM COATED ORAL at 10:20

## 2025-03-18 RX ADMIN — HYDRALAZINE HYDROCHLORIDE 25 MG: 25 TABLET ORAL at 20:33

## 2025-03-18 RX ADMIN — HEPARIN SODIUM 2000 UNITS/HR: 10000 INJECTION, SOLUTION INTRAVENOUS at 23:35

## 2025-03-18 RX ADMIN — HUMAN ALBUMIN MICROSPHERES AND PERFLUTREN 6 ML: 10; .22 INJECTION, SOLUTION INTRAVENOUS at 15:05

## 2025-03-18 RX ADMIN — DOBUTAMINE IN DEXTROSE 5 MCG/KG/MIN: 200 INJECTION, SOLUTION INTRAVENOUS at 06:10

## 2025-03-18 RX ADMIN — Medication 600 MG: at 10:19

## 2025-03-18 ASSESSMENT — ACTIVITIES OF DAILY LIVING (ADL)
ADLS_ACUITY_SCORE: 37
ADLS_ACUITY_SCORE: 37
ADLS_ACUITY_SCORE: 36
ADLS_ACUITY_SCORE: 37
ADLS_ACUITY_SCORE: 36
ADLS_ACUITY_SCORE: 37
ADLS_ACUITY_SCORE: 36
ADLS_ACUITY_SCORE: 37
ADLS_ACUITY_SCORE: 37
ADLS_ACUITY_SCORE: 36
ADLS_ACUITY_SCORE: 36
ADLS_ACUITY_SCORE: 37
ADLS_ACUITY_SCORE: 37
ADLS_ACUITY_SCORE: 36
ADLS_ACUITY_SCORE: 36
ADLS_ACUITY_SCORE: 37
ADLS_ACUITY_SCORE: 37
ADLS_ACUITY_SCORE: 36

## 2025-03-18 NOTE — PROGRESS NOTES
[facility]     Addiction Progress Note - Addiction Service        Date of Admission:  3/13/2025   Assessment & Plan       Jose Enrique Engel is a 38 yo male with PMHx of HFrEF (15%) 2/2 NICM s/p ICD, CKD3, polysubstance use admitted as transfer from outside hospital on 3/14/25 for severe sepsis secondary to Staphylococcus aureus bacteremia leading to multiorgan dysfunction.  He was transferred to [facility] given advanced heart failure.      ADHD  Methamphetamine Use Disorder, in remission   Patient has remained off of methamphetamine for over a year now however has been using his prescription adderall sparingly. His UDS does show amphetamine but NOT methamphetamine which supports no use of illicit substances. He notes that he was prescribed this so did not think there would be any issue taking it. He does not take it daily and only when he needs to get work done or concentrate which is why he still has adderall without a recent prescription (last prescription I can find in records would be 2022.) He says he does not get adderall from any other source. We talked about wellbutrin which he had in the past. He feels like that helped a little bit with energy but not concentration which is what he really needs.   -Discussed recommendation to STOP all use of Adderal moving forward, can consider use of other non-amphetamine medications to treat ADHD with outpatient psychiatrist.  - no sign of meth use on UDS  - Will continue to discuss follow up after discharge with patient, he is considering if he  wants to see his old psychiatrist  - Addiction medicine will continue to follow      Alcohol Use Disorder, in remission   Acute on chronic systolic heart failure/HFrEF  Has been doing well with no alcohol use since last June. No concerns for alcohol use at this time supported by [facility]     Marijuana use   Uses very occasionally. No sign of use disorder and is ok not using but would also be open  "to medical marijuana if approved.     Peer Support:   -Our peer  will meet the patient if agreeable and still hospitalized on Thursday, to provide additional outpatient resources  -To contact Tiara Peer  from [facility] ([facility]): call or text: [phone]     Addiction Social Worker:   Our addiction social worker Zenon Romero can be contacted if needed, on her pager [phone] or texted/called at [phone]  --Patient is interested in ONLY outpatient addiction treatment after discharge.  Our addiction social worker will give the patient appropriate resources for outpatient evaluation.      The patient's care was discussed with the Patient.    Time Spent on this Encounter   I spent 35 minutes on the unit/floor managing the care of Jose Enrique Chetphill Engel. Over 50% of my time was spent on the following:   - Counseling the patient and/or family regarding: risks and benefits of treatment options and recommended follow-up  - Coordination of care with the: coordination of care not performed today      Anil Abreu MD on 3/18/2025 at 11:28 AM   Addiction Service   [facility]     Contact information available via [facility] Paging/Directory under \"addiction medicine\"        ______________________________________________________________________    Interval History   Feeling overall well. Discussed today the recommendation to stop all use of Adderal and other amphetamine stimulant medicatons, due to risk of effect on heart and complications that could arise as part of evaluations for advanced heart therapies. He was agreeable to this.    ROS:  CV, Pulm, GI and  assessed. Pertinent positives as above, otherwise negative.     Data reviewed today: I reviewed all medications, new labs and imaging results over the last 24 hours.     Physical Exam   Temp: 97.4  F (36.3  C) Temp src: Axillary BP: (!) 139/113 Pulse: 84   Resp: 17 SpO2: 99 % O2 Device: " None (Room air)    General Appearance: NAD, sittng up in bed eating lunch  Respiratory: Breathing comfortably in room air  Cardiovascular: Pulses assumed  GI: Soft, ND appearing  Skin: WWP appearing  Other: Answers all questions appropriately     Due to regulation of Title 42 of the Code of Federal Regulations (CFR) Part 2: Confidentiality laws apply to this note and the information wherein.  Thus, this note cannot be copy and pasted into any other health care staff's note nor can it be included in general medical records sent to ANY outside agency without the patient's written consent.

## 2025-03-18 NOTE — PLAN OF CARE
I: Monitored vitals and assessed pt status.   Changes during this shift: PICC 3 lumen on L arm, VAD evaluation continues    Running: Heparin 2000 units/hr, Dobutamine 5mcg/kg/min  PRN Med: Tylenol x 1     Vitals: /75   Pulse 78   Temp 97.7  F (36.5  C) (Oral)   Resp 17   Wt 79.2 kg (174 lb 9.6 oz)   SpO2 94%   BMI 25.05 kg/m      A:   Neuro: Ax4 denies headache, dizziness, lightheadedness, calls appropriately   Cardiac: SR 70s, afebrile, VSS. Denies chest pain.   Respiratory: sating >95 on room air. denies SOB. LS clear bilaterally.   Diet/appetite: Regular Diet. Good appetite.   GI/:  Large BM this shift. Denies abdominal pain. Voids spontaneously.  Activity: Moves independently  Pain: PICC line insertion area, tylenol PRN given  Skin: previous IV extravasation site resolved  Line:  PIV L arm SL, PICC 3 lumen, red HL, white and purple infusing.    Problem: Heart Failure  Goal: Optimal Cardiac Output  Outcome: Progressing     Problem: Heart Failure  Goal: Fluid and Electrolyte Balance  Outcome: Progressing  Intervention: Monitor and Manage Fluid and Electrolyte Balance  Recent Flowsheet Documentation  Taken 3/18/2025 0900 by Becky Walker, RN  Fluid/Electrolyte Management: fluids provided   Goal Outcome Evaluation:      Plan of Care Reviewed With: patient    Overall Patient Progress: no changeOverall Patient Progress: no change    Outcome Evaluation: New PICC line in L arm, VAD evaluation continues

## 2025-03-18 NOTE — PLAN OF CARE
Goal Outcome Evaluation:    Shift 1900 - 0700    Pt VSS, afebrile, SPO2>98% RA, A&Ox4, calm, calls appropriately. BP is taken on the calf due to multiple PIV's on left arm and old PICC line site. On contact precautions.  Cardiac: sinus rhythm.  Pt has old PICC site right brachial. Had an old dressing that was starting to peel and become itchy and irritating. Dressing was changed, new dry gauze and Tegaderm applied. Next dressing change 3/18   Dobutamine gtt 5mcg/kg/min, Hep gtt 2,000 units/hr.  PRN's: Tums @0109 and Ambien 5 mg PO @2230.  Pt had good appetitie, regular diet, ate 2 turkey sandwiches and yogurt. Continues to void spontaneously using urinal, no BM during shift.    Plan of Care Reviewed With: patient    Overall Patient Progress: no change    Outcome Evaluation: Pt VSS, sinus rhythm. Changed old dressing on old PICC site. Pt stated it was getting itchy and wanted new dressing.      Problem: Adult Inpatient Plan of Care  Goal: Plan of Care Review  Description: The Plan of Care Review/Shift note should be completed every shift.  The Outcome Evaluation is a brief statement about your assessment that the patient is improving, declining, or no change.  This information will be displayed automatically on your shift  note.  Outcome: Progressing  Flowsheets (Taken 3/18/2025 0156)  Outcome Evaluation: Pt VSS, sinus rhythm. Changed old dressing on old PICC site. Pt stated it was getting itchy and wanted new dressing.  Plan of Care Reviewed With: patient  Overall Patient Progress: no change     Problem: Adult Inpatient Plan of Care  Goal: Optimal Comfort and Wellbeing  Outcome: Progressing     Problem: Heart Failure  Goal: Stable Heart Rate and Rhythm  Outcome: Progressing

## 2025-03-18 NOTE — CONSULTS
Dental Hygiene Consult Service    Jose Enrique Engel MRN# [id number]   YOB: 1985 Age: 39 year old     Date of Admission: 3/13/2025   PCP is Bebeto Painting  Date of Service: 3/18/2025  Hospital Day #: 5         Reason for Consult:   Referring MD & Reason for Visit: I was asked by Zaid Mar MD, to see Jose Enrique Engel for a COMPREHENSIVE oral assessment regarding dental clearance for VAD    *Please note: dental hygiene services, including oral assessment, are not a replacement for examination by a licensed dentist. The patient has been informed of the oral assessment procedures and has provided verbal consent.       History of Present Illness:   This patient is a 39 year old male with a history of HFrEF (15%) 2/2 NICM s/p ICD, CKD3, polysubstance use admitted as transfer from outside hospital on 3/14/25 for severe sepsis secondary to Staphylococcus aureus bacteremia leading to multiorgan dysfunction.  He was transferred to [facility] given advanced heart failure.   .  Dental and oropharyngeal history is pertinent for recent dental extraction, remaining broken teeth.  The patient reports no current dental pain, no pain from EXT site, aware remaining broken teeth, has dental home and appt scheduled for this Summer. .         OHIP-5 Questionnaire    In the past SEVEN days:   Have you had difficulty chewing any foods because of problems with your teeth, mouth, dentures or jaw? 0 - NEVER   Have you had painful aching in your mouth? 0 - NEVER   Have you felt uncomfortable about the appearance of your teeth, mouth dentures or jaws? 2 - OCCASIONALLY   Have you felt that there has been less flavor in your food because of problems with your teeth, mouth, dentures or jaws? 0 - NEVER   Have you had difficulty doing your usual jobs because of problems with your teeth, mouth, dentures or jaws? 0 - NEVER   Total OHIP Score 2   General Observations   Level of support Patient is fully independent   Patient  currently in pain No   Patient wears dentures No   Current oral care routine Brushes 2x/day, uses waterpik at home.    Patient has oral care products Toothbrush and Toothpaste   Extraoral assessment   General appearance Symmetrical and Normal TMJ function   Lymph nodes Symmetrical, no abnormalities, non-tender   Oral Health Assessment Tool (OHAT)   Lips 0=Healthy: smooth, pink, moist   Tongue 0=Healthy: normal, moist, roughness, pink   Gums and Tissues 0=Healthy: pink, moist, smooth, no bleeding   Saliva 0=Healthy: moist tissues, watery and free flowing saliva   Natural Teeth Yes/No 2=Unhealthy: (ie. 4+ decayed or broken teeth/roots, or very worn down teeth, or less than 4 teeth) - recently extracted molar on LR (see CT), retained root tips on LR, LL, and UR posterior areas, impacted wisdom teeth.    Dentures Yes/No Patient does not wear dentures   Oral Cleanliness 0=Healthy: clean and no food particles or tartar in mouth or dentures   Dental Pain 0=Healthy: no behavioural, verbal, or physical signs of dental pain   OHAT Total Score (0-16) 2   Other Dental Concerns Retained root tips, VAD clearance   Care provided during assessment Oral Assessment and Patient education   Follow up DH assessments required? No   Connect with [facility] or [place] care? Yes: to continue VAD clearance process.    For Dental Team Service Requesting Consult: primary team  Clearance/Reason: VAD clearance  Dental CT status: taken, read  Upcoming Sx date(s), if known: not set  Expected discharge date: not known  Ability to transfer to [facility]: Yes-- needs nurse with  Pain reported, by pt: none  Swelling present: none  Current dental Rx regimen: none.   Oral Care Plan To optimize oral health during hospitalization and reduce risk of HAIs:  Brush oral hard and soft tissues with soft-bristled toothbrush and fluoridated toothpaste at least BID (ideally up to QID)  Interdental cleaning with floss adjuncts daily, as able  Swish-and-spit with alcohol-free  antiseptic rinse BID to reduce oral bacterial load  Swish-and-spit with Biotene as needed to alleviate dry mouth and promote good oral hygiene  Frequent application of Vaseline to lips        Pt should establish/continue outpatient comprehensive dental care at clinic of choice under medical advisement following discharge.            Assessment and Plan:   Assessment:  This patient is a 39 year old male with a history of HFrEF (15%) 2/2 NICM s/p ICD, CKD3, polysubstance use admitted as transfer from outside hospital on 3/14/25 for severe sepsis secondary to Staphylococcus aureus bacteremia leading to multiorgan dysfunction.  He was transferred to [facility] given advanced heart failure., oral exam concerning for VAD planning, broken teeth, retained root tips.      Plan:   [facility] team will review the DH note and dental CT results to provide further recommendations for patient's dental needs.   Medicine team should contact [facility] to arrange for patient appointment and transport/med management for dental appointment to further evaluate and treat infected teeth as indicated.   Implement oral care plan as described above.  Pt to continue comprehensive dental care at dental clinic of choice. Information below for the [facility] if pt would like to use as a resource:  Jose [address], [address]  Ph: [phone]    [facility], [facility]  Message on [facility] [employer] or pager *[phone]    Past Medical History   I have reviewed this patient's medical history and updated it with pertinent information if needed.  Past Medical History:   Diagnosis Date    Congestive heart failure (H)        Past Surgical History   I have reviewed this patient's surgical history and updated it with pertinent information if needed.  Past Surgical History:   Procedure Laterality Date    CV RIGHT HEART CATH MEASUREMENTS RECORDED N/A 01/29/2025    Procedure: Heart  "Cath Right Heart Cath;  Surgeon: Osman Canseco MD;  Location:  HEART CARDIAC CATH LAB    EP SICD INSERT N/A 07/17/2023    Procedure: Subcutaneous Implantable Cardioverter Defibrillator Implant;  Surgeon: Luis Zaragoza MD;  Location:  HEART CARDIAC CATH LAB    PICC DOUBLE LUMEN PLACEMENT Left 07/07/2023    Left basilic vein 48cm total 2cm external.    PICC DOUBLE LUMEN PLACEMENT Right 02/06/2025    Right basilic vein 48cm total 5cm external.       Social History   I have reviewed this patient's social history and updated it with pertinent information if needed.  Social History     Tobacco Use    Smoking status: Former     Types: Cigarettes, Cigars, Pipe, Hookah     Start date: 2003    Smokeless tobacco: Never   Substance Use Topics    Alcohol use: Never    Drug use: Never     Prior to Admission Medications   Prior to Admission Medications   Prescriptions Last Dose Informant Patient Reported? Taking?   DOBUTamine in 5% Dextrose 2 mg/mL 350 mL via CADD pump Past Week  No Yes   Sig: One day bag. Infuse 406.5 mcg/min into the vein continuously. Dose based on 5 mcg/kg/min and 81.3 kg. Continuous rate: 12.2 mL/hr. Reservoir volume: 293 mL. Bag contains 57 mL of overfill. Do not flush line between bags.   Emergency Supply Kit, Central,   No Yes   Sig: Patient use for emergency only. Contents: 3 sodium chloride 0.9% flushes, 1 dressing kit, 1 microclave ext set 14\", 4 nitrile gloves (med), 6 alcohol prep pads, 1 bacitracin, 1 syringe (10 cc 20 G 1\"). Call [phone] to reorder.   VITAMIN C 250 MG tablet Unknown Self Yes Yes   Sig: Take 1 tablet by mouth daily.   albuterol (PROAIR HFA/PROVENTIL HFA/VENTOLIN HFA) 108 (90 Base) MCG/ACT inhaler Unknown  Yes Yes   Sig: Inhale 2 puffs into the lungs every 6 hours as needed for shortness of breath, wheezing or cough.   calcium carbonate (OS-LENARD) 1500 (600 Ca) MG tablet Unknown Self Yes Yes   Sig: Take 1 tablet by mouth daily at 2 pm.   digoxin (LANOXIN) 125 MCG " tablet Unknown  No Yes   Sig: Take 1 tablet (125 mcg) by mouth daily.   empagliflozin (JARDIANCE) 10 MG TABS tablet Unknown  No Yes   Sig: Take 1 tablet (10 mg) by mouth daily.   ferrous sulfate (FEROSUL) 325 (65 Fe) MG tablet Unknown Self Yes Yes   Sig: Take 325 mg by mouth daily (with breakfast).   potassium chloride rand ER (KLOR-CON M20) 20 MEQ CR tablet Unknown  Yes Yes   Sig: Take 20 mEq by mouth 2 times daily.   rivaroxaban ANTICOAGULANT (XARELTO) 20 MG TABS tablet Unknown  Yes Yes   Sig: Take 20 mg by mouth daily (with dinner).   sacubitril-valsartan (ENTRESTO) 49-51 MG per tablet Unknown  No Yes   Sig: Take 1 tablet by mouth 2 times daily.   sodium chloride, PF, 0.9% PF flush Unknown  No Yes   Sig: Inject 10 mLs into catheter as needed for line flush (only as directed by medical professional). Improper flushing may result in a large dose of drug administered and side effects may result.   spironolactone (ALDACTONE) 25 MG tablet Unknown  No Yes   Sig: Take 1 tablet (25 mg) by mouth daily.   torsemide (DEMADEX) 20 MG tablet Unknown  Yes Yes   Sig: Take 40 mg by mouth 2 times daily.   zolpidem (AMBIEN) 5 MG tablet Unknown  No Yes   Sig: Take 0.5 tablets (2.5 mg) by mouth nightly as needed for sleep.   Patient taking differently: Take 5 mg by mouth nightly as needed for sleep.      Facility-Administered Medications: None     Allergies   Allergies   Allergen Reactions    Isosorbide Anaphylaxis    Benadryl [Diphenhydramine]      anxiety    Other Environmental Allergy Itching     Allergic to pollen, s/s itchy eyes and sneeze     Physical Exam

## 2025-03-18 NOTE — PROCEDURES
[facility] [place]    Triple Lumen PICC Placement    Date/Time: 3/18/2025 12:38 PM    Performed by: Lorenzo Pettit RN  Authorized by: Jess Calero MD  Indications: Dobutamine & Antibiotics.      UNIVERSAL PROTOCOL   Site Marked: Yes  Prior Images Obtained and Reviewed:  Yes  Required items: Required blood products, implants, devices and special equipment available    Patient identity confirmed:  Verbally with patient, hospital-assigned identification number, arm band and provided demographic data  Patient was reevaluated immediately before administering moderate or deep sedation or anesthesia  Confirmation Checklist:  Patient's identity using two indicators, procedure was appropriate and matched the consent or emergent situation, correct equipment/implants were available and relevant allergies  Time out: Immediately prior to the procedure a time out was called    Universal Protocol: the Joint Commission Universal Protocol was followed    Preparation: Patient was prepped and draped in usual sterile fashion       ANESTHESIA    Anesthesia:  See MAR for details  Local Anesthetic:  Lidocaine 1% without epinephrine  Anesthetic Total (mL):  1      SEDATION    Patient Sedated: No        Preparation: skin prepped with ChloraPrep  Skin prep agent: skin prep agent completely dried prior to procedure  Sterile barriers: maximum sterile barriers were used: cap, mask, sterile gown, sterile gloves, and large sterile sheet  Hand hygiene: hand hygiene performed prior to central venous catheter insertion  Type of line used: PICC  Catheter type: triple lumen  Lumen type: non-valved and power PICC  Lumen Identification: Gray, Red and White  Catheter size: 5 Fr  Brand: Bard  Lot number: [id number]  Placement method: venipuncture, MST, ultrasound and tip navigation system  Number of attempts: 1  Difficulty threading catheter: no  Successful placement: yes  Orientation: left  Catheter to Vein  (%): 40  Location: basilic vein  Tip Location: SVC  Site rationale: RUE DVT (3/15/25)  Arm circumference: adults 10 cm  Extremity circumference: 27  Visible catheter length: 4  Total catheter length: 50  Dressing and securement: alcohol impregnated caps, chlorhexidine disc applied, transparent dressing, tissue adhesive, subcutaneous anchor securement system, sterile dressing applied and site cleansed  Post procedure assessment: blood return through all ports, free fluid flow and placement verified by 3CG technology  PROCEDURE Describe Procedure: Patient tolerated well and denied pain immediately after procedure.  PICC tip is in satisfactory location as verified by VouchAR 3CG Tip Confirmation System. PICC is OK to use.  Disposal: sharps and needle count correct at the end of procedure, needles and guidewire disposed in sharps container  Patient Tolerance:  Patient tolerated the procedure well with no immediate complications

## 2025-03-18 NOTE — CONSULTS
"CLINICAL NUTRITION SERVICES - ASSESSMENT NOTE    RECOMMENDATIONS FOR MDs/PROVIDERS TO ORDER:  Recommend scheduled bowel regimen or utilization of PRNs per pt constipation.    Malnutrition Status:    Patient does not meet two of the established criteria necessary for diagnosing malnutrition but is at risk for malnutrition.    Registered Dietitian Interventions:  No interventions at this time.    Future/Additional Recommendations:  -Continue regular diet order, as able (liberalized to help encourage oral intake). Self-select tolerated foods/beverages with altered dentition and potential need for extractions. Monitor fluid status and potential need for sodium or fluid restrictions.   -Monitor need for Iron supplementation  -Consider updated A1c; Monitor BG control; Hgb A1c of 5.3 on 10/4/24. Monitor BG control; BG was 141 on 3/16.  -LVAD nutrition workup education, as able.      REASON FOR ASSESSMENT  Provider order - Heart Failure pre Ventricular Assist Device (VAD) planning, Frailty assessment.    SUBJECTIVE INFORMATION  Assessed patient in room.    NUTRITION HISTORY  Met w/ pt 3/18, pt feeling well and reports eating great. Pt consumes typically 3 meal/d while admitted to hospitals but does not eat as much as home, pt did not quantify intake. Pt denies further nutrition concerns or questions at this time.    Pt reports carrot and kiwi allergy. Pt reports symptoms of gum itchiness, scratchy feeling, tingling in mouth, but denies swelling of mouth or throat. Pt reports, he would not \"blow up and die\" if he ate them but avoids them due to those allergy symptoms. Pt reports food cross contamination is fine and that the raw carrots that come w/his humus veg tray is fine and he just avoids the carrots/doesn't eat them. Pt reports cooked carrots do not results in any allergic reaction type symptoms. Pt also reports that eating cherries makes his ears itch. Confirmed no diagnosed allergy w/ RN.    Nutrition support plan: No " "previous nutrition support per EMR.    CURRENT NUTRITION ORDERS  Diet: Regular (Signed and Held - Advance Diet as Tolerated: Clear Liquid Diet; Regular Diet Adult w/ NPO 1 hr post procedure); Pt previously on Low Saturated Fat, 2 g Na from 3/14-3/17.  Snacks/Supplements: None ordered at this time.    CURRENT INTAKE/TOLERANCE  Per flowsheets, pt has 100% intake w/ good feeding tolerance.  Per [employer] pt has 2-3 small meals/d. Pt orders Tikki Masla frequently along w/ humus w/veg, pepsi or lemon lime soda, chocolate ice cream, chocolate milk, and lots of hot sauce.    LABS  Nutrition-relevant labs: Reviewed - Sodium 132 (L); BUN 22.2 (H); Calcium 8.7 (L); Alk Phos 184 (H); Ferritin 488 (H); Glucose 110 (H); Iron 35 (L); Iron Binding Capacity 233 (L)    MEDICATIONS  Nutrition-relevant medications: Reviewed; Significant for calcium carbonate 600 mg, Ascorbic acid 250 mg; Ferosul 325 mg (currently held by provider). Active PRN bowel regimen.      ANTHROPOMETRICS  Height: 177.8 cm (5'10\")  Most Recent Weight: 79.2 kg (174 lb 9.6 oz)  IBW: 75.5 kg  % IBW: 105%  BMI (kg/m ): 25.05 kg/m2; Overweight BMI 25-29.9  Weight History:  Wt variable at times due to fluid status changes, diuresis.  Pt reports UBW of 170 but prefers ~160 lbs.  Pt reports when he is around 180 lbs is when it is bad, in reference to fluid buildup, edema. Pt is in agreement that recent wt loss is fluid.     3.8% wt loss in 1 wk  0.1% wt loss in 1 month  9.4% wt loss in 3 months  1.4% wt loss in 1 yr  Date Weight Weight Method   03/18/25  79.2 kg (174 lb 9.6 oz) Standing scale   03/15/25  82.6 kg (182 lb 1.6 oz) Standing scale   03/14/25  82.6 kg (182 lb) Standing scale   03/13/25  82.1 kg (180 lb 14.4 oz) Standing scale   03/11/25 82.3 kg (181 lb 7 oz)  Care Everywhere   02/28/25 80.7 kg (178 lb) Care Everywhere   02/26/25 80.7 kg (178 lb) Care Everywhere   02/14/25 79.3 kg (174 lb 12.8 oz) Care Everywhere   01/29/25 80.4 kg (177 lb 4 oz) Care " Everywhere   01/17/25 81.1 kg (178 lb 14.4 oz) Care Everywhere   12/05/24 87.4 kg (192 lb 10.9 oz)  Care Everywhere   08/20/24 78 kg (172 lb 1.1 oz)  Care Everywhere   03/25/24 80.6 kg (177 lb 11.2 oz) Care Everywhere     Dosing Weight: 79.2 kg, based on actual wt 3/18 - lowest wt of admit    ASSESSED NUTRITION NEEDS  Estimated Energy Needs: 2945-3112 kcals/day (25 - 30 kcals/kg)  Justification: Maintenance  Estimated Protein Needs:  grams protein/day (1.2 - 1.5 grams of pro/kg). Estimated needs increase to 1.5-2 g protein/kg if/when post-op LVAD placement.   Justification: Increased needs  Estimated Fluid Needs: Per provider pending fluid status    SYSTEM FINDINGS    Skin/wounds: WOC not following; No indication of pressure injury. Pt w/old PICC site, has minor irritation/itchiness. Iron score 21 on 3/18 w/nutrition rated excellent.  GI symptoms: Last BM 3/17 x2, irregular stooling per I/Os. Stool x1 on 3/13 and no documented stooling from 3/13-3/17. Note PRN bowel regimen not given.    MALNUTRITION  % Intake: No decreased intake noted  % Weight Loss: > 2% in 1 week (severe) *   Subcutaneous Fat Loss: None observed  Muscle Loss: None observed and Temples (temporalis muscle): Mild  Fluid Accumulation/Edema: Does not meet criteria - trace lower extremity edema per provider note.  Malnutrition Diagnosis: Patient does not meet two of the established criteria necessary for diagnosing malnutrition but is at risk for malnutrition  Malnutrition Present on Admission: No  *Wt loss likely fluid related, minor actual wt loss if any. This criteria will not be included in final malnutrition diagnosis.    NUTRITION DIAGNOSIS  Predicted inadequate nutrient intake protein  related to increased needs w/HF+VAD planning as evidenced by predicted need for oral nutrition supplements to meet needs,    INTERVENTIONS  None at this time.    Goals  Patient to consume % of nutritionally adequate meal trays TID, or the equivalent  "with supplements/snacks.     Monitoring/Evaluation  Progress toward goals will be monitored and evaluated per policy.    Theodora Carlton MS  N Dietetic Intern      I have read and agree with the above nutrition note  Sofya Clement, MS, RD, LD, CNSC      No longer available via pager  6C (beds [phone] and [phone]): Vocera \"6C Clinical Dietitian\"   Weekend/Holiday: Vocera \"Weekend Clinical Dietitian\"    "

## 2025-03-18 NOTE — PROGRESS NOTES
[facility]  Cardiology II Service / Advanced Heart Failure  Daily Progress Note    Patient: Jose Enrique Engel      : 1985      MRN: [id number]    Assessment/Plan:   38 yo male with PMHx of HFrEF (15%) 2/2 NICM s/p ICD, CKD3, polysubstance use admitted as transfer from outside hospital on 3/14/25 for severe sepsis secondary to MRSA bacteremia leading to multiorgan dysfunction.  He was transferred to [facility] given advanced heart failure.    Today:  - PICC line placement  - Abx per ID  - Continue IV lasix  - Continue VAD eval, including dentistry consult, working with neuropsych to get eval inpt     Severe sepsis secondary to MRSA bacteremia  -Possible source: PICC line (removed at outside hospital) vs septic thrombophlebitis (RUE thrombus). ICD is subcutaneous and does not have venous leads.   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 Was previously on outpatient dobutamine, self-discontinued in . Re-engaged with outpatient cardiologist Dr. Dove 2025.  Interested in advanced therapies but wasn't a candidate at that time due to being lost to follow up, substance use, recurrent infections and difficulty managing PICC line.  He was discharged on home dobutamine infusion during last admission. Home infusion service will not accept him back so he will not be able to discharge on inotrope. Will start VAD eval this admission.   Last RHC 25: RA 15, PA 44/26, PCWP 21, CI 1.7, PVR 3.4, SVR ~1400  Last TTE 3/13/2025: EF 10%,mild pulm Htn, grade-1  diastolic dysfunction  - Volume: hypervolemic.  lbs. Was 180 on admission.   - Inotrope:   - Dobutamine @ 5mcg/kg/min               - PTA digoxin 125 mcg daily  - Afterload Reduction: Hydralazine 25mg q6h (3/15)  GDMT:                - Diuretic: Lasix 80mg IV BID 3/15, daily 3/16, 3/17               - ACE/ARB/ARNI: - PTA Entresto (resumed 3/17)               - MRA spironolactone 25               - BB- none not while on dobutamine               - SGLT2-  empagliflozin 10 mg daily  - Anticoagulation: heparin gtt  - Statin: none  - Antiplatelet: none  - Antiarrhythmic: none  - SCD prophylaxis: subcuteanous ICD in place  - Electrolytes: BID monitoring with diuresis, goals K>4, Mag>2  - Strict I/O monitoring, daily weights  - Dietary restrictions: regular diet (pt request), 2L fluids (pt intermittently following)  - VAD eval initiated on 3/17   - Imaging:    - CT CAP: multiple small pulmonary nodules, do not require further evaluation given small size    - CT dental: Possible abscesses, will be seen by dentistry    - CT head: No intracranial abnormality    - Carotid US: <50% stenosis    - LE arterial US: WNL   - Notable labs:    - Iron deficiency; will need IV iron once infection is cleared    - Subclinical hypothyroidism    Right upper extremity DVT   Clot from brachial vein through the subclavian diagnosed at outside hospital with RUE US 3/10/25. Has been off of Xarelto. Repeat RUE US 3/14 showed persistence of DVT in right innominate, subclavian and axial veins.   - Heparin infusion (3/10)     Hyponatremia  Likely due to heart failure and volume overload.  - Diuresis as above  - Monitor closely    H/o substance use disorders, in remission (methamphetamines, alcohol)  In remission for >1 year. UDS positive for methamphetamines from his adderall prescription, no evidence of active use.   - Addiction medicine following   - SW helping to connect patient to outpatient treatment options  - Peth, nicotine pending       CKD stage II  - Cr stable, continue to monitor     Hx left ventricular mural thrombus  - Heparin gtt     Thrombocytopenia   -CTM      FEN: Cardiac  DVT Prophylaxis: Heparin  Code Status: Full Code  Disposition: Pending resolution of bacteremia and VAD eval     Pt to be seen and discussed with the staff attending Dr. Dove.    Jess Calero MD  Internal Medicine- Pediatrics PGY3  [facility]  Please use on call sytem for paging    ==================================    Subjective:   Interval History: NAEO. Feeling less short of breath. No new pain or concerns.     Objective:     Vitals:    03/14/25 0409 03/15/25 0306 03/18/25 0452   Weight: 82.6 kg (182 lb) 82.6 kg (182 lb 1.6 oz) 79.2 kg (174 lb 9.6 oz)     Vital Signs with Ranges  Temp:  [97.4  F (36.3  C)-98.1  F (36.7  C)] 97.4  F (36.3  C)  Pulse:  [75-95] 84  Resp:  [17-20] 17  BP: (100-139)/() 139/113  SpO2:  [99 %-100 %] 99 %  I/O last 3 completed shifts:  In: 2563.07 [P.O.:1552; I.V.:711.07; IV Piggyback:300]  Out: 5950 [Urine:5950]    Exam:  Constitutional:  Appears tired, no distress  HEENT: PERRLA, extraocular motion intact, oral mucosa moist  Cardiovascular: Tachycardic rate, regular rhythm, pulses 2+ bilateral upper extremities, trace lower extremity edema, fingers and toes slightly cool, JVD +5cm  Pulmonary: Lungs bilaterally clear to auscultation  GI: Abdomen soft nontender nondistended normal active bowel sounds  MSK: Well-developed  Skin: erythema at previous picc site. No drainage  Neuro: Alert and oriented to person place and time  Psych: Appropriate mood and affect    Medications   Current Facility-Administered Medications   Medication Dose Route Frequency Provider Last Rate Last Admin    DOBUTamine (DOBUTREX) 500 mg in D5W 250 mL infusion (adult std conc)  5 mcg/kg/min (Order-Specific) Intravenous Continuous Bao Mahan MD 11.5 mL/hr at 03/18/25 0610 5 mcg/kg/min at 03/18/25 0610    heparin 25,000 units in 0.45%  NaCl 250 mL ANTICOAGULANT infusion  0-5,000 Units/hr Intravenous Continuous Bao Mahan MD 20 mL/hr at 03/18/25 0515 2,000 Units/hr at 03/18/25 0515     Current Facility-Administered Medications   Medication Dose Route Frequency Provider Last Rate Last Admin    calcium carbonate (OS-LENARD) tablet 600 mg  600 mg Oral Daily Bao Mahan MD   600 mg at 03/17/25 0914    ceftaroline fosamil (TEFLARO) 600 mg in sodium chloride 0.9 % 250 mL intermittent infusion  600 mg Intravenous Q8H Jess Calero MD   600 mg at 03/18/25 0447    DAPTOmycin (CUBICIN) 800 mg in sodium chloride 0.9 % 100 mL intermittent infusion  10 mg/kg Intravenous Q24H Jess Calero MD   800 mg at 03/17/25 1252    digoxin (LANOXIN) tablet 125 mcg  125 mcg Oral QAM Zaid Mar MD   125 mcg at 03/17/25 0914    empagliflozin (JARDIANCE) tablet 10 mg  10 mg Oral Daily Bao Mahan MD   10 mg at 03/17/25 0914    [Held by provider] ferrous sulfate (FEROSUL) tablet 325 mg  325 mg Oral Daily with breakfast Bao Mahan MD        furosemide (LASIX) injection 80 mg  80 mg Intravenous Daily Jess Calero MD   80 mg at 03/17/25 0914    hydrALAZINE (APRESOLINE) tablet 25 mg  25 mg Oral TID iBll Enriquez MD   25 mg at 03/17/25 2035    sacubitril-valsartan (ENTRESTO) 49-51 MG per tablet 1 tablet  1 tablet Oral BID Jess Calero MD   1 tablet at 03/17/25 2035    spironolactone (ALDACTONE) tablet 25 mg  25 mg Oral Daily Bao Mahan MD   25 mg at 03/17/25 0914    vitamin C (ASCORBIC ACID) tablet 250 mg  250 mg Oral Daily Bao Mahan MD   250 mg at 03/17/25 0914       Data   Recent Labs   Lab 03/18/25  0641 03/17/25  1639 03/17/25  0539 03/16/25  1712 03/16/25  0520 03/14/25  0546 03/14/25  0154   WBC 9.4  --  11.6*  --  11.6*   < >  --    HGB 12.3*  --  12.5*  --  12.8*   < >  --    MCV 93  --  92  --  91   < >  --      --  209  --  182   < >  --    INR 1.24*  --   --   --   --    --  1.14   * 130* 125*   < > 127*   < > 122*   POTASSIUM 4.4 4.4 4.8   < > 4.0   < > 4.6   CHLORIDE 99 95* 93*   < > 93*   < > 91*   CO2 21* 21* 20*   < > 21*   < > 19*   BUN 22.2* 29.6* 29.7*   < > 27.4*   < > 36.3*   CR 1.11 1.42* 1.37*   < > 1.35*   < > 1.14   ANIONGAP 12 14 12   < > 13   < > 12   LENARD 8.7* 9.2 8.8   < > 8.8   < > 8.4*   * 106* 109*   < > 120*   < > 100*   ALBUMIN 3.1* 3.6 3.4*   < > 3.3*   < > 3.2*   PROTTOTAL 6.1* 7.1 6.7   < > 6.7   < > 6.1*   BILITOTAL 1.2 1.5* 1.4*   < > 1.7*   < > 2.2*   ALKPHOS 184* 225* 226*   < > 237*   < > 193*   ALT 58 75* 76*   < > 89*   < > 114*   AST 35 42 43   < > 59*   < > 108*    < > = values in this interval not displayed.

## 2025-03-18 NOTE — PROGRESS NOTES
Addiction Team Social Work Note    Date of  Intervention: 03/18/25  Collaborated with:  Dr. Abreu, Addiction Attending; Dr. Costello, Addiction Resident; patient    Patient information: Addiction Medicine SW following for support, resources and linkage to care.    Intervention:  Chart reviewed.  Discussed pt's care and plan in Addiction team rounds.    Writer, Dr. Abreu and Dr. Costello met with pt for a supportive visit.    Discussed linkage to OP Psychiatry and mental health care for ADHD management (without Adderall).      Assessment:   Support.   Relapse prevention.    Plan:      Follow Up:  Writer will continue to follow.    Due to regulation of Title 42 of the Code of Federal Regulations (CFR) Part 2: Confidentiality laws apply to this note and the information wherein.  Thus, this note cannot be copy and pasted into any other health care staff's note nor can it be included in general medical records sent to ANY outside agency without the patient's written consent.    DULCE Polanco  She/her/hers  Social Work Services  Inpatient Addiction Medicine Team  [phone] work cell phone  [phone] pager  8:00am-4:30pm M/T/Th/F; Off Wednesday's

## 2025-03-19 LAB
ALBUMIN SERPL BCG-MCNC: 3.2 G/DL (ref 3.5–5.2)
ALBUMIN SERPL BCG-MCNC: 3.7 G/DL (ref 3.5–5.2)
ALP SERPL-CCNC: 181 U/L (ref 40–150)
ALP SERPL-CCNC: 201 U/L (ref 40–150)
ALT SERPL W P-5'-P-CCNC: 55 U/L (ref 0–70)
ALT SERPL W P-5'-P-CCNC: 58 U/L (ref 0–70)
ANION GAP SERPL CALCULATED.3IONS-SCNC: 11 MMOL/L (ref 7–15)
ANION GAP SERPL CALCULATED.3IONS-SCNC: 13 MMOL/L (ref 7–15)
AST SERPL W P-5'-P-CCNC: 30 U/L (ref 0–45)
AST SERPL W P-5'-P-CCNC: 32 U/L (ref 0–45)
BACTERIA BLD CULT: NO GROWTH
BILIRUB SERPL-MCNC: 1 MG/DL
BILIRUB SERPL-MCNC: 1.1 MG/DL
BUN SERPL-MCNC: 21.5 MG/DL (ref 6–20)
BUN SERPL-MCNC: 24.6 MG/DL (ref 6–20)
CALCIUM SERPL-MCNC: 9.2 MG/DL (ref 8.8–10.4)
CALCIUM SERPL-MCNC: 9.7 MG/DL (ref 8.8–10.4)
CHLORIDE SERPL-SCNC: 97 MMOL/L (ref 98–107)
CHLORIDE SERPL-SCNC: 98 MMOL/L (ref 98–107)
CREAT SERPL-MCNC: 1.09 MG/DL (ref 0.67–1.17)
CREAT SERPL-MCNC: 1.12 MG/DL (ref 0.67–1.17)
DIGOXIN SERPL-MCNC: <0.4 NG/ML (ref 0.6–1.2)
EGFRCR SERPLBLD CKD-EPI 2021: 86 ML/MIN/1.73M2
EGFRCR SERPLBLD CKD-EPI 2021: 89 ML/MIN/1.73M2
ERYTHROCYTE [DISTWIDTH] IN BLOOD BY AUTOMATED COUNT: 15.4 % (ref 10–15)
GLUCOSE SERPL-MCNC: 108 MG/DL (ref 70–99)
GLUCOSE SERPL-MCNC: 96 MG/DL (ref 70–99)
HCO3 SERPL-SCNC: 22 MMOL/L (ref 22–29)
HCO3 SERPL-SCNC: 23 MMOL/L (ref 22–29)
HCT VFR BLD AUTO: 39.3 % (ref 40–53)
HGB BLD-MCNC: 12.4 G/DL (ref 13.3–17.7)
LABORATORY REPORT: NORMAL
LACTATE SERPL-SCNC: 0.9 MMOL/L (ref 0.7–2)
MAGNESIUM SERPL-MCNC: 2 MG/DL (ref 1.7–2.3)
MCH RBC QN AUTO: 30.1 PG (ref 26.5–33)
MCHC RBC AUTO-ENTMCNC: 31.6 G/DL (ref 31.5–36.5)
MCV RBC AUTO: 95 FL (ref 78–100)
PETH INTERPRETATION: NORMAL
PLATELET # BLD AUTO: 290 10E3/UL (ref 150–450)
PLPETH BLD-MCNC: <10 NG/ML
POPETH BLD-MCNC: <10 NG/ML
POTASSIUM SERPL-SCNC: 4.5 MMOL/L (ref 3.4–5.3)
POTASSIUM SERPL-SCNC: 4.7 MMOL/L (ref 3.4–5.3)
PROT SERPL-MCNC: 6.6 G/DL (ref 6.4–8.3)
PROT SERPL-MCNC: 7.7 G/DL (ref 6.4–8.3)
RBC # BLD AUTO: 4.12 10E6/UL (ref 4.4–5.9)
SODIUM SERPL-SCNC: 132 MMOL/L (ref 135–145)
SODIUM SERPL-SCNC: 132 MMOL/L (ref 135–145)
UFH PPP CHRO-ACNC: 0.32 IU/ML
WBC # BLD AUTO: 9.1 10E3/UL (ref 4–11)

## 2025-03-19 PROCEDURE — 250N000011 HC RX IP 250 OP 636

## 2025-03-19 PROCEDURE — 250N000013 HC RX MED GY IP 250 OP 250 PS 637

## 2025-03-19 PROCEDURE — 83735 ASSAY OF MAGNESIUM: CPT | Performed by: INTERNAL MEDICINE

## 2025-03-19 PROCEDURE — 99233 SBSQ HOSP IP/OBS HIGH 50: CPT | Performed by: INTERNAL MEDICINE

## 2025-03-19 PROCEDURE — 82040 ASSAY OF SERUM ALBUMIN: CPT

## 2025-03-19 PROCEDURE — 85520 HEPARIN ASSAY: CPT | Performed by: INTERNAL MEDICINE

## 2025-03-19 PROCEDURE — 99233 SBSQ HOSP IP/OBS HIGH 50: CPT | Mod: GC | Performed by: INTERNAL MEDICINE

## 2025-03-19 PROCEDURE — G0545 PR INHRENT VISIT TO INPT/OBS W CNFRM/SUSPCT INFCT DIS BY INFCT DIS SPCIALST: HCPCS | Performed by: INTERNAL MEDICINE

## 2025-03-19 PROCEDURE — 80162 ASSAY OF DIGOXIN TOTAL: CPT | Performed by: INTERNAL MEDICINE

## 2025-03-19 PROCEDURE — 120N000003 HC R&B IMCU UMMC

## 2025-03-19 PROCEDURE — 85027 COMPLETE CBC AUTOMATED: CPT

## 2025-03-19 PROCEDURE — 82310 ASSAY OF CALCIUM: CPT

## 2025-03-19 PROCEDURE — 250N000013 HC RX MED GY IP 250 OP 250 PS 637: Performed by: INTERNAL MEDICINE

## 2025-03-19 PROCEDURE — 83605 ASSAY OF LACTIC ACID: CPT

## 2025-03-19 PROCEDURE — 84155 ASSAY OF PROTEIN SERUM: CPT

## 2025-03-19 PROCEDURE — 250N000013 HC RX MED GY IP 250 OP 250 PS 637: Performed by: STUDENT IN AN ORGANIZED HEALTH CARE EDUCATION/TRAINING PROGRAM

## 2025-03-19 PROCEDURE — 250N000011 HC RX IP 250 OP 636: Performed by: STUDENT IN AN ORGANIZED HEALTH CARE EDUCATION/TRAINING PROGRAM

## 2025-03-19 PROCEDURE — 258N000003 HC RX IP 258 OP 636

## 2025-03-19 RX ORDER — HEPARIN SODIUM 10000 [USP'U]/100ML
0-5000 INJECTION, SOLUTION INTRAVENOUS CONTINUOUS
Status: DISCONTINUED | OUTPATIENT
Start: 2025-03-19 | End: 2025-03-20

## 2025-03-19 RX ADMIN — ACETAMINOPHEN 650 MG: 325 TABLET, FILM COATED ORAL at 20:35

## 2025-03-19 RX ADMIN — DAPTOMYCIN 800 MG: 500 INJECTION, POWDER, LYOPHILIZED, FOR SOLUTION INTRAVENOUS at 11:37

## 2025-03-19 RX ADMIN — FUROSEMIDE 80 MG: 10 INJECTION, SOLUTION INTRAMUSCULAR; INTRAVENOUS at 10:12

## 2025-03-19 RX ADMIN — HYDRALAZINE HYDROCHLORIDE 25 MG: 25 TABLET ORAL at 10:11

## 2025-03-19 RX ADMIN — EMPAGLIFLOZIN 10 MG: 10 TABLET, FILM COATED ORAL at 10:11

## 2025-03-19 RX ADMIN — Medication 600 MG: at 10:11

## 2025-03-19 RX ADMIN — HEPARIN SODIUM 2000 UNITS/HR: 10000 INJECTION, SOLUTION INTRAVENOUS at 20:39

## 2025-03-19 RX ADMIN — CEFTAROLINE FOSAMIL 600 MG: 600 POWDER, FOR SOLUTION INTRAVENOUS at 04:47

## 2025-03-19 RX ADMIN — SACUBITRIL AND VALSARTAN 1 TABLET: 49; 51 TABLET, FILM COATED ORAL at 20:35

## 2025-03-19 RX ADMIN — SACUBITRIL AND VALSARTAN 1 TABLET: 49; 51 TABLET, FILM COATED ORAL at 10:11

## 2025-03-19 RX ADMIN — Medication 250 MG: at 10:11

## 2025-03-19 RX ADMIN — HYDRALAZINE HYDROCHLORIDE 25 MG: 25 TABLET ORAL at 16:15

## 2025-03-19 RX ADMIN — HYDRALAZINE HYDROCHLORIDE 25 MG: 25 TABLET ORAL at 20:35

## 2025-03-19 RX ADMIN — DOBUTAMINE IN DEXTROSE 5 MCG/KG/MIN: 200 INJECTION, SOLUTION INTRAVENOUS at 04:45

## 2025-03-19 RX ADMIN — DIGOXIN 125 MCG: 0.12 TABLET ORAL at 10:11

## 2025-03-19 RX ADMIN — Medication 5 ML: at 11:14

## 2025-03-19 RX ADMIN — Medication 5 MG: at 20:47

## 2025-03-19 RX ADMIN — Medication 5 ML: at 16:41

## 2025-03-19 RX ADMIN — Medication 5 ML: at 11:15

## 2025-03-19 RX ADMIN — CEFTAROLINE FOSAMIL 600 MG: 600 POWDER, FOR SOLUTION INTRAVENOUS at 12:20

## 2025-03-19 RX ADMIN — ONDANSETRON 4 MG: 4 TABLET, ORALLY DISINTEGRATING ORAL at 16:43

## 2025-03-19 RX ADMIN — SPIRONOLACTONE 25 MG: 25 TABLET, FILM COATED ORAL at 10:11

## 2025-03-19 ASSESSMENT — ACTIVITIES OF DAILY LIVING (ADL)
ADLS_ACUITY_SCORE: 36
ADLS_ACUITY_SCORE: 34
ADLS_ACUITY_SCORE: 36
ADLS_ACUITY_SCORE: 34
ADLS_ACUITY_SCORE: 36
ADLS_ACUITY_SCORE: 34
ADLS_ACUITY_SCORE: 36
ADLS_ACUITY_SCORE: 34
ADLS_ACUITY_SCORE: 36

## 2025-03-19 NOTE — CONSULTS
Dental Clearance for cardiac surgery,  [facility]     Patient:   Jose Enrique Cortes    Date of birth 1985   MRN:  [id number]   Date of Visit:   03/19/2025   Date of Admission 3/13/2025   Consult Requested by Comfort Johnson, *                                         Assessment and Recommendations:   ASSESSMENT:  Jose Enrique cortes, 39 male came to [facility] in [facility] for getting his dental clearance for cardiac surgery.  Procedure: Extraction of # #29, 30,18, 14  Anesthesia:  Topical Anesthesia: 20% Benzocaine applied  Local Anesthesia:  1.5 x 1.7 cc  2 carpules 4% Septocaine with 1:100,000 epinephrine administered via buccal infiltration, palatal infiltration, and intraligamentary injections in the area of teeth #27, #28, and #29.  1.5 x 1.7 ml 2 carpules 2% Lidocaine for inferior alveolar nerve block (INF).    Procedure Description:  Teeth Involved: Extraction of # #29, 30,18, 14  Procedure: Simple extraction of teeth ##29, 30,18, 14 performed under local anesthesia.    Technique:  Gingiva was elevated using a #9 periosteal elevator.  Teeth #29, 30,18, 14 were luxated with a series of straight elevators.  Teeth extracted intact using #150 forceps.  Curettage of the sockets was performed, and the area was rinsed with saline.  Digital compression was applied, followed by the placement of Surgifoam for hemostasis.  Sutures: 4-0 chromic gut sutures were placed to close the extraction sites.     Hemostasis:  Good hemostasis achieved with the placement of gauze and the use of Surgifoam.     Post-Operative Care:  Instructions: Routine post-operative instructions were provided verbally and in writing to the patient.    Follow-Up: A clearance letter was given to the patient, and she was cleared for discharge.  Condition at Discharge: Patient was satisfied with the procedure and left the clinic in stable condition.     Consent:  The procedure, associated risks, benefits, and potential  consequences of no treatment were discussed with the patient. All questions were answered, and consent for the procedure was obtained via phone. The patient understood the treatment plan and elected to proceed with the extractions as planned.     Summary:  Extraction of teeth #29, 30,18, 14 was completed successfully with no complications. The patient tolerated the procedure well, and hemostasis was achieved. Post-operative instructions were given, and the patient was cleared for dental clearance for cardiac surgery.  _____________________________________________________________________  Thank you for allowing us to participate in the care of this patient,  Direct any further questions to:     Lady Dorantes MD DMD, Fellow  Pager: [phone]-    Patient discussed with:   Yahir Renee DDS  , [facility]     Clinic information:   [facility]  [facility] [place] [address]  Phone:[phone]  [place], [employer] Support phone: [phone]                                             Reason for Consult:   Referring MD & Reason for Visit: I was asked by Zaid Mar MD, to see Jose Enrique Engel for a surgical clearance, cardio, dental consultation.                                               History of Present Illness:    history of HFrEF (15%) 2/2 NICM s/p ICD, CKD3, polysubstance use admitted as transfer from outside hospital on 3/14/25 for severe sepsis secondary to Staphylococcus aureus bacteremia leading to multiorgan dysfunction. He was transferred to [facility] for advanced heart failure workup. CVTS was consulted for LVAD assessment.                                                       Clinical Examination   Extraoral exam:   No submandibular lymphadenopathy, no induration or erythema, no abnormal skin lesions. Inferior border of mandible is palpable  bilaterally. No TMJ discomfort bilaterally, THUY >45mm. No clicking of TMJ on opening and lateral movements. Infraorbital region showed no swelling, no induration, no contusions, and no erythema bilaterally. Patient able to open and close both eyes without obstruction. Patient exhibits no obstructions during breathing.       Intraoral Findings   Lips Pink   Mucosal quality watery and clear     Alveolar ridges  Ridges had no significant findings    Tongue Big Pool   Gingiva No significant findings   Cheeks Elastic/pliable, clear of debris   Hard palate  No significant findings   Floor of Mouth No significant findings   Dentition Adult dentition, clinical decay and fractured teeth present  Teeth #  14, 18, 29, and 30 noted as      Pulpal status       Biofilm, General level moderate   Denture(s) No removable prosthetic device(s) used   Care provided during assessment Oral Assessment, Connect with other provider, Oral care, and Patient education     Lab results:        CBC RESULTS:   Recent Labs   Lab Test 03/19/25  0429   WBC 9.1   RBC 4.12*   HGB 12.4*   HCT 39.3*   MCV 95   MCH 30.1   MCHC 31.6   RDW 15.4*          Last Basic Metabolic Panel:  Lab Results   Component Value Date     03/19/2025      Lab Results   Component Value Date    POTASSIUM 4.5 03/19/2025     Lab Results   Component Value Date    CHLORIDE 98 03/19/2025    CHLORIDE 102 02/17/2025     Lab Results   Component Value Date    LENARD 9.2 03/19/2025     Lab Results   Component Value Date    CO2 23 03/19/2025     Lab Results   Component Value Date    BUN 24.6 03/19/2025     Lab Results   Component Value Date    CR 1.12 03/19/2025     Lab Results   Component Value Date     03/19/2025     01/29/2025                                                              Imaging     Dental CT taken:     Loss of mandibular bone along the right first  molar, may be secondary to periodontitis   Multifocal periapical lucencies, for example about the left  maxillary  first molar , and left mandibular second molar.   No significant soft tissue swelling or mass. Normal facial bone alignment.                                          Past Medical History      Past Medical History:   Diagnosis Date    Congestive heart failure (H)        Immunization History   Administered Date(s) Administered    TDAP (Adacel,Boostrix) 05/19/2023       Past Surgical History   Past Surgical History:   Procedure Laterality Date    CV RIGHT HEART CATH MEASUREMENTS RECORDED N/A 01/29/2025    Procedure: Heart Cath Right Heart Cath;  Surgeon: Osman Canseco MD;  Location:  HEART CARDIAC CATH LAB    EP SICD INSERT N/A 07/17/2023    Procedure: Subcutaneous Implantable Cardioverter Defibrillator Implant;  Surgeon: Luis Zaragoza MD;  Location:  HEART CARDIAC CATH LAB    PICC DOUBLE LUMEN PLACEMENT Left 07/07/2023    Left basilic vein 48cm total 2cm external.    PICC DOUBLE LUMEN PLACEMENT Right 02/06/2025    Right basilic vein 48cm total 5cm external.    PICC INSERTION - TRIPLE LUMEN Left 03/18/2025    50-4cm, Basilic vein                                           Social History      No family history on file.                                       Allergies      Allergies   Allergen Reactions    Isosorbide Anaphylaxis    Benadryl [Diphenhydramine]      anxiety    Other Environmental Allergy Itching     Allergic to pollen, s/s itchy eyes and sneeze                                        Medications        Medications Prior to Admission   Medication Sig Dispense Refill Last Dose/Taking    albuterol (PROAIR HFA/PROVENTIL HFA/VENTOLIN HFA) 108 (90 Base) MCG/ACT inhaler Inhale 2 puffs into the lungs every 6 hours as needed for shortness of breath, wheezing or cough.   Unknown    calcium carbonate (OS-LENARD) 1500 (600 Ca) MG tablet Take 1 tablet by mouth daily at 2 pm.   Unknown    digoxin (LANOXIN) 125 MCG tablet Take 1 tablet (125 mcg) by mouth daily. 90 tablet 1 Unknown    DOBUTamine in 5%  "Dextrose 2 mg/mL 350 mL via CADD pump One day bag. Infuse 406.5 mcg/min into the vein continuously. Dose based on 5 mcg/kg/min and 81.3 kg. Continuous rate: 12.2 mL/hr. Reservoir volume: 293 mL. Bag contains 57 mL of overfill. Do not flush line between bags. 756026 mL 0 Past Week    Emergency Supply Kit, Central, Patient use for emergency only. Contents: 3 sodium chloride 0.9% flushes, 1 dressing kit, 1 microclave ext set 14\", 4 nitrile gloves (med), 6 alcohol prep pads, 1 bacitracin, 1 syringe (10 cc 20 G 1\"). Call [phone] to reorder. [id number] kit 0 Taking As Needed    empagliflozin (JARDIANCE) 10 MG TABS tablet Take 1 tablet (10 mg) by mouth daily. 90 tablet 1 Unknown    ferrous sulfate (FEROSUL) 325 (65 Fe) MG tablet Take 325 mg by mouth daily (with breakfast).   Unknown    potassium chloride rand ER (KLOR-CON M20) 20 MEQ CR tablet Take 20 mEq by mouth 2 times daily.   Unknown    rivaroxaban ANTICOAGULANT (XARELTO) 20 MG TABS tablet Take 20 mg by mouth daily (with dinner).   Unknown    sacubitril-valsartan (ENTRESTO) 49-51 MG per tablet Take 1 tablet by mouth 2 times daily. 180 tablet 2 Unknown    sodium chloride, PF, 0.9% PF flush Inject 10 mLs into catheter as needed for line flush (only as directed by medical professional). Improper flushing may result in a large dose of drug administered and side effects may result. 086764 mL 0 Unknown    spironolactone (ALDACTONE) 25 MG tablet Take 1 tablet (25 mg) by mouth daily. 90 tablet 1 Unknown    torsemide (DEMADEX) 20 MG tablet Take 40 mg by mouth 2 times daily.   Unknown    VITAMIN C 250 MG tablet Take 1 tablet by mouth daily.   Unknown    zolpidem (AMBIEN) 5 MG tablet Take 0.5 tablets (2.5 mg) by mouth nightly as needed for sleep. (Patient taking differently: Take 5 mg by mouth nightly as needed for sleep.) 4 tablet 0 Unknown         Current Facility-Administered Medications   Medication Dose Route Frequency Provider Last Rate Last Admin    acetaminophen " (TYLENOL) tablet 650 mg  650 mg Oral Q4H PRN Alexi Devine MD   650 mg at 03/18/25 2342    Or    acetaminophen (TYLENOL) Suppository 650 mg  650 mg Rectal Q4H PRN Alexi Devine MD        albuterol (PROVENTIL HFA/VENTOLIN HFA) inhaler  2 puff Inhalation Q6H PRN Bao Mahan MD        calcium carbonate (OS-LENARD) tablet 600 mg  600 mg Oral Daily Bao Mahan MD   600 mg at 03/19/25 1011    calcium carbonate (TUMS) chewable tablet 1,000 mg  1,000 mg Oral 4x Daily PRN Bao Mahan MD   1,000 mg at 03/18/25 2342    ceftaroline fosamil (TEFLARO) 600 mg in sodium chloride 0.9 % 250 mL intermittent infusion  600 mg Intravenous Q8H Jess Calero  mL/hr at 03/19/25 1220 600 mg at 03/19/25 1220    DAPTOmycin (CUBICIN) 800 mg in sodium chloride 0.9 % 100 mL intermittent infusion  10 mg/kg Intravenous Q24H Jess Calero  mL/hr at 03/19/25 1137 800 mg at 03/19/25 1137    digoxin (LANOXIN) tablet 125 mcg  125 mcg Oral QAM Zaid Mar MD   125 mcg at 03/19/25 1011    DOBUTamine (DOBUTREX) 500 mg in D5W 250 mL infusion (adult std conc)  5 mcg/kg/min (Order-Specific) Intravenous Continuous Bao Mahan MD 11.5 mL/hr at 03/19/25 1222 5 mcg/kg/min at 03/19/25 1222    empagliflozin (JARDIANCE) tablet 10 mg  10 mg Oral Daily Bao Mahan MD   10 mg at 03/19/25 1011    [Held by provider] ferrous sulfate (FEROSUL) tablet 325 mg  325 mg Oral Daily with breakfast Bao Mahan MD        furosemide (LASIX) injection 80 mg  80 mg Intravenous Daily Jess Calero MD   80 mg at 03/19/25 1012    [Held by provider] heparin 25,000 units in 0.45% NaCl 250 mL ANTICOAGULANT infusion  0-5,000 Units/hr Intravenous Continuous Bao Mahan MD   Stopped at 03/19/25 0809    heparin lock flush 10 unit/mL injection 5-20 mL  5-20 mL Intracatheter Q24H Jess Calero MD   5 mL at 03/18/25 1514    heparin lock flush 10 unit/mL injection 5-20 mL  5-20 mL  Intracatheter Q1H PRN Jess Calero MD   5 mL at 03/19/25 1115    hydrALAZINE (APRESOLINE) tablet 25 mg  25 mg Oral TID Bill Enriquez MD   25 mg at 03/19/25 1011    lidocaine (LMX4) cream   Topical Q1H PRN Jess Calero MD        lidocaine 1 % 0.1-5 mL  0.1-5 mL Other Q1H PRN Jess Calero MD   1 mL at 03/18/25 1217    ondansetron (ZOFRAN ODT) ODT tab 4 mg  4 mg Oral Q6H PRN Mitzi Limon MD   4 mg at 03/14/25 2207    sacubitril-valsartan (ENTRESTO) 49-51 MG per tablet 1 tablet  1 tablet Oral BID Jess Calero MD   1 tablet at 03/19/25 1011    senna-docusate (SENOKOT-S/PERICOLACE) 8.6-50 MG per tablet 1 tablet  1 tablet Oral BID PRN Bao Mahan MD        Or    senna-docusate (SENOKOT-S/PERICOLACE) 8.6-50 MG per tablet 2 tablet  2 tablet Oral BID PRN Bao Mahan MD        sodium chloride (PF) 0.9% PF flush 3 mL  3 mL Intracatheter q1 min prn Bao Mahan MD        spironolactone (ALDACTONE) tablet 25 mg  25 mg Oral Daily Bao Mahan MD   25 mg at 03/19/25 1011    vitamin C (ASCORBIC ACID) tablet 250 mg  250 mg Oral Daily Bao Mahan MD   250 mg at 03/19/25 1011    zolpidem (AMBIEN) half-tab 5 mg  5 mg Oral At Bedtime PRN Jess Calero MD   5 mg at 03/18/25 2033

## 2025-03-19 NOTE — PROGRESS NOTES
General ID [facility] Service: Follow-up Note      Patient:  Jose Enrique Engel, Date of birth 1985, Medical record number [id number]  Date of Visit:  03/19/2025         Assessment and Recommendations:     ID Problem list:  MRSA bacteremia  Blood cultures positive at OSH from 3/10-transfer on 3/13, remain positive on 3/14, and growth is within 24 hrs of collection, suggesting high-grade bacteremia.   Blood cultures from 3/15 and 3/16 and 3/17 all have no growth to date.   JW did not suggest a valvular lesion, but the picture is certainly concerning for a non-valvular endovascular infection  Right upper extremity DVT with concern for septic thrombophlebitis   Repeat RUE ultrasound without signs of drainable fluid collection  Acute decompensated heart failure  H/o ICD  CKD stage II      Discussion:  40 yo male with PMHx of HFrEF (10%) 2/2 NICM s/p ICD, CKD3, substance use disorder transferred from [place] 3/13 for further cardiac management after 3 days of persistent MRSA bacteremia and multiorgan dysfunction. The bacteremia is likely from PICC line (placed at previous hospitalization 2/3, possibly dislodged and replaced with potential for introducing infection) given the purulent discharge noted and cellulitis. There is also concern for septic thrombophlebitis based on DVT at insertion site noted on US and continued bacteremia. DVT could also be from stopping Xarelto in December, but suspicion for infection is warranted at this time given clinical picture.     His blood cultures have tentatively cleared 3/15 onward after RUE PICC removal  and also fater switching from IV vancomycin to salvage regimen of IV daptomycin and ceftaroline (3/15-). At this point, given all blood cultures from 3/15-3/17 have no growth to date, reasonable to discontinue the ceftaroline at this point and continue on daptomycin alone. If he continues tolerating daptomycin, may be option to complete tentative 4-week course with this.   "      Recs:  Discontinue ceftaroline  Continue on IV daptomycin 10 mg/kg/d; continue to monitor weekly CK while on daptomycin  Follow up pending blood cultures until finalized to ensure clearance - tentatively no growth from 3/15 onward  If no further complications or positive blood cultures, then would plan for tentative 4-week antibiotic course from the time of blood culture clearance, as detailed below.      OPAT:   Primary team:  Place imaging order(s) requested above prior to discharge.  Contact ID teams about missed ID clinic appointments and/or ongoing therapy needs.  [facility] sites only: Place order panel  OPAT Pharmacy ([facility]) Review and ID Care Transition     Prolonged Parenteral/Oral Antibiotic Recommendations and ID Follow up  This template provides final ID recommendations as of this date.     Infectious Diseases Indication: MRSA bacteremia    Antibiotic Information  Name of Antibiotic Dose of Antibiotic1 Anticipated duration Effective start date2 End date   daptomycin 10 mg/kg/d 4 weeks 3/15 4/12                 1.Dose of antibiotic will need to be renally adjusted if creatinine clearance changes  2.Effective start date is the date of adequate therapy with appropriate spectrum    Method of antibiotic delivery:To be determined.    Weekly labs required: CBC with diff, BMP, and CK. Dr. Dudley will follow labs at discharge until ID follow up. Fax labs to ID clinic.    Are there pending microbial tests: Yes Cultures     We will attempt to make OPAT appointments within 2 weeks of discharge based on clinic availability.  Provider: Luis Enrique, timing of visit before this specific date 4/12    [facility] if ID follow up is need. N/[facility] ID academic group is not credentialed there.    ID provider - route this note: \"P PANDA\"    [facility] ID Clinic Information:  Phone: [phone]  Fax: [phone] " (Attention ID clinic nurses)       Recs paged to primary team. Thank you for allowing us to participate in the care of this patient. [facility] ID service will sign off at this time. Can see [facility] coverage schedule for paging details if questions.     50 minutes spent by me on the date of the encounter doing chart review, history and exam, documentation and further activities per the note.  This patient visit is eligible for billing by the  code for complex ID management.    Attestation:  Popeye Dudley MD  Infectious Diseases     03/19/2025            Interval History:     Patient underwent multiple dental extractions today. Otherwise no new complaints - no chest pain, no back pain, no focal joint pains, no GI complaints. No muscle pain/tenderness.  Says his RUE site of prior PICC is no longer tender (he says he never used the prior PICC for anything other than dobutamine). No issues with new LUE PICC that was placed on 3/18 for his dobutamine.          Review of Systems:   ROS as above.          Current Antimicrobials   IV daptomycin 800 mg/d  IV ceftaroline 600 mg q8hr         Physical Exam:   Ranges for vital signs:  Temp:  [96.9  F (36.1  C)-98.9  F (37.2  C)] 96.9  F (36.1  C)  Pulse:  [73-93] 87  Resp:  [18-20] 20  BP: (106-154)/() 154/124  SpO2:  [95 %-100 %] 100 %    Intake/Output Summary (Last 24 hours) at 3/19/2025 1611  Last data filed at 3/19/2025 1608  Gross per 24 hour   Intake 2468.05 ml   Output 4700 ml   Net -2231.95 ml     Exam:  GENERAL:  sitting up in bed in no acute distress.   ENT:  Head is normocephalic, atraumatic. Oropharynx is moist-appearing.  EYES:  No conjunctival injection.   LUNGS:  Unlabored breathing on room air.  ABDOMEN:  Non-distended.  MSK/EXT/SKIN: no spinal tenderness on palpation. Prior RUE PICC site is firm without erythema/tenderness. LUE PICC line is in place without any surrounding erythema.  NEUROLOGIC:  Awake, alert, interactive.         Laboratory  Data:       Hematology Studies    Recent Labs   Lab Test 03/19/25  0429 03/18/25  0641 03/17/25  0539 03/16/25  0520 03/15/25  0549 03/14/25  0546   WBC 9.1 9.4 11.6* 11.6* 13.2* 10.7   HGB 12.4* 12.3* 12.5* 12.8* 13.3 12.8*   MCV 95 93 92 91 91 87    272 209 182 134* 102*       Metabolic Studies     Recent Labs   Lab Test 03/19/25  0429 03/18/25  1651 03/18/25  0641 03/17/25  1639 03/17/25  0539   * 131* 132* 130* 125*   POTASSIUM 4.5 4.2 4.4 4.4 4.8   CHLORIDE 98 94* 99 95* 93*   CO2 23 23 21* 21* 20*   BUN 24.6* 19.7 22.2* 29.6* 29.7*   CR 1.12 0.77 1.11 1.42* 1.37*   GFRESTIMATED 86 >90 87 64 67       Hepatic Studies    Recent Labs   Lab Test 03/19/25  0429 03/18/25  1651 03/18/25  0641 03/17/25  1639 03/17/25  0539 03/16/25  1712   BILITOTAL 1.0 1.2 1.2 1.5* 1.4* 1.4*   ALKPHOS 181* 195* 184* 225* 226* 223*   ALBUMIN 3.2* 3.3* 3.1* 3.6 3.4* 3.4*   AST 30 37 35 42 43 47*   ALT 55 61 58 75* 76* 79*       Microbiology:  Culture   Date Value Ref Range Status   03/17/2025 No growth after 2 days  Preliminary   03/16/2025 No growth after 3 days  Preliminary   03/15/2025 No growth after 4 days  Preliminary   03/14/2025 Positive on the 1st day of incubation (A)  Final   03/14/2025 Staphylococcus aureus MRSA (AA)  Final     Comment:     1 of 2 bottles   03/14/2025 No Growth  Final   03/14/2025 Staphylococcus aureus MRSA (A)  Final       Urine Studies    Recent Labs   Lab Test 03/17/25  1823   LEUKEST Negative   WBCU 1       Vancomycin Levels    Recent Labs   Lab Test 03/14/25  0546   VANCOMYCIN 29.7*            Imaging:     3/13/25 JW report:   Interpretation Summary  Left ventricular function is decreased. The ejection fraction is 5-10%  (severely reduced). Severe diffuse hypokinesis is present.  Global right ventricular function is moderately reduced.  Severe biatrial enlargement is present.  Moderate functional tricuspid insufficiency and mild to moderate functional  mitral insufficiency present. No  evidence of valvular vegetations.  No pericardial effusion present.

## 2025-03-19 NOTE — PROGRESS NOTES
Patient said he is not yet ready for his morning medications. He wanted to sleep more and inform the writer to come back in an hour. Patient's mediations are past due time. Patient's decision was respected.

## 2025-03-19 NOTE — PLAN OF CARE
Goal Outcome Evaluation:    Shift 1900 - 0700    Pt VSS, afebrile, A&Ox4, SPO2>98% RA, calm, calls appropriately.   Cardiac: sinus rhythm. HR 85 - 100.  Lines: L PICC, triple lumen. Hep gtt @2,000 units/hr, dobutamine gtt @5mcg/kg/min.  Pain: new, related to PICC line, rates pain 5 out of 10. Feels tight, sore, area around dressing feels tight.   PRN's: Tums & Tylenol @2342 and Ambien 5mg @2033.  Activity: independent.   Pt is processing going through LVAD workup, what does that mean, how will it impact them. Pt asked lots of questions about the LVAD and the process.    Plan of Care Reviewed With: patient    Overall Patient Progress: no changeOverall Patient Progress: no change    Outcome Evaluation: Pt VSS, afebrile, new PICC line site a little sore/painful, rated their pain 5 out of 10, gave PRN Tylenol for pain. Heparain and dobutamine still running.      Problem: Adult Inpatient Plan of Care  Goal: Plan of Care Review  Description: The Plan of Care Review/Shift note should be completed every shift.  The Outcome Evaluation is a brief statement about your assessment that the patient is improving, declining, or no change.  This information will be displayed automatically on your shift  note.  Outcome: Progressing  Flowsheets (Taken 3/19/2025 0135)  Outcome Evaluation: Pt VSS, afebrile, new PICC line site a little sore/painful, rated their pain 5 out of 10, gave PRN Tylenol for pain. Heparain and dobutamine still running.  Plan of Care Reviewed With: patient  Overall Patient Progress: no change     Problem: Adult Inpatient Plan of Care  Goal: Optimal Comfort and Wellbeing  Outcome: Progressing     Problem: Heart Failure  Goal: Effective Breathing Pattern During Sleep  Outcome: Progressing  Intervention: Monitor and Manage Obstructive Sleep Apnea  Recent Flowsheet Documentation  Taken 3/18/2025 1940 by Rafita Mendez, RN  Medication Review/Management: medications reviewed     Problem: Pain Acute  Goal: Optimal Pain  Control and Function  Outcome: Progressing  Intervention: Optimize Psychosocial Wellbeing  Recent Flowsheet Documentation  Taken 3/18/2025 1940 by Rafita Mendez, RN  Diversional Activities:   smartphone   television  Intervention: Prevent or Manage Pain  Recent Flowsheet Documentation  Taken 3/18/2025 1940 by Rafita Mednez, RN  Sleep/Rest Enhancement:   awakenings minimized   comfort measures   regular sleep/rest pattern promoted   consistent schedule promoted   noise level reduced   room darkened  Medication Review/Management: medications reviewed

## 2025-03-19 NOTE — PROGRESS NOTES
"Met with patient and Martine his mother and Neal his father to present the HM3 as a possible treatment option.      Discussed patient and caregiver responsibilities before and after VAD implant. Clarified that only 2 caregivers may be trained. Clarified the need for a caregiver to be present for education and to assist patient for at least first 30 days after a patient returns home.  Patient's designated caregiver is his father Neal and potentially a friend who was not present.     Discussed basic overview of VAD equipment, on going management, need for anticoagulation, regular dressing change, grounded three-pronged outlet and functioning telephone. Also discussed frequency of follow-up clinic appointments and the need to stay locally for at least 4 weeks.  Reviewed restrictions of having a VAD such as no swimming, bathing, boats, MRI's, or arc welding.     Provided and discussed the VAD educational brochure, information regarding the VAD and transplant support groups, discussed the 4/2024 EO[employer] recall, and \"VAD What You Should Know\" with additional details. Patient and Vic signed \"VAD What You Should Know\" document. VAD coordinator contact information provided. Encouraged patient and Vic to call with questions. Learners verbalized understanding of the above information.   "

## 2025-03-19 NOTE — PLAN OF CARE
Goal Outcome Evaluation:      Plan of Care Reviewed With: patient    Overall Patient Progress: no changeOverall Patient Progress: no change         Neuro: A&Ox4. Initially with flat affect. Calm, pleasant and cooperative to cares.   Cardiac: SR. Denies chest pain. No edema. Moist mucus membranes.   Respiratory: On RA. Dyspnea on exertion. No cough.   GI/: Nausea this afternoon. Zofran was administered and effective. Passed the Nursing Dysphagia Screening and diet was advanced to regular. Adequate urine output. Non-distended abdomen and passing gas.    Diet/appetite: Consumed share with good appetite.  Activity:  SBA. Encouraged to call for assistance.   Pain: Denies pain  Skin: No bleeding on the tooth extraction sites.  LDA's: PICC 3 Lumen. PIV LFA, dressing CDI and flushes well.   Drip: Dobutamine drip 500 mg/250 ml at 5 mcg/kg/minute. Heparin drip on hold and to resume at 9 PM tonight.   RN Mgt: K and Mg.  No replacement needed today.   Plan: Continue with POC. Notify primary team with changes.
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
## 2025-03-19 NOTE — PROGRESS NOTES
Patient was transported via wheelchair to [facility] with RN circulator. Patient was endorsed accordingly to the transport nurse.

## 2025-03-19 NOTE — PROGRESS NOTES
[facility]  Cardiology II Service / Advanced Heart Failure  Daily Progress Note    Patient: Jose Enrique Engel      : 1985      MRN: [id number]    Assessment/Plan:   38 yo male with PMHx of HFrEF (15%) 2/2 NICM s/p ICD, CKD3, polysubstance use admitted as transfer from outside hospital on 3/14/25 for severe sepsis secondary to MRSA bacteremia leading to multiorgan dysfunction.  He was transferred to [facility] given advanced heart failure.    Today:  - Abx per ID  - Continue IV lasix  - Continue VAD eval, including dentistry consult, working with neuropsych to get eval inpt  - Hold heparin for dental clinic today. Will consider restarting pending dental clinic.   - Cpx pending      Severe sepsis secondary to MRSA bacteremia  -Possible source: PICC line (removed at outside hospital) vs septic thrombophlebitis (RUE thrombus). ICD is subcutaneous and does not have venous leads.   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 Was previously on outpatient dobutamine, self-discontinued in . Re-engaged with outpatient cardiologist Dr. Dove 2025.  Interested in advanced therapies but wasn't a candidate at that time due to being lost to follow up, substance use, recurrent infections and difficulty managing PICC line.  He was discharged on home dobutamine infusion during last admission. Home infusion service will not accept him back so he will not be able to discharge on inotrope. Will start VAD eval this admission.   Last [facility] 25: RA 15, PA 44/26, PCWP 21,  CI 1.7, PVR 3.4, SVR ~1400  Last TTE 3/13/2025: EF 10%,mild pulm Htn, grade-1 diastolic dysfunction  - Volume: hypervolemic.  lbs. Was 180 on admission.   - Inotrope:   - Dobutamine @ 5mcg/kg/min               - PTA digoxin 125 mcg daily  - Afterload Reduction: Hydralazine 25mg q6h (3/15)  GDMT:                - Diuretic: Lasix 80mg IV BID 3/15, daily 3/16, 3/17               - ACE/ARB/ARNI: - PTA Entresto (resumed 3/17)               - MRA spironolactone 25               - BB- none not while on dobutamine               - SGLT2-  empagliflozin 10 mg daily  - Anticoagulation: heparin gtt  - Statin: none  - Antiplatelet: none  - Antiarrhythmic: none  - SCD prophylaxis: subcuteanous ICD in place  - Electrolytes: BID monitoring with diuresis, goals K>4, Mag>2  - Strict I/O monitoring, daily weights  - Dietary restrictions: regular diet (pt request), 2L fluids (pt intermittently following)  - VAD eval initiated on 3/17   - Imaging:    - CT CAP: multiple small pulmonary nodules, do not require further evaluation given small size    - CT dental: Possible abscesses, will be seen by dentistry    - CT head: No intracranial abnormality    - Carotid US: <50% stenosis    - LE arterial US: WNL   - Notable labs:    - Iron deficiency; will need IV iron once infection is cleared    - Subclinical hypothyroidism    Right upper extremity DVT   Clot from brachial vein through the subclavian diagnosed at outside hospital with RUE US 3/10/25. Has been off of Xarelto. Repeat RUE US 3/14 showed persistence of DVT in right innominate, subclavian and axial veins.   - Heparin infusion (3/10)     Hyponatremia  Likely due to heart failure and volume overload.  - Diuresis as above  - Monitor closely    H/o substance use disorders, in remission (methamphetamines, alcohol)  In remission for >1 year. UDS positive for methamphetamines from his adderall prescription, no evidence of active use.   - Addiction medicine following   - SW helping to  connect patient to outpatient treatment options  - Peth, nicotine pending      CKD stage II  - Cr stable, continue to monitor     Hx left ventricular mural thrombus  - Heparin gtt     Thrombocytopenia   -CTM      FEN: Cardiac  DVT Prophylaxis: Heparin  Code Status: Full Code  Disposition: Pending resolution of bacteremia and VAD eval     Pt to be seen and discussed with the staff attending Dr. Johnson.    Alexi Devine MD  Cardiology fellow (PGY5)  [address]    ==================================    Subjective:   Interval History: NAEO. Feeling less short of breath. No new pain or concerns.     Objective:     Vitals:    03/15/25 0306 03/18/25 0452 03/19/25 0453   Weight: 82.6 kg (182 lb 1.6 oz) 79.2 kg (174 lb 9.6 oz) 79.7 kg (175 lb 12.8 oz)     Vital Signs with Ranges  Temp:  [97.5  F (36.4  C)-98.2  F (36.8  C)] 97.5  F (36.4  C)  Pulse:  [74-96] 80  Resp:  [17-18] 18  BP: (106-126)/(61-78) 119/78  SpO2:  [94 %-100 %] 99 %  I/O last 3 completed shifts:  In: 4213.95 [P.O.:2988; I.V.:1201.95; IV Piggyback:24]  Out: 5925 [Urine:5925]    Exam:  Constitutional:  Appears tired, no distress  HEENT: PERRLA, extraocular motion intact, oral mucosa moist  Cardiovascular: Tachycardic rate, regular rhythm, pulses 2+ bilateral upper extremities, trace lower extremity edema, fingers and toes slightly cool, JVD +5cm  Pulmonary: Lungs bilaterally clear to auscultation  GI: Abdomen soft nontender nondistended normal active bowel sounds  MSK: Well-developed  Skin: erythema at previous picc site. No drainage  Neuro: Alert and oriented to person place and time  Psych: Appropriate mood and affect    Medications   Current Facility-Administered Medications   Medication Dose Route Frequency Provider Last Rate Last Admin    DOBUTamine (DOBUTREX) 500 mg in D5W 250 mL infusion (adult std conc)  5 mcg/kg/min (Order-Specific) Intravenous Continuous Bao Mahan MD 11.5 mL/hr at 03/19/25 0734 5 mcg/kg/min at 03/19/25 0734     [Held by provider] heparin 25,000 units in 0.45% NaCl 250 mL ANTICOAGULANT infusion  0-5,000 Units/hr Intravenous Continuous Bao Mahan MD   Stopped at 03/19/25 0809     Current Facility-Administered Medications   Medication Dose Route Frequency Provider Last Rate Last Admin    calcium carbonate (OS-LENARD) tablet 600 mg  600 mg Oral Daily Bao Mahan MD   600 mg at 03/19/25 1011    ceftaroline fosamil (TEFLARO) 600 mg in sodium chloride 0.9 % 250 mL intermittent infusion  600 mg Intravenous Q8H Jess Calero MD   600 mg at 03/19/25 0447    DAPTOmycin (CUBICIN) 800 mg in sodium chloride 0.9 % 100 mL intermittent infusion  10 mg/kg Intravenous Q24H Jess Calero MD   800 mg at 03/18/25 1350    digoxin (LANOXIN) tablet 125 mcg  125 mcg Oral QAM Zaid Mar MD   125 mcg at 03/19/25 1011    empagliflozin (JARDIANCE) tablet 10 mg  10 mg Oral Daily Bao Mahan MD   10 mg at 03/19/25 1011    [Held by provider] ferrous sulfate (FEROSUL) tablet 325 mg  325 mg Oral Daily with breakfast Bao Mahan MD        furosemide (LASIX) injection 80 mg  80 mg Intravenous Daily Jess Calero MD   80 mg at 03/19/25 1012    heparin lock flush 10 unit/mL injection 5-20 mL  5-20 mL Intracatheter Q24H Jess Calero MD   5 mL at 03/18/25 1514    hydrALAZINE (APRESOLINE) tablet 25 mg  25 mg Oral TID Bill Enriquez MD   25 mg at 03/19/25 1011    sacubitril-valsartan (ENTRESTO) 49-51 MG per tablet 1 tablet  1 tablet Oral BID Jess Calero MD   1 tablet at 03/19/25 1011    spironolactone (ALDACTONE) tablet 25 mg  25 mg Oral Daily Bao Mahan MD   25 mg at 03/19/25 1011    vitamin C (ASCORBIC ACID) tablet 250 mg  250 mg Oral Daily Bao Mahan MD   250 mg at 03/19/25 1011       Data   Recent Labs   Lab 03/19/25  0429 03/18/25  1651 03/18/25  0641 03/17/25  1639 03/17/25  0539 03/14/25  0546 03/14/25  0154   WBC 9.1  --  9.4  --  11.6*   < >  --    HGB 12.4*   --  12.3*  --  12.5*   < >  --    MCV 95  --  93  --  92   < >  --      --  272  --  209   < >  --    INR  --   --  1.24*  --   --   --  1.14   * 131* 132*   < > 125*   < > 122*   POTASSIUM 4.5 4.2 4.4   < > 4.8   < > 4.6   CHLORIDE 98 94* 99   < > 93*   < > 91*   CO2 23 23 21*   < > 20*   < > 19*   BUN 24.6* 19.7 22.2*   < > 29.7*   < > 36.3*   CR 1.12 0.77 1.11   < > 1.37*   < > 1.14   ANIONGAP 11 14 12   < > 12   < > 12   LENARD 9.2 8.9 8.7*   < > 8.8   < > 8.4*   * 170* 110*   < > 109*   < > 100*   ALBUMIN 3.2* 3.3* 3.1*   < > 3.4*   < > 3.2*   PROTTOTAL 6.6 6.7 6.1*   < > 6.7   < > 6.1*   BILITOTAL 1.0 1.2 1.2   < > 1.4*   < > 2.2*   ALKPHOS 181* 195* 184*   < > 226*   < > 193*   ALT 55 61 58   < > 76*   < > 114*   AST 30 37 35   < > 43   < > 108*    < > = values in this interval not displayed.

## 2025-03-20 VITALS
OXYGEN SATURATION: 97 % | HEART RATE: 83 BPM | WEIGHT: 168.8 LBS | BODY MASS INDEX: 24.22 KG/M2 | RESPIRATION RATE: 18 BRPM | TEMPERATURE: 97.9 F | SYSTOLIC BLOOD PRESSURE: 128 MMHG | DIASTOLIC BLOOD PRESSURE: 78 MMHG

## 2025-03-20 LAB
ALBUMIN SERPL BCG-MCNC: 3.5 G/DL (ref 3.5–5.2)
ALBUMIN SERPL BCG-MCNC: 3.8 G/DL (ref 3.5–5.2)
ALP SERPL-CCNC: 187 U/L (ref 40–150)
ALP SERPL-CCNC: 189 U/L (ref 40–150)
ALT SERPL W P-5'-P-CCNC: 51 U/L (ref 0–70)
ALT SERPL W P-5'-P-CCNC: 53 U/L (ref 0–70)
ANION GAP SERPL CALCULATED.3IONS-SCNC: 11 MMOL/L (ref 7–15)
ANION GAP SERPL CALCULATED.3IONS-SCNC: 16 MMOL/L (ref 7–15)
AST SERPL W P-5'-P-CCNC: 31 U/L (ref 0–45)
AST SERPL W P-5'-P-CCNC: 31 U/L (ref 0–45)
BACTERIA BLD CULT: NO GROWTH
BACTERIA BLD CULT: NORMAL
BACTERIA BLD CULT: NORMAL
BILIRUB SERPL-MCNC: 1.1 MG/DL
BILIRUB SERPL-MCNC: 1.2 MG/DL
BUN SERPL-MCNC: 18.5 MG/DL (ref 6–20)
BUN SERPL-MCNC: 25.3 MG/DL (ref 6–20)
CALCIUM SERPL-MCNC: 9.5 MG/DL (ref 8.8–10.4)
CALCIUM SERPL-MCNC: ABNORMAL MG/DL
CHLORIDE SERPL-SCNC: 100 MMOL/L (ref 98–107)
CHLORIDE SERPL-SCNC: 94 MMOL/L (ref 98–107)
CREAT SERPL-MCNC: 1.02 MG/DL (ref 0.67–1.17)
CREAT SERPL-MCNC: 1.1 MG/DL (ref 0.67–1.17)
EGFRCR SERPLBLD CKD-EPI 2021: 88 ML/MIN/1.73M2
EGFRCR SERPLBLD CKD-EPI 2021: >90 ML/MIN/1.73M2
GLUCOSE SERPL-MCNC: 168 MG/DL (ref 70–99)
GLUCOSE SERPL-MCNC: 90 MG/DL (ref 70–99)
HCO3 SERPL-SCNC: 23 MMOL/L (ref 22–29)
HCO3 SERPL-SCNC: 24 MMOL/L (ref 22–29)
LACTATE SERPL-SCNC: 0.9 MMOL/L (ref 0.7–2)
MAGNESIUM SERPL-MCNC: 2 MG/DL (ref 1.7–2.3)
POTASSIUM SERPL-SCNC: 4.5 MMOL/L (ref 3.4–5.3)
POTASSIUM SERPL-SCNC: 4.7 MMOL/L (ref 3.4–5.3)
PROT SERPL-MCNC: 7.4 G/DL (ref 6.4–8.3)
PROT SERPL-MCNC: 7.9 G/DL (ref 6.4–8.3)
SODIUM SERPL-SCNC: 133 MMOL/L (ref 135–145)
SODIUM SERPL-SCNC: 135 MMOL/L (ref 135–145)
UFH PPP CHRO-ACNC: 0.28 IU/ML
UFH PPP CHRO-ACNC: 0.65 IU/ML

## 2025-03-20 PROCEDURE — 250N000013 HC RX MED GY IP 250 OP 250 PS 637: Performed by: STUDENT IN AN ORGANIZED HEALTH CARE EDUCATION/TRAINING PROGRAM

## 2025-03-20 PROCEDURE — 83735 ASSAY OF MAGNESIUM: CPT | Performed by: STUDENT IN AN ORGANIZED HEALTH CARE EDUCATION/TRAINING PROGRAM

## 2025-03-20 PROCEDURE — 85520 HEPARIN ASSAY: CPT | Performed by: INTERNAL MEDICINE

## 2025-03-20 PROCEDURE — 83605 ASSAY OF LACTIC ACID: CPT

## 2025-03-20 PROCEDURE — 250N000011 HC RX IP 250 OP 636

## 2025-03-20 PROCEDURE — 84155 ASSAY OF PROTEIN SERUM: CPT

## 2025-03-20 PROCEDURE — 99231 SBSQ HOSP IP/OBS SF/LOW 25: CPT | Performed by: STUDENT IN AN ORGANIZED HEALTH CARE EDUCATION/TRAINING PROGRAM

## 2025-03-20 PROCEDURE — 85520 HEPARIN ASSAY: CPT | Performed by: STUDENT IN AN ORGANIZED HEALTH CARE EDUCATION/TRAINING PROGRAM

## 2025-03-20 PROCEDURE — 82040 ASSAY OF SERUM ALBUMIN: CPT

## 2025-03-20 PROCEDURE — 250N000011 HC RX IP 250 OP 636: Performed by: STUDENT IN AN ORGANIZED HEALTH CARE EDUCATION/TRAINING PROGRAM

## 2025-03-20 PROCEDURE — 120N000003 HC R&B IMCU UMMC

## 2025-03-20 PROCEDURE — 80053 COMPREHEN METABOLIC PANEL: CPT

## 2025-03-20 PROCEDURE — 250N000013 HC RX MED GY IP 250 OP 250 PS 637: Performed by: INTERNAL MEDICINE

## 2025-03-20 PROCEDURE — 99497 ADVNCD CARE PLAN 30 MIN: CPT | Mod: 25 | Performed by: STUDENT IN AN ORGANIZED HEALTH CARE EDUCATION/TRAINING PROGRAM

## 2025-03-20 PROCEDURE — 99418 PROLNG IP/OBS E/M EA 15 MIN: CPT | Performed by: STUDENT IN AN ORGANIZED HEALTH CARE EDUCATION/TRAINING PROGRAM

## 2025-03-20 PROCEDURE — 258N000003 HC RX IP 258 OP 636

## 2025-03-20 PROCEDURE — 99233 SBSQ HOSP IP/OBS HIGH 50: CPT | Mod: GC | Performed by: INTERNAL MEDICINE

## 2025-03-20 PROCEDURE — 99223 1ST HOSP IP/OBS HIGH 75: CPT | Mod: 25 | Performed by: STUDENT IN AN ORGANIZED HEALTH CARE EDUCATION/TRAINING PROGRAM

## 2025-03-20 PROCEDURE — 250N000013 HC RX MED GY IP 250 OP 250 PS 637

## 2025-03-20 PROCEDURE — 250N000011 HC RX IP 250 OP 636: Mod: JZ

## 2025-03-20 RX ORDER — HYDRALAZINE HYDROCHLORIDE 25 MG/1
25 TABLET, FILM COATED ORAL 3 TIMES DAILY
Status: DISCONTINUED | OUTPATIENT
Start: 2025-03-20 | End: 2025-03-25 | Stop reason: HOSPADM

## 2025-03-20 RX ORDER — LIDOCAINE 40 MG/G
CREAM TOPICAL
Status: DISCONTINUED | OUTPATIENT
Start: 2025-03-20 | End: 2025-03-25 | Stop reason: HOSPADM

## 2025-03-20 RX ORDER — TORSEMIDE 20 MG/1
60 TABLET ORAL DAILY
Status: DISCONTINUED | OUTPATIENT
Start: 2025-03-21 | End: 2025-03-23

## 2025-03-20 RX ORDER — FUROSEMIDE 80 MG/1
80 TABLET ORAL DAILY
Status: DISCONTINUED | OUTPATIENT
Start: 2025-03-20 | End: 2025-03-20

## 2025-03-20 RX ADMIN — HEPARIN SODIUM 2000 UNITS/HR: 10000 INJECTION, SOLUTION INTRAVENOUS at 03:19

## 2025-03-20 RX ADMIN — ACETAMINOPHEN 650 MG: 325 TABLET, FILM COATED ORAL at 21:34

## 2025-03-20 RX ADMIN — Medication 5 MG: at 21:34

## 2025-03-20 RX ADMIN — SACUBITRIL AND VALSARTAN 1 TABLET: 49; 51 TABLET, FILM COATED ORAL at 09:04

## 2025-03-20 RX ADMIN — EMPAGLIFLOZIN 10 MG: 10 TABLET, FILM COATED ORAL at 09:04

## 2025-03-20 RX ADMIN — ACETAMINOPHEN 650 MG: 325 TABLET, FILM COATED ORAL at 09:29

## 2025-03-20 RX ADMIN — DOBUTAMINE IN DEXTROSE 5 MCG/KG/MIN: 200 INJECTION, SOLUTION INTRAVENOUS at 03:25

## 2025-03-20 RX ADMIN — ACETAMINOPHEN 650 MG: 325 TABLET, FILM COATED ORAL at 05:17

## 2025-03-20 RX ADMIN — HYDRALAZINE HYDROCHLORIDE 25 MG: 25 TABLET ORAL at 21:26

## 2025-03-20 RX ADMIN — Medication 600 MG: at 09:05

## 2025-03-20 RX ADMIN — DIGOXIN 125 MCG: 0.12 TABLET ORAL at 09:04

## 2025-03-20 RX ADMIN — FUROSEMIDE 80 MG: 10 INJECTION, SOLUTION INTRAMUSCULAR; INTRAVENOUS at 09:05

## 2025-03-20 RX ADMIN — Medication 250 MG: at 09:05

## 2025-03-20 RX ADMIN — HYDRALAZINE HYDROCHLORIDE 25 MG: 25 TABLET ORAL at 09:05

## 2025-03-20 RX ADMIN — HEPARIN, PORCINE (PF) 10 UNIT/ML INTRAVENOUS SYRINGE 5 ML: at 12:13

## 2025-03-20 RX ADMIN — RIVAROXABAN 20 MG: 20 TABLET, FILM COATED ORAL at 17:52

## 2025-03-20 RX ADMIN — HYDRALAZINE HYDROCHLORIDE 25 MG: 25 TABLET ORAL at 14:02

## 2025-03-20 RX ADMIN — SACUBITRIL AND VALSARTAN 1 TABLET: 97; 103 TABLET, FILM COATED ORAL at 21:26

## 2025-03-20 RX ADMIN — SPIRONOLACTONE 25 MG: 25 TABLET, FILM COATED ORAL at 09:04

## 2025-03-20 RX ADMIN — DAPTOMYCIN 800 MG: 500 INJECTION, POWDER, LYOPHILIZED, FOR SOLUTION INTRAVENOUS at 12:12

## 2025-03-20 ASSESSMENT — ACTIVITIES OF DAILY LIVING (ADL)
ADLS_ACUITY_SCORE: 36
ADLS_ACUITY_SCORE: 37
ADLS_ACUITY_SCORE: 36
ADLS_ACUITY_SCORE: 37
ADLS_ACUITY_SCORE: 36
ADLS_ACUITY_SCORE: 37
ADLS_ACUITY_SCORE: 36
ADLS_ACUITY_SCORE: 37
ADLS_ACUITY_SCORE: 36
ADLS_ACUITY_SCORE: 37
ADLS_ACUITY_SCORE: 36
ADLS_ACUITY_SCORE: 36

## 2025-03-20 NOTE — PROGRESS NOTES
"Care Management Follow Up    Length of Stay (days): 7    Expected Discharge Date: 03/27/2025     Concerns to be Addressed: Discharge planning     Patient plan of care discussed at interdisciplinary rounds: Yes    Anticipated Discharge Disposition: TBD     Anticipated Discharge Services: TBD    Additional Information:  This writer reached out to [facility] liaison to get additional clarity regarding why they will not accept patient back on service for home IV dobutamine.     Per [facility] liaison, safety concerns included, \"Missed doses and missed calls. Pt didn t go in when PICC was out. Pt had dad put in a PIV when we can t have a CADD pump going on a PIV line. PICC site infection.\" Unfortunately this was after extensive discussions last admission regarding expectations and patient had signed a behavioral contract.     Per previous admission, the following other home infusion options were exhausted on 2/10/2025:    Summary of Home Infusion Companies attempted:  [facility]- declined due to history of non compliance  [facility]- declined due to history of non compliance  [facility]- no longer providing inotropes  [facility]- outpatient infusion only  [facility]- do IV dobutamine, but [place] outside of service area  [facility]- do IV dobutamine, but [place] is outside of their service area (intrope pts must be within 2 hours)  [facility]- do not provide IV inotropes  [facility]- do not provide IV inotropes    CC will continue to monitor patient's medical condition and progress towards discharge.  Krystal Bonds RN BSN  6C Unit Care Coordinator  Phone number: [phone]    SEARCHABLE in [facility] - search CARE COORDINATOR      [place] & [place] ([phone]) Saturday & Sunday; ([phone]) [facility] Recognized Holidays      Units: 5A Onc Vocera & 5C Vocera      Units: 6B Vocera & 6C Vocera       Units: 7A SOT " RNCC Vocera, 7B Med Surg Vocera, & 7C Med Surg Vocera      Units: 6A Vocera & 4A CVICU Vocera, 4C MICU Vocera, and 4E SICU Vocera       Units: 5 Ortho Vocera & 5 Med Surg Vocera      Units: 6 Med Surg Vocera & 8 Med Surg Vocera

## 2025-03-20 NOTE — PLAN OF CARE
Shift: 5828-4906    Neuro: A&O x4, DND order in per pt request [id number]  Cardiac: SR   Respiratory: RA    : Up to the bathroom  Diet/appetite: Regular diet  Activity: Independent  Pain: reported pain around teeth site, managed w tylenol.  Skin: No significant changes  LDA's: TL PICC, grey running dobutamine 5 mcg/kg/minute, white running heparin 2150 units/hr, after 2300 unit bolus is complete.              Goal Outcome Evaluation:      Plan of Care Reviewed With: patient    Overall Patient Progress: no changeOverall Patient Progress: no change

## 2025-03-20 NOTE — PROGRESS NOTES
[facility]     Addiction Progress Note - Addiction Service        Date of Admission:  3/13/2025      Assessment & Plan       Jose Enrique Engel is a 38 yo male with PMHx of HFrEF (15%) 2/2 NICM s/p ICD, CKD3, polysubstance use admitted as transfer from outside hospital on 3/14/25 for severe sepsis secondary to Staphylococcus aureus bacteremia leading to multiorgan dysfunction.  He was transferred to [facility] given advanced heart failure.      ADHD  Methamphetamine Use Disorder, in remission   Patient has remained off of methamphetamine for over a year now however has been using his prescription adderall sparingly. His UDS does show amphetamine but NOT methamphetamine which supports no use of illicit substances. He notes that he was prescribed this so did not think there would be any issue taking it. He does not take it daily and only when he needs to get work done or concentrate which is why he still has adderall without a recent prescription (last prescription I can find in records would be 2022.) He says he does not get adderall from any other source. We talked about wellbutrin which he had in the past. He feels like that helped a little bit with energy but not concentration which is what he really needs.   -Discussed recommendation to STOP all use of Adderal moving forward, can consider use of other non-amphetamine medications to treat ADHD with outpatient psychiatrist.  - Will continue to discuss follow up after discharge with patient, he does not feel like he needs a psychiatric appointment or assistance for management of ADHD.  - no sign of meth use on UDS  - Addiction medicine will continue to follow      Alcohol Use Disorder, in remission   Acute on chronic systolic heart failure/HFrEF  Has been doing well with no alcohol use since last June. No concerns for alcohol use at this time supported by [facility]     Marijuana use   Uses very occasionally. No sign of use disorder and  "is ok not using but would also be open to medical marijuana if approved.     Peer Support:   -Our peer  will meet the patient if agreeable and still hospitalized on Thursday, to provide additional outpatient resources  -To contact Tiara Peer  from [facility] ([facility]): call or text: [phone]     Addiction Social Worker:   Our addiction social worker Zenon Nick can be contacted if needed, on her pager [phone] or texted/called at [phone]      The patient's care was discussed with the Patient.    Time Spent on this Encounter   I spent 25 minutes on the unit/floor managing the care of Jose Enrique Engel. Over 50% of my time was spent on the following:   - Counseling the patient and/or family regarding: recommended follow-up  - Coordination of care with the: coordination of care not performed today      Anil Abreu MD on 3/20/2025 at 10:28 AM   Addiction Service   [facility]     Contact information available via [facility] Paging/Directory under \"addiction medicine\"        ______________________________________________________________________    Interval History   Feeling okay this morning, resting in bed. Discussed whether he has been considering a need for a psychiatric appointment as an outpatient related to ADHD management. He says that he doesn't feel that he needs assistance with this, and that he doesn't plan to use adderal moving forward.    Data reviewed today: I reviewed all medications, new labs and imaging results over the last 24 hours.     Physical Exam   Temp: 97.7  F (36.5  C) Temp src: Oral BP: (!) 143/103 Pulse: 90   Resp: 16 SpO2: 98 % O2 Device: None (Room air)    General Appearance:  NAD, laying in bed  Respiratory: Breathing comfortably in room air  Cardiovascular: Pulses assumed  GI: Soft, ND appearing  Skin: WWP appearing  Other:  Answers all questions appropriately     Due to regulation of Title 42 of " the Code of Federal Regulations (CFR) Part 2: Confidentiality laws apply to this note and the information wherein.  Thus, this note cannot be copy and pasted into any other health care staff's note nor can it be included in general medical records sent to ANY outside agency without the patient's written consent.

## 2025-03-20 NOTE — CONSULTS
Palliative Care Consultation Note  [facility]      Patient: Jose Enrique Engel  Date of Admission:  3/13/2025    Requesting Clinician / Team: Jess Calero MD - CARDS 2  Reason for consult: Goals of care - LVAD eval       Recommendations & Counseling     GOALS OF CARE:   Plan of care - Restorative with limits - ultimate goal is to get a heart transplant and notes he is motivated to do anything he needs to do.  Would NOT want dialysis (limits quality of life)   Would NOT want to be kept alive in vegetative state   Would NOT want to be stuck living in a bedbound situation   Would NOT want to be kept alive on mechanical ventilation indefinitely   Would NOT want a LVAD if he was not a heart transplant candidate  He does not like the prospect of being weighed down with a 20-pound LVAD. He is concerned of how it may outwardly look. He also does not like that he has to wait 6 months after getting the LVAD until he can get a heart transplant since he doesn't want to miss an opportunity of getting a heart transplant if it is available, say, 4 months after LVAD implantation.   He has a certain discharge plan in mind (e.g. going out on dobutamine with medical management and following up a bit later to see how he is doing and decide if he would consent for LVAD implantation at that time or not).  Would like to see what an LVAD is like and speak to another patient who has an LVAD    ADVANCE CARE PLANNING:  No health care directive on file. Per system policy, Surrogate Decision-makers for Patients With Diminished Decision-making Capacity offers guidance on possible decision-makers. Father Neal has been identified as a surrogate decision maker.   There is no POLST form on file, defer to patient and/or next of kin for decisions   Code status: Full Code    MEDICAL MANAGEMENT:   We are not actively managing symptoms at this time.    PSYCHOSOCIAL/SPIRITUAL SUPPORT:  Family father  Neal, 9 yo son Isidro, ex-wife  [place] community: No [facility]     Palliative will sign off. Please page if needed.    Advance Care Planning Discussion 3/20/2025. IDanilo MD met with Patient today at the hospital to discuss Advance Care Planning. Jose Enrique Engel does have decisional capacity and was present for this discussion.  Those present were informed of the voluntary nature of this discussion and wished to proceed.  The discussion included:  as above/below . This discussion began at 10:11am and ended at 10:29am for a total of 18 minutes. The aforementioned time is exclusive for advanced care planning and separate from the time noted below for other aspects of this visit.    Total time spent was 110 minutes regarding goals of care and support on the date of the encounter. These recommendations were given to the primary team via this note.    Roque Albright DO / Internal and Palliative Medicine   Securely message with the Vocera Web Console (learn more here)   Text page via [employer] Paging/Directory       Assessment      Jose Enrique Engel is a 39 year old male with a past medical history of HFrEF (10-15%) 2/2 NICM s/p ICD, CKD3, polysubstance. He was transferred from [place] on 3/14/25 for severe sepsis secondary to MRSA bacteremia from PICC leading to multiorgan dysfunction to [facility] for advanced heart failure therapy evaluation. Palliative was consulted 3/17 for goals of care (LVAD eval).    Today, the patient was seen for:  HFrEF 2/2 NICM on dobutamine  Substance use history (amphetamine)  Goals of care  Support  Palliative encounter      History of Present Illness   Met with Jose Enrique at bedside.     Introduced the role of palliative care as an interdisciplinary team that cares for patients with serious illness to help support symptom management, communication, coping for patients and their families as well as support with medical decision making.    Palliative Care questions for pre- LVAD and heart  transplant patients:  Patient's legally designated health care agent: father Neal    Patient's description of how they make decisions (by themselves, in consultation with certain close loved ones, based on advice from medical professionals, etc):  Confers with his father Neal and takes healthcare staff recommendations in mind    What are your personal hopes/goals for receiving the LVAD/heart transplant? Hopes to get to some semblance of normal prior to his NICM event ~2 years prior. Likes to be active, work as , and be spontaneous.    Patient's worries/concerns when considering receiving the LVAD/heart transplant: Does not like how it would look or weigh him down. Notes he could get away with a keri pack with the dobutamine pump but with the LVAD, there is more to consider from a wiring and electronic perspective.     What are the activities/abilities that make your life worth living/give good quality of life? Wants to be a part of his son's life. 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     What does a typical day look like for you? Currently lays around a lot. States Gabriel is cold and there is not much to do. He also gets more fatigued and winded going from the home to car and the cold air doesn't help. Notes when he suffers from hypervolemia, he feels very poorly    How does the patient envision or anticipate his/her future with the LVAD/heart transplant? He understands he would have more energy with the LVAD but does not like the prospect of being weighed down by a heaving machine.     There are risks for kidney failure in patients with advancing heart disease who need LVAD support.  The places/locations that offer dialysis and accept patients with LVADs may be limited in your community.  What do you know about dialysis? How would your possible need  for dialysis influence your quality of life? He was not familiar with dialysis. He was advised generally that dialysis would entail going x3 weekly to a center for ~3-4 hours. Noted some patients feel like dialysis increases fatigue and further stated that dialysis locations for LVAD patients are limited. He states that if it came down to it, he would NOT want dialysis given how limiting it would be    The need to be on a ventilator/breathing machine through a tracheostomy (a tube in your neck) is a risk with LVAD. What are with circumstances you would want your medical providers and family to keep you alive with long term mechanical ventilation? He would be okay with intubation and tracheostomy. If he was stuck on the ventilator 24/7 and/or bedbound, he would want to be made comfortable and pass without suffering/in peace.    An LVAD and heart transplant carries a risk of stroke which, for some people, may limit their ability to communicate their wishes to others.  After a stroke, what sort of outcomes would be unacceptable to you (ie needing to live in nursing facility, loss of independence... Etc.) He understands there is a spectrum of stroke symptoms and that sometimes stroke deficits improve and sometimes they cause people to be in a vegetative state. He would not want to be a vegetable but is okay with some deficit (e.g. facial palsy, some hemiparesis) but function and independence and living life is important to him.    LVAD's are often placed with future heart transplant consideration. Some of our patients are determined not to be heart transplant candidates, or choose to forego heart transplant surgery for personal reasons.  If you had an LVAD placed inside of you for the remainder of your life, what would be your concerns? He would not want an LVAD for the rest of his life. If he was not a candidate for heart transplant he would want to be pass away. Ultimate goal is to get a heart transplant.    What  circumstances or events would lead you to decide or ask your health care agent to decide that you would want the LVAD turned off and be allowed to die a natural death? If he needed it 24/7 and was not an heart transplant candidate, in a bedbound state, in a vegetative state, unable to be independent/care for himself and live life.    Patient's thoughts about what health conditions would be unacceptable (situations the patient would want her/his doctors and loved ones to know that she/he would not want life prolonged)? As above      Prognosis, Goals, & Planning:   Functional Status just prior to this current hospitalization:  American College of Cardiology/American Heart Association Heart Failure Stages:    Stage C: Symptomatic heart failure.  Patient experiences heart failure symptoms -- shortness of breath, fatigue, inability to exercise, etc.     Prognosis, Goals, and/or Advance Care Planning:  As above    Code Status was addressed today:   No      Patient's decision making preferences: shared with support from loved ones        Patient has decision-making capacity today for complex decisions: Intact           Coping, Meaning, & Spirituality:   Mood, coping, and/or meaning in the context of serious illness were addressed today: No    Social:   Living situation: lives with father in [place]   Important relationships/caregivers: father  Occupation: former artist/ (weddings)   Areas of fulfillment/brigette: independence, being social, living life, /art, used to like going to raves, music  Contributing stressors (financial, substance abuse, relationship concerns, etc.)  son is 9 yo who is with ex-wife (social relationship), hx of amphetamine use    Medications:  Reviewed this patient's medication profile and medications from this hospitalization.   [place] Board of Pharmacy Data Base Reviewed: Yes:   reviewed - as below    Takes Adderall too so must get that from a provider in the  [facility]?    ROS:  Comprehensive ROS is reviewed and is negative except per HPI    Physical Exam   Vital Signs with Ranges  Temp:  [97.7  F (36.5  C)-97.8  F (36.6  C)] 97.7  F (36.5  C)  Pulse:  [76-90] 87  Resp:  [16-20] 16  BP: (110-147)/() 145/69  SpO2:  [98 %-99 %] 98 %  Wt Readings from Last 10 Encounters:   03/20/25 76.6 kg (168 lb 12.8 oz)   02/28/25 80.7 kg (178 lb)   02/26/25 80.7 kg (178 lb)   02/14/25 79.3 kg (174 lb 12.8 oz)   01/29/25 80.4 kg (177 lb 4 oz)   01/17/25 81.1 kg (178 lb 14.4 oz)   08/09/23 78.5 kg (173 lb)   07/19/23 79.4 kg (175 lb)     168 lbs 12.8 oz  General: Not in acute distress.  Head: Atraumatic. Normocephalic.   Eyes: Anicteric without injection. Eyes conjugate. Extraocular movements intact.  Ear, Nose, and Throat: Mouth pink and moist without lesions. Neck without overt masses.  Pulmonary: Unlabored. Speaking in full sentences  Cardiovascular: Perfusing. On dobutamine  Abdomen: Non-distended.  Skin: Warm. Dry. No bruises or rashes noted.  Musculoskeletal: Joints of hand normal. Muscle bulk and tone normal in UE and LE.  Neuro: Speech fluent. Face symmetric. No focal neurologic deficit noted. Alert and oriented x4.  Psych: Normal mood and affect. Sensorium, gross memory, thought processes, and fund of knowledge intact.      Data reviewed:  Results for orders placed or performed during the hospital encounter of 03/13/25 (from the past 24 hours)   Comprehensive metabolic panel   Result Value Ref Range    Sodium 132 (L) 135 - 145 mmol/L    Potassium 4.7 3.4 - 5.3 mmol/L    Carbon Dioxide (CO2) 22 22 - 29 mmol/L    Anion Gap 13 7 - 15 mmol/L    Urea Nitrogen 21.5 (H) 6.0 - 20.0 mg/dL    Creatinine 1.09 0.67 - 1.17 mg/dL    GFR Estimate 89 >60 mL/min/1.73m2    Calcium 9.7 8.8 - 10.4 mg/dL    Chloride 97 (L) 98 - 107 mmol/L    Glucose 96 70 - 99 mg/dL    Alkaline Phosphatase 201 (H) 40 - 150 U/L    AST 32 0 - 45 U/L    ALT 58 0 - 70 U/L    Protein Total 7.7 6.4 - 8.3 g/dL     Albumin 3.7 3.5 - 5.2 g/dL    Bilirubin Total 1.1 <=1.2 mg/dL   Lactic acid whole blood   Result Value Ref Range    Lactic Acid 0.9 0.7 - 2.0 mmol/L   Comprehensive metabolic panel   Result Value Ref Range    Sodium 135 135 - 145 mmol/L    Potassium 4.5 3.4 - 5.3 mmol/L    Carbon Dioxide (CO2) 24 22 - 29 mmol/L    Anion Gap 11 7 - 15 mmol/L    Urea Nitrogen 18.5 6.0 - 20.0 mg/dL    Creatinine 1.10 0.67 - 1.17 mg/dL    GFR Estimate 88 >60 mL/min/1.73m2    Calcium      Chloride 100 98 - 107 mmol/L    Glucose 90 70 - 99 mg/dL    Alkaline Phosphatase 189 (H) 40 - 150 U/L    AST 31 0 - 45 U/L    ALT 51 0 - 70 U/L    Protein Total 7.4 6.4 - 8.3 g/dL    Albumin 3.5 3.5 - 5.2 g/dL    Bilirubin Total 1.2 <=1.2 mg/dL   Heparin Unfractionated Anti Xa Level   Result Value Ref Range    Anti Xa Unfractionated Heparin 0.28 For Reference Range, See Comment IU/mL    Narrative    Therapeutic Range: UFH: 0.25-0.50 IU/mL for low intensity dosing,  0.30-0.70 IU/mL for high intensity dosing DVT and PE.  This test is not validated for other direct factor X inhibitors (e.g. rivaroxaban, apixaban, edoxaban, betrixaban, fondaparinux) and should not be used for monitoring of other medications.   Magnesium   Result Value Ref Range    Magnesium 2.0 1.7 - 2.3 mg/dL   Heparin Unfractionated Anti Xa Level   Result Value Ref Range    Anti Xa Unfractionated Heparin 0.65 For Reference Range, See Comment IU/mL    Narrative    Therapeutic Range: UFH: 0.25-0.50 IU/mL for low intensity dosing,  0.30-0.70 IU/mL for high intensity dosing DVT and PE.  This test is not validated for other direct factor X inhibitors (e.g. rivaroxaban, apixaban, edoxaban, betrixaban, fondaparinux) and should not be used for monitoring of other medications.     No results found for this or any previous visit (from the past 24 hours).    Medical Decision Making

## 2025-03-20 NOTE — PLAN OF CARE
Goal Outcome Evaluation:      Plan of Care Reviewed With: patient    Overall Patient Progress: improvingOverall Patient Progress: improving    Outcome Evaluation: VSS, met with providers today to discuss LVAD    Neuro: A&O x4,   Cardiac: SR   Respiratory: RA    : Up to the bathroom ind, AUO, LBM 3/19  Diet/appetite: Regular diet  Activity: Independent  Pain: reported pain d/t tooth extraction, using tylenol PRN with good relief  Skin: No significant changes  LDA's: TL PICC, running dobutamine 2.5 mcg/kg/minute, decreased rate per orders.  Heparin gtt stopped this afternoon per orders.

## 2025-03-20 NOTE — PROGRESS NOTES
[facility]  Cardiology II Service / Advanced Heart Failure  Daily Progress Note    Patient: Jose Enrique Engel      : 1985      MRN: [id number]    Assessment/Plan:   38 yo male with PMHx of HFrEF (15%) 2/2 NICM s/p ICD, CKD3, polysubstance use admitted as transfer from outside hospital on 3/14/25 for severe sepsis secondary to MRSA bacteremia leading to multiorgan dysfunction.  He was transferred to [facility] given advanced heart failure.    Today:  - Continue Daptomycin through ; may require outpatient IV daily antibiotic infusions  - Given improvement in MAP and volume status, will attempt to wean down dobutamine to 2.5mcg/kg/min and potentially trial off, as he is no longer approved for home dobutamine infusion  - Increase Entresto for further afterload reduction while weaning dobutamine  - Ongoing discussions re LVAD, pt is hesitant and he also may not be a candidate  - Transition to Torsemide instead of IV lasix as appears euvolemic   - Transition to rivaroxaban for DVT treatment and stop heparin drip as he does not have any imminent procedures      Severe sepsis secondary to MRSA bacteremia  Possible source: PICC line (removed at outside hospital) vs septic thrombophlebitis (RUE thrombus). ICD is subcutaneous and does not have venous leads. He had JW which did not show vegetation. Blood cultures remain negative since 3/15. Planning for prolonged course of daily Daptomycin. May require outpatient daily infusions to be arranged as he is no longer home infusion candidate.  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 Was previously on outpatient dobutamine, self-discontinued in . Re-engaged with outpatient  cardiologist Dr. Dove Jan 2025.  Interested in advanced therapies but wasn't a candidate at that time due to being lost to follow up, substance use, recurrent infections and difficulty managing PICC line.  He was discharged on home dobutamine infusion during last admission. Home infusion service will not accept him back due to compliance issues so he will not be able to discharge on inotrope. VAD eval initited this admission, but patient currently is hesitant about LVAD and he also may not be considered a candidate.    Last RHC 1/29/25: RA 15, PA 44/26, PCWP 21, CI 1.7, PVR 3.4, SVR ~1400  Last TTE 3/13/2025: EF 10%,mild pulm Htn, grade-1 diastolic dysfunction  - Volume: hypervolemic.  lbs. Was 180 on admission.   - Inotrope:   - Dobutamine @ 2.5mcg/kg/min (decreased 3/20)               - PTA digoxin 125 mcg daily  - Afterload Reduction: Hydralazine 25mg q6h (3/15)  GDMT:                - Diuretic: Torsemide 60mg BID               - ACE/ARB/ARNI: - Entresto  mg BID (increased 3/20)               - MRA spironolactone 25               - BB- none not while on dobutamine               - SGLT2-  empagliflozin 10 mg daily  - Anticoagulation:  Rivaroxaban   - Statin: none  - Antiplatelet: none  - Antiarrhythmic: none  - SCD prophylaxis: subcuteanous ICD in place  - Electrolytes: BID monitoring with diuresis, goals K>4, Mag>2  - Strict I/O monitoring, daily weights  - Dietary restrictions: regular diet (pt request), 2L fluids (pt intermittently following)  - VAD eval initiated on 3/17   - Imaging:    - CT CAP: multiple small pulmonary nodules, do not require further evaluation given small size    - CT dental:  Multiple dental extractions done 3/19    - CT head: No intracranial abnormality    - Carotid US: <50% stenosis    - LE arterial US: WNL   - Notable labs:    - Iron deficiency; will need IV iron once infection is cleared    - Subclinical hypothyroidism   - Consults     - Palliative care    - Neuropsych-  eval pending     Right upper extremity DVT   Clot from brachial vein through the subclavian diagnosed at outside hospital with RUE US 3/10/25. Was not on Xarelto prior to admission. Repeat RUE US 3/14 showed persistence of DVT in right innominate, subclavian and axial veins.   - Heparin infusion (3/10-20)  - Rivaroxaban 3/20-     Hyponatremia, resolved  Likely due to heart failure and volume overload. Improved with diuresis.    H/o substance use disorders, in remission (methamphetamines, alcohol)  In remission for >1 year. UDS positive for methamphetamines from his adderall prescription, no evidence of active use.   - Addiction medicine following   - SW helping to connect patient to outpatient treatment options  - Peth, nicotine pending      CKD stage II  - Cr stable, continue to monitor     Hx left ventricular mural thrombus  - Heparin gtt     Thrombocytopenia   -CTM      FEN: Cardiac  DVT Prophylaxis: Xarelto  Code Status: Full Code  Disposition: Pending resolution of bacteremia and VAD eval     Pt to be seen and discussed with the staff attending Dr. Johnson.    Jess Calero MD  PGY-3 Internal Medicine- Pediatrics       ==================================    Subjective:   Interval History: NAEO. Breathing feels better today. He is meeting with LVAD patient to discuss further. He feels  hesitant about it.     Objective:     Vitals:    03/18/25 0452 03/19/25 0453 03/20/25 0519   Weight: 79.2 kg (174 lb 9.6 oz) 79.7 kg (175 lb 12.8 oz) 76.6 kg (168 lb 12.8 oz)     Vital Signs with Ranges  Temp:  [97.7  F (36.5  C)-97.8  F (36.6  C)] 97.7  F (36.5  C)  Pulse:  [76-90] 87  Resp:  [16-20] 16  BP: (110-147)/() 145/69  SpO2:  [98 %-99 %] 98 %  I/O last 3 completed shifts:  In: 1969.1 [P.O.:1690; I.V.:179.1; IV Piggyback:100]  Out: 5050 [Urine:5050]    Exam:  Constitutional:  Appears tired, no distress  HEENT: PERRLA, extraocular motion intact, oral mucosa moist  Cardiovascular: Tachycardic rate, regular rhythm,  pulses 2+ bilateral upper extremities, no lower extremity edema, fingers and toes slightly cool, JVD +5cm  Pulmonary: Lungs bilaterally clear to auscultation  GI: Abdomen soft nontender nondistended normal active bowel sounds  MSK: Well-developed  Skin: erythema at previous picc site. No drainage  Neuro: Alert and oriented to person place and time  Psych: Appropriate mood and affect    Medications   Current Facility-Administered Medications   Medication Dose Route Frequency Provider Last Rate Last Admin    DOBUTamine (DOBUTREX) 500 mg in D5W 250 mL infusion (adult std conc)  2.5 mcg/kg/min (Order-Specific) Intravenous Continuous Alexi Devine MD 5.8 mL/hr at 03/20/25 1703 2.5 mcg/kg/min at 03/20/25 1703     Current Facility-Administered Medications   Medication Dose Route Frequency Provider Last Rate Last Admin    calcium carbonate (OS-LENARD) tablet 600 mg  600 mg Oral Daily Bao Mahan MD   600 mg at 03/20/25 0905    DAPTOmycin (CUBICIN) 800 mg in sodium chloride 0.9 % 100 mL intermittent infusion  10 mg/kg Intravenous Q24H Jess Calero  mL/hr at 03/19/25 1137 800 mg at 03/20/25 1212    digoxin (LANOXIN) tablet 125 mcg  125 mcg Oral QAM Zaid Mar MD   125 mcg at 03/20/25 0904    empagliflozin (JARDIANCE) tablet 10 mg  10 mg Oral Daily Bao Mahan MD   10 mg at 03/20/25 0904    [Held by provider] ferrous sulfate (FEROSUL) tablet 325 mg  325 mg Oral Daily with breakfast Bao Mahan MD        heparin lock flush 10 unit/mL injection 5-20 mL  5-20 mL Intracatheter Q24H Jess Calero MD   5 mL at 03/20/25 1213    hydrALAZINE (APRESOLINE) tablet 25 mg  25 mg Oral TID Alexi Devine MD   25 mg at 03/20/25 1402    rivaroxaban ANTICOAGULANT (XARELTO) tablet 20 mg  20 mg Oral Daily with supper Jess Calero MD        sacubitril-valsartan (ENTRESTO)  MG per tablet 1 tablet  1 tablet Oral BID Alexi Devine MD        spironolactone (ALDACTONE) tablet 25 mg   25 mg Oral Daily Bao Mahan MD   25 mg at 03/20/25 0904    [START ON 3/21/2025] torsemide (DEMADEX) tablet 60 mg  60 mg Oral Daily Alexi Devine MD        vitamin C (ASCORBIC ACID) tablet 250 mg  250 mg Oral Daily Bao Mahan MD   250 mg at 03/20/25 0905       Data   Recent Labs   Lab 03/20/25  0534 03/19/25  1644 03/19/25  0429 03/18/25  1651 03/18/25  0641 03/17/25  1639 03/17/25  0539 03/14/25  0546 03/14/25  0154   WBC  --   --  9.1  --  9.4  --  11.6*   < >  --    HGB  --   --  12.4*  --  12.3*  --  12.5*   < >  --    MCV  --   --  95  --  93  --  92   < >  --    PLT  --   --  290  --  272  --  209   < >  --    INR  --   --   --   --  1.24*  --   --   --  1.14    132* 132* 131* 132*   < > 125*   < > 122*   POTASSIUM 4.5 4.7 4.5 4.2 4.4   < > 4.8   < > 4.6   CHLORIDE 100 97* 98 94* 99   < > 93*   < > 91*   CO2 24 22 23 23 21*   < > 20*   < > 19*   BUN 18.5 21.5* 24.6* 19.7 22.2*   < > 29.7*   < > 36.3*   CR 1.10 1.09 1.12 0.77 1.11   < > 1.37*   < > 1.14   ANIONGAP 11 13 11 14 12   < > 12   < > 12   LENARD  --  9.7 9.2 8.9 8.7*   < > 8.8   < > 8.4*   GLC 90 96 108* 170* 110*   < > 109*   < > 100*   ALBUMIN 3.5 3.7 3.2* 3.3* 3.1*   < > 3.4*   < > 3.2*   PROTTOTAL 7.4 7.7 6.6 6.7 6.1*   < > 6.7   < > 6.1*   BILITOTAL 1.2 1.1 1.0 1.2 1.2   < > 1.4*   < > 2.2*   ALKPHOS 189* 201* 181* 195* 184*   < > 226*   < > 193*   ALT 51 58 55 61 58   < > 76*   < > 114*   AST 31 32 30 37 35   < > 43   < > 108*    < > = values in this interval not displayed.

## 2025-03-21 ENCOUNTER — APPOINTMENT (OUTPATIENT)
Dept: GENERAL RADIOLOGY | Facility: CLINIC | Age: 40
End: 2025-03-21
Payer: COMMERCIAL

## 2025-03-21 ENCOUNTER — APPOINTMENT (OUTPATIENT)
Dept: OCCUPATIONAL THERAPY | Facility: CLINIC | Age: 40
End: 2025-03-21
Attending: INTERNAL MEDICINE
Payer: COMMERCIAL

## 2025-03-21 PROBLEM — N17.9 ACUTE KIDNEY FAILURE, UNSPECIFIED: Status: ACTIVE | Noted: 2025-03-21

## 2025-03-21 LAB
ALBUMIN SERPL BCG-MCNC: 2.5 G/DL (ref 3.5–5.2)
ALBUMIN SERPL BCG-MCNC: 3.4 G/DL (ref 3.5–5.2)
ALBUMIN SERPL BCG-MCNC: 3.8 G/DL (ref 3.5–5.2)
ALBUMIN SERPL BCG-MCNC: 3.9 G/DL (ref 3.5–5.2)
ALP SERPL-CCNC: 114 U/L (ref 40–150)
ALP SERPL-CCNC: 169 U/L (ref 40–150)
ALP SERPL-CCNC: 184 U/L (ref 40–150)
ALP SERPL-CCNC: 187 U/L (ref 40–150)
ALT SERPL W P-5'-P-CCNC: 31 U/L (ref 0–70)
ALT SERPL W P-5'-P-CCNC: 43 U/L (ref 0–70)
ALT SERPL W P-5'-P-CCNC: 50 U/L (ref 0–70)
ALT SERPL W P-5'-P-CCNC: 53 U/L (ref 0–70)
ANION GAP SERPL CALCULATED.3IONS-SCNC: 10 MMOL/L (ref 7–15)
ANION GAP SERPL CALCULATED.3IONS-SCNC: 11 MMOL/L (ref 7–15)
ANION GAP SERPL CALCULATED.3IONS-SCNC: 15 MMOL/L (ref 7–15)
ANION GAP SERPL CALCULATED.3IONS-SCNC: 16 MMOL/L (ref 7–15)
AST SERPL W P-5'-P-CCNC: 27 U/L (ref 0–45)
AST SERPL W P-5'-P-CCNC: 31 U/L (ref 0–45)
AST SERPL W P-5'-P-CCNC: 32 U/L (ref 0–45)
AST SERPL W P-5'-P-CCNC: 33 U/L (ref 0–45)
BACTERIA BLD CULT: NO GROWTH
BASE EXCESS BLDV CALC-SCNC: 1.8 MMOL/L (ref -3–3)
BASE EXCESS BLDV CALC-SCNC: 2.1 MMOL/L (ref -3–3)
BASE EXCESS BLDV CALC-SCNC: 3.7 MMOL/L (ref -3–3)
BILIRUB SERPL-MCNC: 0.5 MG/DL
BILIRUB SERPL-MCNC: 0.9 MG/DL
BILIRUB SERPL-MCNC: 0.9 MG/DL
BILIRUB SERPL-MCNC: 1.1 MG/DL
BUN SERPL-MCNC: 25.3 MG/DL (ref 6–20)
BUN SERPL-MCNC: 28 MG/DL (ref 6–20)
BUN SERPL-MCNC: 31.4 MG/DL (ref 6–20)
BUN SERPL-MCNC: 37.7 MG/DL (ref 6–20)
CALCIUM SERPL-MCNC: 6.1 MG/DL (ref 8.8–10.4)
CALCIUM SERPL-MCNC: 9.4 MG/DL (ref 8.8–10.4)
CALCIUM SERPL-MCNC: 9.4 MG/DL (ref 8.8–10.4)
CALCIUM SERPL-MCNC: 9.9 MG/DL (ref 8.8–10.4)
CHLORIDE SERPL-SCNC: 100 MMOL/L (ref 98–107)
CHLORIDE SERPL-SCNC: 112 MMOL/L (ref 98–107)
CHLORIDE SERPL-SCNC: 94 MMOL/L (ref 98–107)
CHLORIDE SERPL-SCNC: 94 MMOL/L (ref 98–107)
COTININE SERPL-MCNC: <5 NG/ML
CREAT SERPL-MCNC: 0.87 MG/DL (ref 0.67–1.17)
CREAT SERPL-MCNC: 1.02 MG/DL (ref 0.67–1.17)
CREAT SERPL-MCNC: 1.18 MG/DL (ref 0.67–1.17)
CREAT SERPL-MCNC: 1.28 MG/DL (ref 0.67–1.17)
CRP SERPL-MCNC: 18.5 MG/L
CYSTATIN C (ROCHE): 1.2 MG/L (ref 0.6–1)
EGFRCR SERPLBLD CKD-EPI 2021: 73 ML/MIN/1.73M2
EGFRCR SERPLBLD CKD-EPI 2021: 80 ML/MIN/1.73M2
EGFRCR SERPLBLD CKD-EPI 2021: >90 ML/MIN/1.73M2
EGFRCR SERPLBLD CKD-EPI 2021: >90 ML/MIN/1.73M2
ERYTHROCYTE [DISTWIDTH] IN BLOOD BY AUTOMATED COUNT: 15.6 % (ref 10–15)
ERYTHROCYTE [DISTWIDTH] IN BLOOD BY AUTOMATED COUNT: 15.8 % (ref 10–15)
FLUAV RNA SPEC QL NAA+PROBE: NEGATIVE
FLUBV RNA RESP QL NAA+PROBE: NEGATIVE
GFR/BSA.PRED SERPLBLD CYS-BASED-ARV: 66 ML/MIN/1.73M2
GLUCOSE SERPL-MCNC: 125 MG/DL (ref 70–99)
GLUCOSE SERPL-MCNC: 168 MG/DL (ref 70–99)
GLUCOSE SERPL-MCNC: 65 MG/DL (ref 70–99)
GLUCOSE SERPL-MCNC: 96 MG/DL (ref 70–99)
HCO3 BLDV-SCNC: 27 MMOL/L (ref 21–28)
HCO3 BLDV-SCNC: 28 MMOL/L (ref 21–28)
HCO3 BLDV-SCNC: 29 MMOL/L (ref 21–28)
HCO3 SERPL-SCNC: 16 MMOL/L (ref 22–29)
HCO3 SERPL-SCNC: 22 MMOL/L (ref 22–29)
HCO3 SERPL-SCNC: 23 MMOL/L (ref 22–29)
HCO3 SERPL-SCNC: 24 MMOL/L (ref 22–29)
HCT VFR BLD AUTO: 44.1 % (ref 40–53)
HCT VFR BLD AUTO: 44.8 % (ref 40–53)
HGB BLD-MCNC: 14.4 G/DL (ref 13.3–17.7)
HGB BLD-MCNC: 15 G/DL (ref 13.3–17.7)
LACTATE SERPL-SCNC: 0.7 MMOL/L (ref 0.7–2)
LACTATE SERPL-SCNC: 1.6 MMOL/L (ref 0.7–2)
LACTATE SERPL-SCNC: 1.8 MMOL/L (ref 0.7–2)
MAGNESIUM SERPL-MCNC: 1.9 MG/DL (ref 1.7–2.3)
MCH RBC QN AUTO: 30.4 PG (ref 26.5–33)
MCH RBC QN AUTO: 31 PG (ref 26.5–33)
MCHC RBC AUTO-ENTMCNC: 32.7 G/DL (ref 31.5–36.5)
MCHC RBC AUTO-ENTMCNC: 33.5 G/DL (ref 31.5–36.5)
MCV RBC AUTO: 91 FL (ref 78–100)
MCV RBC AUTO: 95 FL (ref 78–100)
NICOTINE SERPL-MCNC: <5 NG/ML
O2/TOTAL GAS SETTING VFR VENT: 21 %
OXYHGB MFR BLDV: 54 % (ref 70–75)
OXYHGB MFR BLDV: 60 % (ref 70–75)
OXYHGB MFR BLDV: 67 % (ref 70–75)
PCO2 BLDV: 45 MM HG (ref 40–50)
PCO2 BLDV: 46 MM HG (ref 40–50)
PCO2 BLDV: 49 MM HG (ref 40–50)
PH BLDV: 7.37 [PH] (ref 7.32–7.43)
PH BLDV: 7.39 [PH] (ref 7.32–7.43)
PH BLDV: 7.42 [PH] (ref 7.32–7.43)
PLATELET # BLD AUTO: 360 10E3/UL (ref 150–450)
PLATELET # BLD AUTO: 416 10E3/UL (ref 150–450)
PO2 BLDV: 36 MM HG (ref 25–47)
PO2 BLDV: 38 MM HG (ref 25–47)
PO2 BLDV: 39 MM HG (ref 25–47)
POTASSIUM SERPL-SCNC: 2.9 MMOL/L (ref 3.4–5.3)
POTASSIUM SERPL-SCNC: 4.6 MMOL/L (ref 3.4–5.3)
POTASSIUM SERPL-SCNC: 4.7 MMOL/L (ref 3.4–5.3)
POTASSIUM SERPL-SCNC: 4.7 MMOL/L (ref 3.4–5.3)
PROT SERPL-MCNC: 5 G/DL (ref 6.4–8.3)
PROT SERPL-MCNC: 7.2 G/DL (ref 6.4–8.3)
PROT SERPL-MCNC: 7.9 G/DL (ref 6.4–8.3)
PROT SERPL-MCNC: 8 G/DL (ref 6.4–8.3)
RBC # BLD AUTO: 4.65 10E6/UL (ref 4.4–5.9)
RBC # BLD AUTO: 4.93 10E6/UL (ref 4.4–5.9)
RSV RNA SPEC NAA+PROBE: NEGATIVE
SAO2 % BLDV: 54.6 % (ref 70–75)
SAO2 % BLDV: 60.6 % (ref 70–75)
SAO2 % BLDV: 68.5 % (ref 70–75)
SARS-COV-2 RNA RESP QL NAA+PROBE: NEGATIVE
SODIUM SERPL-SCNC: 131 MMOL/L (ref 135–145)
SODIUM SERPL-SCNC: 133 MMOL/L (ref 135–145)
SODIUM SERPL-SCNC: 134 MMOL/L (ref 135–145)
SODIUM SERPL-SCNC: 139 MMOL/L (ref 135–145)
WBC # BLD AUTO: 11.3 10E3/UL (ref 4–11)
WBC # BLD AUTO: 8.1 10E3/UL (ref 4–11)

## 2025-03-21 PROCEDURE — 83605 ASSAY OF LACTIC ACID: CPT

## 2025-03-21 PROCEDURE — 250N000011 HC RX IP 250 OP 636

## 2025-03-21 PROCEDURE — 99233 SBSQ HOSP IP/OBS HIGH 50: CPT | Mod: GC | Performed by: INTERNAL MEDICINE

## 2025-03-21 PROCEDURE — 86140 C-REACTIVE PROTEIN: CPT

## 2025-03-21 PROCEDURE — 82610 CYSTATIN C: CPT | Performed by: INTERNAL MEDICINE

## 2025-03-21 PROCEDURE — 82805 BLOOD GASES W/O2 SATURATION: CPT

## 2025-03-21 PROCEDURE — 90791 PSYCH DIAGNOSTIC EVALUATION: CPT | Performed by: CLINICAL NEUROPSYCHOLOGIST

## 2025-03-21 PROCEDURE — 74018 RADEX ABDOMEN 1 VIEW: CPT

## 2025-03-21 PROCEDURE — 250N000013 HC RX MED GY IP 250 OP 250 PS 637

## 2025-03-21 PROCEDURE — 97535 SELF CARE MNGMENT TRAINING: CPT | Mod: GO | Performed by: OCCUPATIONAL THERAPIST

## 2025-03-21 PROCEDURE — 250N000013 HC RX MED GY IP 250 OP 250 PS 637: Performed by: INTERNAL MEDICINE

## 2025-03-21 PROCEDURE — 250N000011 HC RX IP 250 OP 636: Performed by: STUDENT IN AN ORGANIZED HEALTH CARE EDUCATION/TRAINING PROGRAM

## 2025-03-21 PROCEDURE — 87040 BLOOD CULTURE FOR BACTERIA: CPT

## 2025-03-21 PROCEDURE — 999N000044 HC STATISTIC CVC DRESSING CHANGE

## 2025-03-21 PROCEDURE — 85027 COMPLETE CBC AUTOMATED: CPT

## 2025-03-21 PROCEDURE — 84155 ASSAY OF PROTEIN SERUM: CPT

## 2025-03-21 PROCEDURE — 36415 COLL VENOUS BLD VENIPUNCTURE: CPT

## 2025-03-21 PROCEDURE — 250N000013 HC RX MED GY IP 250 OP 250 PS 637: Performed by: STUDENT IN AN ORGANIZED HEALTH CARE EDUCATION/TRAINING PROGRAM

## 2025-03-21 PROCEDURE — 74018 RADEX ABDOMEN 1 VIEW: CPT | Mod: 26 | Performed by: STUDENT IN AN ORGANIZED HEALTH CARE EDUCATION/TRAINING PROGRAM

## 2025-03-21 PROCEDURE — 80053 COMPREHEN METABOLIC PANEL: CPT

## 2025-03-21 PROCEDURE — 87637 SARSCOV2&INF A&B&RSV AMP PRB: CPT

## 2025-03-21 PROCEDURE — 258N000003 HC RX IP 258 OP 636

## 2025-03-21 PROCEDURE — 120N000003 HC R&B IMCU UMMC

## 2025-03-21 PROCEDURE — 83735 ASSAY OF MAGNESIUM: CPT | Performed by: INTERNAL MEDICINE

## 2025-03-21 RX ORDER — ACETAMINOPHEN 325 MG/1
975 TABLET ORAL 3 TIMES DAILY
Status: DISCONTINUED | OUTPATIENT
Start: 2025-03-21 | End: 2025-03-23

## 2025-03-21 RX ORDER — POLYETHYLENE GLYCOL 3350 17 G/17G
17 POWDER, FOR SOLUTION ORAL DAILY
Status: COMPLETED | OUTPATIENT
Start: 2025-03-21 | End: 2025-03-22

## 2025-03-21 RX ORDER — ACETAMINOPHEN 325 MG/1
975 TABLET ORAL EVERY 6 HOURS PRN
Status: DISCONTINUED | OUTPATIENT
Start: 2025-03-21 | End: 2025-03-21

## 2025-03-21 RX ORDER — AMOXICILLIN 250 MG
1 CAPSULE ORAL AT BEDTIME
Status: COMPLETED | OUTPATIENT
Start: 2025-03-21 | End: 2025-03-22

## 2025-03-21 RX ORDER — PROCHLORPERAZINE MALEATE 5 MG/1
10 TABLET ORAL EVERY 6 HOURS PRN
Status: DISCONTINUED | OUTPATIENT
Start: 2025-03-21 | End: 2025-03-25 | Stop reason: HOSPADM

## 2025-03-21 RX ORDER — PROCHLORPERAZINE 25 MG
25 SUPPOSITORY, RECTAL RECTAL EVERY 12 HOURS PRN
Status: DISCONTINUED | OUTPATIENT
Start: 2025-03-21 | End: 2025-03-25 | Stop reason: HOSPADM

## 2025-03-21 RX ORDER — ACETAMINOPHEN 325 MG/1
975 TABLET ORAL 3 TIMES DAILY
Status: DISCONTINUED | OUTPATIENT
Start: 2025-03-21 | End: 2025-03-21

## 2025-03-21 RX ADMIN — ACETAMINOPHEN 650 MG: 325 TABLET, FILM COATED ORAL at 08:06

## 2025-03-21 RX ADMIN — Medication 250 MG: at 08:06

## 2025-03-21 RX ADMIN — SACUBITRIL AND VALSARTAN 1 TABLET: 97; 103 TABLET, FILM COATED ORAL at 20:02

## 2025-03-21 RX ADMIN — HYDRALAZINE HYDROCHLORIDE 25 MG: 25 TABLET ORAL at 19:44

## 2025-03-21 RX ADMIN — HEPARIN, PORCINE (PF) 10 UNIT/ML INTRAVENOUS SYRINGE 5 ML: at 12:08

## 2025-03-21 RX ADMIN — Medication 5 ML: at 19:44

## 2025-03-21 RX ADMIN — PROCHLORPERAZINE EDISYLATE 10 MG: 5 INJECTION INTRAMUSCULAR; INTRAVENOUS at 17:03

## 2025-03-21 RX ADMIN — TORSEMIDE 60 MG: 20 TABLET ORAL at 08:06

## 2025-03-21 RX ADMIN — HYDRALAZINE HYDROCHLORIDE 25 MG: 25 TABLET ORAL at 08:06

## 2025-03-21 RX ADMIN — SACUBITRIL AND VALSARTAN 1 TABLET: 97; 103 TABLET, FILM COATED ORAL at 08:07

## 2025-03-21 RX ADMIN — ACETAMINOPHEN 650 MG: 325 TABLET, FILM COATED ORAL at 12:08

## 2025-03-21 RX ADMIN — DIGOXIN 125 MCG: 0.12 TABLET ORAL at 08:06

## 2025-03-21 RX ADMIN — Medication 5 ML: at 12:58

## 2025-03-21 RX ADMIN — ACETAMINOPHEN 975 MG: 325 TABLET, FILM COATED ORAL at 16:12

## 2025-03-21 RX ADMIN — DOBUTAMINE IN DEXTROSE 2.5 MCG/KG/MIN: 200 INJECTION, SOLUTION INTRAVENOUS at 06:08

## 2025-03-21 RX ADMIN — EMPAGLIFLOZIN 10 MG: 10 TABLET, FILM COATED ORAL at 08:06

## 2025-03-21 RX ADMIN — SPIRONOLACTONE 25 MG: 25 TABLET, FILM COATED ORAL at 08:06

## 2025-03-21 RX ADMIN — ONDANSETRON 4 MG: 4 TABLET, ORALLY DISINTEGRATING ORAL at 16:15

## 2025-03-21 RX ADMIN — HYDRALAZINE HYDROCHLORIDE 25 MG: 25 TABLET ORAL at 14:48

## 2025-03-21 RX ADMIN — Medication 5 ML: at 16:22

## 2025-03-21 RX ADMIN — RIVAROXABAN 20 MG: 20 TABLET, FILM COATED ORAL at 16:12

## 2025-03-21 RX ADMIN — Medication 600 MG: at 08:06

## 2025-03-21 RX ADMIN — DAPTOMYCIN 800 MG: 500 INJECTION, POWDER, LYOPHILIZED, FOR SOLUTION INTRAVENOUS at 12:05

## 2025-03-21 ASSESSMENT — ACTIVITIES OF DAILY LIVING (ADL)
ADLS_ACUITY_SCORE: 37
ADLS_ACUITY_SCORE: 36
ADLS_ACUITY_SCORE: 37
ADLS_ACUITY_SCORE: 36
ADLS_ACUITY_SCORE: 37
ADLS_ACUITY_SCORE: 35
ADLS_ACUITY_SCORE: 37
ADLS_ACUITY_SCORE: 36
ADLS_ACUITY_SCORE: 37

## 2025-03-21 NOTE — PROGRESS NOTES
Jose Enrique Engel is a 39 year old year old male who is being evaluated via a billable video visit.       If the video visit is dropped, the invitation should be resent by [employer]  Will anyone else be joining your video visit?  no  Video Start Time: 10:01 am     Video-Visit Details     Type of service:  Video Visit     Video End Time: 10:38 am    Originating Location (pt. 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 He was unaccompanied during the interview.      This interview was completed via [employer].     BACKGROUND INFORMATION / INTERVIEW FINDINGS    Records indicate that Mr. Jose Enrique Engel has heart failure with reduced ejection fraction secondary to nonischemic cardiomyopathy.  He underwent placement of an implantable cardioverter defibrillator.  His medical history also includes chronic kidney disease stage III, polysubstance abuse, right upper extremity deep vein thrombosis, hyponatremia, thrombocytopenia, cardiogenic shock, and ADHD.  He has had several hospitalizations in February and March, 2025 due to heart issues as well as infections attributable to his PICC line.  Please see records for more details.  A CT scan of his head on March 17, 2025 documented no acute intracranial pathology.  He is being evaluated for LVAD placement.  Concerns have been expressed about his cognition, mental health, and past substance use history in light of his LVAD candidacy.  The current consultation was requested by Dr. Heather Dove, in this context.    On interview, Mr. Engel and I discussed his history of heart issues.  He stated that he has had  "heart disease for somewhere between 2 and 2.5 years.  He noted that over this time, there have been periods in which his heart was working well and other times when he has had heart difficulties.  He stated that he had a PICC line placed.  He was receiving dobutamine.  He described a couple of instances in which this PICC line slipped out.  He went without the PICC line for 1 or 2 months but then had worsening health.  The PICC line was replaced.  He noted that this replacement PICC line also slipped out and became infected.  He stated that as a result of these issues, he has been largely in the hospital since February, 2025.    In terms of the LVAD placement, Mr. Engel reported that he is currently uncertain about whether he wants to move forward with placing this device.  We had a lengthy conversation about the advantages and disadvantages of an LVAD from his point of view.  On the one hand, he would be healthier and be able to spend time with his son.  He noted that if he underwent placement of the LVAD, he would have to stay in the [place] for 30 days.  He reported that it is his understanding that he would have to learn about how to operate the LVAD and involve others so that they understand how to operate the LVAD as well.  He noted feeling somewhat intimidated by managing the LVAD.  He also used the words  daunting  and  involved  when discussing having an LVAD.  He stated that he also understands that he would not be eligible for a heart transplant for the 6 months after he had an LVAD placed.  He reported that he feels like he would be committed to the LVAD for more than 6 months.  He recognizes that he would have to complete significant preparations in order to shower and would no longer be able to bathe.  He described the LVAD as a \"lifelong commitment\" until he would get a heart.  He stated that he would have to care for the LVAD and not neglect it.  On the other hand, he stated that his preference " "would be to go on \"and all pill diet\" until a heart comes along for him.  He stated that there is uncertainty with this approach.  However, he reported that he would be back to 100% healthy if he had a heart transplant.  He reported that he would have to stay positive.  He reported that the LVAD is \"not sexy\" if he is trying to have a social life.  He reported that he have to plug in the LVAD at night.  He stated that he is forgetting what it is like to be normal.    In terms of support, he reported that he lives near many of his family members.  His parents and grandparents live in houses next-door to him.  He described this relationship in a joking way as \"uncomfortably close.\"  He noted, however, that having this level of family support is ideal in his current situation as his family would be able to support him with the LVAD.    With respect to cognition, Mr. Engel reported that he has had lifelong difficulties with attention.  He reported that was diagnosed with ADHD in elementary school.  He was on stimulant medications through grade school and part of high school.  He went back on stimulant medications in his early 30s after his son was born.  He stated that he needed to be able to focus on being a father.  He described having a distracted mind.  He stated that he also struggled with reading comprehension from a young age.  He must read information several times in in order to retain the information.  He was never diagnosed with a verbal learning disability.  He reportedly is not as hyper as he used to be.  He noted feeling bored in the hospital.  He denied having identified any problems or changes with his cognition.  He denied that personality changes have been pointed out to him.    With respect to mental health, Mr. Engel reported that his mood varies.  He described his mood is generally normal.  He denied prior mental health diagnoses or treatments aside from the above-described ADHD.  He reported that " he is generally happy and loves life.  He noted feeling that his life is sometimes unfair and that he sometimes gets the short end the stick.  He denied prior psychiatric hospitalization, hallucinations, symptoms consistent with severe mental illness, trauma, abuse, suicidal ideation, or past suicide attempts.    Regarding other medical background, Mr. Engel denied prior head injury, stroke, or seizure.  He stated that he has always had an irregular sleep schedule.  He relies on zolpidem to aid his sleep.  He tosses and turns.  He reported that he can sleep well when he finally gets to sleep.  He will typically sleep 8 hours at night.  He has had some pain in his mouth due to having some teeth pulled.  He otherwise denied pain.  He stated that he has always been clumsy but otherwise denied gross motor symptoms.  He denied sensory changes.    Per records, his current medications include:  Current Facility-Administered Medications   Medication Dose Route Frequency Provider Last Rate Last Admin    acetaminophen (TYLENOL) tablet 975 mg  975 mg Oral TID Comfort Johnson MD        albuterol (PROVENTIL HFA/VENTOLIN HFA) inhaler  2 puff Inhalation Q6H PRN Bao Mahan MD        calcium carbonate (OS-LENARD) tablet 600 mg  600 mg Oral Daily Bao Mahan MD   600 mg at 03/21/25 0806    calcium carbonate (TUMS) chewable tablet 1,000 mg  1,000 mg Oral 4x Daily PRN Bao Mahan MD   1,000 mg at 03/18/25 2342    DAPTOmycin (CUBICIN) 800 mg in sodium chloride 0.9 % 100 mL intermittent infusion  10 mg/kg Intravenous Q24H Jess Calero  mL/hr at 03/21/25 1205 800 mg at 03/21/25 1205    digoxin (LANOXIN) tablet 125 mcg  125 mcg Oral QAM Zaid Mar MD   125 mcg at 03/21/25 0806    DOBUTamine (DOBUTREX) 500 mg in D5W 250 mL infusion (adult std conc)  2.5 mcg/kg/min (Order-Specific) Intravenous Continuous Alexi Devine MD 5.8 mL/hr at 03/21/25 1500 2.5 mcg/kg/min at 03/21/25 1500     empagliflozin (JARDIANCE) tablet 10 mg  10 mg Oral Daily Bao Mahan MD   10 mg at 03/21/25 0806    [Held by provider] ferrous sulfate (FEROSUL) tablet 325 mg  325 mg Oral Daily with breakfast Bao Mahan MD        heparin lock flush 10 unit/mL injection 5-20 mL  5-20 mL Intracatheter Q24H Jess Calero MD   5 mL at 03/21/25 1208    heparin lock flush 10 unit/mL injection 5-20 mL  5-20 mL Intracatheter Q1H PRN Jess Calero MD   5 mL at 03/21/25 1258    hydrALAZINE (APRESOLINE) tablet 25 mg  25 mg Oral TID Alexi Devine MD   25 mg at 03/21/25 1448    lidocaine (LMX4) cream   Topical Q1H PRN Bao Mahan MD        lidocaine 1 % 1 mL  1 mL Other Q1H PRN Bao Mahan MD        ondansetron (ZOFRAN ODT) ODT tab 4 mg  4 mg Oral Q6H PRN Mitzi Limon MD   4 mg at 03/19/25 1643    rivaroxaban ANTICOAGULANT (XARELTO) tablet 20 mg  20 mg Oral Daily with supper Jess Calero MD   20 mg at 03/20/25 1752    sacubitril-valsartan (ENTRESTO)  MG per tablet 1 tablet  1 tablet Oral BID Alexi Devine MD   1 tablet at 03/21/25 0807    senna-docusate (SENOKOT-S/PERICOLACE) 8.6-50 MG per tablet 1 tablet  1 tablet Oral BID PRN Bao Mahan MD        Or    senna-docusate (SENOKOT-S/PERICOLACE) 8.6-50 MG per tablet 2 tablet  2 tablet Oral BID PRN Bao Mahan MD        sodium chloride (PF) 0.9% PF flush 3 mL  3 mL Intravenous Q1H PRN Bao Mahan MD   3 mL at 03/21/25 1208    sodium chloride (PF) 0.9% PF flush 3 mL  3 mL Intracatheter q1 min prn Bao Mahan MD        spironolactone (ALDACTONE) tablet 25 mg  25 mg Oral Daily Bao Mahan MD   25 mg at 03/21/25 0806    torsemide (DEMADEX) tablet 60 mg  60 mg Oral Daily Alexi Devine MD   60 mg at 03/21/25 0806    vitamin C (ASCORBIC ACID) tablet 250 mg  250 mg Oral Daily Bao Mahan MD   250 mg at 03/21/25 0806    zolpidem (AMBIEN) half-tab 5 mg  5 mg Oral At  "Bedtime Jess De León MD   5 mg at 03/20/25 2134     He denied current alcohol, tobacco, or illicit drug use.  He stated that he was a \"functioning alcoholic\" in the past.  He stated that he used alcohol to medicate.  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  He stopped drinking with onset of his heart issues.  He reported that he used to use tobacco products.  He acknowledged that he had Adderall in his system and cannabis in his system when he was checked into the hospital.  He reported that he took a puff of marijuana at a friend's house.  He stated that he used methamphetamines from age 34 through 2024.  He noted that he never bought methamphetamines but just used with his friends. He understands that methamphetamine use in the future will disqualify him from being on a heart transplant list.     In terms of family medical history, he reported that his mother side of the family has had heart issues.  He denied known family neurologic history.    He denied past significant legal issues.  He reported that he had a speeding ticket in October, 2024.    Mr. Engel lives in a trailer home that he rents from his aunt.  He manages his own basic daily activities and his own medications.  He prepares his own meals.  His family are helping with his finances.  He drives.    By way of background, Mr. Engel was  once in the past.  He is .  He has a girlfriend.  He is an 8-year-old son.  He moved around a lot as a child, as his father was in the .  He reported that he did not do well in school as he was often distracted.  He was never held back in school.  He was not in special education classes.  He dropped out of high school but later earned a GED.  He then completed an associate's degree in automotive engineering.  He also completed real estate licenses.  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  He has applied for disability.     BEHAVIORAL OBSERVATIONS  Mr. Engel was pleasant and cooperative with the interview.  He was talkative.  His speech was normal. His comprehension was normal. His thought processes were normal. His mood was neutral with congruent affect.      IMPRESSIONS  A neuropsychological interview was completed in this visit.  Mr. Engel has a history of heart failure with reduced ejection fraction due to nonischemic cardiomyopathy.  He is being evaluated for LVAD.  He has a history of polysubstance abuse including alcohol and methamphetamines.  He is currently sober.  He has been diagnosed with ADHD but denied other mental health history to me in the interview.  He denied that he has had past treatments for mental health.  He described to me that he is uncertain whether he wants to pursue LVAD.  He noted that the LVAD may be difficult to manage.  He stated that it is his preference to go back to managing his heart disease with a \"pill diet\" and then wait for a heart transplant.  He does have good family support.  He noted that he wants to be able to be there for his son.  While standardized neuropsychological testing was not able to be completed due to staffing, I think that he has a cognitive capacity for making informed medical decisions on his own.  In the past, he managed restaurants and has completed an associate's degree.  Taken together, assuming he maintains sobriety, I do not have concerns about his cognitive or psychological functioning that would preclude him from LVAD placement, assuming that is what he wants to do.     RECOMMENDATIONS     1.  As noted above, I do not have significant neuropsychological or psychological concerns about Mr. Engel's ability to manage an LVAD if he wants to pursue this approach.    2.  Continued sobriety is critically important.     Dale Pichardo, Ph.D., L.P., " ABPP  Board Certified in Clinical Neuropsychology   / Licensed Psychologist [id number]     Time spent:  One unit psychiatric diagnostic interview including interview and clinical assessment by licensed and board-certified neuropsychologist (CPT [id number]). Diagnoses: I50.9, F06.8.

## 2025-03-21 NOTE — PROGRESS NOTES
[facility]  Cardiology II Service / Advanced Heart Failure  Daily Progress Note    Patient: Jose Enrique Engel      : 1985      MRN: [id number]    Assessment/Plan:   40 yo male with PMHx of HFrEF (15%) 2/2 NICM s/p ICD, CKD3, polysubstance use admitted as transfer from outside hospital on 3/14/25 for severe sepsis secondary to MRSA bacteremia leading to multiorgan dysfunction.  He was transferred to [facility] given advanced heart failure.    Today:  - Continue Daptomycin through ; appreciate SW and RNCC setting up outpatient daily antibiotic infusions locally, as well as PICC line dressing changes and weekly lab monitoring (CK while on daptomycin)  - Given increase in Cr and low CI, keep dobutamine at 2.5mcg/kg/min today, anticipate continuing to wean off tomorrow   - Per patient preference, resume IV lasix tomorrow until discharge      Severe sepsis secondary to MRSA bacteremia  Possible source: PICC line (removed at outside hospital) vs septic thrombophlebitis (RUE thrombus). ICD is subcutaneous and does not have venous leads. He had JW which did not show vegetation. Blood cultures remain negative since 3/15. Planning for prolonged course of daily Daptomycin. May require outpatient daily infusions to be arranged as he is no longer home infusion candidate.  - Antibiotics:  - Vancomycin 3/10- 3/15  - Ceftaroline 3/15-3/19  - Daptomycin 3/15- 25     Cardiogenic shock, resolved  Acute on chronic systolic heart failure (ejection fraction 10 to 15%), stage D class IIIb  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 Was previously on outpatient dobutamine, self-discontinued in . Re-engaged with outpatient cardiologist Dr. Dove 2025.  Interested in advanced therapies but wasn't a candidate at that time due to being lost to follow up, substance use, recurrent infections and difficulty managing PICC line.  He was  discharged on home dobutamine infusion during last admission. Home infusion service will not accept him back due to compliance issues so he will not be able to discharge on inotrope. VAD eval initited this admission, but patient currently is hesitant about LVAD and he also may not be considered a candidate.    Last RHC 1/29/25: RA 15, PA 44/26, PCWP 21, CI 1.7, PVR 3.4, SVR ~1400  Last TTE 3/13/2025: EF 10%,mild pulm Htn, grade-1 diastolic dysfunction  - Volume: hypervolemic.  lbs. Was 180 on admission.   - Inotrope:   - Dobutamine @ 2.5mcg/kg/min (decreased 3/20)               - PTA digoxin 125 mcg daily  - Afterload Reduction: Hydralazine 25mg q6h (3/15)  GDMT:                - Diuretic: Torsemide 60mg BID               - ACE/ARB/ARNI: - Entresto  mg BID (increased 3/20)               - MRA spironolactone 25               - BB- none not while on dobutamine               - SGLT2-  empagliflozin 10 mg daily  - Anticoagulation:  Rivaroxaban   - Statin: none  - Antiplatelet: none  - Antiarrhythmic: none  - SCD prophylaxis: subcuteanous ICD in place  - Electrolytes: BID monitoring with diuresis, goals K>4, Mag>2  - Strict I/O monitoring, daily weights  - Dietary restrictions: regular diet (pt request), 2L fluids (pt intermittently following)  - VAD eval initiated on 3/17   - Imaging:    - CT CAP: multiple small pulmonary nodules, do not require further evaluation given small size    - CT dental:  Multiple dental extractions done 3/19    - CT head: No intracranial abnormality    - Carotid US: <50% stenosis    - LE arterial US: WNL   - Notable labs:    - Iron deficiency; will need IV iron once infection is cleared    - Subclinical hypothyroidism   - Consults     - Palliative care    - Neuropsych- eval pending     Right upper extremity DVT   Clot from brachial vein through the subclavian diagnosed at outside hospital with RUE US 3/10/25. Was not on Xarelto prior to admission. Repeat RUE US 3/14 showed  persistence of DVT in right innominate, subclavian and axial veins.   - Heparin infusion (3/10-20)  - Rivaroxaban 3/20-     Hyponatremia, resolved  Likely due to heart failure and volume overload. Improved with diuresis.    H/o substance use disorders, in remission (methamphetamines, alcohol)  In remission for >1 year. UDS positive for methamphetamines from his adderall prescription, no evidence of active use. Recommend stopping PRN adderall.   - Addiction medicine following  - SW helping to connect patient to outpatient treatment options  - Peth, nicotine pending      CKD stage II  - Cr stable, continue to monitor     Hx left ventricular mural thrombus  - Rivaroxaban (3/20-)     Thrombocytopenia   -CTM      FEN: Cardiac  DVT Prophylaxis: Xarelto  Code Status: Full Code  Disposition: Likely to home on 3/24 off of dobutamine infusions with outpatient IV antibiotics through 4/12     Pt staffed with Dr. Latif.     Jess Calero MD  PGY-3 Internal Medicine- Pediatrics       ==================================    Subjective:   Interval History: NAEO. Pt would like to resume IV diuretics as he feels like he gets more fluid off and is more comfortable that way.     Objective:     Vitals:    03/19/25 0453 03/20/25 0519 03/21/25 0518   Weight: 79.7 kg (175 lb 12.8 oz) 76.6 kg (168 lb 12.8 oz) 76.9 kg (169 lb 8 oz)     Vital Signs with Ranges  Temp:  [97.7  F (36.5  C)-98.3  F (36.8  C)] 97.7  F (36.5  C)  Pulse:  [80-99] 99  Resp:  [16-20] 18  BP: ()/(62-78) 94/66  SpO2:  [96 %-99 %] 96 %  I/O last 3 completed shifts:  In: 2281.19 [P.O.:1892; I.V.:389.19]  Out: 4100 [Urine:4100]    Exam:  Constitutional:  Appears tired, no distress  HEENT: PERRLA, extraocular motion intact, oral mucosa moist  Cardiovascular: Tachycardic rate, regular rhythm, pulses 2+ bilateral upper extremities, no lower extremity edema, fingers and toes slightly cool, JVD +5cm  Pulmonary: Lungs bilaterally clear to auscultation  GI: Abdomen  soft nontender nondistended normal active bowel sounds  MSK: Well-developed  Skin: erythema at previous picc site. No drainage  Neuro: Alert and oriented to person place and time  Psych: Appropriate mood and affect    Medications   Current Facility-Administered Medications   Medication Dose Route Frequency Provider Last Rate Last Admin    DOBUTamine (DOBUTREX) 500 mg in D5W 250 mL infusion (adult std conc)  2.5 mcg/kg/min (Order-Specific) Intravenous Continuous Alexi Devine MD 5.8 mL/hr at 03/21/25 1500 2.5 mcg/kg/min at 03/21/25 1500     Current Facility-Administered Medications   Medication Dose Route Frequency Provider Last Rate Last Admin    acetaminophen (TYLENOL) tablet 975 mg  975 mg Oral TID Comfort Johnson MD        calcium carbonate (OS-LENARD) tablet 600 mg  600 mg Oral Daily Bao Mahan MD   600 mg at 03/21/25 0806    DAPTOmycin (CUBICIN) 800 mg in sodium chloride 0.9 % 100 mL intermittent infusion  10 mg/kg Intravenous Q24H Jess Calero  mL/hr at 03/21/25 1205 800 mg at 03/21/25 1205    digoxin (LANOXIN) tablet 125 mcg  125 mcg Oral QAM Zaid Mar MD   125 mcg at 03/21/25 0806    empagliflozin (JARDIANCE) tablet 10 mg  10 mg Oral Daily Bao Mahan MD   10 mg at 03/21/25 0806    [Held by provider] ferrous sulfate (FEROSUL) tablet 325 mg  325 mg Oral Daily with breakfast Bao Mahan MD        heparin lock flush 10 unit/mL injection 5-20 mL  5-20 mL Intracatheter Q24H Jess Calero MD   5 mL at 03/21/25 1208    hydrALAZINE (APRESOLINE) tablet 25 mg  25 mg Oral TID Alexi Devine MD   25 mg at 03/21/25 1448    rivaroxaban ANTICOAGULANT (XARELTO) tablet 20 mg  20 mg Oral Daily with supper Jess Calero MD   20 mg at 03/20/25 1752    sacubitril-valsartan (ENTRESTO)  MG per tablet 1 tablet  1 tablet Oral BID Alexi Devine MD   1 tablet at 03/21/25 0807    spironolactone (ALDACTONE) tablet 25 mg  25 mg Oral Daily Bao Mahan  MD Cassie   25 mg at 03/21/25 0806    torsemide (DEMADEX) tablet 60 mg  60 mg Oral Daily Alexi Devine MD   60 mg at 03/21/25 0806    vitamin C (ASCORBIC ACID) tablet 250 mg  250 mg Oral Daily Bao Mahan MD   250 mg at 03/21/25 0806       Data   Recent Labs   Lab 03/21/25  0528 03/20/25  1756 03/20/25  0534 03/19/25  1644 03/19/25  0429 03/18/25  1651 03/18/25  0641   WBC 8.1  --   --   --  9.1  --  9.4   HGB 14.4  --   --   --  12.4*  --  12.3*   MCV 95  --   --   --  95  --  93     --   --   --  290  --  272   INR  --   --   --   --   --   --  1.24*   * 133* 135   < > 132*   < > 132*   POTASSIUM 4.6 4.7 4.5   < > 4.5   < > 4.4   CHLORIDE 100 94* 100   < > 98   < > 99   CO2 24 23 24   < > 23   < > 21*   BUN 31.4* 25.3* 18.5   < > 24.6*   < > 22.2*   CR 1.28* 1.02 1.10   < > 1.12   < > 1.11   ANIONGAP 10 16* 11   < > 11   < > 12   LENARD 9.4 9.4 9.5   < > 9.2   < > 8.7*   GLC 96 168* 90   < > 108*   < > 110*   ALBUMIN 3.4* 3.8 3.5   < > 3.2*   < > 3.1*   PROTTOTAL 7.2 7.9 7.4   < > 6.6   < > 6.1*   BILITOTAL 0.9 1.1 1.2   < > 1.0   < > 1.2   ALKPHOS 169* 187* 189*   < > 181*   < > 184*   ALT 43 53 51   < > 55   < > 58   AST 27 31 31   < > 30   < > 35    < > = values in this interval not displayed.

## 2025-03-21 NOTE — PLAN OF CARE
Problem: Adult Inpatient Plan of Care  Goal: Plan of Care Review  Description: The Plan of Care Review/Shift note should be completed every shift.  The Outcome Evaluation is a brief statement about your assessment that the patient is improving, declining, or no change.  This information will be displayed automatically on your shift  note.  Outcome: Progressing  Flowsheets (Taken 3/21/2025 7927)  Plan of Care Reviewed With: patient  Overall Patient Progress: No change   Goal Outcome Evaluation:      Plan of Care Reviewed With: patient    Neuro: A&Ox4. Calm, cooperative and pleasant. With chills this afternoon during GI upset episodes.   Cardiac: SR and ST. Intermittent episodes of V-bigemy and trigeminy. MAP 73-85. Denies chest pain. No edema.   Respiratory: Intermittent shortness of breath while at rest and SOB during exertion. O2 saturation 90-92 at room air. To notify provider if with new hypoxia ( O2 sat < 88%) as ordered. Nasopharyngeal Swab was taken to check for Influenza A/B, RSV and SARS-COV2 PCR.   GI/: Nausea and multiple vomiting episodes this afternoon. Refer to I/O for emesis output.  Zofran ODT was ineffective. Compazine IV was administered as ordered and with relieved of symptoms. Abdominal X-ray was taken today.   Diet/appetite: Consumed share with good appetite prior to nausea and vomiting episodes in the afternoon.   Activity:  SBA. Encouraged to call for assistance.   Pain: Mouth pain secondary to dental extraction. Pain management was updated today.  Skin: No new skin concern.  LDA's: PICC 3 lumen on his left upper arm.   Drip: Dobutamine 500 mg/250 ml at 2.5 mcg/kg.minute infusing well on his gray access line.   RN Mgt: Mg and K. No replacements needed today.   Labs/Diagnostics: Repeat Blood gas venous and Lactic acid at 8 PM.   Plan: Continue with POC. Notify primary team with changes.

## 2025-03-21 NOTE — PROGRESS NOTES
Care Management Follow Up    Length of Stay (days): 8    Expected Discharge Date: 03/27/2025     Concerns to be Addressed:       Patient plan of care discussed at interdisciplinary rounds: Yes    Anticipated Discharge Disposition:  Home with family              Anticipated Discharge Services:  Outpatient antibiotic infusions  Anticipated Discharge DME:  No changes    Patient/family educated on Medicare website which has current facility and service quality ratings:    Education Provided on the Discharge Plan:  Yes  Patient/Family in Agreement with the Plan:  Yes    Referrals Placed by CM/SW:  Antibiotic infusion order to [facility] in [facility]  Private pay costs discussed: Not applicable    Discussed  Partnership in Safe Discharge Planning  document with patient/family: Yes:      Handoff Completed: No, handoff not indicated or clinically appropriate    Additional Information:  Care team is currently weaning Dobutamine inpatient.  Pending successful wean, patient may discharge on Sunday. 3/23 on Daptomycin daily through 4/12/24.    Order placed for daily antibiotic infusions, weekly picc line cares, labs at [facility] in [place].      [facility] @ [facility]   [address] [place] [address]  Ph: [phone]  Fax: [phone]    Faxed Dapto prescription and clinicals, confirmed receipt with Emely at [facility].      Next Steps:   1) Monitor for weekend discharge plans  2) If patient discharges, [place] will need the following faxed prior to discharge:       -lab order       -PICC cares order       - PICC placement note   3) [place] is tentatively holding an appointment for patient on Monday, March 24th at 3 pm.        Gina Patricio RN  [facility]  Inpatient Care Management - FLOAT

## 2025-03-21 NOTE — PLAN OF CARE
Shift: 3791-2701     Neuro: A&O x4, DND [id number]  Cardiac: SR   Respiratory: RA    : Up to the bathroom  Diet/appetite: Regular diet  Activity: Independent  Pain: mild pain managed w tylenol  Skin: RUE DVT pt on anticoagulants.   LDA's: TL PICC, grey running dobutamine 2.5 mcg/kg/minute.       Goal Outcome Evaluation:      Plan of Care Reviewed With: patient    Overall Patient Progress: no changeOverall Patient Progress: no change

## 2025-03-21 NOTE — PROGRESS NOTES
CLINICAL NUTRITION SERVICES    Reason for Assessment:  Nutrition education regarding meal planning post-op LVAD placement - deferred at this time per pt's GOC (see palliative note 3/20)    Follow-up:    Will continue to monitor and evaluate per policy; following to complete education if/when it becomes appropriate.     Theodora Carlton MS  N Dietetic Intern    I have read & agree with the above nutrition assessment.  Kinga Singleton, MPH, RDN, LD

## 2025-03-22 LAB
ALBUMIN SERPL BCG-MCNC: 3.4 G/DL (ref 3.5–5.2)
ALBUMIN SERPL BCG-MCNC: 3.4 G/DL (ref 3.5–5.2)
ALP SERPL-CCNC: 157 U/L (ref 40–150)
ALP SERPL-CCNC: 159 U/L (ref 40–150)
ALT SERPL W P-5'-P-CCNC: 42 U/L (ref 0–70)
ALT SERPL W P-5'-P-CCNC: 42 U/L (ref 0–70)
ANION GAP SERPL CALCULATED.3IONS-SCNC: 13 MMOL/L (ref 7–15)
ANION GAP SERPL CALCULATED.3IONS-SCNC: 15 MMOL/L (ref 7–15)
ANION GAP SERPL CALCULATED.3IONS-SCNC: 16 MMOL/L (ref 7–15)
AST SERPL W P-5'-P-CCNC: 29 U/L (ref 0–45)
AST SERPL W P-5'-P-CCNC: 33 U/L (ref 0–45)
BACTERIA BLD CULT: NO GROWTH
BASE EXCESS BLDV CALC-SCNC: 3.9 MMOL/L (ref -3–3)
BILIRUB SERPL-MCNC: 0.7 MG/DL
BILIRUB SERPL-MCNC: 0.8 MG/DL
BUN SERPL-MCNC: 44.4 MG/DL (ref 6–20)
BUN SERPL-MCNC: 45.4 MG/DL (ref 6–20)
BUN SERPL-MCNC: 48.3 MG/DL (ref 6–20)
CALCIUM SERPL-MCNC: 9.4 MG/DL (ref 8.8–10.4)
CALCIUM SERPL-MCNC: 9.5 MG/DL (ref 8.8–10.4)
CALCIUM SERPL-MCNC: 9.8 MG/DL (ref 8.8–10.4)
CHLORIDE SERPL-SCNC: 90 MMOL/L (ref 98–107)
CHLORIDE SERPL-SCNC: 92 MMOL/L (ref 98–107)
CHLORIDE SERPL-SCNC: 93 MMOL/L (ref 98–107)
CK SERPL-CCNC: 51 U/L (ref 39–308)
CREAT SERPL-MCNC: 0.64 MG/DL (ref 0.67–1.17)
CREAT SERPL-MCNC: 1.36 MG/DL (ref 0.67–1.17)
CREAT SERPL-MCNC: 1.62 MG/DL (ref 0.67–1.17)
EGFRCR SERPLBLD CKD-EPI 2021: 55 ML/MIN/1.73M2
EGFRCR SERPLBLD CKD-EPI 2021: 68 ML/MIN/1.73M2
EGFRCR SERPLBLD CKD-EPI 2021: >90 ML/MIN/1.73M2
ERYTHROCYTE [DISTWIDTH] IN BLOOD BY AUTOMATED COUNT: 15.7 % (ref 10–15)
GLUCOSE SERPL-MCNC: 124 MG/DL (ref 70–99)
GLUCOSE SERPL-MCNC: 164 MG/DL (ref 70–99)
GLUCOSE SERPL-MCNC: 306 MG/DL (ref 70–99)
HCO3 BLDV-SCNC: 30 MMOL/L (ref 21–28)
HCO3 SERPL-SCNC: 22 MMOL/L (ref 22–29)
HCO3 SERPL-SCNC: 24 MMOL/L (ref 22–29)
HCO3 SERPL-SCNC: 25 MMOL/L (ref 22–29)
HCT VFR BLD AUTO: 44.2 % (ref 40–53)
HGB BLD-MCNC: 15.1 G/DL (ref 13.3–17.7)
MAGNESIUM SERPL-MCNC: 1.9 MG/DL (ref 1.7–2.3)
MCH RBC QN AUTO: 31 PG (ref 26.5–33)
MCHC RBC AUTO-ENTMCNC: 34.2 G/DL (ref 31.5–36.5)
MCV RBC AUTO: 91 FL (ref 78–100)
O2/TOTAL GAS SETTING VFR VENT: 21 %
OXYHGB MFR BLDV: 66 % (ref 70–75)
PCO2 BLDV: 49 MM HG (ref 40–50)
PH BLDV: 7.4 [PH] (ref 7.32–7.43)
PLATELET # BLD AUTO: 366 10E3/UL (ref 150–450)
PO2 BLDV: 39 MM HG (ref 25–47)
POTASSIUM SERPL-SCNC: 4.6 MMOL/L (ref 3.4–5.3)
POTASSIUM SERPL-SCNC: 4.7 MMOL/L (ref 3.4–5.3)
POTASSIUM SERPL-SCNC: 4.7 MMOL/L (ref 3.4–5.3)
PROT SERPL-MCNC: 7 G/DL (ref 6.4–8.3)
PROT SERPL-MCNC: 7.3 G/DL (ref 6.4–8.3)
RBC # BLD AUTO: 4.87 10E6/UL (ref 4.4–5.9)
SAO2 % BLDV: 67.5 % (ref 70–75)
SODIUM SERPL-SCNC: 128 MMOL/L (ref 135–145)
SODIUM SERPL-SCNC: 131 MMOL/L (ref 135–145)
SODIUM SERPL-SCNC: 131 MMOL/L (ref 135–145)
WBC # BLD AUTO: 10.7 10E3/UL (ref 4–11)

## 2025-03-22 PROCEDURE — 250N000013 HC RX MED GY IP 250 OP 250 PS 637

## 2025-03-22 PROCEDURE — 250N000011 HC RX IP 250 OP 636

## 2025-03-22 PROCEDURE — 83735 ASSAY OF MAGNESIUM: CPT | Performed by: INTERNAL MEDICINE

## 2025-03-22 PROCEDURE — 99231 SBSQ HOSP IP/OBS SF/LOW 25: CPT | Performed by: STUDENT IN AN ORGANIZED HEALTH CARE EDUCATION/TRAINING PROGRAM

## 2025-03-22 PROCEDURE — 999N000007 HC SITE CHECK

## 2025-03-22 PROCEDURE — 99233 SBSQ HOSP IP/OBS HIGH 50: CPT | Mod: GC | Performed by: INTERNAL MEDICINE

## 2025-03-22 PROCEDURE — 80053 COMPREHEN METABOLIC PANEL: CPT

## 2025-03-22 PROCEDURE — 250N000013 HC RX MED GY IP 250 OP 250 PS 637: Performed by: STUDENT IN AN ORGANIZED HEALTH CARE EDUCATION/TRAINING PROGRAM

## 2025-03-22 PROCEDURE — 250N000013 HC RX MED GY IP 250 OP 250 PS 637: Performed by: INTERNAL MEDICINE

## 2025-03-22 PROCEDURE — 258N000003 HC RX IP 258 OP 636

## 2025-03-22 PROCEDURE — 120N000003 HC R&B IMCU UMMC

## 2025-03-22 PROCEDURE — 82550 ASSAY OF CK (CPK): CPT

## 2025-03-22 PROCEDURE — 999N000202 HC STATISTICAL VASC ACCESS NURSE TIME, 1-15 MINUTES

## 2025-03-22 PROCEDURE — 82805 BLOOD GASES W/O2 SATURATION: CPT

## 2025-03-22 PROCEDURE — 85027 COMPLETE CBC AUTOMATED: CPT

## 2025-03-22 RX ORDER — FUROSEMIDE 10 MG/ML
60 INJECTION INTRAMUSCULAR; INTRAVENOUS DAILY
Status: DISCONTINUED | OUTPATIENT
Start: 2025-03-22 | End: 2025-03-22

## 2025-03-22 RX ADMIN — ACETAMINOPHEN 975 MG: 325 TABLET, FILM COATED ORAL at 20:34

## 2025-03-22 RX ADMIN — Medication 5 MG: at 20:35

## 2025-03-22 RX ADMIN — SACUBITRIL AND VALSARTAN 1 TABLET: 97; 103 TABLET, FILM COATED ORAL at 08:59

## 2025-03-22 RX ADMIN — RIVAROXABAN 20 MG: 20 TABLET, FILM COATED ORAL at 17:03

## 2025-03-22 RX ADMIN — SACUBITRIL AND VALSARTAN 1 TABLET: 97; 103 TABLET, FILM COATED ORAL at 20:34

## 2025-03-22 RX ADMIN — EMPAGLIFLOZIN 10 MG: 10 TABLET, FILM COATED ORAL at 08:57

## 2025-03-22 RX ADMIN — ACETAMINOPHEN 975 MG: 325 TABLET, FILM COATED ORAL at 11:41

## 2025-03-22 RX ADMIN — DAPTOMYCIN 800 MG: 500 INJECTION, POWDER, LYOPHILIZED, FOR SOLUTION INTRAVENOUS at 11:41

## 2025-03-22 RX ADMIN — Medication 250 MG: at 08:57

## 2025-03-22 RX ADMIN — HYDRALAZINE HYDROCHLORIDE 25 MG: 25 TABLET ORAL at 20:35

## 2025-03-22 RX ADMIN — DIGOXIN 125 MCG: 0.12 TABLET ORAL at 08:57

## 2025-03-22 RX ADMIN — Medication 600 MG: at 08:57

## 2025-03-22 RX ADMIN — Medication 5 ML: at 05:54

## 2025-03-22 RX ADMIN — Medication 5 MG: at 03:02

## 2025-03-22 RX ADMIN — FUROSEMIDE 60 MG: 10 INJECTION, SOLUTION INTRAMUSCULAR; INTRAVENOUS at 08:57

## 2025-03-22 RX ADMIN — ACETAMINOPHEN 975 MG: 325 TABLET, FILM COATED ORAL at 02:57

## 2025-03-22 RX ADMIN — CALCIUM CARBONATE (ANTACID) CHEW TAB 500 MG 1000 MG: 500 CHEW TAB at 02:57

## 2025-03-22 RX ADMIN — HYDRALAZINE HYDROCHLORIDE 25 MG: 25 TABLET ORAL at 15:09

## 2025-03-22 RX ADMIN — HYDRALAZINE HYDROCHLORIDE 25 MG: 25 TABLET ORAL at 08:57

## 2025-03-22 RX ADMIN — SPIRONOLACTONE 25 MG: 25 TABLET, FILM COATED ORAL at 08:57

## 2025-03-22 ASSESSMENT — ACTIVITIES OF DAILY LIVING (ADL)
ADLS_ACUITY_SCORE: 35
ADLS_ACUITY_SCORE: 36
ADLS_ACUITY_SCORE: 36
ADLS_ACUITY_SCORE: 35
ADLS_ACUITY_SCORE: 36
ADLS_ACUITY_SCORE: 35

## 2025-03-22 NOTE — PROGRESS NOTES
[facility]     Addiction Progress Note - Addiction Service        Date of Admission:  3/13/2025  Assessment & Plan       Jose Enrique Engel is a 38 yo male with PMHx of HFrEF (15%) 2/2 NICM s/p ICD, CKD3, polysubstance use admitted as transfer from outside hospital on 3/14/25 for severe sepsis secondary to Staphylococcus aureus bacteremia leading to multiorgan dysfunction.  He was transferred to [facility] given advanced heart failure, VAD evaluation initiated this admission.      ADHD  Methamphetamine Use Disorder, in remission   Patient has remained off of methamphetamine for over a year now however has been using his prescription adderall sparingly. His UDS does show amphetamine but NOT methamphetamine which supports no use of illicit substances. He notes that he was prescribed this so did not think there would be any issue taking it. He does not take it daily and only when he needs to get work done or concentrate which is why he still has adderall without a recent prescription (last prescription I can find in records would be 2022.) He says he does not get adderall from any other source. We talked about wellbutrin which he had in the past. He feels like that helped a little bit with energy but not concentration which is what he really needs.   -Discussed recommendation to STOP all use of Adderal moving forward, can consider use of other non-amphetamine medications to treat ADHD with outpatient psychiatrist.  - Currently he does not feel like he needs a psychiatric appointment or assistance for management of ADHD.  - no sign of meth use on UDS  - Addiction medicine will continue to follow peripherally      Alcohol Use Disorder, in remission   Acute on chronic systolic heart failure/HFrEF  Has been doing well with no alcohol use since last June. No concerns for alcohol use at this time supported by [facility]     Marijuana use   Uses very occasionally. No sign of use disorder and is ok  "not using but would also be open to medical marijuana if approved.     Peer Support:   -Our peer  will meet the patient if agreeable and still hospitalized on Thursday, to provide additional outpatient resources  -To contact Tiara Peer  from [facility] ([facility]): call or text: [phone]     Addiction Social Worker:   Our addiction social worker Zenon Nick can be contacted if needed, on her pager [phone] or texted/called at [phone]      The patient's care was discussed with the Patient.    Time Spent on this Encounter   I spent 25 minutes on the unit/floor managing the care of Jose Enrique Engel. Over 50% of my time was spent on the following:   - Counseling the patient and/or family regarding: recommended follow-up  - Coordination of care with the: coordination of care not performed today      Anil Abreu MD on 3/22/2025 at 8:48 AM   Addiction Service   [facility]     Contact information available via [facility] Paging/Directory under \"addiction medicine\"        ______________________________________________________________________    Interval History   Feeling overall okay, curious about his disposition plan from the hospital. No interest for now in psychiatry or mental health services once he discharges.    Data reviewed today: I reviewed all medications, new labs and imaging results over the last 24 hours.     Physical Exam   Temp: 98.3  F (36.8  C) Temp src: Oral BP: 94/57 Pulse: 74   Resp: 16 SpO2: 96 % O2 Device: None (Room air)    General Appearance:  NAD, sitting up in bed  Respiratory: Breathing comfortably in room air  Cardiovascular: Pulses assumed  GI: Soft, ND appearing  Skin: WWP appearing  Other:  Answers all questions appropriately     Due to regulation of Title 42 of the Code of Federal Regulations (CFR) Part 2: Confidentiality laws apply to this note and the information wherein.  Thus, this note cannot be " copy and pasted into any other health care staff's note nor can it be included in general medical records sent to ANY outside agency without the patient's written consent.

## 2025-03-22 NOTE — PROGRESS NOTES
[facility]  Cardiology II Service / Advanced Heart Failure  Daily Progress Note    Patient: Jose Enrique Engel      : 1985      MRN: [id number]    Assessment/Plan:   38 yo male with PMHx of HFrEF (15%) 2/2 NICM s/p ICD, CKD3, polysubstance use admitted as transfer from outside hospital on 3/14/25 for severe sepsis secondary to MRSA bacteremia leading to multiorgan dysfunction.  He was transferred to [facility] given advanced heart failure.    Today:  - Worsening BERNARD;  concern for overdiuresis with low cardiac output. Reassuringly lactate and Svo2 stable so will continue dobutamine at current dose, but hole diuretics.  - If Cr worsening this afternoon, will give fluids. If creatinines continues to worsen, will abandon dobutamine wean and increase back to 5 mcg/kg/min  - Continue Daptomycin through ; appreciate SW and RNCC setting up outpatient daily antibiotic infusions locally, as well as PICC line dressing changes and weekly lab monitoring (CK while on daptomycin)       MRSA bacteremia  Possible source: PICC line (removed at outside hospital) vs septic thrombophlebitis (RUE thrombus). ICD is subcutaneous and does not have venous leads. He had JW which did not show vegetation. Blood cultures remain negative since 3/15. Planning for prolonged course of daily Daptomycin. Will receive daily outpatient infusions via PICC line in [facility] where he lives.   - Antibiotics:  - Vancomycin 3/10- 3/15  - Ceftaroline 3/15-3/19  - Daptomycin 3/15- 25     Cardiogenic shock, resolved  Acute on chronic systolic heart failure (ejection fraction 10 to 15%), stage D class IIIb  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 Was previously on outpatient dobutamine, self-discontinued in . Re-engaged with outpatient cardiologist Dr. Dove 2025.  Interested in advanced therapies but wasn't a candidate at that time due to being lost  to follow up, substance use, recurrent infections and difficulty managing PICC line.  He was discharged on home dobutamine infusion during last admission. VAD/ transplant eval initited this admission, but patient is not a candidate currently due to concerns about follow up, compliance, recurrent infections. Unfortunately, he has also lost his home infusions service due to same issues. Will attempt dobutamine wean in the  hospital.  Last RHC 1/29/25: RA 15, PA 44/26, PCWP 21, CI 1.7, PVR 3.4, SVR ~1400  Last TTE 3/13/2025: EF 10%,mild pulm Htn, grade-1 diastolic dysfunction  - Volume: hypervolemic.  lbs. Was 180 on admission.   - Inotrope:   - Dobutamine @ 2.5mcg/kg/min (decreased 3/20)               - PTA digoxin 125 mcg daily  - Afterload Reduction: Hydralazine 25mg q6h (3/15)  GDMT:                - Diuretic: Torsemide 60mg BID               - ACE/ARB/ARNI: - Entresto  mg BID (increased 3/20)               - MRA spironolactone 25               - BB- none not while on dobutamine               - SGLT2-  empagliflozin 10 mg daily  - Anticoagulation:  Rivaroxaban   - Statin: none  - Antiplatelet: none  - Antiarrhythmic: none  - SCD prophylaxis: subcuteanous ICD in place  - Electrolytes: daily checks, goals K>4, Mag>2  - Strict I/O monitoring, daily weights  - Dietary restrictions: regular diet (pt request), 2L fluids (pt intermittently following)  - VAD/ transplant eval completed this admission, not currently a candidate     Right upper extremity DVT   Clot from brachial vein through the subclavian diagnosed at outside hospital with RUE US 3/10/25. Was not on Xarelto prior to admission. Repeat RUE US 3/14 showed persistence of DVT in right innominate, subclavian and axial veins.   - Heparin infusion (3/10-20)  - Rivaroxaban 3/20, anticipate 3-6 months treatment duration     Hyponatremia, resolved  Likely due to heart failure and volume overload. Improved with diuresis.    H/o substance use disorders, in  remission (methamphetamines, alcohol)  In remission for >1 year. UDS positive for methamphetamines from his adderall prescription, no evidence of active use. Recommend stopping PRN adderall.   - Addiction medicine following peripherally  - SW helping to connect patient to outpatient treatment options  - Peth negative, nicotine negative     CKD stage II  - Cr stable, continue to monitor     Hx left ventricular mural thrombus  - Rivaroxaban (3/20-)     Thrombocytopenia   -CTM      FEN: Cardiac  DVT Prophylaxis: Xarelto  Code Status: Full Code  Disposition: Possibly to home on 3/24 off of dobutamine infusions with outpatient IV antibiotics through 4/12, pending stability of kidney function during dobutamine wean     Pt staffed with Dr. Latif.     Jess Calero MD  PGY-3 Internal Medicine- Pediatrics       ==================================    Subjective:   Interval History: NAEO. No new symptoms or concerns. Kidney function worsening but making a lot of urine.     Objective:     Vitals:    03/19/25 0453 03/20/25 0519 03/21/25 0518   Weight: 79.7 kg (175 lb 12.8 oz) 76.6 kg (168 lb 12.8 oz) 76.9 kg (169 lb 8 oz)     Vital Signs with Ranges  Temp:  [97.7  F (36.5  C)-98.5  F (36.9  C)] 97.7  F (36.5  C)  Pulse:  [] 84  Resp:  [16-20] 18  BP: ()/(40-75) 111/74  SpO2:  [90 %-100 %] 97 %  I/O last 3 completed shifts:  In: 2491.11 [P.O.:2212; I.V.:279.11]  Out: 3400 [Urine:2400; Emesis/NG output:1000]    Exam:  Constitutional: No distress  HEENT: PERRLA, extraocular motion intact, oral mucosa moist  Cardiovascular: RRR, pulses 2+ bilateral upper extremities, no lower extremity edema,no elevated JVP  Pulmonary: Lungs bilaterally clear to auscultation  GI: Abdomen soft nontender nondistended normal active bowel sounds  MSK: Well-developed  Skin: erythema at previous picc site. No drainage  Neuro: Alert and oriented to person place and time  Psych: Appropriate mood and affect    Medications   Current  Facility-Administered Medications   Medication Dose Route Frequency Provider Last Rate Last Admin    DOBUTamine (DOBUTREX) 500 mg in D5W 250 mL infusion (adult std conc)  2.5 mcg/kg/min (Order-Specific) Intravenous Continuous Alexi Devine MD 5.8 mL/hr at 03/22/25 0754 2.5 mcg/kg/min at 03/22/25 0754     Current Facility-Administered Medications   Medication Dose Route Frequency Provider Last Rate Last Admin    acetaminophen (TYLENOL) tablet 975 mg  975 mg Oral TID Comfort Johnson MD   975 mg at 03/22/25 1141    calcium carbonate (OS-LENARD) tablet 600 mg  600 mg Oral Daily Bao Mahan MD   600 mg at 03/22/25 0857    DAPTOmycin (CUBICIN) 800 mg in sodium chloride 0.9 % 100 mL intermittent infusion  10 mg/kg Intravenous Q24H Jess Calero  mL/hr at 03/21/25 1205 800 mg at 03/22/25 1141    digoxin (LANOXIN) tablet 125 mcg  125 mcg Oral QAM Zaid Mar MD   125 mcg at 03/22/25 0857    empagliflozin (JARDIANCE) tablet 10 mg  10 mg Oral Daily Bao Mahan MD   10 mg at 03/22/25 0857    [Held by provider] ferrous sulfate (FEROSUL) tablet 325 mg  325 mg Oral Daily with breakfast Bao Mahan MD        heparin lock flush 10 unit/mL injection 5-20 mL  5-20 mL Intracatheter Q24H Jess Calero MD   5 mL at 03/21/25 1208    hydrALAZINE (APRESOLINE) tablet 25 mg  25 mg Oral TID Alexi Devine MD   25 mg at 03/22/25 0857    rivaroxaban ANTICOAGULANT (XARELTO) tablet 20 mg  20 mg Oral Daily with supper Jess Calero MD   20 mg at 03/21/25 1612    sacubitril-valsartan (ENTRESTO)  MG per tablet 1 tablet  1 tablet Oral BID Alexi Devine MD   1 tablet at 03/22/25 0859    senna-docusate (SENOKOT-S/PERICOLACE) 8.6-50 MG per tablet 1 tablet  1 tablet Oral At Bedtime Navin Bar MD        spironolactone (ALDACTONE) tablet 25 mg  25 mg Oral Daily Bao Mahan MD   25 mg at 03/22/25 0857    [Held by provider] torsemide (DEMADEX) tablet 60 mg  60 mg Oral  Daily Alexi Devine MD   60 mg at 03/21/25 0806    vitamin C (ASCORBIC ACID) tablet 250 mg  250 mg Oral Daily Bao Mahan MD   250 mg at 03/22/25 0857       Data   Recent Labs   Lab 03/22/25  0604 03/21/25  1646 03/21/25  1621 03/21/25  0528 03/18/25  1651 03/18/25  0641   WBC 10.7 11.3*  --  8.1   < > 9.4   HGB 15.1 15.0  --  14.4   < > 12.3*   MCV 91 91  --  95   < > 93    416  --  360   < > 272   INR  --   --   --   --   --  1.24*   * 131* 139 134*   < > 132*   POTASSIUM 4.7 4.7 2.9* 4.6   < > 4.4   CHLORIDE 93* 94* 112* 100   < > 99   CO2 25 22 16* 24   < > 21*   BUN 48.3* 37.7* 28.0* 31.4*   < > 22.2*   CR 1.62* 1.18* 0.87 1.28*   < > 1.11   ANIONGAP 13 15 11 10   < > 12   LENARD 9.4 9.9 6.1* 9.4   < > 8.7*   * 125* 65* 96   < > 110*   ALBUMIN 3.4* 3.9 2.5* 3.4*   < > 3.1*   PROTTOTAL 7.3 8.0 5.0* 7.2   < > 6.1*   BILITOTAL 0.8 0.9 0.5 0.9   < > 1.2   ALKPHOS 159* 184* 114 169*   < > 184*   ALT 42 50 31 43   < > 58   AST 29 33 32 27   < > 35    < > = values in this interval not displayed.

## 2025-03-22 NOTE — PLAN OF CARE
Hours of Care: [id number]    Neuro: A&O x4, call light appropriate, denied pain and dizziness.  Respiratory:  On RA, lung sounds clear.  Cardiac: WDL heart sounds noted.  Had 1+ edema in RUE.   GI: Denied nausea, bowel sounds audible. LBM 3/21.  Diet: Regular diet  : Had good urine output, see I&O flowsheet. Uses urinal or toilet.  Skin: See PCS for assessment and treatment of wounds and surgical incisions.   Lines: L 3x PICC   VS: In SR/ST w/ bigeminal & trigeminal rhythm with HR 80s-100s, SBP 100s, SaO2 >88% on RA.    Drips:  Dobutamine gtt continued at 2.5 mcg/kg/min.    Electrolytes: Mag and K protocols.  replaced per protocol.  Pain: Reported 5/10 pain, was given scheduled tylenol. Patient reported 0/10 of pain.  Mobility: Ambulated in room up ad aron ind, was steady on his feet.    Plan:  Continue to monitor pain, VS, heart rhythm,  fluid status, bowel status, cardiac and respiratory status.  Notify care team of changes in patient condition or other concerns.  Possible discharge on Sunday to home w/ family & outpatient antibiotic infusions ([facility] in [place]). Resume IV Lasix 3/22. Possible start to wean dobutamine 3/22. Change IV tubing w/ new bag of dobutamine.     Goal Outcome Evaluation:      Plan of Care Reviewed With: patient    Overall Patient Progress: improvingOverall Patient Progress: improving      Problem: Adult Inpatient Plan of Care  Goal: Plan of Care Review  Description: The Plan of Care Review/Shift note should be completed every shift.  The Outcome Evaluation is a brief statement about your assessment that the patient is improving, declining, or no change.  This information will be displayed automatically on your shift  note.  Outcome: Progressing  Flowsheets (Taken 3/21/2025 2159)  Plan of Care Reviewed With: patient  Overall Patient Progress: improving  Goal: Patient-Specific Goal (Individualized)  Description: You can add care plan individualizations to a care plan. Examples of  "Individualization might be:  \"Parent requests to be called daily at 9am for status\", \"I have a hard time hearing out of my right ear\", or \"Do not touch me to wake me up as it startles  me\".  Outcome: Progressing  Goal: Absence of Hospital-Acquired Illness or Injury  Outcome: Progressing  Intervention: Identify and Manage Fall Risk  Flowsheets  Taken 3/21/2025 2159  Safety Promotion/Fall Prevention:   clutter free environment maintained   increased rounding and observation   increase visualization of patient   lighting adjusted   nonskid shoes/slippers when out of bed   room near nurse's station   room organization consistent   safety round/check completed  Taken 3/21/2025 2000  Safety Promotion/Fall Prevention:   clutter free environment maintained   increased rounding and observation   increase visualization of patient   lighting adjusted   nonskid shoes/slippers when out of bed   room near nurse's station   room organization consistent   safety round/check completed  Intervention: Prevent Skin Injury  Flowsheets  Taken 3/21/2025 2159  Body Position: position changed independently  Skin Protection: adhesive use limited  Taken 3/21/2025 2000  Body Position: position changed independently  Skin Protection: adhesive use limited  Taken 3/21/2025 1925  Body Position: position changed independently  Intervention: Prevent and Manage VTE (Venous Thromboembolism) Risk  Flowsheets  Taken 3/21/2025 2159  VTE Prevention/Management: SCDs off (sequential compression devices)  Taken 3/21/2025 2000  VTE Prevention/Management: SCDs off (sequential compression devices)  Intervention: Prevent Infection  Flowsheets  Taken 3/21/2025 2159  Infection Prevention:   cohorting utilized   equipment surfaces disinfected   hand hygiene promoted   personal protective equipment utilized   rest/sleep promoted   single patient room provided  Taken 3/21/2025 2000  Infection Prevention:   cohorting utilized   equipment surfaces disinfected   hand " hygiene promoted   personal protective equipment utilized   rest/sleep promoted   single patient room provided  Goal: Optimal Comfort and Wellbeing  Outcome: Progressing  Goal: Readiness for Transition of Care  Outcome: Progressing     Problem: Heart Failure  Goal: Optimal Coping  Outcome: Progressing  Intervention: Support Psychosocial Response  Flowsheets  Taken 3/21/2025 2159  Supportive Measures:   decision-making supported   self-care encouraged   relaxation techniques promoted  Family/Support System Care: self-care encouraged  Taken 3/21/2025 2000  Family/Support System Care: self-care encouraged  Goal: Optimal Cardiac Output  Outcome: Progressing  Intervention: Optimize Cardiac Output  Flowsheets  Taken 3/21/2025 2159  Environmental Support:   calm environment promoted   comfort object encouraged   distractions minimized   personal routine supported   rest periods encouraged  Taken 3/21/2025 2000  Environmental Support:   calm environment promoted   comfort object encouraged   distractions minimized   personal routine supported   rest periods encouraged  Goal: Stable Heart Rate and Rhythm  Outcome: Progressing  Goal: Fluid and Electrolyte Balance  Outcome: Progressing  Intervention: Monitor and Manage Fluid and Electrolyte Balance  Flowsheets  Taken 3/21/2025 2159  Fluid/Electrolyte Management: fluids provided  Taken 3/21/2025 2000  Fluid/Electrolyte Management: fluids provided  Goal: Optimal Functional Ability  Outcome: Progressing  Intervention: Optimize Functional Ability  Flowsheets  Taken 3/21/2025 2159  Self-Care Promotion: independence encouraged  Activity Management:   activity adjusted per tolerance   activity encouraged  Taken 3/21/2025 2000  Self-Care Promotion: independence encouraged  Taken 3/21/2025 1925  Activity Management:   activity adjusted per tolerance   activity encouraged  Goal: Improved Oral Intake  Outcome: Progressing  Goal: Effective Oxygenation and Ventilation  Outcome:  Progressing  Intervention: Promote Airway Secretion Clearance  Recent Flowsheet Documentation  Taken 3/21/2025 2159 by Yasmine Zhao RN  Activity Management:   activity adjusted per tolerance   activity encouraged  Taken 3/21/2025 2000 by Yasmine Zhao RN  Cough And Deep Breathing: done independently per patient  Taken 3/21/2025 1925 by Yasmine Zhao RN  Activity Management:   activity adjusted per tolerance   activity encouraged  Intervention: Optimize Oxygenation and Ventilation  Recent Flowsheet Documentation  Taken 3/21/2025 2000 by Yasmine Zhao RN  Head of Bed (HOB) Positioning: HOB at 45 degrees  Taken 3/21/2025 1925 by Yasmine Zhao RN  Head of Bed (HOB) Positioning: HOB at 45 degrees  Goal: Effective Breathing Pattern During Sleep  Outcome: Progressing  Intervention: Monitor and Manage Obstructive Sleep Apnea  Recent Flowsheet Documentation  Taken 3/21/2025 2159 by Yasmine Zhao RN  Medication Review/Management: medications reviewed  Taken 3/21/2025 2000 by Yasmine Zhao RN  Medication Review/Management: medications reviewed     Problem: Skin Injury Risk Increased  Goal: Skin Health and Integrity  Outcome: Progressing  Intervention: Plan: Nurse Driven Intervention: Moisture Management  Flowsheets  Taken 3/21/2025 2159  Moisture Interventions:   Encourage regular toileting   No brief in bed  Bathing/Skin Care: patient refused  Plan: Moisture Management: encourage regular toileting  Taken 3/21/2025 2000  Moisture Interventions: Encourage regular toileting  Bathing/Skin Care: patient refused  Plan: Moisture Management: encourage regular toileting  Taken 3/21/2025 1925  Bathing/Skin Care: patient refused  Intervention: Optimize Skin Protection  Recent Flowsheet Documentation  Taken 3/21/2025 2159 by Yasmine Zhao RN  Skin Protection: adhesive use limited  Activity Management:   activity adjusted per tolerance   activity encouraged  Taken 3/21/2025 2000 by Yasmine Zhao RN  Pressure Reduction  Techniques: frequent weight shift encouraged  Skin Protection: adhesive use limited  Head of Bed (HOB) Positioning: HOB at 45 degrees  Taken 3/21/2025 1925 by Yasmine Zhao, RN  Activity Management:   activity adjusted per tolerance   activity encouraged  Head of Bed (HOB) Positioning: HOB at 45 degrees     Problem: Pain Acute  Goal: Optimal Pain Control and Function  Outcome: Progressing  Intervention: Optimize Psychosocial Wellbeing  Flowsheets  Taken 3/21/2025 2159  Supportive Measures:   decision-making supported   self-care encouraged   relaxation techniques promoted  Diversional Activities: television  Taken 3/21/2025 2000  Diversional Activities: television  Intervention: Prevent or Manage Pain  Flowsheets  Taken 3/21/2025 2159  Sleep/Rest Enhancement:   awakenings minimized   comfort measures   consistent schedule promoted   natural light exposure provided   regular sleep/rest pattern promoted   relaxation techniques promoted   room darkened  Medication Review/Management: medications reviewed  Taken 3/21/2025 2000  Sleep/Rest Enhancement:   awakenings minimized   comfort measures   consistent schedule promoted   natural light exposure provided   regular sleep/rest pattern promoted   relaxation techniques promoted   room darkened  Medication Review/Management: medications reviewed

## 2025-03-22 NOTE — PLAN OF CARE
Goal Outcome Evaluation:      Plan of Care Reviewed With: patient  Overall Patient Progress: improving    [id number]    Shift changes:   -Bumex on held by provider due to worsening BERNARD.   - No acute changes this shift.     Neuro: A&O x4. Pt is sleepy half of this shift. Able to make needs known and call appropriately.   Cardiac: SR w/freq PVCs. Denied chest pain, dizziness, palpitations. VSS, afebrile.   Respiratory: RA, sating >95%. Denied SOB.   GI/: WDL.   Diet/Appetite: Regular diet, good appetite.   Skin: No new deficits noted.    LDA: L TL PICC infusing dobutamine at 2.5 mcg/kg/min.  Activity: Independent   Pain: Denies pain.   Plan: Continue with POC. Notify CARDS 2 of pertinent changes.

## 2025-03-23 LAB
ALBUMIN SERPL BCG-MCNC: 3.3 G/DL (ref 3.5–5.2)
ALBUMIN SERPL BCG-MCNC: 3.7 G/DL (ref 3.5–5.2)
ALP SERPL-CCNC: 140 U/L (ref 40–150)
ALP SERPL-CCNC: 148 U/L (ref 40–150)
ALT SERPL W P-5'-P-CCNC: 39 U/L (ref 0–70)
ALT SERPL W P-5'-P-CCNC: 44 U/L (ref 0–70)
ANION GAP SERPL CALCULATED.3IONS-SCNC: 12 MMOL/L (ref 7–15)
ANION GAP SERPL CALCULATED.3IONS-SCNC: 15 MMOL/L (ref 7–15)
AST SERPL W P-5'-P-CCNC: 34 U/L (ref 0–45)
AST SERPL W P-5'-P-CCNC: 36 U/L (ref 0–45)
BASE EXCESS BLDV CALC-SCNC: 1.7 MMOL/L (ref -3–3)
BILIRUB SERPL-MCNC: 0.7 MG/DL
BILIRUB SERPL-MCNC: 0.7 MG/DL
BUN SERPL-MCNC: 37.9 MG/DL (ref 6–20)
BUN SERPL-MCNC: 47.5 MG/DL (ref 6–20)
CALCIUM SERPL-MCNC: 9.5 MG/DL (ref 8.8–10.4)
CALCIUM SERPL-MCNC: 9.6 MG/DL (ref 8.8–10.4)
CHLORIDE SERPL-SCNC: 98 MMOL/L (ref 98–107)
CHLORIDE SERPL-SCNC: 99 MMOL/L (ref 98–107)
CREAT SERPL-MCNC: 0.95 MG/DL (ref 0.67–1.17)
CREAT SERPL-MCNC: 1.22 MG/DL (ref 0.67–1.17)
EGFRCR SERPLBLD CKD-EPI 2021: 77 ML/MIN/1.73M2
EGFRCR SERPLBLD CKD-EPI 2021: >90 ML/MIN/1.73M2
ERYTHROCYTE [DISTWIDTH] IN BLOOD BY AUTOMATED COUNT: 15.9 % (ref 10–15)
GLUCOSE SERPL-MCNC: 109 MG/DL (ref 70–99)
GLUCOSE SERPL-MCNC: 120 MG/DL (ref 70–99)
HCO3 BLDV-SCNC: 27 MMOL/L (ref 21–28)
HCO3 SERPL-SCNC: 20 MMOL/L (ref 22–29)
HCO3 SERPL-SCNC: 22 MMOL/L (ref 22–29)
HCT VFR BLD AUTO: 43 % (ref 40–53)
HGB BLD-MCNC: 14.3 G/DL (ref 13.3–17.7)
LACTATE SERPL-SCNC: 0.7 MMOL/L (ref 0.7–2)
MAGNESIUM SERPL-MCNC: 2.1 MG/DL (ref 1.7–2.3)
MCH RBC QN AUTO: 30.3 PG (ref 26.5–33)
MCHC RBC AUTO-ENTMCNC: 33.3 G/DL (ref 31.5–36.5)
MCV RBC AUTO: 91 FL (ref 78–100)
O2/TOTAL GAS SETTING VFR VENT: 21 %
OXYHGB MFR BLDV: 63 % (ref 70–75)
PCO2 BLDV: 46 MM HG (ref 40–50)
PH BLDV: 7.38 [PH] (ref 7.32–7.43)
PLATELET # BLD AUTO: 369 10E3/UL (ref 150–450)
PO2 BLDV: 37 MM HG (ref 25–47)
POTASSIUM SERPL-SCNC: 5 MMOL/L (ref 3.4–5.3)
POTASSIUM SERPL-SCNC: 5.2 MMOL/L (ref 3.4–5.3)
PROT SERPL-MCNC: 7 G/DL (ref 6.4–8.3)
PROT SERPL-MCNC: 7.4 G/DL (ref 6.4–8.3)
RBC # BLD AUTO: 4.72 10E6/UL (ref 4.4–5.9)
SAO2 % BLDV: 64.4 % (ref 70–75)
SODIUM SERPL-SCNC: 132 MMOL/L (ref 135–145)
SODIUM SERPL-SCNC: 134 MMOL/L (ref 135–145)
WBC # BLD AUTO: 10.4 10E3/UL (ref 4–11)

## 2025-03-23 PROCEDURE — 82805 BLOOD GASES W/O2 SATURATION: CPT

## 2025-03-23 PROCEDURE — 82040 ASSAY OF SERUM ALBUMIN: CPT

## 2025-03-23 PROCEDURE — 250N000013 HC RX MED GY IP 250 OP 250 PS 637

## 2025-03-23 PROCEDURE — 250N000011 HC RX IP 250 OP 636: Mod: JZ

## 2025-03-23 PROCEDURE — 99233 SBSQ HOSP IP/OBS HIGH 50: CPT | Mod: GC | Performed by: INTERNAL MEDICINE

## 2025-03-23 PROCEDURE — 250N000013 HC RX MED GY IP 250 OP 250 PS 637: Performed by: INTERNAL MEDICINE

## 2025-03-23 PROCEDURE — 85027 COMPLETE CBC AUTOMATED: CPT

## 2025-03-23 PROCEDURE — 84460 ALANINE AMINO (ALT) (SGPT): CPT

## 2025-03-23 PROCEDURE — 84155 ASSAY OF PROTEIN SERUM: CPT

## 2025-03-23 PROCEDURE — 250N000013 HC RX MED GY IP 250 OP 250 PS 637: Performed by: STUDENT IN AN ORGANIZED HEALTH CARE EDUCATION/TRAINING PROGRAM

## 2025-03-23 PROCEDURE — 83735 ASSAY OF MAGNESIUM: CPT | Performed by: INTERNAL MEDICINE

## 2025-03-23 PROCEDURE — 83605 ASSAY OF LACTIC ACID: CPT

## 2025-03-23 PROCEDURE — 258N000003 HC RX IP 258 OP 636

## 2025-03-23 PROCEDURE — 999N000044 HC STATISTIC CVC DRESSING CHANGE

## 2025-03-23 PROCEDURE — 250N000011 HC RX IP 250 OP 636: Performed by: STUDENT IN AN ORGANIZED HEALTH CARE EDUCATION/TRAINING PROGRAM

## 2025-03-23 PROCEDURE — 999N000203 HC STATISTICAL VASC ACCESS NURSE TIME, 16-31 MINUTES

## 2025-03-23 PROCEDURE — 120N000003 HC R&B IMCU UMMC

## 2025-03-23 RX ORDER — TORSEMIDE 20 MG/1
40 TABLET ORAL 2 TIMES DAILY
Status: DISCONTINUED | OUTPATIENT
Start: 2025-03-23 | End: 2025-03-25

## 2025-03-23 RX ORDER — ACETAMINOPHEN 325 MG/1
975 TABLET ORAL 3 TIMES DAILY
Status: DISCONTINUED | OUTPATIENT
Start: 2025-03-23 | End: 2025-03-23

## 2025-03-23 RX ORDER — ACETAMINOPHEN 325 MG/1
975 TABLET ORAL EVERY 8 HOURS
Status: DISCONTINUED | OUTPATIENT
Start: 2025-03-23 | End: 2025-03-25 | Stop reason: HOSPADM

## 2025-03-23 RX ADMIN — SACUBITRIL AND VALSARTAN 1 TABLET: 97; 103 TABLET, FILM COATED ORAL at 21:02

## 2025-03-23 RX ADMIN — DAPTOMYCIN 800 MG: 500 INJECTION, POWDER, LYOPHILIZED, FOR SOLUTION INTRAVENOUS at 12:33

## 2025-03-23 RX ADMIN — RIVAROXABAN 20 MG: 20 TABLET, FILM COATED ORAL at 17:06

## 2025-03-23 RX ADMIN — SACUBITRIL AND VALSARTAN 1 TABLET: 97; 103 TABLET, FILM COATED ORAL at 09:14

## 2025-03-23 RX ADMIN — SPIRONOLACTONE 25 MG: 25 TABLET, FILM COATED ORAL at 08:28

## 2025-03-23 RX ADMIN — HYDRALAZINE HYDROCHLORIDE 25 MG: 25 TABLET ORAL at 21:02

## 2025-03-23 RX ADMIN — Medication 5 MG: at 21:02

## 2025-03-23 RX ADMIN — EMPAGLIFLOZIN 10 MG: 10 TABLET, FILM COATED ORAL at 08:29

## 2025-03-23 RX ADMIN — Medication 250 MG: at 08:29

## 2025-03-23 RX ADMIN — ACETAMINOPHEN 975 MG: 325 TABLET, FILM COATED ORAL at 17:06

## 2025-03-23 RX ADMIN — TORSEMIDE 40 MG: 20 TABLET ORAL at 21:02

## 2025-03-23 RX ADMIN — HYDRALAZINE HYDROCHLORIDE 25 MG: 25 TABLET ORAL at 15:26

## 2025-03-23 RX ADMIN — Medication 600 MG: at 08:29

## 2025-03-23 RX ADMIN — HYDRALAZINE HYDROCHLORIDE 25 MG: 25 TABLET ORAL at 08:29

## 2025-03-23 RX ADMIN — Medication 10 ML: at 05:40

## 2025-03-23 RX ADMIN — DIGOXIN 125 MCG: 0.12 TABLET ORAL at 08:29

## 2025-03-23 RX ADMIN — ACETAMINOPHEN 975 MG: 325 TABLET, FILM COATED ORAL at 02:23

## 2025-03-23 RX ADMIN — DOBUTAMINE IN DEXTROSE 2.5 MCG/KG/MIN: 200 INJECTION, SOLUTION INTRAVENOUS at 02:22

## 2025-03-23 RX ADMIN — ACETAMINOPHEN 975 MG: 325 TABLET, FILM COATED ORAL at 09:14

## 2025-03-23 ASSESSMENT — ACTIVITIES OF DAILY LIVING (ADL)
ADLS_ACUITY_SCORE: 34
ADLS_ACUITY_SCORE: 36
ADLS_ACUITY_SCORE: 36
ADLS_ACUITY_SCORE: 34
ADLS_ACUITY_SCORE: 36
ADLS_ACUITY_SCORE: 34
ADLS_ACUITY_SCORE: 36
ADLS_ACUITY_SCORE: 34
ADLS_ACUITY_SCORE: 36
ADLS_ACUITY_SCORE: 36
ADLS_ACUITY_SCORE: 34
ADLS_ACUITY_SCORE: 36
ADLS_ACUITY_SCORE: 34
ADLS_ACUITY_SCORE: 34
ADLS_ACUITY_SCORE: 36
ADLS_ACUITY_SCORE: 34

## 2025-03-23 NOTE — PROGRESS NOTES
Complained of itchiness under the PICC dressing. Redness noted near the wing where tape was applied. Cleansed with sterile saline, used alcohol to clean the site, applied algidex and cover with IV 3000. Chlorhexidine not used.

## 2025-03-23 NOTE — PLAN OF CARE
"Hours of Care: [id number]     Neuro: A&O x4, call light appropriate, denied dizziness.  Respiratory:  On RA, lung sounds clear.  Cardiac: WDL heart sounds noted.  Had 1+ edema in RUE.   GI: Denied nausea, bowel sounds audible. LBM 3/22.  Diet: Regular diet  : Had good urine output, see I&O flowsheet. Uses urinal or toilet.  Skin: See PCS for assessment and treatment of wounds and surgical incisions.   Lines: L 3x PICC, white infusing dobutamine.  VS: In SR/ST w/ bigeminal & trigeminal rhythm with HR 80s-100s, SBP 100s, SaO2 >88% on RA.    Drips:  Dobutamine gtt continued at 2.5 mcg/kg/min.    Electrolytes: Mag and K protocols. No replacements needed.  Pain: Reported 5/10 pain, was given scheduled tylenol. Patient reported 0/10 of pain.  Mobility: Ambulated in room up ad aron ind, was steady on his feet.    Events: Changed dobutamine bag and line. Pt had a run of 8 beats of PVCs at 2330. Changed white lumen of PICC.    Plan:  Continue to monitor pain, VS, heart rhythm,  fluid status, bowel status, cardiac and respiratory status.  Notify care team of changes in patient condition or other concerns.  Possible discharge on 3/27 to home w/ family & outpatient antibiotic infusions ([facility] in [place]). Possible start to wean dobutamine 3/23, goal is to be off dobutamine before discharge.    Goal Outcome Evaluation:    Problem: Adult Inpatient Plan of Care  Goal: Plan of Care Review  Description: The Plan of Care Review/Shift note should be completed every shift.  The Outcome Evaluation is a brief statement about your assessment that the patient is improving, declining, or no change.  This information will be displayed automatically on your shift  note.  Outcome: Progressing  Goal: Patient-Specific Goal (Individualized)  Description: You can add care plan individualizations to a care plan. Examples of Individualization might be:  \"Parent requests to be called daily at 9am for status\", \"I have a hard time hearing out of my " "right ear\", or \"Do not touch me to wake me up as it startles  me\".  Outcome: Progressing  Goal: Absence of Hospital-Acquired Illness or Injury  Outcome: Progressing  Intervention: Identify and Manage Fall Risk  Flowsheets  Taken 3/23/2025 0233  Safety Promotion/Fall Prevention:   clutter free environment maintained   nonskid shoes/slippers when out of bed   patient and family education   room near nurse's station   safety round/check completed  Taken 3/22/2025 2000  Safety Promotion/Fall Prevention:   clutter free environment maintained   nonskid shoes/slippers when out of bed   patient and family education   room near nurse's station   safety round/check completed  Intervention: Prevent Skin Injury  Flowsheets  Taken 3/23/2025 0233  Body Position: position changed independently  Taken 3/23/2025 0000  Body Position: position changed independently  Taken 3/22/2025 2000  Body Position: position changed independently  Intervention: Prevent and Manage VTE (Venous Thromboembolism) Risk  Flowsheets (Taken 3/23/2025 0233)  VTE Prevention/Management: SCDs off (sequential compression devices)  Intervention: Prevent Infection  Flowsheets  Taken 3/23/2025 0233  Infection Prevention:   cohorting utilized   equipment surfaces disinfected   hand hygiene promoted   rest/sleep promoted   single patient room provided  Taken 3/22/2025 2000  Infection Prevention:   cohorting utilized   equipment surfaces disinfected   hand hygiene promoted   rest/sleep promoted   single patient room provided  Goal: Optimal Comfort and Wellbeing  Outcome: Progressing  Goal: Readiness for Transition of Care  Outcome: Progressing     Problem: Heart Failure  Goal: Optimal Coping  Outcome: Progressing  Intervention: Support Psychosocial Response  Flowsheets  Taken 3/23/2025 0233  Supportive Measures:   decision-making supported   self-care encouraged   relaxation techniques promoted  Family/Support System Care: self-care encouraged  Taken 3/22/2025 " 2000  Family/Support System Care: self-care encouraged  Goal: Optimal Cardiac Output  Outcome: Progressing  Intervention: Optimize Cardiac Output  Flowsheets (Taken 3/23/2025 0233)  Environmental Support:   calm environment promoted   comfort object encouraged   distractions minimized   personal routine supported   rest periods encouraged  Goal: Stable Heart Rate and Rhythm  Outcome: Progressing  Goal: Fluid and Electrolyte Balance  Outcome: Progressing  Goal: Optimal Functional Ability  Outcome: Progressing  Intervention: Optimize Functional Ability  Flowsheets  Taken 3/23/2025 0233  Self-Care Promotion: independence encouraged  Activity Management:   activity encouraged   ambulated outside room   ambulated in room   up ad aron  Taken 3/23/2025 0000  Self-Care Promotion: independence encouraged  Taken 3/22/2025 2000  Self-Care Promotion: independence encouraged  Activity Management:   activity encouraged   ambulated outside room   ambulated in room   up ad aron  Goal: Improved Oral Intake  Outcome: Progressing  Goal: Effective Oxygenation and Ventilation  Outcome: Progressing  Intervention: Promote Airway Secretion Clearance  Flowsheets  Taken 3/23/2025 0233  Cough And Deep Breathing: done independently per patient  Activity Management:   activity encouraged   ambulated outside room   ambulated in room   up ad aron  Taken 3/22/2025 2000  Cough And Deep Breathing: done independently per patient  Activity Management:   activity encouraged   ambulated outside room   ambulated in room   up ad aron  Intervention: Optimize Oxygenation and Ventilation  Flowsheets  Taken 3/23/2025 0233  Airway/Ventilation Management: airway patency maintained  Head of Bed (HOB) Positioning: HOB at 30 degrees  Taken 3/23/2025 0000  Head of Bed (HOB) Positioning: HOB at 30 degrees  Taken 3/22/2025 2000  Airway/Ventilation Management: airway patency maintained  Head of Bed (HOB) Positioning: HOB at 30-45 degrees  Goal: Effective Breathing Pattern  During Sleep  Outcome: Progressing  Intervention: Monitor and Manage Obstructive Sleep Apnea  Flowsheets  Taken 3/23/2025 0233  Medication Review/Management: medications reviewed  Taken 3/22/2025 2000  Medication Review/Management: medications reviewed     Problem: Skin Injury Risk Increased  Goal: Skin Health and Integrity  Outcome: Progressing  Intervention: Plan: Nurse Driven Intervention: Moisture Management  Flowsheets  Taken 3/23/2025 0233  Moisture Interventions: Encourage regular toileting  Bathing/Skin Care: patient refused  Plan: Moisture Management: encourage regular toileting  Taken 3/22/2025 2000  Moisture Interventions: Encourage regular toileting  Bathing/Skin Care: patient refused  Plan: Moisture Management: encourage regular toileting  Intervention: Optimize Skin Protection  Flowsheets  Taken 3/23/2025 0233  Activity Management:   activity encouraged   ambulated outside room   ambulated in room   up ad aron  Head of Bed (HOB) Positioning: HOB at 30 degrees  Taken 3/23/2025 0000  Head of Bed (HOB) Positioning: HOB at 30 degrees  Taken 3/22/2025 2000  Activity Management:   activity encouraged   ambulated outside room   ambulated in room   up ad aron  Head of Bed (HOB) Positioning: HOB at 30-45 degrees     Problem: Pain Acute  Goal: Optimal Pain Control and Function  Outcome: Progressing  Intervention: Optimize Psychosocial Wellbeing  Flowsheets  Taken 3/23/2025 0233  Supportive Measures:   decision-making supported   self-care encouraged   relaxation techniques promoted  Diversional Activities: television  Taken 3/22/2025 2000  Diversional Activities: television  Intervention: Prevent or Manage Pain  Flowsheets  Taken 3/23/2025 0233  Sensory Stimulation Regulation:   auditory stimulation minimized   care clustered   lighting decreased   tactile stimulation minimized   television on   visual stimulation minimized  Sleep/Rest Enhancement:   awakenings minimized   comfort measures   consistent schedule  promoted  Bowel Elimination Promotion:   adequate fluid intake promoted   ambulation promoted  Medication Review/Management: medications reviewed  Taken 3/23/2025 0000  Sleep/Rest Enhancement:   awakenings minimized   comfort measures   consistent schedule promoted  Taken 3/22/2025 2000  Sensory Stimulation Regulation:   auditory stimulation minimized   care clustered   lighting decreased   tactile stimulation minimized   television on   visual stimulation minimized  Sleep/Rest Enhancement:   awakenings minimized   comfort measures   consistent schedule promoted  Bowel Elimination Promotion:   adequate fluid intake promoted   ambulation promoted  Medication Review/Management: medications reviewed

## 2025-03-23 NOTE — PROGRESS NOTES
[facility]  Parenteral ANtibiotic Review at Departure from Acute Care Collaborative Note     Patient: Jose Enrique Engel  MRN: [id number]  Allergies: Isosorbide, Benadryl [diphenhydramine], and Other environmental allergy    Current Location: [facility]  OPAT to be provided by: Other    Outpatient Infusion Center @ [facility]  Line Type: PICC    Diagnosis/Indications: MRSA bacteremia  Organism(s): MRSA  MRDO? Yes - MRSA  Pending Cultures/Microbiological Tests: yes 3/21 Blood culture finalization    Inpatient ID involved in developing OPAT plan: Yes - discharge OPAT plan has no changes from ID provider, Dr. Dudley, OPAT plan charted on 3/19/2025    Outpatient ID Follow-up: ID OPAT Clinic Referral Placed ([facility] Ph: [phone] and Fax: [phone]) - appointment scheduled  Designated Provider: Popeye Dudley MD    Antimicrobial Regimen / Route Anticipated  Duration Start Date Stop /  Reassess Date   Daptomycin 800 mg (~10 mg/kg actual BW) every 24 hours/IV  4 weeks 3/15/2025 4/12/2025     Laboratory Tests and Monitoring Frequency: CBC with Diff, BMP, CK Once Weekly    Imaging/Miscellaneous Monitoring: None    ID Pharmacist Interventions: None                          Angie Swain, PharmD, BCIDP  Available on [employer] Messenger (MM)

## 2025-03-23 NOTE — PROGRESS NOTES
[facility]  Cardiology II Service / Advanced Heart Failure  Daily Progress Note    Patient: Jose Enrique Engel      : 1985      MRN: [id number]    Assessment/Plan:   40 yo male with PMHx of HFrEF (15%) 2/2 NICM s/p ICD, CKD3, polysubstance use admitted as transfer from outside hospital on 3/14/25 for severe sepsis secondary to MRSA bacteremia leading to multiorgan dysfunction.  He was transferred to [facility] given advanced heart failure.    Today:  - BERNARD improved, this was most likely due to over-diuresis and not cardio-renal syndrome; continue Dobutamine at 2.5mcg/kg/min, no further decreases yet. Goal will still be to wean off prior to discharge.  - Resume home diuretics with Torsemide 40mg BID    MRSA bacteremia  Possible source: PICC line (removed at outside hospital) vs septic thrombophlebitis (RUE thrombus). ICD is subcutaneous and does not have venous leads. He had JW which did not show vegetation. Blood cultures remain negative since 3/15. Planning for prolonged course of daily Daptomycin. Will receive daily outpatient infusions via PICC line in [place], where he lives.   - Antibiotics:  - Vancomycin 3/10- 3/15  - Ceftaroline 3/15-3/19  - Daptomycin 3/15- 25     Cardiogenic shock, resolved  Acute on chronic systolic heart failure (ejection fraction 10 to 15%), stage D class IIIb  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 Was previously on outpatient dobutamine, self-discontinued in . Re-engaged with outpatient cardiologist Dr. Dove 2025.  Interested in advanced therapies but wasn't a candidate at that time due to being lost to follow up, substance use, recurrent infections and difficulty managing PICC line.  He was discharged on home dobutamine infusion during last admission. VAD/ transplant eval initited this admission, but patient is not a candidate currently due to concerns about follow up,  compliance, recurrent infections. Unfortunately, he has also lost his home infusions service due to same issues. Will attempt dobutamine wean in the  hospital.  Last RHC 1/29/25: RA 15, PA 44/26, PCWP 21, CI 1.7, PVR 3.4, SVR ~1400  Last TTE 3/13/2025: EF 10%,mild pulm Htn, grade-1 diastolic dysfunction  - Inotrope:   - Dobutamine @ 2.5mcg/kg/min (decreased 3/20)               - PTA digoxin 125 mcg daily  - Afterload Reduction: Hydralazine 25mg q6h (3/15)  GDMT:                - Diuretic: Torsemide 40mg BID               - ACE/ARB/ARNI: - Entresto  mg BID (increased 3/20)               - MRA spironolactone 25               - BB- none not while on dobutamine               - SGLT2-  empagliflozin 10 mg daily  - Anticoagulation:  Rivaroxaban   - Statin: none  - Antiplatelet: none  - Antiarrhythmic: none  - SCD prophylaxis: subcuteanous ICD in place  - Electrolytes: daily checks, goals K>4, Mag>2  - Strict I/O monitoring, daily weights  - Dietary restrictions: regular diet (pt request), 2L fluids (pt intermittently following)  - VAD/ transplant eval completed this admission, not currently a candidate     Right upper extremity DVT   Clot from brachial vein through the subclavian diagnosed at outside hospital with RUE US 3/10/25. Was not on Xarelto prior to admission. Repeat RUE US 3/14 showed persistence of DVT in right innominate, subclavian and axial veins.   - Heparin infusion (3/10-20)  - Rivaroxaban 3/20, anticipate 3-6 months treatment duration     Hyponatremia, resolved  Likely due to heart failure and volume overload. Improved with diuresis.    H/o substance use disorders, in remission (methamphetamines, alcohol)  In remission for >1 year. UDS positive for methamphetamines from his adderall prescription, no evidence of active use. Recommend stopping PRN adderall.   - Addiction medicine following peripherally  - SW helping to connect patient to outpatient treatment options  - Peth negative, nicotine  negative     CKD stage II  - Cr stable, continue to monitor     Hx left ventricular mural thrombus  - Rivaroxaban (3/20-)     Thrombocytopenia   -CTM      FEN: Cardiac  DVT Prophylaxis: Xarelto  Code Status: Full Code  Disposition: Possibly to home on 3/25 or 3/26 off of dobutamine infusion with outpatient IV antibiotics through 4/12 via PICC     Pt staffed with Dr. Latif.     Jess Calero MD  PGY-3 Internal Medicine- Pediatrics       ==================================    Subjective:   Interval History: NAEO. No new symptoms or concerns. Does not feel volume overloaded, but is urinating less    Objective:     Vitals:    03/20/25 0519 03/21/25 0518 03/23/25 0640   Weight: 76.6 kg (168 lb 12.8 oz) 76.9 kg (169 lb 8 oz) 79.6 kg (175 lb 6.4 oz)     Vital Signs with Ranges  Temp:  [97  F (36.1  C)-98.5  F (36.9  C)] 97.9  F (36.6  C)  Pulse:  [82-98] 98  Resp:  [16-18] 18  BP: ()/() 153/63  SpO2:  [97 %-99 %] 97 %  I/O last 3 completed shifts:  In: 2392.57 [P.O.:2270; I.V.:122.57]  Out: 4800 [Urine:4800]    Exam:  Constitutional: No distress  HEENT: PERRLA, extraocular motion intact, oral mucosa moist  Cardiovascular: RRR, pulses 2+ bilateral upper extremities, no lower extremity edema,no elevated JVP  Pulmonary: Lungs bilaterally clear to auscultation  GI: Abdomen soft nontender nondistended normal active bowel sounds  MSK: Well-developed  Skin: erythema at previous picc site. No drainage  Neuro: Alert and oriented to person place and time  Psych: Appropriate mood and affect    Medications   Current Facility-Administered Medications   Medication Dose Route Frequency Provider Last Rate Last Admin    DOBUTamine (DOBUTREX) 500 mg in D5W 250 mL infusion (adult std conc)  2.5 mcg/kg/min (Order-Specific) Intravenous Continuous Alexi Devine MD 5.8 mL/hr at 03/23/25 1521 2.5 mcg/kg/min at 03/23/25 1521     Current Facility-Administered Medications   Medication Dose Route Frequency Provider Last Rate Last  Admin    acetaminophen (TYLENOL) tablet 975 mg  975 mg Oral Q8H Jess Calero MD   975 mg at 03/23/25 1706    calcium carbonate (OS-LENARD) tablet 600 mg  600 mg Oral Daily Bao Mahan MD   600 mg at 03/23/25 0829    DAPTOmycin (CUBICIN) 800 mg in sodium chloride 0.9 % 100 mL intermittent infusion  10 mg/kg Intravenous Q24H Jess Calero  mL/hr at 03/21/25 1205 800 mg at 03/23/25 1233    digoxin (LANOXIN) tablet 125 mcg  125 mcg Oral QAM Zaid Mar MD   125 mcg at 03/23/25 0829    empagliflozin (JARDIANCE) tablet 10 mg  10 mg Oral Daily Bao Mahan MD   10 mg at 03/23/25 0829    [Held by provider] ferrous sulfate (FEROSUL) tablet 325 mg  325 mg Oral Daily with breakfast Bao Mahan MD        heparin lock flush 10 unit/mL injection 5-20 mL  5-20 mL Intracatheter Q24H Jess Calero MD   5 mL at 03/21/25 1208    hydrALAZINE (APRESOLINE) tablet 25 mg  25 mg Oral TID Alexi Devine MD   25 mg at 03/23/25 1526    rivaroxaban ANTICOAGULANT (XARELTO) tablet 20 mg  20 mg Oral Daily with supper Jess Calero MD   20 mg at 03/23/25 1706    sacubitril-valsartan (ENTRESTO)  MG per tablet 1 tablet  1 tablet Oral BID Alexi Devine MD   1 tablet at 03/23/25 0914    spironolactone (ALDACTONE) tablet 25 mg  25 mg Oral Daily Bao Mahan MD   25 mg at 03/23/25 0828    [Held by provider] torsemide (DEMADEX) tablet 60 mg  60 mg Oral Daily Jess Calero MD   60 mg at 03/21/25 0806    vitamin C (ASCORBIC ACID) tablet 250 mg  250 mg Oral Daily Bao Mahan MD   250 mg at 03/23/25 0829       Data   Recent Labs   Lab 03/23/25  0546 03/22/25  1516 03/22/25  0604 03/21/25  1646 03/18/25  1651 03/18/25  0641   WBC 10.4  --  10.7 11.3*   < > 9.4   HGB 14.3  --  15.1 15.0   < > 12.3*   MCV 91  --  91 91   < > 93     --  366 416   < > 272   INR  --   --   --   --   --  1.24*   * 128*  131* 131* 131*   < > 132*   POTASSIUM 5.2 4.6  4.7 4.7  4.7   < > 4.4   CHLORIDE 98 90*  92* 93* 94*   < > 99   CO2 22 22  24 25 22   < > 21*   BUN 47.5* 44.4*  45.4* 48.3* 37.7*   < > 22.2*   CR 1.22* 0.64*  1.36* 1.62* 1.18*   < > 1.11   ANIONGAP 12 16*  15 13 15   < > 12   LENARD 9.5 9.5  9.8 9.4 9.9   < > 8.7*   * 306*  164* 124* 125*   < > 110*   ALBUMIN 3.3* 3.4* 3.4* 3.9   < > 3.1*   PROTTOTAL 7.0 7.0 7.3 8.0   < > 6.1*   BILITOTAL 0.7 0.7 0.8 0.9   < > 1.2   ALKPHOS 140 157* 159* 184*   < > 184*   ALT 39 42 42 50   < > 58   AST 34 33 29 33   < > 35    < > = values in this interval not displayed.

## 2025-03-23 NOTE — PLAN OF CARE
Goal Outcome Evaluation:      Plan of Care Reviewed With: patient    Overall Patient Progress: improving    [id number]     Shift changes:   - No acute changes this shift.     Neuro: A&O x4. Able to make needs known and call appropriately.   Cardiac: SR w/freq PVCs and bi/trigeminy. Denied chest pain, dizziness, palpitations. VSS, afebrile.   Respiratory: RA, sating >95%. Denied SOB.   GI/: WDL.   Diet/Appetite: Regular diet, good appetite.   Skin: No new deficits noted.    LDA: L TL PICC infusing dobutamine at 2.5 mcg/kg/min.  Activity: Independent   Pain: Denies pain.   Plan: Continue with POC. Notify CARDS 2 of pertinent changes.

## 2025-03-24 LAB
ALBUMIN SERPL BCG-MCNC: 3.8 G/DL (ref 3.5–5.2)
ALP SERPL-CCNC: 145 U/L (ref 40–150)
ALT SERPL W P-5'-P-CCNC: 46 U/L (ref 0–70)
ANION GAP SERPL CALCULATED.3IONS-SCNC: 13 MMOL/L (ref 7–15)
AST SERPL W P-5'-P-CCNC: 40 U/L (ref 0–45)
BASE EXCESS BLDV CALC-SCNC: 0 MMOL/L (ref -3–3)
BILIRUB SERPL-MCNC: 0.8 MG/DL
BUN SERPL-MCNC: 39 MG/DL (ref 6–20)
CALCIUM SERPL-MCNC: 9.6 MG/DL (ref 8.8–10.4)
CHLORIDE SERPL-SCNC: 98 MMOL/L (ref 98–107)
CREAT SERPL-MCNC: 1.11 MG/DL (ref 0.67–1.17)
EGFRCR SERPLBLD CKD-EPI 2021: 87 ML/MIN/1.73M2
ERYTHROCYTE [DISTWIDTH] IN BLOOD BY AUTOMATED COUNT: 15.9 % (ref 10–15)
GLUCOSE SERPL-MCNC: 123 MG/DL (ref 70–99)
HCO3 BLDV-SCNC: 26 MMOL/L (ref 21–28)
HCO3 SERPL-SCNC: 21 MMOL/L (ref 22–29)
HCT VFR BLD AUTO: 43.6 % (ref 40–53)
HGB BLD-MCNC: 14.6 G/DL (ref 13.3–17.7)
MAGNESIUM SERPL-MCNC: 2.1 MG/DL (ref 1.7–2.3)
MCH RBC QN AUTO: 30.9 PG (ref 26.5–33)
MCHC RBC AUTO-ENTMCNC: 33.5 G/DL (ref 31.5–36.5)
MCV RBC AUTO: 92 FL (ref 78–100)
O2/TOTAL GAS SETTING VFR VENT: 21 %
OXYHGB MFR BLDV: 64 % (ref 70–75)
PCO2 BLDV: 45 MM HG (ref 40–50)
PH BLDV: 7.37 [PH] (ref 7.32–7.43)
PLATELET # BLD AUTO: 368 10E3/UL (ref 150–450)
PO2 BLDV: 38 MM HG (ref 25–47)
POTASSIUM SERPL-SCNC: 5.2 MMOL/L (ref 3.4–5.3)
PROT SERPL-MCNC: 7.7 G/DL (ref 6.4–8.3)
RBC # BLD AUTO: 4.72 10E6/UL (ref 4.4–5.9)
SAO2 % BLDV: 65.5 % (ref 70–75)
SODIUM SERPL-SCNC: 132 MMOL/L (ref 135–145)
WBC # BLD AUTO: 8.7 10E3/UL (ref 4–11)

## 2025-03-24 PROCEDURE — 250N000013 HC RX MED GY IP 250 OP 250 PS 637: Performed by: INTERNAL MEDICINE

## 2025-03-24 PROCEDURE — 82805 BLOOD GASES W/O2 SATURATION: CPT

## 2025-03-24 PROCEDURE — 250N000013 HC RX MED GY IP 250 OP 250 PS 637: Performed by: STUDENT IN AN ORGANIZED HEALTH CARE EDUCATION/TRAINING PROGRAM

## 2025-03-24 PROCEDURE — 80053 COMPREHEN METABOLIC PANEL: CPT

## 2025-03-24 PROCEDURE — 120N000003 HC R&B IMCU UMMC

## 2025-03-24 PROCEDURE — 250N000013 HC RX MED GY IP 250 OP 250 PS 637

## 2025-03-24 PROCEDURE — 99231 SBSQ HOSP IP/OBS SF/LOW 25: CPT | Performed by: STUDENT IN AN ORGANIZED HEALTH CARE EDUCATION/TRAINING PROGRAM

## 2025-03-24 PROCEDURE — 83735 ASSAY OF MAGNESIUM: CPT | Performed by: INTERNAL MEDICINE

## 2025-03-24 PROCEDURE — 85027 COMPLETE CBC AUTOMATED: CPT

## 2025-03-24 PROCEDURE — 99233 SBSQ HOSP IP/OBS HIGH 50: CPT | Mod: GC | Performed by: INTERNAL MEDICINE

## 2025-03-24 PROCEDURE — 258N000003 HC RX IP 258 OP 636

## 2025-03-24 PROCEDURE — 250N000011 HC RX IP 250 OP 636: Mod: JZ

## 2025-03-24 RX ADMIN — SACUBITRIL AND VALSARTAN 1 TABLET: 97; 103 TABLET, FILM COATED ORAL at 09:09

## 2025-03-24 RX ADMIN — DAPTOMYCIN 800 MG: 500 INJECTION, POWDER, LYOPHILIZED, FOR SOLUTION INTRAVENOUS at 11:58

## 2025-03-24 RX ADMIN — ACETAMINOPHEN 975 MG: 325 TABLET, FILM COATED ORAL at 17:19

## 2025-03-24 RX ADMIN — TORSEMIDE 40 MG: 20 TABLET ORAL at 09:09

## 2025-03-24 RX ADMIN — Medication 5 MG: at 20:21

## 2025-03-24 RX ADMIN — HYDRALAZINE HYDROCHLORIDE 25 MG: 25 TABLET ORAL at 14:45

## 2025-03-24 RX ADMIN — HEPARIN, PORCINE (PF) 10 UNIT/ML INTRAVENOUS SYRINGE 5 ML: at 14:45

## 2025-03-24 RX ADMIN — Medication 600 MG: at 09:09

## 2025-03-24 RX ADMIN — Medication 5 ML: at 05:10

## 2025-03-24 RX ADMIN — TORSEMIDE 40 MG: 20 TABLET ORAL at 17:30

## 2025-03-24 RX ADMIN — EMPAGLIFLOZIN 10 MG: 10 TABLET, FILM COATED ORAL at 09:09

## 2025-03-24 RX ADMIN — Medication 250 MG: at 09:15

## 2025-03-24 RX ADMIN — HYDRALAZINE HYDROCHLORIDE 25 MG: 25 TABLET ORAL at 09:09

## 2025-03-24 RX ADMIN — SACUBITRIL AND VALSARTAN 1 TABLET: 97; 103 TABLET, FILM COATED ORAL at 20:21

## 2025-03-24 RX ADMIN — ACETAMINOPHEN 975 MG: 325 TABLET, FILM COATED ORAL at 03:17

## 2025-03-24 RX ADMIN — DIGOXIN 125 MCG: 0.12 TABLET ORAL at 09:09

## 2025-03-24 RX ADMIN — RIVAROXABAN 20 MG: 20 TABLET, FILM COATED ORAL at 17:19

## 2025-03-24 RX ADMIN — SPIRONOLACTONE 25 MG: 25 TABLET, FILM COATED ORAL at 09:09

## 2025-03-24 RX ADMIN — HYDRALAZINE HYDROCHLORIDE 25 MG: 25 TABLET ORAL at 20:21

## 2025-03-24 RX ADMIN — ACETAMINOPHEN 975 MG: 325 TABLET, FILM COATED ORAL at 09:15

## 2025-03-24 ASSESSMENT — ACTIVITIES OF DAILY LIVING (ADL)
ADLS_ACUITY_SCORE: 34

## 2025-03-24 NOTE — PROGRESS NOTES
[facility]  Cardiology II Service / Advanced Heart Failure  Daily Progress Note    Patient: Jose Enrique Engel      : 1985      MRN: [id number]    Assessment/Plan:   40 yo male with PMHx of HFrEF (15%) 2/2 NICM s/p ICD, CKD3, polysubstance use admitted as transfer from outside hospital on 3/14/25 for severe sepsis secondary to MRSA bacteremia leading to multiorgan dysfunction.  He was transferred to [facility] given advanced heart failure.    Today:  - DC dobutamine gtt today  - Continue home diuretics with Torsemide 40mg BID    MRSA bacteremia  Possible source: PICC line (removed at outside hospital) vs septic thrombophlebitis (RUE thrombus). ICD is subcutaneous and does not have venous leads. He had JW which did not show vegetation. Blood cultures remain negative since 3/15. Planning for prolonged course of daily Daptomycin. Will receive daily outpatient infusions via PICC line in [place], where he lives.   - Antibiotics:  - Vancomycin 3/10- 3/15  - Ceftaroline 3/15-3/19  - Daptomycin 3/15- 25     Cardiogenic shock, resolved  Acute on chronic systolic heart failure (ejection fraction 10 to 15%), stage D class IIIb  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 Was previously on outpatient dobutamine, self-discontinued in . Re-engaged with outpatient cardiologist Dr. Dove 2025.  Interested in advanced therapies but wasn't a candidate at that time due to being lost to follow up, substance use, recurrent infections and difficulty managing PICC line.  He was discharged on home dobutamine infusion during last admission. VAD/ transplant eval initited this admission, but patient is not a candidate currently due to concerns about follow up, compliance, recurrent infections. Unfortunately, he has also lost his home infusions service due to same issues. Will attempt dobutamine wean in the  hospital.  Last [facility] 25:  RA 15, PA 44/26, PCWP 21, CI 1.7, PVR 3.4, SVR ~1400  Last TTE 3/13/2025: EF 10%,mild pulm Htn, grade-1 diastolic dysfunction  - Inotrope:   - DC Dobutamine gtt                - PTA digoxin 125 mcg daily  - Afterload Reduction: Hydralazine 25mg q6h (3/15)  GDMT:                - Diuretic: Torsemide 40mg BID               - ACE/ARB/ARNI: - Entresto  mg BID (increased 3/20)               - MRA spironolactone 25               - BB- none not while on dobutamine               - SGLT2-  empagliflozin 10 mg daily  - Anticoagulation:  Rivaroxaban   - Statin: none  - Antiplatelet: none  - Antiarrhythmic: none  - SCD prophylaxis: subcuteanous ICD in place  - Electrolytes: daily checks, goals K>4, Mag>2  - Strict I/O monitoring, daily weights  - Dietary restrictions: regular diet (pt request), 2L fluids (pt intermittently following)  - VAD/ transplant eval completed this admission, not currently a candidate     Right upper extremity DVT   Clot from brachial vein through the subclavian diagnosed at outside hospital with RUE US 3/10/25. Was not on Xarelto prior to admission. Repeat RUE US 3/14 showed persistence of DVT in right innominate, subclavian and axial veins.   - Rivaroxaban 3/20, anticipate 3-6 months treatment duration     Hyponatremia, resolved  Likely due to heart failure and volume overload. Improved with diuresis.    H/o substance use disorders, in remission (methamphetamines, alcohol)  In remission for >1 year. UDS positive for methamphetamines from his adderall prescription, no evidence of active use. Recommend stopping PRN adderall.   - Addiction medicine following peripherally  - SW helping to connect patient to outpatient treatment options  - Peth negative, nicotine negative     CKD stage II  - Cr stable, continue to monitor     Hx left ventricular mural thrombus  - Rivaroxaban (3/20-)     Thrombocytopenia   -CTM      FEN: Cardiac  DVT Prophylaxis: Xarelto  Code Status: Full Code  Disposition: Home  3/25     Pt staffed with Dr. Latif.     Sammy Enriquez MD  Cardiology Fellow, PGY-4  Pager: [phone]        ==================================    Subjective:   Interval History: NAEO. No new symptoms or concerns. Does not feel volume overloaded, but is urinating less    Objective:     Vitals:    03/21/25 0518 03/23/25 0640 03/24/25 0500   Weight: 76.9 kg (169 lb 8 oz) 79.6 kg (175 lb 6.4 oz) 77.4 kg (170 lb 9.6 oz)     Vital Signs with Ranges  Temp:  [97.3  F (36.3  C)-98.4  F (36.9  C)] 97.8  F (36.6  C)  Pulse:  [5-104] 96  Resp:  [16-18] 18  BP: (126-153)/(48-78) 129/48  SpO2:  [97 %-98 %] 98 %  I/O last 3 completed shifts:  In: 1565 [P.O.:1420; I.V.:145]  Out: 5800 [Urine:5800]    Exam:  Constitutional: No distress  HEENT: PERRLA, extraocular motion intact, oral mucosa moist  Cardiovascular: RRR, pulses 2+ bilateral upper extremities, no lower extremity edema,no elevated JVP  Pulmonary: Lungs bilaterally clear to auscultation  GI: Abdomen soft nontender nondistended normal active bowel sounds  MSK: Well-developed  Skin: erythema at previous picc site. No drainage  Neuro: Alert and oriented to person place and time  Psych: Appropriate mood and affect    Medications   Current Facility-Administered Medications   Medication Dose Route Frequency Provider Last Rate Last Admin    DOBUTamine (DOBUTREX) 500 mg in D5W 250 mL infusion (adult std conc)  2.5 mcg/kg/min (Order-Specific) Intravenous Continuous Alexi Devine MD 5.8 mL/hr at 03/24/25 0731 2.5 mcg/kg/min at 03/24/25 0731     Current Facility-Administered Medications   Medication Dose Route Frequency Provider Last Rate Last Admin    acetaminophen (TYLENOL) tablet 975 mg  975 mg Oral Q8H Jess Calero MD   975 mg at 03/24/25 0317    calcium carbonate (OS-LENARD) tablet 600 mg  600 mg Oral Daily Bao Mahan MD   600 mg at 03/23/25 0829    DAPTOmycin (CUBICIN) 800 mg in sodium chloride 0.9 % 100 mL intermittent infusion  10 mg/kg Intravenous  Q24H Jess Calero  mL/hr at 03/21/25 1205 800 mg at 03/23/25 1233    digoxin (LANOXIN) tablet 125 mcg  125 mcg Oral QAM Zaid Mar MD   125 mcg at 03/23/25 0829    empagliflozin (JARDIANCE) tablet 10 mg  10 mg Oral Daily Bao Mahan MD   10 mg at 03/23/25 0829    [Held by provider] ferrous sulfate (FEROSUL) tablet 325 mg  325 mg Oral Daily with breakfast Bao Mahan MD        heparin lock flush 10 unit/mL injection 5-20 mL  5-20 mL Intracatheter Q24H Jess Calero MD   5 mL at 03/21/25 1208    hydrALAZINE (APRESOLINE) tablet 25 mg  25 mg Oral TID Alexi Devine MD   25 mg at 03/23/25 2102    rivaroxaban ANTICOAGULANT (XARELTO) tablet 20 mg  20 mg Oral Daily with supper Jess Calero MD   20 mg at 03/23/25 1706    sacubitril-valsartan (ENTRESTO)  MG per tablet 1 tablet  1 tablet Oral BID Alexi Devine MD   1 tablet at 03/23/25 2102    spironolactone (ALDACTONE) tablet 25 mg  25 mg Oral Daily Bao Mahan MD   25 mg at 03/23/25 0828    torsemide (DEMADEX) tablet 40 mg  40 mg Oral BID Jess Calero MD   40 mg at 03/23/25 2102    vitamin C (ASCORBIC ACID) tablet 250 mg  250 mg Oral Daily Bao Mahan MD   250 mg at 03/23/25 0829       Data   Recent Labs   Lab 03/24/25  0510 03/23/25  1710 03/23/25  0546 03/22/25  1516 03/22/25  0604 03/18/25  1651 03/18/25  0641   WBC 8.7  --  10.4  --  10.7   < > 9.4   HGB 14.6  --  14.3  --  15.1   < > 12.3*   MCV 92  --  91  --  91   < > 93     --  369  --  366   < > 272   INR  --   --   --   --   --   --  1.24*   * 134* 132*   < > 131*   < > 132*   POTASSIUM 5.2 5.0 5.2   < > 4.7   < > 4.4   CHLORIDE 98 99 98   < > 93*   < > 99   CO2 21* 20* 22   < > 25   < > 21*   BUN 39.0* 37.9* 47.5*   < > 48.3*   < > 22.2*   CR 1.11 0.95 1.22*   < > 1.62*   < > 1.11   ANIONGAP 13 15 12   < > 13   < > 12   LENARD 9.6 9.6 9.5   < > 9.4   < > 8.7*   * 109* 120*   < > 124*   < > 110*   ALBUMIN 3.8 3.7  3.3*   < > 3.4*   < > 3.1*   PROTTOTAL 7.7 7.4 7.0   < > 7.3   < > 6.1*   BILITOTAL 0.8 0.7 0.7   < > 0.8   < > 1.2   ALKPHOS 145 148 140   < > 159*   < > 184*   ALT 46 44 39   < > 42   < > 58   AST 40 36 34   < > 29   < > 35    < > = values in this interval not displayed.

## 2025-03-24 NOTE — PLAN OF CARE
Goal Outcome Evaluation:      Plan of Care Reviewed With: patient    Overall Patient Progress: improvingOverall Patient Progress: improving         Neuro: Alert and Oriented x4   Cardiac: Preceptor RN noted ventricular bigeminy and trigeminy. Provider notified. /69   Respiratory: Patient denies shortness of breath, RUL, RML, RLL lung sounds diminished.   GI/: Normal bowel sounds, soft non-distended abdomen, passing flatus.  Diet/appetite: Regular diet, maintaining a good appetite  Activity: Independent, up ad aron in room    Pain:  Dental pain from tooth extractions, managed with acetaminophen and effective.  Skin: Clean and dry, rash on left forearm, bruises on left and right upper arms  LDA's: Triple lumen PICC line left upper arm, heparin locked.   Drip: Dobutamine was discontinued at 0930.    Plan:  Continue with POC. Notify primary team with changes. Care plan updated with close supervision from preceptor RN.

## 2025-03-24 NOTE — PLAN OF CARE
2028-9922 DND [id number]  Neuro: A&Ox4. Able to make needs known.   Cardiac: SR/ST freq. PVCs. VSS.   Respiratory: Sating >92% on RA.  GI/: Adequate urine output. No BM overnight.   Diet: Tolerating regular diet.   Activity:  Up ad aron.  Pain: At acceptable level on current regimen.   Skin: No new deficits noted.  LDA's:  Triple lumen PICC to left arm infusing dobutamine at 2.5 mcg/kg/min  Plan: Continue with POC. Notify primary team with changes.

## 2025-03-24 NOTE — PROGRESS NOTES
Jose Enrique Rosenbergphill Toro was referred for AHF Eval.  Per Dr. Coley's instructions, eval has been paused due to noncompliance.   Dr. Dove and Dr. Mora  and La Hernandez, RNCC have been notified of outstanding items.  AHF episode has been closed.

## 2025-03-24 NOTE — PROGRESS NOTES
Care Management Follow Up    Length of Stay (days): 11    Expected Discharge Date: 03/26/2025     Concerns to be Addressed: Discharge planning  Patient plan of care discussed at interdisciplinary rounds: Yes    Anticipated Discharge Disposition: Home     Anticipated Discharge Services: Outpatient infusion  Anticipated Discharge DME: None    Education Provided on the Discharge Plan: Yes  Patient/Family in Agreement with the Plan: Yes    Referrals Placed by CM/SW: Outpatient Infusion  Private pay costs discussed: Not applicable    Discussed  Partnership in Safe Discharge Planning  document with patient/family: No     Additional Information:  Per MD, will attempt to wean dobutamine today. If stable off dobutamine anticipate discharge home in 1-2 days.     RNCC had tentatively arranged OP Infusions to start today at [facility], this writer called and updated Emely Lopez in their infusion department that pt not yet ready for discharge but anticipate some time this week. Will just need to update Emely once discharge date confirmed. Script and ID notes were faxed on Friday 3/21.    Outpatient Infusion Center @ [facility]   [address] [facility] [place] [address]  Phone: [phone]  Fax: [phone]    Next Steps:   Updated coordinatorEmely at [facility] once discharge date confirmed.  She will need the following additional documentation faxed at discharge:       -lab orders       -PICC cares order       - PICC placement note     CC will continue to monitor patient's medical condition and progress towards discharge.  Krystal Bonds RN BSN  6C Unit Care Coordinator  Phone number: [phone]    SEARCHABLE in [facility] - search CARE COORDINATOR      [place] ([phone]) Saturday & Sunday; ([phone]) [facility] Recognized Holidays      Units: 5A Onc Vocera & 5C Vocera      Units: 6B Vocera & 6C Vocera       Units: 7A SOT RNCC Vocera, 7B Med Surg Vocera, & 7C Med Surg Vocera      Units: 6A  Vocera & 4A CVICU Vocera, 4C MICU Vocera, and 4E SICU Vocera       Units: 5 Ortho Vocera & 5 Med Surg Vocera      Units: 6 Med Surg Vocera & 8 Med Surg Vocera

## 2025-03-24 NOTE — PROGRESS NOTES
[facility]     Addiction Progress Note - Addiction Service        Date of Admission:  3/13/2025  Assessment & Plan       Jose Enrique Engel is a 38 yo male with PMHx of HFrEF (15%) 2/2 NICM s/p ICD, CKD3, polysubstance use admitted as transfer from outside hospital on 3/14/25 for severe sepsis secondary to Staphylococcus aureus bacteremia leading to multiorgan dysfunction.  He was transferred to [facility] given advanced heart failure, VAD evaluation initiated this admission.      ADHD  Methamphetamine Use Disorder, in remission   Patient has remained off of methamphetamine for over a year now however has been using his prescription adderall sparingly. His UDS does show amphetamine but NOT methamphetamine which supports no use of illicit substances. He notes that he was prescribed this so did not think there would be any issue taking it. He does not take it daily and only when he needs to get work done or concentrate which is why he still has adderall without a recent prescription (last prescription I can find in records would be 2022.) He says he does not get adderall from any other source. We talked about wellbutrin which he had in the past. He feels like that helped a little bit with energy but not concentration which is what he really needs.   -Discussed recommendation to STOP all use of Adderal moving forward, can consider use of other non-amphetamine medications to treat ADHD with outpatient psychiatrist.  - Currently he does not feel like he needs a psychiatric appointment or assistance for management of ADHD.  - no sign of meth use on UDS  - Addiction medicine will continue to follow peripherally      Alcohol Use Disorder, in remission   Acute on chronic systolic heart failure/HFrEF  Has been doing well with no alcohol use since last June. No concerns for alcohol use at this time supported by [facility]     Marijuana use   Uses very occasionally. No sign of use disorder and is ok  "not using but would also be open to medical marijuana if approved.     Peer Support:   -Our peer  will meet the patient if agreeable and still hospitalized on Thursday, to provide additional outpatient resources  -To contact Tiara Peer  from [facility] ([facility]): call or text: [phone]     Addiction Social Worker:   Our addiction social worker Zenon Romero can be contacted if needed, on her pager [phone] or texted/called at [phone]      The patient's care was discussed with the Patient.    Time Spent on this Encounter   I spent 25 minutes on the unit/floor managing the care of Jose Enrique Engel. Over 50% of my time was spent on the following:   - Counseling the patient and/or family regarding: prevention of disease  - Coordination of care with the: coordination of care not performed today      Anil Abreu MD on 3/24/2025 at 10:43 AM   Addiction Service   [facility]     Contact information available via [facility] Paging/Directory under \"addiction medicine\"        ______________________________________________________________________    Interval History   No adverse events noted. He says he feels like he is doing overall well, looking forward to discharge planning. He does not feel he has any specific needs or questions for the addiction medicine service today.    ROS:  CV, Pulm, GI and  assessed. Pertinent positives as above, otherwise negative.     Data reviewed today: I reviewed all medications, new labs and imaging results over the last 24 hours.     Physical Exam   Temp: 97.3  F (36.3  C) Temp src: Axillary BP: 97/80 Pulse: 96   Resp: 28 SpO2: 98 % O2 Device: None (Room air)    General Appearance:  NAD, laying in bed  Respiratory: Breathing comfortably in room air  Cardiovascular: Pulses assumed  GI: Soft, ND appearing  Skin: WWP appearing  Other:  Answers all questions appropriately     Due to regulation of Title 42 " of the Code of Federal Regulations (CFR) Part 2: Confidentiality laws apply to this note and the information wherein.  Thus, this note cannot be copy and pasted into any other health care staff's note nor can it be included in general medical records sent to ANY outside agency without the patient's written consent.

## 2025-03-25 VITALS
DIASTOLIC BLOOD PRESSURE: 82 MMHG | SYSTOLIC BLOOD PRESSURE: 111 MMHG | WEIGHT: 169.2 LBS | RESPIRATION RATE: 16 BRPM | OXYGEN SATURATION: 96 % | TEMPERATURE: 97.7 F | BODY MASS INDEX: 24.28 KG/M2 | HEART RATE: 52 BPM

## 2025-03-25 DIAGNOSIS — I50.42 CHRONIC HEART FAILURE WITH REDUCED EJECTION FRACTION AND DIASTOLIC DYSFUNCTION (H): Primary | ICD-10-CM

## 2025-03-25 LAB
ANION GAP SERPL CALCULATED.3IONS-SCNC: 14 MMOL/L (ref 7–15)
ATRIAL RATE - MUSE: 90 BPM
BASE EXCESS BLDV CALC-SCNC: 1.7 MMOL/L (ref -3–3)
BUN SERPL-MCNC: 50 MG/DL (ref 6–20)
CALCIUM SERPL-MCNC: 9.8 MG/DL (ref 8.8–10.4)
CHLORIDE SERPL-SCNC: 95 MMOL/L (ref 98–107)
CREAT SERPL-MCNC: 1.51 MG/DL (ref 0.67–1.17)
DIASTOLIC BLOOD PRESSURE - MUSE: NORMAL MMHG
EGFRCR SERPLBLD CKD-EPI 2021: 60 ML/MIN/1.73M2
ERYTHROCYTE [DISTWIDTH] IN BLOOD BY AUTOMATED COUNT: 15.7 % (ref 10–15)
GLUCOSE SERPL-MCNC: 96 MG/DL (ref 70–99)
HCO3 BLDV-SCNC: 27 MMOL/L (ref 21–28)
HCO3 SERPL-SCNC: 22 MMOL/L (ref 22–29)
HCT VFR BLD AUTO: 45.2 % (ref 40–53)
HGB BLD-MCNC: 15.1 G/DL (ref 13.3–17.7)
INTERPRETATION ECG - MUSE: NORMAL
MAGNESIUM SERPL-MCNC: 2.2 MG/DL (ref 1.7–2.3)
MCH RBC QN AUTO: 30.9 PG (ref 26.5–33)
MCHC RBC AUTO-ENTMCNC: 33.4 G/DL (ref 31.5–36.5)
MCV RBC AUTO: 92 FL (ref 78–100)
O2/TOTAL GAS SETTING VFR VENT: 21 %
OXYHGB MFR BLDV: 60 % (ref 70–75)
P AXIS - MUSE: 32 DEGREES
PCO2 BLDV: 45 MM HG (ref 40–50)
PH BLDV: 7.39 [PH] (ref 7.32–7.43)
PLATELET # BLD AUTO: 372 10E3/UL (ref 150–450)
PO2 BLDV: 36 MM HG (ref 25–47)
POTASSIUM SERPL-SCNC: 5 MMOL/L (ref 3.4–5.3)
PR INTERVAL - MUSE: 196 MS
QRS DURATION - MUSE: 100 MS
QT - MUSE: 374 MS
QTC - MUSE: 457 MS
R AXIS - MUSE: 92 DEGREES
RBC # BLD AUTO: 4.89 10E6/UL (ref 4.4–5.9)
SAO2 % BLDV: 61.4 % (ref 70–75)
SODIUM SERPL-SCNC: 131 MMOL/L (ref 135–145)
SYSTOLIC BLOOD PRESSURE - MUSE: NORMAL MMHG
T AXIS - MUSE: 43 DEGREES
VENTRICULAR RATE- MUSE: 90 BPM
WBC # BLD AUTO: 7.8 10E3/UL (ref 4–11)

## 2025-03-25 PROCEDURE — 85027 COMPLETE CBC AUTOMATED: CPT

## 2025-03-25 PROCEDURE — 83735 ASSAY OF MAGNESIUM: CPT | Performed by: INTERNAL MEDICINE

## 2025-03-25 PROCEDURE — 250N000013 HC RX MED GY IP 250 OP 250 PS 637

## 2025-03-25 PROCEDURE — 250N000013 HC RX MED GY IP 250 OP 250 PS 637: Performed by: STUDENT IN AN ORGANIZED HEALTH CARE EDUCATION/TRAINING PROGRAM

## 2025-03-25 PROCEDURE — 99239 HOSP IP/OBS DSCHRG MGMT >30: CPT | Mod: GC | Performed by: INTERNAL MEDICINE

## 2025-03-25 PROCEDURE — 82805 BLOOD GASES W/O2 SATURATION: CPT

## 2025-03-25 PROCEDURE — 82310 ASSAY OF CALCIUM: CPT | Performed by: STUDENT IN AN ORGANIZED HEALTH CARE EDUCATION/TRAINING PROGRAM

## 2025-03-25 PROCEDURE — 93010 ELECTROCARDIOGRAM REPORT: CPT | Performed by: INTERNAL MEDICINE

## 2025-03-25 PROCEDURE — 80048 BASIC METABOLIC PNL TOTAL CA: CPT | Performed by: STUDENT IN AN ORGANIZED HEALTH CARE EDUCATION/TRAINING PROGRAM

## 2025-03-25 PROCEDURE — 93005 ELECTROCARDIOGRAM TRACING: CPT

## 2025-03-25 PROCEDURE — 258N000003 HC RX IP 258 OP 636

## 2025-03-25 PROCEDURE — 250N000011 HC RX IP 250 OP 636

## 2025-03-25 RX ORDER — SPIRONOLACTONE 25 MG
12.5 TABLET ORAL DAILY
Status: DISCONTINUED | OUTPATIENT
Start: 2025-03-26 | End: 2025-03-25 | Stop reason: HOSPADM

## 2025-03-25 RX ORDER — TORSEMIDE 20 MG/1
40 TABLET ORAL DAILY
Status: DISCONTINUED | OUTPATIENT
Start: 2025-03-25 | End: 2025-03-25 | Stop reason: HOSPADM

## 2025-03-25 RX ORDER — HYDRALAZINE HYDROCHLORIDE 25 MG/1
25 TABLET, FILM COATED ORAL 3 TIMES DAILY
Qty: 90 TABLET | Refills: 2 | Status: SHIPPED | OUTPATIENT
Start: 2025-03-25 | End: 2025-06-23

## 2025-03-25 RX ORDER — SPIRONOLACTONE 25 MG/1
12.5 TABLET ORAL DAILY
Qty: 90 TABLET | Refills: 1 | Status: SHIPPED | OUTPATIENT
Start: 2025-03-25 | End: 2025-03-27

## 2025-03-25 RX ADMIN — ACETAMINOPHEN 975 MG: 325 TABLET, FILM COATED ORAL at 09:20

## 2025-03-25 RX ADMIN — SACUBITRIL AND VALSARTAN 1 TABLET: 97; 103 TABLET, FILM COATED ORAL at 08:53

## 2025-03-25 RX ADMIN — ACETAMINOPHEN 975 MG: 325 TABLET, FILM COATED ORAL at 05:48

## 2025-03-25 RX ADMIN — EMPAGLIFLOZIN 10 MG: 10 TABLET, FILM COATED ORAL at 08:53

## 2025-03-25 RX ADMIN — SPIRONOLACTONE 25 MG: 25 TABLET, FILM COATED ORAL at 08:53

## 2025-03-25 RX ADMIN — PROCHLORPERAZINE EDISYLATE 10 MG: 5 INJECTION INTRAMUSCULAR; INTRAVENOUS at 05:49

## 2025-03-25 RX ADMIN — HYDRALAZINE HYDROCHLORIDE 25 MG: 25 TABLET ORAL at 13:22

## 2025-03-25 RX ADMIN — Medication 600 MG: at 08:53

## 2025-03-25 RX ADMIN — DAPTOMYCIN 800 MG: 500 INJECTION, POWDER, LYOPHILIZED, FOR SOLUTION INTRAVENOUS at 11:41

## 2025-03-25 RX ADMIN — TORSEMIDE 40 MG: 20 TABLET ORAL at 08:53

## 2025-03-25 RX ADMIN — HEPARIN, PORCINE (PF) 10 UNIT/ML INTRAVENOUS SYRINGE 5 ML: at 12:36

## 2025-03-25 RX ADMIN — Medication 250 MG: at 08:53

## 2025-03-25 RX ADMIN — HYDRALAZINE HYDROCHLORIDE 25 MG: 25 TABLET ORAL at 08:53

## 2025-03-25 ASSESSMENT — ACTIVITIES OF DAILY LIVING (ADL)
ADLS_ACUITY_SCORE: 34

## 2025-03-25 NOTE — PROGRESS NOTES
Neuro: A&Ox4.   Cardiac: no monitor. VSS.   Respiratory: Sating adequate on RA.  GI/: Adequate urine output. BM X1  Diet/appetite: Tolerating cardiac diet. Eating well.  Activity:  Independent, up to chair and in halls.  Pain: At acceptable level on current regimen.   Skin: No new deficits noted.  LDA's:TL picc    Plan: Continue with POC. Notify primary team with changes.

## 2025-03-25 NOTE — PROGRESS NOTES
NURSING PROGRESS NOTE  Shift Summary      Date: March 25, 2025     Neuro/Musculoskeletal:  A&Ox4.   Cardiac:  No Tele orders.  VSS.     Respiratory:  Sating in the 90s on RA.  GI/:  Large urine output.  LBM: Yesterday  Diet/Appetite:  Tolerating Regular diet.  Activity:  Independent   Pain: Extracted teeth pain/ Tylenol  Skin:  No new deficits noted.   LDAs + Drips/IVF:  Triple lumen PICC L  Protocols/Labs:  K+, Mag    Pertinent Shift Updates:  Uneventful night with no acute changes.       Plan:  Discharge plan for 03/26. Keep PICC for long term ABX.      Addison Salguero RN  .................................................... March 25, 2025   6:37 AM  [facility] ([facility]): [facility] ICU (Unit 6D)

## 2025-03-25 NOTE — PROGRESS NOTES
The patient will discharge today. The AVS was discussed to the patient. The patient expressed understanding on the importance of medication adherence, diet and activity follow-up care. Patient's concerns were addressed. The patient will be accompanied by his parents to [facility] [place]

## 2025-03-25 NOTE — PROGRESS NOTES
Pt discharging home today to [facility]. BMP ordered per Cards 2 for Follow-up.     Discussed with ; she will arrange for labs to be drawn locally or by HH RN on 3/27.     Bela Small RN

## 2025-03-25 NOTE — DISCHARGE SUMMARY
[facility]   Cardiology II Service / Advanced Heart Failure  Discharge Summary     Jose Enrique Engel MRN# [id number]   YOB: 1985 Age: 39 year old     DATE OF ADMISSION:  3/13/2025  DATE OF DISCHARGE: 3/25/2025  ADMITTING PHYSICIAN: Zaid Mar MD  DISCHARGE PHYSICIAN: Zaid Mar MD  PRIMARY PROVIDER:  Bebeto Painting    ADMIT DIAGNOSES:   Severe sepsis secondary to Staphylococcus bacteremia   Acute decompensated heart failure  Chronic systolic heart failure (ejection fraction 10 to 15%), stage D class IIIb  Nonischemic cardiomyopathy  Moderate chronic hyponatremia   CKD stage II   History of high anion gap metabolic acidosis   Hypochloremia  Right upper extremity DVT   Thrombocytopenia   H/o substance use disorders, in remission     DISCHARGE DIAGNOSES:   As above    FOLLOW-UP:  [] Daily visits to [facility] for IV daptomycin treatment   [] Follow up BMP on 3/27/25 to review potassium and creatinine   [] [facility] clinic follow up   [] Office visit with cardiology, Dr. Dove     PENDING RESULTS:   [] None    HPI: Please see the detailed H & P from 3/13/2025. Briefly, Jose Enrique Engel is a 40 yo male with PMHx of HFrEF (15%) 2/2 NICM s/p ICD, CKD3, polysubstance use admitted as transfer from outside hospital on 3/14/25 for severe sepsis secondary to MRSA bacteremia leading to multiorgan dysfunction. He was transferred to [facility] given advanced heart failure.     HOSPITAL COURSE:     MRSA bacteremia  Possible source: PICC line (removed at outside hospital) vs septic thrombophlebitis (RUE thrombus). ICD is subcutaneous and does not have venous leads. He had JW which did not show vegetation. Blood cultures remain negative since 3/15. Planning for prolonged course of daily Daptomycin. Will receive daily outpatient infusions via PICC line in [place], where he lives.   - Antibiotics:  - Vancomycin 3/10- 3/15  - Ceftaroline 3/15-3/19  - Daptomycin 3/15- 4/12/25      Cardiogenic shock, resolved  Acute on chronic systolic heart failure (ejection fraction 10 to 15%), stage D class IIIb  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 Was previously on outpatient dobutamine, self-discontinued in 2023. Re-engaged with outpatient cardiologist Dr. Dove Jan 2025.  Interested in advanced therapies but wasn't a candidate at that time due to being lost to follow up, substance use, recurrent infections and difficulty managing PICC line.  He was discharged on home dobutamine infusion during last admission. VAD/ transplant eval initited this admission, but patient is not a candidate currently due to concerns about follow up, compliance, recurrent infections. Unfortunately, he has also lost his home infusions service due to same issues. Dobutamine able to be weaned 3/24/25.   Last RHC 1/29/25: RA 15, PA 44/26, PCWP 21, CI 1.7, PVR 3.4, SVR ~1400  Last TTE 3/13/2025: EF 10%,mild pulm Htn, grade-1 diastolic dysfunction  - Inotrope:                - PTA digoxin 125 mcg daily  - Afterload Reduction: Hydralazine 25mg q6h (3/15)  GDMT:                - Diuretic: Torsemide 40mg daily               - ACE/ARB/ARNI: - Entresto  mg BID (increased 3/20)               - MRA spironolactone 12.5 (dose reduced 3/25)               - BB- none                - SGLT2-  empagliflozin 10 mg daily  - Anticoagulation:  Rivaroxaban   - Statin: none  - Antiplatelet: none  - Antiarrhythmic: none  - SCD prophylaxis: subcuteanous ICD in place  - Electrolytes: daily checks, goals K>4, Mag>2  - Strict I/O monitoring, daily weights  - Dietary restrictions: regular diet (pt request), 2L fluids (pt intermittently following)  - VAD/ transplant eval completed this admission, not currently a candidate      Right upper extremity DVT   Clot from brachial vein through the subclavian diagnosed at outside hospital with RUE US 3/10/25. Was not on Xarelto prior to admission. Repeat RUE  US 3/14 showed persistence of DVT in right innominate, subclavian and axial veins.   - Rivaroxaban 3/20, anticipate 3-6 months treatment duration     Hyponatremia, resolved  Likely due to heart failure and volume overload. Improved with diuresis.     H/o substance use disorders, in remission (methamphetamines, alcohol)  In remission for >1 year. UDS positive for methamphetamines from his adderall prescription, no evidence of active use. Recommend stopping PRN adderall.   - Addiction medicine consulted during admission  -  helping to connect patient to outpatient treatment options  - Peth negative, nicotine negative     CKD stage II  - Cr stable, continue to monitor     Hx left ventricular mural thrombus  - Rivaroxaban (3/20-)     Thrombocytopenia   -CTM     PHYSICAL EXAM:  Blood pressure 111/82, pulse 52, temperature 97.7  F (36.5  C), temperature source Oral, resp. rate 16, weight 76.7 kg (169 lb 3.2 oz), SpO2 96%.    Constitutional: In no acute distress  HEENT: PERRLA, extraocular motion intact, oral mucosa moist  Cardiovascular: RRR, pulses 2+ bilateral upper extremities, no lower extremity edema, no elevated JVP  Pulmonary: Lungs bilaterally clear to auscultation  GI: Abdomen soft nontender nondistended normal active bowel sounds  MSK: Well-developed  Skin: erythema at previous picc site. No drainage  Neuro: Alert and oriented to person place and time  Psych: Appropriate mood and affect    LABS:    Recent Labs   Lab Test 03/25/25  0631   WBC 7.8   RBC 4.89   HGB 15.1   HCT 45.2   MCV 92   MCH 30.9   MCHC 33.4   RDW 15.7*          Recent Labs   Lab Test 03/25/25  0631 03/24/25  0510   * 132*   POTASSIUM 5.0 5.2   CHLORIDE 95* 98   LENARD 9.8 9.6   CO2 22 21*   BUN 50.0* 39.0*   CR 1.51* 1.11   GLC 96 123*   AST  --  40   ALT  --  46   BILITOTAL  --  0.8   ALBUMIN  --  3.8   PROTTOTAL  --  7.7   ALKPHOS  --  145       IMAGING:  See EMR    PROCEDURES:  Triple lumen PICC placement  3/18/25    CONSULTATIONS:   Infectious disease  Addiction medicine  Neuropsychology  Cardiothoracic Surgery  Dentistry    DISCHARGE MEDICATIONS:  Discharge Medication List as of 3/25/2025  4:13 PM        START taking these medications    Details   DAPTOmycin 800 mg Inject 800 mg over 30 minutes into the vein every 24 hours for 21 days.Local Print, Disp-[id number] each, R-0      hydrALAZINE (APRESOLINE) 25 MG tablet Take 1 tablet (25 mg) by mouth 3 times daily., Disp-90 tablet, R-2, E-Prescribe      sacubitril-valsartan (ENTRESTO)  MG per tablet Take 1 tablet by mouth 2 times daily., Disp-60 tablet, R-2, E-Prescribe           CONTINUE these medications which have CHANGED    Details   spironolactone (ALDACTONE) 25 MG tablet Take 0.5 tablets (12.5 mg) by mouth daily., Disp-90 tablet, R-1, E-Prescribe      torsemide 40 MG TABS Take 40 mg by mouth daily., Disp-30 tablet, R-3, E-Prescribe           CONTINUE these medications which have NOT CHANGED    Details   albuterol (PROAIR HFA/PROVENTIL HFA/VENTOLIN HFA) 108 (90 Base) MCG/ACT inhaler Inhale 2 puffs into the lungs every 6 hours as needed for shortness of breath, wheezing or cough., Historical      calcium carbonate (OS-LENARD) 1500 (600 Ca) MG tablet Take 1 tablet by mouth daily at 2 pm., Historical      digoxin (LANOXIN) 125 MCG tablet Take 1 tablet (125 mcg) by mouth daily., Disp-90 tablet, R-1, E-Prescribe      empagliflozin (JARDIANCE) 10 MG TABS tablet Take 1 tablet (10 mg) by mouth daily., Disp-90 tablet, R-1, E-Prescribe      ferrous sulfate (FEROSUL) 325 (65 Fe) MG tablet Take 325 mg by mouth daily (with breakfast)., Historical      potassium chloride rand ER (KLOR-CON M20) 20 MEQ CR tablet Take 20 mEq by mouth 2 times daily., Historical      rivaroxaban ANTICOAGULANT (XARELTO) 20 MG TABS tablet Take 20 mg by mouth daily (with dinner)., Historical      VITAMIN C 250 MG tablet Take 1 tablet by mouth daily., LEIF, Historical      zolpidem (AMBIEN) 5 MG tablet  "Take 0.5 tablets (2.5 mg) by mouth nightly as needed for sleep., Disp-4 tablet, R-0, E-Prescribe      DOBUTamine in 5% Dextrose 2 mg/mL 350 mL via CADD pump One day bag. Infuse 406.5 mcg/min into the vein continuously. Dose based on 5 mcg/kg/min and 81.3 kg. Continuous rate: 12.2 mL/hr. Reservoir volume: 293 mL. Bag contains 57 mL of overfill. Do not flush line between bags.Normal, Disp-431041 mL, R-0      Emergency Supply Kit, Central, Patient use for emergency only. Contents: 3 sodium chloride 0.9% flushes, 1 dressing kit, 1 microclave ext set 14\", 4 nitrile gloves (med), 6 alcohol prep pads, 1 bacitracin, 1 syringe (10 cc 20 G 1\"). Call [phone] to reorder., Disp-253699 kit, R- 0, Normal      sodium chloride, PF, 0.9% PF flush Inject 10 mLs into catheter as needed for line flush (only as directed by medical professional). Improper flushing may result in a large dose of drug administered and side effects may result., Disp-804972 mL, R-0, Normal           STOP taking these medications       sacubitril-valsartan (ENTRESTO) 49-51 MG per tablet Comments:   Reason for Stopping:               DISCHARGE DISPOSITION: Jose Enrique Engel will discharge to home in stable condition.     DISCHARGE INSTRUCTIONS:  Discharge Procedure Orders   Basic metabolic panel   Standing Status: Future Standing Exp. Date: 03/25/26     OPAT enrollment and ID Clinic Referral   Referral Priority: Routine Referral Type: Consultation   Number of Visits Requested: 1     Discharge Instructions   Order Comments: Outpatient Infusion Center @ [facility]   [address] [place] [address]  Ph: [phone]  Fax: [phone]     We have scheduled you for daily IV antibiotic infusions starting on Monday, March 24th at 3 pm.      Weekly PICC line dressing changes and labs.     Order Specific Question Answer Comments   Select button to print No Print      Reason for your hospital stay   Order Comments: You were admitted for a " bacterial blood stream infection, a blood clot in your arm, and worsening heart failure. While you were admitted, you were treated with IV antibiotics and an IV inotrope medication for your heart. Following treatment with IV antibiotics your condition improved and you were able to be weaned off of the IV inotrope medication. To fully treat your infection you will need to be on long term antibiotics and a PICC line was placed. You will need to go to the [facility] daily to receive antibiotic treatment moving forward. Prior to discharge, you had a kidney injury and will need to keep a close eye on your renal function moving forward. We placed a referral for you to follow up in the next few days to repeat labs. During admission, we also increased your Entresto dose and started you on hydralazine which you will take 3 times a day. Your spironolactone dose was reduced and you will take torsemide 40mg daily. You will also follow up closely with [facility] and with your cardiologist Dr. Dove. It was a pleasure taking care of you at the [facility].     Activity   Order Comments: Your activity upon discharge: activity as tolerated     Order Specific Question Answer Comments   Is discharge order? Yes      Tubes and Drains   Order Comments: Current Tubes and Drains:     PICC Line  Duration           PICC 03/18/25 Triple Lumen Left Basilic Dobutamine & Antibiotics 6 days              Order Specific Question Answer Comments   If tubes and drains present: Continue at discharge      Diet   Order Comments: Follow this diet upon discharge: Current Diet:Orders Placed This Encounter      Regular Diet Adult     Order Specific Question Answer Comments   Is discharge order? Yes      Hospital Follow-up with Existing Primary Care Provider (PCP)   Standing Status: Future Standing Exp. Date: 04/24/25   Order Comments: Please see details below          Order Specific Question Answer Comments    Schedule Primary Care visit within 7 Days        Patient was seen and plan of care discussed with attending Dr. Mar on day of discharge. Patient understood and agreed with above plan of care. All questions answered.    Thomas Valerio MD  Internal Medicine PGY-1  [facility]

## 2025-03-25 NOTE — PROGRESS NOTES
Care Management Discharge Note    Discharge Date: 03/25/2025       Discharge Disposition: Home    Discharge Services:  outpatient infusion    Discharge DME:  none    Discharge Transportation: car, drives self, family or friend will provide    Private pay costs discussed: Not applicable    Does the patient's insurance plan have a 3 day qualifying hospital stay waiver?  No    PAS Confirmation Code:  NA  Patient/family educated on Medicare website which has current facility and service quality ratings:  NA    Education Provided on the Discharge Plan: Yes  Persons Notified of Discharge Plans: patient and care team  Patient/Family in Agreement with the Plan:      Handoff Referral Completed: Yes, non-[facility] PCP: External handoff communication completed    Additional Information:     BMP needed on the 27 th   Called the infusion center and booked a 2:45 pm appointment for lab for the 27 th. Patient updated.  Discharge instruction updated.  Notified provider to updated the discharge instruction order and place the lab order.   Faxed all documents  to [place] [facility].     Emely updated. She said , they have an appointment scheduled for patient tomorrow.     Outpatient Infusion Center @ [address] [facility] [place] [address]  Phone: [phone]  Fax: [phone]         Donna Lopez RN, PHN, BSN   Float Nurse Care Coordinator  Covering for Unit [address]  Phone [phone]

## 2025-03-25 NOTE — DISCHARGE INSTRUCTIONS
Important lab appointment to attend on March 27th at 2:45 pm  Outpatient Infusion Center @ [facility]   [address] [place] [place] [address]  Phone: [phone]  Fax: [phone]

## 2025-03-26 ENCOUNTER — PATIENT OUTREACH (OUTPATIENT)
Dept: CARE COORDINATION | Facility: CLINIC | Age: 40
End: 2025-03-26

## 2025-03-26 ENCOUNTER — TELEPHONE (OUTPATIENT)
Dept: INFECTIOUS DISEASES | Facility: CLINIC | Age: 40
End: 2025-03-26

## 2025-03-26 DIAGNOSIS — B95.62 MRSA BACTEREMIA: Primary | ICD-10-CM

## 2025-03-26 DIAGNOSIS — R78.81 MRSA BACTEREMIA: Primary | ICD-10-CM

## 2025-03-26 LAB
BACTERIA BLD CULT: NO GROWTH
BACTERIA BLD CULT: NO GROWTH

## 2025-03-26 NOTE — PROGRESS NOTES
Occupational Therapy Discharge Summary    Reason for therapy discharge:    Discharged to home.    Progress towards therapy goal(s). See goals on Care Plan in [facility] electronic health record for goal details.  Goals partially met.  Barriers to achieving goals:   discharge from facility.    Therapy recommendation(s):    No further therapy is recommended.

## 2025-03-26 NOTE — PROGRESS NOTES
[facility]    Background: Transitional Care Management program identified per system criteria and reviewed by Connected [facility] Resource Center team for possible outreach.    Assessment: Upon chart review, CCRC Team member will not proceed with patient outreach related to this episode of Transitional Care Management program due to reason below:    Patient declined to answer all post hospital discharge questions with CCRC team member and disconnected call.    Plan: Transitional Care Management episode addressed appropriately per reason noted above.      BROCK Martin  [facility] Resource [facility]    *Connected Care Resource Team does NOT follow patient ongoing. Referrals are identified based on internal discharge reports and the outreach is to ensure patient has an understanding of their discharge instructions.

## 2025-03-26 NOTE — TELEPHONE ENCOUNTER
Patient discharged, OPAT therapy plan noted below.  Follow up call to [facility], they will be seeing patient daily for his daptomycin, ER on the weekends.  Scheduled to have lab work done on Thursdays, visible in Care Everywhere.  Provided contact number for ID.    Patient is scheduled for follow up with ID on 4/2.    Cheryl Verdugo, WENDYN, RN  RN Care Coordinator Infectious Disease Clinic        Angie Swain [facility]  Pharmacist  Antimicrobial Management Team (AMT)     Progress Notes     Signed     Date of Service: 3/23/2025  2:06 PM  Creation Time: 3/23/2025  2:06 PM           [facility]  Parenteral ANtibiotic Review at Departure from [facility] Note      Patient: Jose Enrique Engel  MRN: [id number]  Allergies: Isosorbide, Benadryl [diphenhydramine], and Other environmental allergy     Current Location: [facility]  OPAT to be provided by: Other    Outpatient Infusion Center @ [facility]  Line Type: PICC     Diagnosis/Indications: MRSA bacteremia  Organism(s): MRSA  MRDO? Yes - MRSA  Pending Cultures/Microbiological Tests: yes 3/21 Blood culture finalization     Inpatient ID involved in developing OPAT plan: Yes - discharge OPAT plan has no changes from ID provider, Dr. Dudley, OPAT plan charted on 3/19/2025     Outpatient ID Follow-up: ID OPAT Clinic Referral Placed ([facility] Ph: [phone] and Fax: [phone]) - appointment scheduled  Designated Provider: Popeye Dudley MD     Antimicrobial Regimen / Route Anticipated  Duration Start Date Stop /  Reassess Date   Daptomycin 800 mg (~10 mg/kg actual BW) every 24 hours/IV  4 weeks 3/15/2025 4/12/2025      Laboratory Tests and Monitoring Frequency: CBC with Diff, BMP, CK Once Weekly     Imaging/Miscellaneous Monitoring: None     ID Pharmacist Interventions: None                           Angie Swain, PharmD, BCIDP  Available on VOSS

## 2025-03-26 NOTE — TELEPHONE ENCOUNTER
[facility] returned call to this RN , confirmed plan.  Infusion center needs lab orders faxed to them- [phone].  Infusion center provided specifics needed for lab orders for them to be able to draw and process weekly.  Infusion Center will plan to do lab work on Tuesdays.    WENDY LaiN, RN  RN Care Coordinator Infectious Disease Clinic

## 2025-03-27 ENCOUNTER — TELEPHONE (OUTPATIENT)
Dept: CARDIOLOGY | Facility: CLINIC | Age: 40
End: 2025-03-27
Payer: COMMERCIAL

## 2025-03-27 ENCOUNTER — CARE COORDINATION (OUTPATIENT)
Dept: CARDIOLOGY | Facility: CLINIC | Age: 40
End: 2025-03-27
Payer: COMMERCIAL

## 2025-03-27 DIAGNOSIS — I50.42 CHRONIC HEART FAILURE WITH REDUCED EJECTION FRACTION AND DIASTOLIC DYSFUNCTION (H): Primary | ICD-10-CM

## 2025-03-27 RX ORDER — EPLERENONE 25 MG/1
25 TABLET ORAL DAILY
Qty: 90 TABLET | Refills: 1 | Status: SHIPPED | OUTPATIENT
Start: 2025-03-27

## 2025-03-27 NOTE — TELEPHONE ENCOUNTER
30 days supply test claims requested for the drugs below:  eplerenone 25mg once daily  -$0 copay

## 2025-03-27 NOTE — PROGRESS NOTES
[facility]: Cardiology Nurse Care Coordination   Post Discharge Phone Call Note    Jose Enrique Engel's most recent inpatient dates and diagnoses:   Admission Date: 3/13/2025   Admission Diagnosis: Acute dyspnea - R06.00   Discharge Date: 3/25/2025   Discharge Diagnosis: Bacteremia - R78.81  Chronic heart failure with reduced ejection fraction and diastolic dysfunction (H) - I50.42  Cardiogenic shock (H) - R57.0  Benign essential hypertension - I10     Per hospital discharge summary and inpatient provider notes:   Please see the detailed H & P from 3/13/2025. Briefly, Jose Enrique Engel is a 38 yo male with PMHx of HFrEF (15%) 2/2 NICM s/p ICD, CKD3, polysubstance use admitted as transfer from outside hospital on 3/14/25 for severe sepsis secondary to MRSA bacteremia leading to multiorgan dysfunction. He was transferred to [facility] given advanced heart failure.      Post Discharge Assessment:  He states he is doing well. He called earlier to see about his follow up appointments - ID on 4/2 and dr dueñas on 4/4.   Infusion yesterday went well. Reviewed he will go again today and planning for labs this afternoon with his infusion.         Weight today 168lb.     Medication Review:  I have reviewed all Jose Enrique's medications per the discharge summary with particular attention to any medication changes, including discontinued and new medications.   Spironolactone giving him sensitive nipples.   Torsemide - has been taking 20mg tablets. Took 80mg yesterday. He didn't think he was peeing enough, the 80mg didn't make him excessively pee.   Digoxin not on his list but he says he can resume taking it.    Xarelto - is drying him out, itchier. He starts getting blood splotches - tops of hands and legs get dry and ashy. Lotion might help but he doesn't like to use it. If he doesn't take it, he feels better. Reviewed importance of taking this daily but that I would let Dr Dueñas know his concerns.     Follow-Up Plan:   ID on 4/2  and Dr Schmidt on 4/4.   Future Appointments   Date Time Provider Department Center   4/2/2025 12:00 PM Popeye Dudley MD [facility]   4/4/2025  1:00 PM  LAB [facility]   4/4/2025  1:45 PM Heather Schmidt MD [facility]      I reviewed the above appointments with Jose Enrique and am assisting in scheduling additional appointments needed per the discharge summary above.     Subspecialty Assessments:   Reviewed importance of daily weights. He states weigh today is good at 168lb.       I reviewed the Cardiology clinic's phone number ([phone] Option #1 and after hours on-call number [phone] #4 and ask for the On-Call Cardiologist) and have advised Jose Enrique to contact us with changes or worsening of symptoms, additional questions about medications and appt scheduling needs.   Jose Enrique verbalizes understanding and agrees with plan of care.

## 2025-03-27 NOTE — PROGRESS NOTES
Reviewed with Dr dove.  Date: 3/27/2025    Time of Call: 1:06 PM     Diagnosis:  heart failure     [ VORB ] Ordering provider: Heather Dove MD    Order: stop spironolactone. Start eplerenone 25mg once daily. Move cardio appt to 4/2.      Order received by: La Norman RN      Follow-up/additional notes: Called Jose Enrique. Left message advising of above and asked for call back if he has any further questions.

## 2025-04-01 ENCOUNTER — TELEPHONE (OUTPATIENT)
Dept: INFECTIOUS DISEASES | Facility: CLINIC | Age: 40
End: 2025-04-01
Payer: COMMERCIAL

## 2025-04-01 ENCOUNTER — TELEPHONE (OUTPATIENT)
Dept: CARDIOLOGY | Facility: CLINIC | Age: 40
End: 2025-04-01
Payer: COMMERCIAL

## 2025-04-01 DIAGNOSIS — I50.42 CHRONIC HEART FAILURE WITH REDUCED EJECTION FRACTION AND DIASTOLIC DYSFUNCTION (H): Primary | ICD-10-CM

## 2025-04-01 NOTE — TELEPHONE ENCOUNTER
Health Call Center    Phone Message    May a detailed message be left on voicemail: yes     Reason for Call: Other: Patient requesting to speak with La in regards to their appt on 04/02 that they will like to reschedule to next Wednesday or Thursday due to the weather and their long drive. Did not reschedule since in appt notes stated to not reschedule. Please call patient back to further discuss and coordinate.     Action Taken: Other: Cardiology    Travel Screening: Not Applicable     Thank you!  [facility]

## 2025-04-01 NOTE — TELEPHONE ENCOUNTER
Called daksha. He states he doesn't feel safe driving in the expected snowy conditions for tomorrows appointments. Rescheduled cardiology appt to 4/9 per his request. Gave number for ID appointment and he will call for reschedule of this. Confirmed with him that since he is not traveling tomorrow, he should maintain his local infusion schedule. He states understanding.

## 2025-04-01 NOTE — TELEPHONE ENCOUNTER
EP called 4/1 to cancel 4/2 follow up with Dr. Dudley and resched to 4/9 with other appts per pt request.   
yes

## 2025-04-07 ENCOUNTER — TELEPHONE (OUTPATIENT)
Dept: INFECTIOUS DISEASES | Facility: CLINIC | Age: 40
End: 2025-04-07
Payer: COMMERCIAL

## 2025-04-07 DIAGNOSIS — I50.42 CHRONIC HEART FAILURE WITH REDUCED EJECTION FRACTION AND DIASTOLIC DYSFUNCTION (H): Primary | ICD-10-CM

## 2025-04-07 NOTE — TELEPHONE ENCOUNTER
Call from [facility] with update.    Patient has been removing the dressing from his PICC line site independently at home and changing it himself. The skin at the insertion site has become very reddened and irritated.  Infusion center has concerns with current PICC line due to sterility/access issues.    Infusion Center will remove PICC line and administer the remaining 5-6 doses of patient's daptomycin with a PIV.    This RN will udpate provider.    Cheryl Verdugo, BSN, RN  RN Care Coordinator [facility]
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
## 2025-04-09 ENCOUNTER — OFFICE VISIT (OUTPATIENT)
Dept: CARDIOLOGY | Facility: CLINIC | Age: 40
End: 2025-04-09
Attending: STUDENT IN AN ORGANIZED HEALTH CARE EDUCATION/TRAINING PROGRAM
Payer: COMMERCIAL

## 2025-04-09 ENCOUNTER — OFFICE VISIT (OUTPATIENT)
Dept: INFECTIOUS DISEASES | Facility: CLINIC | Age: 40
End: 2025-04-09
Attending: STUDENT IN AN ORGANIZED HEALTH CARE EDUCATION/TRAINING PROGRAM
Payer: COMMERCIAL

## 2025-04-09 ENCOUNTER — LAB (OUTPATIENT)
Dept: LAB | Facility: CLINIC | Age: 40
End: 2025-04-09
Attending: STUDENT IN AN ORGANIZED HEALTH CARE EDUCATION/TRAINING PROGRAM
Payer: COMMERCIAL

## 2025-04-09 VITALS
SYSTOLIC BLOOD PRESSURE: 104 MMHG | OXYGEN SATURATION: 99 % | WEIGHT: 166 LBS | DIASTOLIC BLOOD PRESSURE: 72 MMHG | BODY MASS INDEX: 23.82 KG/M2 | RESPIRATION RATE: 18 BRPM

## 2025-04-09 DIAGNOSIS — R78.81 BACTEREMIA: ICD-10-CM

## 2025-04-09 DIAGNOSIS — B95.62 MRSA BACTEREMIA: Primary | ICD-10-CM

## 2025-04-09 DIAGNOSIS — I50.23 ACUTE ON CHRONIC SYSTOLIC HEART FAILURE (H): Primary | ICD-10-CM

## 2025-04-09 DIAGNOSIS — I50.42 CHRONIC HEART FAILURE WITH REDUCED EJECTION FRACTION AND DIASTOLIC DYSFUNCTION (H): Primary | ICD-10-CM

## 2025-04-09 DIAGNOSIS — R78.81 MRSA BACTEREMIA: ICD-10-CM

## 2025-04-09 DIAGNOSIS — Z91.148 NONCOMPLIANCE WITH MEDICATION REGIMEN: ICD-10-CM

## 2025-04-09 DIAGNOSIS — B95.62 MRSA BACTEREMIA: ICD-10-CM

## 2025-04-09 DIAGNOSIS — I50.42 CHRONIC HEART FAILURE WITH REDUCED EJECTION FRACTION AND DIASTOLIC DYSFUNCTION (H): ICD-10-CM

## 2025-04-09 DIAGNOSIS — R78.81 MRSA BACTEREMIA: Primary | ICD-10-CM

## 2025-04-09 DIAGNOSIS — F19.10 POLYSUBSTANCE ABUSE (H): ICD-10-CM

## 2025-04-09 DIAGNOSIS — I42.8 NONISCHEMIC CARDIOMYOPATHY (H): ICD-10-CM

## 2025-04-09 DIAGNOSIS — N18.30 STAGE 3 CHRONIC KIDNEY DISEASE, UNSPECIFIED WHETHER STAGE 3A OR 3B CKD (H): ICD-10-CM

## 2025-04-09 LAB
ALBUMIN SERPL BCG-MCNC: 4.3 G/DL (ref 3.5–5.2)
ALP SERPL-CCNC: 107 U/L (ref 40–150)
ALT SERPL W P-5'-P-CCNC: 30 U/L (ref 0–70)
ANION GAP SERPL CALCULATED.3IONS-SCNC: 13 MMOL/L (ref 7–15)
AST SERPL W P-5'-P-CCNC: 40 U/L (ref 0–45)
BASOPHILS # BLD AUTO: 0.1 10E3/UL (ref 0–0.2)
BASOPHILS NFR BLD AUTO: 1 %
BILIRUB SERPL-MCNC: 0.7 MG/DL
BUN SERPL-MCNC: 49.6 MG/DL (ref 6–20)
CALCIUM SERPL-MCNC: 9.6 MG/DL (ref 8.8–10.4)
CHLORIDE SERPL-SCNC: 99 MMOL/L (ref 98–107)
CK SERPL-CCNC: 141 U/L (ref 39–308)
CREAT SERPL-MCNC: 1.71 MG/DL (ref 0.67–1.17)
EGFRCR SERPLBLD CKD-EPI 2021: 52 ML/MIN/1.73M2
EOSINOPHIL # BLD AUTO: 0.7 10E3/UL (ref 0–0.7)
EOSINOPHIL NFR BLD AUTO: 11 %
ERYTHROCYTE [DISTWIDTH] IN BLOOD BY AUTOMATED COUNT: 14.6 % (ref 10–15)
GLUCOSE SERPL-MCNC: 101 MG/DL (ref 70–99)
HCO3 SERPL-SCNC: 22 MMOL/L (ref 22–29)
HCT VFR BLD AUTO: 43.5 % (ref 40–53)
HGB BLD-MCNC: 14.7 G/DL (ref 13.3–17.7)
IMM GRANULOCYTES # BLD: 0 10E3/UL
IMM GRANULOCYTES NFR BLD: 0 %
LYMPHOCYTES # BLD AUTO: 2.2 10E3/UL (ref 0.8–5.3)
LYMPHOCYTES NFR BLD AUTO: 32 %
MCH RBC QN AUTO: 31 PG (ref 26.5–33)
MCHC RBC AUTO-ENTMCNC: 33.8 G/DL (ref 31.5–36.5)
MCV RBC AUTO: 92 FL (ref 78–100)
MONOCYTES # BLD AUTO: 0.8 10E3/UL (ref 0–1.3)
MONOCYTES NFR BLD AUTO: 12 %
NEUTROPHILS # BLD AUTO: 3 10E3/UL (ref 1.6–8.3)
NEUTROPHILS NFR BLD AUTO: 44 %
NRBC # BLD AUTO: 0 10E3/UL
NRBC BLD AUTO-RTO: 0 /100
PLATELET # BLD AUTO: 340 10E3/UL (ref 150–450)
POTASSIUM SERPL-SCNC: 4.4 MMOL/L (ref 3.4–5.3)
PROT SERPL-MCNC: 8.4 G/DL (ref 6.4–8.3)
RBC # BLD AUTO: 4.74 10E6/UL (ref 4.4–5.9)
SODIUM SERPL-SCNC: 134 MMOL/L (ref 135–145)
WBC # BLD AUTO: 6.8 10E3/UL (ref 4–11)

## 2025-04-09 PROCEDURE — 80307 DRUG TEST PRSMV CHEM ANLYZR: CPT | Mod: 90 | Performed by: PATHOLOGY

## 2025-04-09 PROCEDURE — 80053 COMPREHEN METABOLIC PANEL: CPT | Performed by: PATHOLOGY

## 2025-04-09 PROCEDURE — 99000 SPECIMEN HANDLING OFFICE-LAB: CPT | Performed by: PATHOLOGY

## 2025-04-09 PROCEDURE — G0463 HOSPITAL OUTPT CLINIC VISIT: HCPCS | Performed by: STUDENT IN AN ORGANIZED HEALTH CARE EDUCATION/TRAINING PROGRAM

## 2025-04-09 PROCEDURE — G0480 DRUG TEST DEF 1-7 CLASSES: HCPCS | Mod: 90 | Performed by: PATHOLOGY

## 2025-04-09 PROCEDURE — 36415 COLL VENOUS BLD VENIPUNCTURE: CPT | Performed by: PATHOLOGY

## 2025-04-09 PROCEDURE — 82550 ASSAY OF CK (CPK): CPT | Performed by: PATHOLOGY

## 2025-04-09 PROCEDURE — 85025 COMPLETE CBC W/AUTO DIFF WBC: CPT | Performed by: PATHOLOGY

## 2025-04-09 ASSESSMENT — PAIN SCALES - GENERAL
PAINLEVEL_OUTOF10: NO PAIN (0)
PAINLEVEL_OUTOF10: NO PAIN (0)

## 2025-04-09 NOTE — NURSING NOTE
Chief Complaint   Patient presents with    RECHECK     Follow up visit     Yahir Abrams, CMA CMA at 2:26 PM on 4/9/2025     /72   Resp 18   Wt 75.3 kg (166 lb)   SpO2 99%   BMI 23.82 kg/m

## 2025-04-09 NOTE — NURSING NOTE
Chief Complaint   Patient presents with    Follow Up     : RTKATHLEEN HFL 39 year old male presents with systolic heart failure, history of substance use, for follow up with labs prior       Vitals were taken, medications reconciled     Haroldo Franks, Clinic Assistant     12:22 PM

## 2025-04-09 NOTE — NURSING NOTE
Diet: Patient instructed regarding a heart failure healthy diet, including discussion of reduced fat and 2000 mg daily sodium restriction, daily weights, medication purpose and compliance, fluid restrictions and resources for patient and family to access for assistance with heart failure management.       Labs: Patient was given results of the laboratory testing obtained today and patient was instructed about when to return for the next laboratory testing.     Med Reconcile: Reviewed and verified all current medications with the patient. The updated medication list was printed and given to the patient. No changes    Return Appointment: Patient given instructions regarding scheduling next clinic visit. Referral to [facility] for second opinion. Follow up with Dr Dove next available.     Patient stated he understood all health information given and agreed to call with further questions or concerns.     La Norman RN
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
## 2025-04-09 NOTE — PATIENT INSTRUCTIONS
-Cardiology Providers you saw during your visit:  Dr. Schmidt     Medication changes:  1- No changes        Follow up:  1- We will send referral to [facility] for second opinion  2 - Discuss with infectious disease what to do about your infusion this afternoon and your PICC line  3 - Follow up with Dr Schmidt next available.       Please call if you have:  1. Weight gain of more than 2 pounds in a day or 5 pounds in a week  2. Increased shortness of breath, swelling or bloating  3. Dizziness, lightheadedness   4. Any questions or concerns.      Heart Failure Support Group  Support group is held virtually. Please reach out if you would like to attend and we can get you the information you need to log in.     Follow the American Heart Association Diet and Lifestyle recommendations:  Limit saturated fat, trans fat, sodium, red meat, sweets and sugar-sweetened beverages. If you choose to eat red meat, compare labels and select the leanest cuts available.  Aim for at least 150 minutes of moderate physical activity or 75 minutes of vigorous physical activity - or an equal combination of both - each week.     During business hours: [phone], press option # 1 to schedule an appointment or send a message to your care team     After hours, weekends or holidays: On Call Cardiologist- [phone]   option #4 and ask to speak to the on-call Cardiologist. Inform them you are a CORE/heart failure patient at the [facility].     La Norman RN BSN  Cardiology Nurse Care Coordinator (Heart Failure / C.O.R.E.)

## 2025-04-09 NOTE — PROGRESS NOTES
Attending Physician Attestation:  I personally reviewed the vitals, labs, microbiologic data, imaging, and interdisciplinary notes.This note by Dr. Diana Bear - ID fellow reflects my observations and opinions, and the assessment and recommendations reflect my approach.    Popeye Wood MD  Division of Infectious Diseases and International Medicine  P: [phone]  Date of Service (when I saw the patient): 04/09/25

## 2025-04-09 NOTE — PROGRESS NOTES
[facility]    Division of Infectious Diseases and International Medicine    CLINICAL INFECTIOUS DISEASE OUTPATIENT CONSULTATION NOTE     Patient:  Jose Enrique Engel, Date of birth 1985, Medical record number [id number]  Date of Visit:  April 9, 2025  Referred by: Popeye Dudley   Reason for Visit: follow up         Assessment and Plan     ID Problem list:  MRSA bacteremia  Blood cultures positive at OSH from 3/10-transfer on 3/13, remain positive on 3/14, and growth is within 24 hrs of collection, suggesting high-grade bacteremia.   Blood cultures from 3/15 onwards will NGTD  JW did not suggest a valvular lesion, but the picture is certainly concerning for a non-valvular endovascular infection  Right upper extremity DVT with concern for septic thrombophlebitis   Repeat RUE ultrasound without signs of drainable fluid collection  Acute decompensated heart failure  H/o ICD w.o leads  CKD stage II        Discussion:  40 yo male with PMHx of HFrEF (10%) 2/2 NICM s/p ICD, CKD3, substance use disorder transferred from [facility] 3/13 for further cardiac management after 3 days of persistent MRSA bacteremia and multiorgan dysfunction. The bacteremia is likely from PICC line (placed at previous hospitalization 2/3, possibly dislodged and replaced with potential for introducing infection) given the purulent discharge noted and cellulitis. There is also concern for septic thrombophlebitis based on DVT at insertion site noted on US and continued bacteremia. DVT could also be from stopping Xarelto in December, but suspicion for infection is warranted at this time given clinical picture.      Blood cultures have tentatively cleared 3/15 onward after RUE PICC removal. After dual therapy with ceftaroline and daptomycin, he was continued on daptomycin 10mg/kg alone. Since discharge no complications from daptomycin and planning for 4 weeks total ending 4/12. At that time can remove the picc line.      Recs:    Continue on IV daptomycin 10 mg/kg/d through 4/12 for total 4 week course   Can remove the picc line at that time   Advised to stay hydrated and use torsemide as instructed.   No ID follow up needed     Staffed with Dr. Wood.     Diana Bear   ID fellow      Interval history:   Jose Enrique drove down from [facility] today.   He denies any complications with the daily daptomycin. He is driving to the infusion center daily. He has only missed about 2 doses due to weather. He denies GI upset or trouble breathing.   He reports itchy rash in his elbow from the tape adhesive. No other picc concerns.          Key Prior Lab/Imaging and other data      Latest Reference Range & Units 04/09/25 13:23   Sodium 135 - 145 mmol/L 134 (L)   Potassium 3.4 - 5.3 mmol/L 4.4   Chloride 98 - 107 mmol/L 99   Carbon Dioxide (CO2) 22 - 29 mmol/L 22   Urea Nitrogen 6.0 - 20.0 mg/dL 49.6 (H)   Creatinine 0.67 - 1.17 mg/dL 1.71 (H)   GFR Estimate >60 mL/min/1.73m2 52 (L)   Calcium 8.8 - 10.4 mg/dL 9.6   Anion Gap 7 - 15 mmol/L 13   Albumin 3.5 - 5.2 g/dL 4.3   Protein Total 6.4 - 8.3 g/dL 8.4 (H)   Alkaline Phosphatase 40 - 150 U/L 107   ALT 0 - 70 U/L 30   AST 0 - 45 U/L 40   Bilirubin Total <=1.2 mg/dL 0.7      Latest Reference Range & Units 04/09/25 13:23   WBC 4.0 - 11.0 10e3/uL 6.8   Hemoglobin 13.3 - 17.7 g/dL 14.7   Hematocrit 40.0 - 53.0 % 43.5   Platelet Count 150 - 450 10e3/uL 340   RBC Count 4.40 - 5.90 10e6/uL 4.74   MCV 78 - 100 fL 92   MCH 26.5 - 33.0 pg 31.0   MCHC 31.5 - 36.5 g/dL 33.8   RDW 10.0 - 15.0 % 14.6   % Neutrophils % 44   % Lymphocytes % 32   % Monocytes % 12   % Eosinophils % 11   % Basophils % 1   % Immature Granulocytes % 0   Absolute Basophils 0.0 - 0.2 10e3/uL 0.1   Absolute Eosinophils 0.0 - 0.7 10e3/uL 0.7   Absolute Immature Granulocytes <=0.4 10e3/uL 0.0   Absolute Lymphocytes 0.8 - 5.3 10e3/uL 2.2          Review of Systems:     The following systems were reviewed with the patient as they pertain to  the case and are negative unless noted here or above in the HPI. The patient was  able to participate in the following review of systems    REVIEW OF SYSTEMS:     Constitutional: No fevers, no chills, no sweats  Cardiac: No history of chest pain, No palpitations  Respiratory: No shortness of breath, no wheeze, no cough  Gastro Intestinal: No history of abdominal pain, no vomiting, no diarrhea  Neurological: No headaches, no new or changing weakness, no new or changing numbness  Musculoskeletal: No joint pain or swelling HEENT: No congestion No stridor  Psychiatric: No reported hallucinations, No obvious delusions  Allergic: No Hives No Rash  Hematologic: No Easy Bruising, No Nosebleeds,   Genitourinary: No Dysuria, No Frequency  Endocrine: No Polyuria, No Polydipsia  Skin/Integumentary: No Rash, No Ulcer  Opthalmologic: No Diplopia, No Flashes        Other Medical History:     Attempt was made to collect past, family and social history during this encounter,  this information was reviewed with the patient and updated    Allergies Isosorbide, Benadryl [diphenhydramine], and Other environmental allergy    Past Medical History  Past Medical History:   Diagnosis Date    Congestive heart failure (H)     PastSurgical History   has a past surgical history that includes PICC/Midline Placement (Left, 07/07/2023); Subcutaneous Implantable Cardioverter Defibrillator Implant (N/A, 07/17/2023); Right Heart Catheterization (N/A, 01/29/2025); PICC/Midline Placement (Right, 02/06/2025); and Picc Insertion - Triple Lumen (Left, 03/18/2025).   Family History  No family history on file. Social History  He reports that he has quit smoking. His smoking use included cigarettes, cigars, pipe, and hookah. He started smoking about 22 years ago. He has never used smokeless tobacco. He reports that he does not drink alcohol and does not use drugs.   Notable Exposures Listed below if pertinent    Vaccination History:  Immunization History    Administered Date(s) Administered    TDAP (Adacel,Boostrix) 05/19/2023             Physical Exam:     VITAL SIGNS:  Blood pressure 104/72, resp. rate 18, weight 75.3 kg (166 lb), SpO2 99%.     GENERAL APPEARANCE: Not in acute distress    PHYSICAL EXAM:   Eyes:     no ptosis, no discharge, no scleral icterus  Mouth, Throat:     mucous membranes moist, pharynx normal without lesions  Cardiovascular:    Inspection: No Cyanosis, JVD not elevated   Auscultation:  S1, S2 normal, regular rate and rhythm  Respiratory:     Inspection: Not in respiratory distress, Chest expansion symmetrical   Auscultation: 4 point auscultation done clear to auscultation bilaterally, no wheezes, no rales, and no rhonchi  Gastrointestinal:      soft, non-tender; bowel sounds normal; no masses,  no organomegaly  Musculoskeletal:     no elbow wrist knee or ankle tenderness, deformity or swelling, no quadriceps calf or upper arm muscular tenderness noted  Skin:     Contact dermatitis in elbow on left arm, picc line without complication on the left arm.   Neurologic:     Higher Mental Function: Conversant, AOx4   Facial asymmetry grossly absent   is ambulatory  Psychiatric:     appropriate      Signature:     Diana Bear MD  Adult and Pediatrics Infectious Diseases Fellow       Case discussed with Supervising attending ID physician

## 2025-04-09 NOTE — PATIENT INSTRUCTIONS
Plan to end therapy at on 4/12       We will send a letter to you and your primary care provider summarizing our recommendations and the results of any testing performed today. Meanwhile feel free to contact our clinic at any time with questions and clarifications.    Thank you,    Diana Bear MD    Infectious Diseases Clinic   [facility]     Contact info:  Clinic Coordinator: [phone]  Clinic Fax: [phone]

## 2025-04-09 NOTE — LETTER
4/9/2025       RE: Jose Enrique Engel  [address]  [place] [address]     Dear Colleague,    Thank you for referring your patient, Jose Enrique Engel, to the [facility] INFECTIOUS DISEASE CLINIC [place] [facility]. Please see a copy of my visit note below.     [facility]    Division of Infectious Diseases and International Medicine    CLINICAL INFECTIOUS DISEASE OUTPATIENT CONSULTATION NOTE     Patient:  Jose Enrique Engel, Date of birth 1985, Medical record number [id number]  Date of Visit:  April 9, 2025  Referred by: Popeye Dudley   Reason for Visit: follow up         Assessment and Plan     ID Problem list:  MRSA bacteremia  Blood cultures positive at OSH from 3/10-transfer on 3/13, remain positive on 3/14, and growth is within 24 hrs of collection, suggesting high-grade bacteremia.   Blood cultures from 3/15 onwards will NGTD  JW did not suggest a valvular lesion, but the picture is certainly concerning for a non-valvular endovascular infection  Right upper extremity DVT with concern for septic thrombophlebitis   Repeat RUE ultrasound without signs of drainable fluid collection  Acute decompensated heart failure  H/o ICD w.o leads  CKD stage II        Discussion:  40 yo male with PMHx of HFrEF (10%) 2/2 NICM s/p ICD, CKD3, substance use disorder transferred from [place] 3/13 for further cardiac management after 3 days of persistent MRSA bacteremia and multiorgan dysfunction. The bacteremia is likely from PICC line (placed at previous hospitalization 2/3, possibly dislodged and replaced with potential for introducing infection) given the purulent discharge noted and cellulitis. There is also concern for septic thrombophlebitis based on DVT at insertion site noted on US and continued bacteremia. DVT could also be from stopping Xarelto in December, but suspicion for infection is warranted at this time given  clinical picture.      Blood cultures have tentatively cleared 3/15 onward after RUE PICC removal. After dual therapy with ceftaroline and daptomycin, he was continued on daptomycin 10mg/kg alone. Since discharge no complications from daptomycin and planning for 4 weeks total ending 4/12. At that time can remove the picc line.     Recs:    Continue on IV daptomycin 10 mg/kg/d through 4/12 for total 4 week course   Can remove the picc line at that time   Advised to stay hydrated and use torsemide as instructed.   No ID follow up needed     Staffed with Dr. Wood.     Diana Bear   ID fellow      Interval history:   Jose Enrique drove down from [facility] today.   He denies any complications with the daily daptomycin. He is driving to the infusion center daily. He has only missed about 2 doses due to weather. He denies GI upset or trouble breathing.   He reports itchy rash in his elbow from the tape adhesive. No other picc concerns.          Key Prior Lab/Imaging and other data      Latest Reference Range & Units 04/09/25 13:23   Sodium 135 - 145 mmol/L 134 (L)   Potassium 3.4 - 5.3 mmol/L 4.4   Chloride 98 - 107 mmol/L 99   Carbon Dioxide (CO2) 22 - 29 mmol/L 22   Urea Nitrogen 6.0 - 20.0 mg/dL 49.6 (H)   Creatinine 0.67 - 1.17 mg/dL 1.71 (H)   GFR Estimate >60 mL/min/1.73m2 52 (L)   Calcium 8.8 - 10.4 mg/dL 9.6   Anion Gap 7 - 15 mmol/L 13   Albumin 3.5 - 5.2 g/dL 4.3   Protein Total 6.4 - 8.3 g/dL 8.4 (H)   Alkaline Phosphatase 40 - 150 U/L 107   ALT 0 - 70 U/L 30   AST 0 - 45 U/L 40   Bilirubin Total <=1.2 mg/dL 0.7      Latest Reference Range & Units 04/09/25 13:23   WBC 4.0 - 11.0 10e3/uL 6.8   Hemoglobin 13.3 - 17.7 g/dL 14.7   Hematocrit 40.0 - 53.0 % 43.5   Platelet Count 150 - 450 10e3/uL 340   RBC Count 4.40 - 5.90 10e6/uL 4.74   MCV 78 - 100 fL 92   MCH 26.5 - 33.0 pg 31.0   MCHC 31.5 - 36.5 g/dL 33.8   RDW 10.0 - 15.0 % 14.6   % Neutrophils % 44   % Lymphocytes % 32   % Monocytes % 12   % Eosinophils % 11   %  Basophils % 1   % Immature Granulocytes % 0   Absolute Basophils 0.0 - 0.2 10e3/uL 0.1   Absolute Eosinophils 0.0 - 0.7 10e3/uL 0.7   Absolute Immature Granulocytes <=0.4 10e3/uL 0.0   Absolute Lymphocytes 0.8 - 5.3 10e3/uL 2.2          Review of Systems:     The following systems were reviewed with the patient as they pertain to the case and are negative unless noted here or above in the HPI. The patient was  able to participate in the following review of systems    REVIEW OF SYSTEMS:     Constitutional: No fevers, no chills, no sweats  Cardiac: No history of chest pain, No palpitations  Respiratory: No shortness of breath, no wheeze, no cough  Gastro Intestinal: No history of abdominal pain, no vomiting, no diarrhea  Neurological: No headaches, no new or changing weakness, no new or changing numbness  Musculoskeletal: No joint pain or swelling HEENT: No congestion No stridor  Psychiatric: No reported hallucinations, No obvious delusions  Allergic: No Hives No Rash  Hematologic: No Easy Bruising, No Nosebleeds,   Genitourinary: No Dysuria, No Frequency  Endocrine: No Polyuria, No Polydipsia  Skin/Integumentary: No Rash, No Ulcer  Opthalmologic: No Diplopia, No Flashes        Other Medical History:     Attempt was made to collect past, family and social history during this encounter,  this information was reviewed with the patient and updated    Allergies Isosorbide, Benadryl [diphenhydramine], and Other environmental allergy    Past Medical History  Past Medical History:   Diagnosis Date     Congestive heart failure (H)     PastSurgical History   has a past surgical history that includes PICC/Midline Placement (Left, 07/07/2023); Subcutaneous Implantable Cardioverter Defibrillator Implant (N/A, 07/17/2023); Right Heart Catheterization (N/A, 01/29/2025); PICC/Midline Placement (Right, 02/06/2025); and Picc Insertion - Triple Lumen (Left, 03/18/2025).   Family History  No family history on file. Social History  He  reports that he has quit smoking. His smoking use included cigarettes, cigars, pipe, and hookah. He started smoking about 22 years ago. He has never used smokeless tobacco. He reports that he does not drink alcohol and does not use drugs.   Notable Exposures Listed below if pertinent    Vaccination History:  Immunization History   Administered Date(s) Administered     TDAP (Adacel,Boostrix) 05/19/2023             Physical Exam:     VITAL SIGNS:  Blood pressure 104/72, resp. rate 18, weight 75.3 kg (166 lb), SpO2 99%.     GENERAL APPEARANCE: Not in acute distress    PHYSICAL EXAM:   Eyes:     no ptosis, no discharge, no scleral icterus  Mouth, Throat:     mucous membranes moist, pharynx normal without lesions  Cardiovascular:    Inspection: No Cyanosis, JVD not elevated   Auscultation:  S1, S2 normal, regular rate and rhythm  Respiratory:     Inspection: Not in respiratory distress, Chest expansion symmetrical   Auscultation: 4 point auscultation done clear to auscultation bilaterally, no wheezes, no rales, and no rhonchi  Gastrointestinal:      soft, non-tender; bowel sounds normal; no masses,  no organomegaly  Musculoskeletal:     no elbow wrist knee or ankle tenderness, deformity or swelling, no quadriceps calf or upper arm muscular tenderness noted  Skin:     Contact dermatitis in elbow on left arm, picc line without complication on the left arm.   Neurologic:     Higher Mental Function: Conversant, AOx4   Facial asymmetry grossly absent   is ambulatory  Psychiatric:     appropriate      Signature:     Diana Bear MD  Adult and Pediatrics Infectious Diseases Fellow       Case discussed with Supervising attending ID physician     Attending Physician Attestation:  I personally reviewed the vitals, labs, microbiologic data, imaging, and interdisciplinary notes.This note by Dr. Diana Bear - ID fellow reflects my observations and opinions, and the assessment and recommendations reflect my approach.    Popeye  Israel LEBRON  Division of Infectious Diseases and International Medicine  P: [phone]  Date of Service (when I saw the patient): 04/09/25    I spent at least 40 minutes on the day of this encounter on chart review, patient interview and examination, and note preparation.      Again, thank you for allowing me to participate in the care of your patient.      Sincerely,    Diana Bear MD

## 2025-04-10 ENCOUNTER — TELEPHONE (OUTPATIENT)
Dept: INFECTIOUS DISEASES | Facility: CLINIC | Age: 40
End: 2025-04-10
Payer: COMMERCIAL

## 2025-04-10 VITALS
BODY MASS INDEX: 23.82 KG/M2 | WEIGHT: 166 LBS | HEART RATE: 63 BPM | DIASTOLIC BLOOD PRESSURE: 72 MMHG | OXYGEN SATURATION: 99 % | SYSTOLIC BLOOD PRESSURE: 104 MMHG

## 2025-04-10 NOTE — TELEPHONE ENCOUNTER
Follow up call to [facility] following patient's clinic appt yesterday.    Dr. Bear has ordered for IV therapy of Daptomycin to stop on 4/12/2025. PICC line can be pulled on 4/12/2025.    Will fax orders to infusion center.    Infusion center verbalized understanding.    NINOSKA Lai, RN  RN Care Coordinator Infectious Disease Clinic      Infusion center returned call to this RN, they have continued to have issues with compliance with patient.   This RN reviewed that he did not have an infusion here yesterday, we did not pull his PICC.  Therapy is through 4/12/2025.    All questions answered.    NINOSKA Lai, RN  RN Care Coordinator Infectious Disease Clinic

## 2025-04-13 LAB — CANNABINOIDS SERPL-MCNC: 10 NG/ML

## 2025-04-14 ENCOUNTER — TELEPHONE (OUTPATIENT)
Dept: INFECTIOUS DISEASES | Facility: CLINIC | Age: 40
End: 2025-04-14
Payer: COMMERCIAL

## 2025-04-14 NOTE — TELEPHONE ENCOUNTER
OPAT therapy plan complete, episode resolved.    Cheryl Verdugo, BSN, RN  RN Care Coordinator Infectious Disease Clinic

## 2025-04-17 LAB
AMPHET SERPL-MCNC: <20 NG/ML
MDA SERPL-MCNC: <20 NG/ML
MDEA SERPL-MCNC: <20 NG/ML
MDMA SERPL-MCNC: <20 NG/ML
METHAMPHET SERPL-MCNC: 110 NG/ML

## 2025-04-22 ENCOUNTER — CARE COORDINATION (OUTPATIENT)
Dept: CARDIOLOGY | Facility: CLINIC | Age: 40
End: 2025-04-22
Payer: COMMERCIAL

## 2025-04-22 NOTE — PROGRESS NOTES
Called Jose Enrique. He hasn't heard from [facility] yet about second opinion. Will follow up with them on this.    Confirmed he is set to see dr dueñas in August but should see the nurse practitioners in May or June. He states understanding.

## 2025-04-22 NOTE — PROGRESS NOTES
Scheduling attempted to reach out to Jose Enrique to set up CORE visit - no answer, voicemail left.    Called [facility].    They report some records have been scanned into his chart but they don't see 'an order' so will refax the referral. [phone] fax for new referral    Referral refaxed as requested.

## 2025-05-05 ENCOUNTER — CARE COORDINATION (OUTPATIENT)
Dept: CARDIOLOGY | Facility: CLINIC | Age: 40
End: 2025-05-05
Payer: COMMERCIAL

## 2025-05-05 NOTE — PROGRESS NOTES
Jose Enrique returned call. He states he has been busy (has a girl moving in and life going on) but he did get my previous message.   He had an appointment with eleanor set up for 5/8 but realized this day wasn't good for him so he rescheduled for 6/5. He states he hasn't been in for labs yet. Reviewed we should get some labs done. He will go into his local [place] clinic in the coming days to get these done.  Otherwise he reports he is feeling well, states he is taking his pills. Weight today 169lb per his report.

## 2025-05-12 ENCOUNTER — RESULTS FOLLOW-UP (OUTPATIENT)
Dept: CARDIOLOGY | Facility: CLINIC | Age: 40
End: 2025-05-12

## 2025-06-03 ENCOUNTER — CARE COORDINATION (OUTPATIENT)
Dept: CARDIOLOGY | Facility: CLINIC | Age: 40
End: 2025-06-03
Payer: COMMERCIAL

## 2025-06-03 DIAGNOSIS — N18.30 STAGE 3 CHRONIC KIDNEY DISEASE, UNSPECIFIED WHETHER STAGE 3A OR 3B CKD (H): ICD-10-CM

## 2025-06-03 DIAGNOSIS — I50.42 CHRONIC HEART FAILURE WITH REDUCED EJECTION FRACTION AND DIASTOLIC DYSFUNCTION (H): ICD-10-CM

## 2025-06-03 RX ORDER — HYDRALAZINE HYDROCHLORIDE 25 MG/1
25 TABLET, FILM COATED ORAL 3 TIMES DAILY
Qty: 270 TABLET | Refills: 1 | Status: SHIPPED | OUTPATIENT
Start: 2025-06-03

## 2025-06-03 RX ORDER — DIGOXIN 125 MCG
125 TABLET ORAL DAILY
Qty: 90 TABLET | Refills: 1 | Status: SHIPPED | OUTPATIENT
Start: 2025-06-03

## 2025-06-03 NOTE — PROGRESS NOTES
Called Jose Enrique as noted in his chart he may need refill of hydralazine.  He states he has been out of hydralazine for about a week, wasn't sure if he should continue it or stop it. Reviewed he should continue all his medications. Will send refill through for him right now. Confirmed [facility] pharmacy.  He also states he is out of jardiance and digoxin, just ran out. He tells me pharmacy said it is too soon to refill jardiance. Confirms he is taking 10mg tablet daily of jardiance.     He reports overall he is feeling pretty good. Weight is up a little bit, between 175-180lb. He reports no swelling and no changes to his breathing. States he is eating more and this has led to body weight gain.     Called pharmacy. They report multiple jardiance rx on file (one for half tab and one for full tab). He picked up the half tab rx so that is why it is coming back as 'too soon.' Reviewed that he should be on full tab - they will discontinue half tab rx and try to get full tablet covered for  now.  They need new rx for digoxin as last one got e cancelled.    Called Jose Enrique. Let him know pharmacy is working on filling all meds that have been sent. He states understanding, is grateful for the help. Has follow up scheduled for second opinion at [facility] tomorrow.

## 2025-06-10 DIAGNOSIS — N18.30 STAGE 3 CHRONIC KIDNEY DISEASE, UNSPECIFIED WHETHER STAGE 3A OR 3B CKD (H): ICD-10-CM

## 2025-06-10 RX ORDER — DIGOXIN 125 MCG
125 TABLET ORAL DAILY
Qty: 90 TABLET | Refills: 1 | OUTPATIENT
Start: 2025-06-10

## 2025-06-16 ENCOUNTER — CARE COORDINATION (OUTPATIENT)
Dept: CARDIOLOGY | Facility: CLINIC | Age: 40
End: 2025-06-16
Payer: COMMERCIAL

## 2025-06-16 NOTE — PROGRESS NOTES
Called Jose Enrique to remind him to get labs done at local clinic.  Someone else picked up, stated that he was busy but asked if I could relay the message.  He appeared to be next to the person on the phone.  Reminded him of labs needing to be today or tomorrow.

## 2025-06-23 ENCOUNTER — RESULTS FOLLOW-UP (OUTPATIENT)
Dept: CARDIOLOGY | Facility: CLINIC | Age: 40
End: 2025-06-23

## 2025-06-23 ENCOUNTER — CARE COORDINATION (OUTPATIENT)
Dept: CARDIOLOGY | Facility: CLINIC | Age: 40
End: 2025-06-23
Payer: COMMERCIAL

## 2025-06-23 DIAGNOSIS — I50.42 CHRONIC HEART FAILURE WITH REDUCED EJECTION FRACTION AND DIASTOLIC DYSFUNCTION (H): Primary | ICD-10-CM

## 2025-06-23 NOTE — PROGRESS NOTES
Called Jose Enrique to review lab results and to discuss drug screen which was not drawn with other labs. Patient did not answer.     Noted that patient was advised to get labs done on Monday/tUesday and they were not done until Friday which is not in compliance with drug screen policy.

## 2025-06-25 RX ORDER — POTASSIUM CHLORIDE 1500 MG/1
20 TABLET, EXTENDED RELEASE ORAL 2 TIMES DAILY
Qty: 180 TABLET | Refills: 1 | Status: SHIPPED | OUTPATIENT
Start: 2025-06-25

## 2025-06-25 RX ORDER — SPIRONOLACTONE 25 MG/1
25 TABLET ORAL DAILY
Qty: 90 TABLET | Refills: 1 | Status: SHIPPED | OUTPATIENT
Start: 2025-06-25

## 2025-06-25 NOTE — PROGRESS NOTES
Date: 6/25/2025    Time of Call: 3:47 PM     Diagnosis:  heart failure     [ VORB ] Ordering provider: Heather Dove MD    Order: increase potassium up to 20meq bid. Continue evelyn, remove eplerenone from med list. No ED meds at this time due to blood pressure concerns.      Order received by: La Norman RN      Follow-up/additional notes:   3:50 PM called daksha. Left message asking for call back to discuss.
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
## 2025-07-21 ENCOUNTER — CARE COORDINATION (OUTPATIENT)
Dept: CARDIOLOGY | Facility: CLINIC | Age: 40
End: 2025-07-21
Payer: COMMERCIAL

## 2025-07-21 NOTE — PROGRESS NOTES
Called Jose Enrique to let him know he is due for drug testing. Confirmed with bernadette they do have lab orders.  He will go in today to get his random testing completed.

## 2025-07-23 NOTE — PROGRESS NOTES
Called Jose Enrique. Left voicemail letting him know that his window for random drug testing of 24 hours has closed. We will notify him of next random drug testing.

## 2025-07-24 ENCOUNTER — EXTERNAL ORDER RESULTS (OUTPATIENT)
Dept: LAB | Facility: CLINIC | Age: 40
End: 2025-07-24
Payer: COMMERCIAL

## 2025-08-05 DIAGNOSIS — I50.42 CHRONIC HEART FAILURE WITH REDUCED EJECTION FRACTION AND DIASTOLIC DYSFUNCTION (H): Primary | ICD-10-CM

## 2025-08-06 ENCOUNTER — OFFICE VISIT (OUTPATIENT)
Dept: CARDIOLOGY | Facility: CLINIC | Age: 40
End: 2025-08-06
Attending: STUDENT IN AN ORGANIZED HEALTH CARE EDUCATION/TRAINING PROGRAM
Payer: COMMERCIAL

## 2025-08-06 ENCOUNTER — LAB (OUTPATIENT)
Dept: LAB | Facility: CLINIC | Age: 40
End: 2025-08-06
Attending: STUDENT IN AN ORGANIZED HEALTH CARE EDUCATION/TRAINING PROGRAM
Payer: COMMERCIAL

## 2025-08-06 VITALS
HEART RATE: 80 BPM | OXYGEN SATURATION: 100 % | BODY MASS INDEX: 25.68 KG/M2 | SYSTOLIC BLOOD PRESSURE: 92 MMHG | DIASTOLIC BLOOD PRESSURE: 64 MMHG | WEIGHT: 179 LBS

## 2025-08-06 DIAGNOSIS — N18.30 STAGE 3 CHRONIC KIDNEY DISEASE, UNSPECIFIED WHETHER STAGE 3A OR 3B CKD (H): ICD-10-CM

## 2025-08-06 DIAGNOSIS — I42.8 NONISCHEMIC CARDIOMYOPATHY (H): ICD-10-CM

## 2025-08-06 DIAGNOSIS — I50.22 CHRONIC SYSTOLIC HEART FAILURE (H): Primary | ICD-10-CM

## 2025-08-06 DIAGNOSIS — I50.42 CHRONIC HEART FAILURE WITH REDUCED EJECTION FRACTION AND DIASTOLIC DYSFUNCTION (H): ICD-10-CM

## 2025-08-06 DIAGNOSIS — R78.81 MRSA BACTEREMIA: ICD-10-CM

## 2025-08-06 DIAGNOSIS — B95.62 MRSA BACTEREMIA: ICD-10-CM

## 2025-08-06 DIAGNOSIS — Z91.148 NONCOMPLIANCE WITH MEDICATION REGIMEN: ICD-10-CM

## 2025-08-06 DIAGNOSIS — F19.10 POLYSUBSTANCE ABUSE (H): ICD-10-CM

## 2025-08-06 DIAGNOSIS — N52.9 ERECTILE DYSFUNCTION, UNSPECIFIED ERECTILE DYSFUNCTION TYPE: ICD-10-CM

## 2025-08-06 LAB
ALBUMIN SERPL BCG-MCNC: 4.7 G/DL (ref 3.5–5.2)
ALP SERPL-CCNC: 121 U/L (ref 40–150)
ALT SERPL W P-5'-P-CCNC: 30 U/L (ref 0–70)
ANION GAP SERPL CALCULATED.3IONS-SCNC: 13 MMOL/L (ref 7–15)
AST SERPL W P-5'-P-CCNC: 32 U/L (ref 0–45)
BASOPHILS # BLD AUTO: 0 10E3/UL (ref 0–0.2)
BASOPHILS NFR BLD AUTO: 0 %
BILIRUB SERPL-MCNC: 0.8 MG/DL
BUN SERPL-MCNC: 27.5 MG/DL (ref 6–20)
CALCIUM SERPL-MCNC: 10 MG/DL (ref 8.8–10.4)
CHLORIDE SERPL-SCNC: 98 MMOL/L (ref 98–107)
CK SERPL-CCNC: 209 U/L (ref 39–308)
CREAT SERPL-MCNC: 1.33 MG/DL (ref 0.67–1.17)
EGFRCR SERPLBLD CKD-EPI 2021: 69 ML/MIN/1.73M2
EOSINOPHIL # BLD AUTO: 0.2 10E3/UL (ref 0–0.7)
EOSINOPHIL NFR BLD AUTO: 2 %
ERYTHROCYTE [DISTWIDTH] IN BLOOD BY AUTOMATED COUNT: 11.9 % (ref 10–15)
GLUCOSE SERPL-MCNC: 90 MG/DL (ref 70–99)
HCO3 SERPL-SCNC: 27 MMOL/L (ref 22–29)
HCT VFR BLD AUTO: 51.4 % (ref 40–53)
HGB BLD-MCNC: 16.8 G/DL (ref 13.3–17.7)
IMM GRANULOCYTES # BLD: 0 10E3/UL
IMM GRANULOCYTES NFR BLD: 0 %
LYMPHOCYTES # BLD AUTO: 2.1 10E3/UL (ref 0.8–5.3)
LYMPHOCYTES NFR BLD AUTO: 29 %
MCH RBC QN AUTO: 30.4 PG (ref 26.5–33)
MCHC RBC AUTO-ENTMCNC: 32.7 G/DL (ref 31.5–36.5)
MCV RBC AUTO: 93 FL (ref 78–100)
MONOCYTES # BLD AUTO: 0.8 10E3/UL (ref 0–1.3)
MONOCYTES NFR BLD AUTO: 11 %
NEUTROPHILS # BLD AUTO: 4.3 10E3/UL (ref 1.6–8.3)
NEUTROPHILS NFR BLD AUTO: 58 %
NRBC # BLD AUTO: 0 10E3/UL
NRBC BLD AUTO-RTO: 0 /100
PLATELET # BLD AUTO: 278 10E3/UL (ref 150–450)
POTASSIUM SERPL-SCNC: 3.8 MMOL/L (ref 3.4–5.3)
PROT SERPL-MCNC: 8.2 G/DL (ref 6.4–8.3)
RBC # BLD AUTO: 5.53 10E6/UL (ref 4.4–5.9)
SODIUM SERPL-SCNC: 138 MMOL/L (ref 135–145)
WBC # BLD AUTO: 7.4 10E3/UL (ref 4–11)

## 2025-08-06 PROCEDURE — 99215 OFFICE O/P EST HI 40 MIN: CPT | Performed by: STUDENT IN AN ORGANIZED HEALTH CARE EDUCATION/TRAINING PROGRAM

## 2025-08-06 PROCEDURE — G2211 COMPLEX E/M VISIT ADD ON: HCPCS | Performed by: STUDENT IN AN ORGANIZED HEALTH CARE EDUCATION/TRAINING PROGRAM

## 2025-08-06 PROCEDURE — G0463 HOSPITAL OUTPT CLINIC VISIT: HCPCS | Performed by: STUDENT IN AN ORGANIZED HEALTH CARE EDUCATION/TRAINING PROGRAM

## 2025-08-06 RX ORDER — FERROUS SULFATE 325(65) MG
325 TABLET ORAL
Qty: 90 TABLET | Refills: 3 | Status: SHIPPED | OUTPATIENT
Start: 2025-08-06

## 2025-08-06 ASSESSMENT — PAIN SCALES - GENERAL: PAINLEVEL_OUTOF10: NO PAIN (0)

## 2025-08-07 LAB — Lab: NEGATIVE

## 2025-08-10 LAB
CANNABINOIDS SERPL-MCNC: 30 NG/ML
SCANNED LAB RESULT: ABNORMAL

## 2025-08-13 LAB
AMPHET SERPL-MCNC: 26 NG/ML
MDA SERPL-MCNC: <20 NG/ML
MDEA SERPL-MCNC: <20 NG/ML
MDMA SERPL-MCNC: <20 NG/ML
METHAMPHET SERPL-MCNC: 385 NG/ML

## 2025-08-27 DIAGNOSIS — I50.22 CHRONIC SYSTOLIC HEART FAILURE (H): Primary | ICD-10-CM

## (undated) DEVICE — PACK HEART RIGHT CUSTOM SAN32RHF18

## (undated) DEVICE — Device

## (undated) DEVICE — KIT RIGHT HEART CATH 60130719

## (undated) DEVICE — INTRO SHEATH 7FRX10CM PINNACLE RSS702

## (undated) DEVICE — WIRE GUIDE 0.025"X150CM EMERALD J TIP 502524

## (undated) DEVICE — ELECTRODE DEFIB CADENCE 22550R

## (undated) RX ORDER — LIDOCAINE 40 MG/G
CREAM TOPICAL
Status: DISPENSED
Start: 2025-01-29

## (undated) RX ORDER — GLYCOPYRROLATE 0.2 MG/ML
INJECTION, SOLUTION INTRAMUSCULAR; INTRAVENOUS
Status: DISPENSED
Start: 2023-07-17

## (undated) RX ORDER — LIDOCAINE HYDROCHLORIDE 20 MG/ML
INJECTION, SOLUTION INFILTRATION; PERINEURAL
Status: DISPENSED
Start: 2023-07-17

## (undated) RX ORDER — FENTANYL CITRATE 50 UG/ML
INJECTION, SOLUTION INTRAMUSCULAR; INTRAVENOUS
Status: DISPENSED
Start: 2023-07-17

## (undated) RX ORDER — CEFAZOLIN SODIUM/WATER 2 G/20 ML
SYRINGE (ML) INTRAVENOUS
Status: DISPENSED
Start: 2023-07-17

## (undated) RX ORDER — PROPOFOL 10 MG/ML
INJECTION, EMULSION INTRAVENOUS
Status: DISPENSED
Start: 2023-07-17

## (undated) RX ORDER — FENTANYL CITRATE 50 UG/ML
INJECTION, SOLUTION INTRAMUSCULAR; INTRAVENOUS
Status: DISPENSED
Start: 2025-03-14

## (undated) RX ORDER — CEFAZOLIN SODIUM 1 G/3ML
INJECTION, POWDER, FOR SOLUTION INTRAMUSCULAR; INTRAVENOUS
Status: DISPENSED
Start: 2023-07-17

## (undated) RX ORDER — ONDANSETRON 2 MG/ML
INJECTION INTRAMUSCULAR; INTRAVENOUS
Status: DISPENSED
Start: 2023-07-17

## (undated) RX ORDER — LIDOCAINE HYDROCHLORIDE 20 MG/ML
SOLUTION OROPHARYNGEAL
Status: DISPENSED
Start: 2025-03-14